# Patient Record
Sex: FEMALE | Race: WHITE | NOT HISPANIC OR LATINO | Employment: UNEMPLOYED | URBAN - METROPOLITAN AREA
[De-identification: names, ages, dates, MRNs, and addresses within clinical notes are randomized per-mention and may not be internally consistent; named-entity substitution may affect disease eponyms.]

---

## 2019-05-28 LAB
CHOLEST SERPL-MSCNC: 217 MG/DL (ref 0–200)
HDLC SERPL-MCNC: 35 MG/DL
LDLC SERPL CALC-MCNC: 147 MG/DL
TRIGL SERPL-MCNC: 176 MG/DL

## 2019-08-26 ENCOUNTER — PATIENT OUTREACH (OUTPATIENT)
Dept: ADMINISTRATIVE | Facility: HOSPITAL | Age: 39
End: 2019-08-26

## 2019-08-26 DIAGNOSIS — G47.33 OSA (OBSTRUCTIVE SLEEP APNEA): Primary | ICD-10-CM

## 2019-08-26 NOTE — LETTER
August 26, 2019    Mariana Mora  [address] [place] [address]             [facility]  [address]  [facility] [address]  Phone: [phone] Dear Mariana Mora    [facility] is committed to your overall health.  To help you get the most out of each of your visits, we will review your information to make sure you are up to date on all of your recommended tests and/or procedures.      As a new patient to EMILIA HAMILTON MD, we may not have your complete medical records.  He has found that your chart shows you may be due for:    ANNUAL LABS  PAP SMEAR    If you have had any of the above done at another facility, please bring the records or information with you so that your record at [facility] will be complete.      If you are currently taking medication, please bring it with you to your appointment for review.    Also, if you have any type of Advanced Directives, please bring them with you to your office visit so we may scan them into your chart.      Thank You,  Your [facility] Team  Lizeth Aguilar L.P.N. Clinical Care Coordinator  [address] [place] [address], [place] [address] [phone]

## 2019-09-04 ENCOUNTER — PROCEDURE VISIT (OUTPATIENT)
Dept: SLEEP MEDICINE | Facility: HOSPITAL | Age: 39
End: 2019-09-04
Attending: INTERNAL MEDICINE
Payer: COMMERCIAL

## 2019-09-04 DIAGNOSIS — G47.33 OSA (OBSTRUCTIVE SLEEP APNEA): ICD-10-CM

## 2019-09-04 PROCEDURE — 95806 SLEEP STUDY UNATT&RESP EFFT: CPT

## 2019-09-09 ENCOUNTER — OFFICE VISIT (OUTPATIENT)
Dept: FAMILY MEDICINE | Facility: CLINIC | Age: 39
End: 2019-09-09
Payer: COMMERCIAL

## 2019-09-09 VITALS
DIASTOLIC BLOOD PRESSURE: 79 MMHG | HEART RATE: 86 BPM | RESPIRATION RATE: 16 BRPM | OXYGEN SATURATION: 98 % | BODY MASS INDEX: 41.71 KG/M2 | WEIGHT: 259.5 LBS | SYSTOLIC BLOOD PRESSURE: 119 MMHG | HEIGHT: 66 IN

## 2019-09-09 DIAGNOSIS — K21.9 GASTROESOPHAGEAL REFLUX DISEASE WITHOUT ESOPHAGITIS: ICD-10-CM

## 2019-09-09 DIAGNOSIS — L40.50 PSORIATIC ARTHRITIS: ICD-10-CM

## 2019-09-09 DIAGNOSIS — F41.9 ANXIETY: Primary | ICD-10-CM

## 2019-09-09 PROCEDURE — 99203 PR OFFICE/OUTPT VISIT, NEW, LEVL III, 30-44 MIN: ICD-10-PCS | Mod: S$GLB,,, | Performed by: FAMILY MEDICINE

## 2019-09-09 PROCEDURE — 99203 OFFICE O/P NEW LOW 30 MIN: CPT | Mod: S$GLB,,, | Performed by: FAMILY MEDICINE

## 2019-09-09 PROCEDURE — 99999 PR PBB SHADOW E&M-EST. PATIENT-LVL IV: ICD-10-PCS | Mod: PBBFAC,,, | Performed by: FAMILY MEDICINE

## 2019-09-09 PROCEDURE — 99999 PR PBB SHADOW E&M-EST. PATIENT-LVL IV: CPT | Mod: PBBFAC,,, | Performed by: FAMILY MEDICINE

## 2019-09-09 PROCEDURE — 3008F BODY MASS INDEX DOCD: CPT | Mod: S$GLB,,, | Performed by: FAMILY MEDICINE

## 2019-09-09 PROCEDURE — 3008F PR BODY MASS INDEX (BMI) DOCUMENTED: ICD-10-PCS | Mod: S$GLB,,, | Performed by: FAMILY MEDICINE

## 2019-09-09 RX ORDER — LEVONORGESTREL / ETHINYL ESTRADIOL AND ETHINYL ESTRADIOL 150-30(84)
KIT ORAL
COMMUNITY
End: 2020-11-11 | Stop reason: SDUPTHER

## 2019-09-09 RX ORDER — SECUKINUMAB 150 MG/ML
INJECTION SUBCUTANEOUS
COMMUNITY
Start: 2019-08-08 | End: 2021-09-20

## 2019-09-09 RX ORDER — ERGOCALCIFEROL 1.25 MG/1
50000 CAPSULE ORAL
COMMUNITY
Start: 2019-07-11 | End: 2019-12-09

## 2019-09-09 RX ORDER — SERTRALINE HYDROCHLORIDE 50 MG/1
50 TABLET, FILM COATED ORAL DAILY
COMMUNITY
Start: 2019-07-09 | End: 2019-09-09 | Stop reason: SDUPTHER

## 2019-09-09 RX ORDER — APREMILAST 30 MG/1
30 TABLET, FILM COATED ORAL 2 TIMES DAILY
COMMUNITY
Start: 2019-08-28 | End: 2022-09-15

## 2019-09-09 RX ORDER — OMEPRAZOLE 40 MG/1
40 CAPSULE, DELAYED RELEASE ORAL 2 TIMES DAILY
COMMUNITY
End: 2019-09-09 | Stop reason: SDUPTHER

## 2019-09-09 RX ORDER — LEVOTHYROXINE SODIUM 75 UG/1
75 TABLET ORAL DAILY
COMMUNITY
Start: 2019-08-19 | End: 2020-02-20 | Stop reason: SDUPTHER

## 2019-09-09 RX ORDER — SERTRALINE HYDROCHLORIDE 50 MG/1
50 TABLET, FILM COATED ORAL DAILY
Qty: 30 TABLET | Refills: 11 | Status: SHIPPED | OUTPATIENT
Start: 2019-09-09 | End: 2020-03-12 | Stop reason: DRUGHIGH

## 2019-09-09 RX ORDER — RIZATRIPTAN BENZOATE 10 MG/1
10 TABLET, ORALLY DISINTEGRATING ORAL
COMMUNITY
Start: 2019-07-30 | End: 2020-05-22 | Stop reason: SDUPTHER

## 2019-09-09 RX ORDER — CETIRIZINE HYDROCHLORIDE 10 MG/1
10 TABLET ORAL DAILY
COMMUNITY
End: 2020-07-24

## 2019-09-09 RX ORDER — OMEPRAZOLE 40 MG/1
40 CAPSULE, DELAYED RELEASE ORAL 2 TIMES DAILY
Qty: 60 CAPSULE | Refills: 11 | Status: SHIPPED | OUTPATIENT
Start: 2019-09-09 | End: 2020-02-27

## 2019-09-09 NOTE — PROGRESS NOTES
"Subjective:       Patient ID: Mariana Mora is a 38 y.o. female.    Chief Complaint: Establish Care (BW completed w/Dr. Burkett, Pap completed w/[facility], Pt refused vaccinations) and Medication Refill    Establish care    Psoriatic arthritis  Seeing rheum and chiropractor, improved, stable    Anxiety  Stable  Would like to see a psychologist for non-medication options    GERD  Controlled with prilosec bid    Review of Systems   Constitutional: Negative for activity change, appetite change, chills, fatigue and fever.   HENT: Negative for congestion, dental problem, facial swelling, nosebleeds, postnasal drip, sinus pain, sore throat, trouble swallowing and voice change.    Eyes: Negative for pain, discharge and visual disturbance.   Respiratory: Negative for apnea, cough, chest tightness and shortness of breath.    Cardiovascular: Negative for chest pain and palpitations.   Gastrointestinal: Negative for abdominal pain, blood in stool, constipation and nausea.   Endocrine: Negative for cold intolerance, polydipsia and polyuria.   Genitourinary: Negative for difficulty urinating, enuresis and flank pain.   Musculoskeletal: Positive for arthralgias, back pain, gait problem and myalgias.   Skin: Negative for color change.   Allergic/Immunologic: Negative for environmental allergies and immunocompromised state.   Neurological: Negative for dizziness and light-headedness.   Hematological: Negative for adenopathy.   Psychiatric/Behavioral: Negative for agitation, behavioral problems, decreased concentration and dysphoric mood. The patient is not nervous/anxious.    All other systems reviewed and are negative.        Reviewed family, medical, surgical, and social history.    Objective:      /79 (BP Location: Left arm, Patient Position: Sitting, BP Method: Large (Automatic))   Pulse 86   Resp 16   Ht 5' 5.5" (1.664 m)   Wt 117.7 kg (259 lb 8 oz)   LMP  (Approximate) Comment: Irregular  SpO2 " 98%   BMI 42.53 kg/m²   Physical Exam   Constitutional: She is oriented to person, place, and time. She appears well-developed and well-nourished. No distress.   HENT:   Head: Normocephalic and atraumatic.   Nose: Nose normal.   Mouth/Throat: Oropharynx is clear and moist. No oropharyngeal exudate.   Eyes: Pupils are equal, round, and reactive to light. Conjunctivae and EOM are normal. No scleral icterus.   Neck: Normal range of motion. Neck supple. No thyromegaly present.   Cardiovascular: Normal rate, regular rhythm and normal heart sounds. Exam reveals no gallop and no friction rub.   No murmur heard.  Pulmonary/Chest: Effort normal and breath sounds normal. No respiratory distress. She has no wheezes. She has no rales. She exhibits no tenderness.   Abdominal: Soft. Bowel sounds are normal. She exhibits no distension. There is no tenderness. There is no guarding.   Musculoskeletal: Normal range of motion. She exhibits tenderness. She exhibits no edema or deformity.   Lymphadenopathy:     She has no cervical adenopathy.   Neurological: She is alert and oriented to person, place, and time. She displays normal reflexes. No cranial nerve deficit or sensory deficit. She exhibits normal muscle tone.   Skin: Skin is warm and dry. Rash noted. She is not diaphoretic. No erythema. No pallor.   Psychiatric: She has a normal mood and affect. Her behavior is normal. Judgment and thought content normal.   Nursing note and vitals reviewed.      Assessment:       1. Anxiety    2. Gastroesophageal reflux disease without esophagitis    3. Psoriatic arthritis        Plan:       Anxiety  -     sertraline (ZOLOFT) 50 MG tablet; Take 1 tablet (50 mg total) by mouth once daily.  Dispense: 30 tablet; Refill: 11  -     Ambulatory referral to Psychology    Gastroesophageal reflux disease without esophagitis  -     omeprazole (PRILOSEC) 40 MG capsule; Take 1 capsule (40 mg total) by mouth 2 (two) times daily.  Dispense: 60 capsule;  Refill: 11    Psoriatic arthritis            Risks, benefits, and side effects were discussed with the patient. All questions were answered to the fullest satisfaction of the patient, and pt verbalized understanding and agreement to treatment plan. Pt was to call with any new or worsening symptoms, or present to the ER.

## 2019-09-12 ENCOUNTER — PATIENT OUTREACH (OUTPATIENT)
Dept: ADMINISTRATIVE | Facility: HOSPITAL | Age: 39
End: 2019-09-12

## 2019-09-13 ENCOUNTER — TELEPHONE (OUTPATIENT)
Dept: FAMILY MEDICINE | Facility: CLINIC | Age: 39
End: 2019-09-13

## 2019-09-13 NOTE — TELEPHONE ENCOUNTER
----- Message from Lenora Arriola sent at 9/13/2019  1:59 PM CDT -----  Contact: self [phone]  Patient requesting a referral to [facility] in [place], [place].    Patient contact # [phone]

## 2019-09-13 NOTE — TELEPHONE ENCOUNTER
Referral faxed to [facility] [phone] per pt request.

## 2019-09-23 ENCOUNTER — PATIENT OUTREACH (OUTPATIENT)
Dept: ADMINISTRATIVE | Facility: HOSPITAL | Age: 39
End: 2019-09-23

## 2019-10-10 DIAGNOSIS — Z12.31 ENCOUNTER FOR SCREENING MAMMOGRAM FOR HIGH-RISK PATIENT: Primary | ICD-10-CM

## 2019-10-16 ENCOUNTER — TELEPHONE (OUTPATIENT)
Dept: FAMILY MEDICINE | Facility: CLINIC | Age: 39
End: 2019-10-16

## 2019-10-18 ENCOUNTER — TELEPHONE (OUTPATIENT)
Dept: PSYCHIATRY | Facility: CLINIC | Age: 39
End: 2019-10-18

## 2019-10-18 NOTE — TELEPHONE ENCOUNTER
Patient was transferred by phone room and she states she has been leaving messages to be schedule with Dr Swan please call to schedule

## 2019-10-25 DIAGNOSIS — G47.9 FATIGUE DUE TO SLEEP PATTERN DISTURBANCE: ICD-10-CM

## 2019-10-25 DIAGNOSIS — G47.33 OSA (OBSTRUCTIVE SLEEP APNEA): Primary | ICD-10-CM

## 2019-10-25 DIAGNOSIS — G47.19 EXCESSIVE DAYTIME SLEEPINESS: ICD-10-CM

## 2019-10-25 DIAGNOSIS — R06.83 SNORING: ICD-10-CM

## 2019-10-25 DIAGNOSIS — R53.83 FATIGUE DUE TO SLEEP PATTERN DISTURBANCE: ICD-10-CM

## 2019-11-08 ENCOUNTER — OFFICE VISIT (OUTPATIENT)
Dept: FAMILY MEDICINE | Facility: CLINIC | Age: 39
End: 2019-11-08
Payer: COMMERCIAL

## 2019-11-08 VITALS
WEIGHT: 263 LBS | DIASTOLIC BLOOD PRESSURE: 81 MMHG | HEART RATE: 80 BPM | SYSTOLIC BLOOD PRESSURE: 134 MMHG | BODY MASS INDEX: 42.27 KG/M2 | HEIGHT: 66 IN | OXYGEN SATURATION: 99 % | TEMPERATURE: 98 F | RESPIRATION RATE: 18 BRPM

## 2019-11-08 DIAGNOSIS — R13.10 DYSPHAGIA, UNSPECIFIED TYPE: Primary | ICD-10-CM

## 2019-11-08 PROCEDURE — 99213 OFFICE O/P EST LOW 20 MIN: CPT | Mod: S$GLB,,, | Performed by: NURSE PRACTITIONER

## 2019-11-08 PROCEDURE — 3008F BODY MASS INDEX DOCD: CPT | Mod: S$GLB,,, | Performed by: NURSE PRACTITIONER

## 2019-11-08 PROCEDURE — 99213 PR OFFICE/OUTPT VISIT, EST, LEVL III, 20-29 MIN: ICD-10-PCS | Mod: S$GLB,,, | Performed by: NURSE PRACTITIONER

## 2019-11-08 PROCEDURE — 3008F PR BODY MASS INDEX (BMI) DOCUMENTED: ICD-10-PCS | Mod: S$GLB,,, | Performed by: NURSE PRACTITIONER

## 2019-11-08 PROCEDURE — 99999 PR PBB SHADOW E&M-EST. PATIENT-LVL III: CPT | Mod: PBBFAC,,, | Performed by: NURSE PRACTITIONER

## 2019-11-08 PROCEDURE — 99999 PR PBB SHADOW E&M-EST. PATIENT-LVL III: ICD-10-PCS | Mod: PBBFAC,,, | Performed by: NURSE PRACTITIONER

## 2019-11-08 NOTE — PROGRESS NOTES
Subjective:       Patient ID: Mariana Mora is a 38 y.o. female.    Chief Complaint: Choking (having difficulity swallowing and its painful)    Ms. Mariana Mora is a 38 year old female who presents to the clinic today for difficulty swallowing. She reports this has been ongoing for quite some time, but states that it is getting worse. She reports she was eating a blueberry muffin this morning and felt her throat closing. She reports she was very anxious because she couldn't swallow. She reports a few months ago her rheumatologist suggested she decrease her otezla to once daily instead of BID to see if this improves her symptoms but didn't. She stated she couldn't eat steak the other night because the same issue occurred. She reports it happens almost daily, but progressively getting worse. Denies nausea or vomiting. She is taking omperzaole BID.     Health Maintenance:  - Refusing vaccine today     Review of Systems   Constitutional: Negative for activity change, appetite change, chills, fatigue and fever.   HENT: Positive for trouble swallowing. Negative for congestion, dental problem, facial swelling, nosebleeds, postnasal drip, sinus pain, sore throat and voice change.    Eyes: Negative for pain, discharge and visual disturbance.   Respiratory: Negative for apnea, cough, chest tightness and shortness of breath.    Cardiovascular: Negative for chest pain and palpitations.   Gastrointestinal: Negative for abdominal pain, blood in stool, constipation and nausea.   Endocrine: Negative for cold intolerance, polydipsia and polyuria.   Genitourinary: Negative for difficulty urinating, enuresis and flank pain.   Musculoskeletal: Positive for arthralgias, back pain, gait problem and myalgias.   Skin: Negative for color change.   Allergic/Immunologic: Negative for environmental allergies and immunocompromised state.   Neurological: Negative for dizziness and light-headedness.   Hematological: Negative for  "adenopathy.   Psychiatric/Behavioral: Negative for agitation, behavioral problems, decreased concentration and dysphoric mood. The patient is not nervous/anxious.    All other systems reviewed and are negative.        Reviewed family, medical, surgical, and social history.    Objective:      /81 (BP Location: Right arm, Patient Position: Sitting, BP Method: Large (Automatic))   Pulse 80   Temp 98.2 °F (36.8 °C) (Tympanic)   Resp 18   Ht 5' 5.5" (1.664 m)   Wt 119.3 kg (263 lb)   SpO2 99%   BMI 43.10 kg/m²   Physical Exam   Constitutional: She is oriented to person, place, and time. She appears well-developed and well-nourished. No distress.   HENT:   Head: Normocephalic and atraumatic.   Nose: Nose normal.   Mouth/Throat: Oropharynx is clear and moist. No oropharyngeal exudate.   Eyes: Pupils are equal, round, and reactive to light. Conjunctivae and EOM are normal. No scleral icterus.   Neck: Normal range of motion. Neck supple. No thyromegaly present.   Cardiovascular: Normal rate, regular rhythm and normal heart sounds. Exam reveals no gallop and no friction rub.   No murmur heard.  Pulmonary/Chest: Effort normal and breath sounds normal. No respiratory distress. She has no wheezes. She has no rales. She exhibits no tenderness.   Abdominal: Soft. Bowel sounds are normal. She exhibits no distension. There is no tenderness. There is no guarding.   Musculoskeletal: Normal range of motion. She exhibits tenderness. She exhibits no edema or deformity.   Lymphadenopathy:     She has no cervical adenopathy.   Neurological: She is alert and oriented to person, place, and time. She displays normal reflexes. No cranial nerve deficit or sensory deficit. She exhibits normal muscle tone.   Skin: Skin is warm and dry. Rash noted. She is not diaphoretic. No erythema. No pallor.   Psychiatric: She has a normal mood and affect. Her behavior is normal. Judgment and thought content normal.   Nursing note and vitals " reviewed.      Assessment:       1. Dysphagia, unspecified type        Plan:       Dysphagia, unspecified type  -     Ambulatory referral to Gastroenterology          PLAN:  - Discussed with patient the plan of care  - Medications reviewed. Medication side effects discussed. Patient has no questions or concerns at this time. Informed patient to notify me regarding any concerns.   - Continue monitoring symptoms   - Informed patient to please notify me with any questions or concerns at anytime  - Follow up ordered for December with Dr Stahl      Risks, benefits, and side effects were discussed with the patient. All questions were answered to the fullest satisfaction of the patient, and pt verbalized understanding and agreement to treatment plan. Pt was to call with any new or worsening symptoms, or present to the ER.

## 2019-11-14 ENCOUNTER — OFFICE VISIT (OUTPATIENT)
Dept: GASTROENTEROLOGY | Facility: CLINIC | Age: 39
End: 2019-11-14
Payer: COMMERCIAL

## 2019-11-14 VITALS
SYSTOLIC BLOOD PRESSURE: 126 MMHG | OXYGEN SATURATION: 98 % | WEIGHT: 266 LBS | BODY MASS INDEX: 44.32 KG/M2 | HEIGHT: 65 IN | RESPIRATION RATE: 16 BRPM | HEART RATE: 90 BPM | DIASTOLIC BLOOD PRESSURE: 86 MMHG

## 2019-11-14 DIAGNOSIS — K21.00 GASTROESOPHAGEAL REFLUX DISEASE WITH ESOPHAGITIS: ICD-10-CM

## 2019-11-14 DIAGNOSIS — R13.10 DYSPHAGIA, UNSPECIFIED TYPE: ICD-10-CM

## 2019-11-14 DIAGNOSIS — R10.9 ABDOMINAL PAIN, UNSPECIFIED ABDOMINAL LOCATION: Primary | ICD-10-CM

## 2019-11-14 PROCEDURE — 99999 PR PBB SHADOW E&M-EST. PATIENT-LVL III: ICD-10-PCS | Mod: PBBFAC,,, | Performed by: INTERNAL MEDICINE

## 2019-11-14 PROCEDURE — 99203 OFFICE O/P NEW LOW 30 MIN: CPT | Mod: S$GLB,,, | Performed by: INTERNAL MEDICINE

## 2019-11-14 PROCEDURE — 99999 PR PBB SHADOW E&M-EST. PATIENT-LVL III: CPT | Mod: PBBFAC,,, | Performed by: INTERNAL MEDICINE

## 2019-11-14 PROCEDURE — 99203 PR OFFICE/OUTPT VISIT, NEW, LEVL III, 30-44 MIN: ICD-10-PCS | Mod: S$GLB,,, | Performed by: INTERNAL MEDICINE

## 2019-11-14 PROCEDURE — 3008F PR BODY MASS INDEX (BMI) DOCUMENTED: ICD-10-PCS | Mod: S$GLB,,, | Performed by: INTERNAL MEDICINE

## 2019-11-14 PROCEDURE — 3008F BODY MASS INDEX DOCD: CPT | Mod: S$GLB,,, | Performed by: INTERNAL MEDICINE

## 2019-11-14 NOTE — PROGRESS NOTES
Subjective:       Patient ID: Mariana Mora is a 38 y.o. female.    Chief Complaint: Dysphagia (off and on for years but every day now) and Heartburn    She is here for dysphagia.  She has a long history of gastroesophageal reflux disease but on the omeprazole.  Her family history is negative for gastrointestinal disease but is positive for diabetes arises and heart disease. She has experienced dysphagia.  It all started in the last 4-5 months.  She was put on otezla for the psoriasis and arthritis.  She had gastrointestinal problems and the dosage had to be reduced.  She is not sure whether cause the dysphagia but she did have gastrointestinal upset and diarrhea.  She denies hematemesis hematochezia aspiration jaundice or melena.  She generally has daily bowel movements.  A couple months ago she had ENT surgery.  She was put on the prednisone and stated she was improved.  She had a sore throat for 2 weeks after the surgery.  She describes having an endotracheal tube.  She denies aspiration or respiratory symptoms.  She states when she swallows that it sticks .  And she points to her throat.  She can cough and the food will come out through her mouth.  She denies difficulty breathing.  She sometimes feels as if if burp a few times it comes upper or goes down .  She denies fever chills or weight loss.      Allergies:  Review of patient's allergies indicates:   Allergen Reactions    Penicillins     Prednisone Other (See Comments) and Rash     PSORIASIS  PSORIASIS       Medications:    Current Outpatient Medications:     cetirizine (ZYRTEC) 10 MG tablet, Take 10 mg by mouth once daily., Disp: , Rfl:     COSENTYX PEN, 2 PENS, 150 mg/mL PnIj, , Disp: , Rfl:     folic acid-vit B6-vit B12 2.5-25-2 mg (FOLBIC) 2.5-25-2 mg Tab, Take 1 tablet by mouth once daily., Disp: , Rfl:     L norgest/e.estradiol-e.estrad (SEASONIQUE) 0.15 mg-30 mcg (84)/10 mcg (7) 3MPk, Take by mouth., Disp: , Rfl:     omeprazole  (PRILOSEC) 40 MG capsule, Take 1 capsule (40 mg total) by mouth 2 (two) times daily., Disp: 60 capsule, Rfl: 11    OTEZLA 30 mg Tab, Take 30 mg by mouth 2 (two) times daily., Disp: , Rfl:     rizatriptan (MAXALT-MLT) 10 MG disintegrating tablet, 10 mg., Disp: , Rfl:     sertraline (ZOLOFT) 50 MG tablet, Take 1 tablet (50 mg total) by mouth once daily., Disp: 30 tablet, Rfl: 11    SYNTHROID 75 mcg tablet, Take 75 mcg by mouth once daily., Disp: , Rfl:     VITAMIN D2 50,000 unit capsule, Take 50,000 Units by mouth every 7 days. , Disp: , Rfl:     Past Medical History:   Diagnosis Date    Anxiety     Depression     Hypertension     Hypothyroidism     Migraine headache     Psoriasis     TMJ (temporomandibular joint syndrome)        Past Surgical History:   Procedure Laterality Date    TYMPANOPLASTY      bilateral    TYMPANOSTOMY TUBE PLACEMENT           Review of Systems   Constitutional: Negative for appetite change, fever and unexpected weight change.   HENT: Positive for trouble swallowing.         No jaundice.   Respiratory: Negative for cough, shortness of breath and wheezing.         She does not use tobacco alcohol.  She denies chronic cough sputum production hemoptysis.   Cardiovascular: Negative for chest pain.   Gastrointestinal: Negative for abdominal distention, abdominal pain, anal bleeding, blood in stool, constipation, diarrhea, nausea, rectal pain and vomiting.   Musculoskeletal: Positive for arthralgias. Negative for back pain and neck pain.   Skin: Negative for pallor and rash.        She has psoriasis and is followed by her dermatologist.   Neurological: Negative for dizziness, seizures, syncope, speech difficulty, weakness and numbness.   Hematological: Negative for adenopathy.   Psychiatric/Behavioral: Negative for confusion.       Objective:      Physical Exam   Constitutional: She is oriented to person, place, and time. She appears well-developed and well-nourished.   Well-nourished  well-hydrated overweight nonicteric white female.   HENT:   Head: Normocephalic.   Eyes: Pupils are equal, round, and reactive to light. EOM are normal.   Neck: Normal range of motion. Neck supple. No tracheal deviation present. No thyromegaly present.   Cardiovascular: Normal rate, regular rhythm and normal heart sounds.   Pulmonary/Chest: Effort normal and breath sounds normal.   Abdominal: Soft. Bowel sounds are normal. She exhibits no distension and no mass. There is no tenderness. There is no rebound and no guarding. No hernia.   The abdomen is soft obese without tenderness masses organomegaly.  Bowel sounds are normal.   Musculoskeletal: Normal range of motion.   She can ambulate normally.  She can go from the sitting to the standing position without difficulty.  She get on the examination table without difficulty or assistance.   Lymphadenopathy:     She has no cervical adenopathy.   Neurological: She is alert and oriented to person, place, and time. No cranial nerve deficit.   Skin: Skin is warm and dry.   Psychiatric: She has a normal mood and affect. Her behavior is normal.   Vitals reviewed.        Plan:       Abdominal pain, unspecified abdominal location  -     FL Esophagram Complete; Future; Expected date: 11/14/2019  -     FL Upper GI With KUB; Future; Expected date: 11/14/2019    Dysphagia, unspecified type  -     FL Esophagram Complete; Future; Expected date: 11/14/2019  -     FL Upper GI With KUB; Future; Expected date: 11/14/2019    Gastroesophageal reflux disease with esophagitis     She will continue her reflux regimen in chew her food very carefully. She continues the omeprazole another medication.  Weight reduction would be ideal.  A barium swallow and upper GI series will be scheduled initially.

## 2019-11-14 NOTE — LETTER
November 14, 2019      Katia Torres, NP  [address]  [place] [address]           [facility]  [address], [address]  [place] [address]  Phone: [phone]  Fax: [phone]          Patient: Mariana Mora   MR Number: [id number]   YOB: 1980   Date of Visit: 11/14/2019       Dear Katia Torres:    Thank you for referring Mariana Mora to me for evaluation. Attached you will find relevant portions of my assessment and plan of care.    If you have questions, please do not hesitate to call me. I look forward to following Mariana Mora along with you.    Sincerely,    David Stahl MD    Enclosure  CC:  No Recipients    If you would like to receive this communication electronically, please contact [email or web address] or [phone] to request more information on [employer] access.    For providers and/or their staff who would like to refer a patient to [facility], please contact us through our one-stop-shop provider referral line, [facility] , at [phone].    If you feel you have received this communication in error or would no longer like to receive these types of communications, please e-mail [email or web address]

## 2019-11-14 NOTE — PATIENT INSTRUCTIONS
She will continue the omeprazole and current medications.  Barium swallow and upper GI series will be obtained to evaluate the dysphagia.  In the interval she continues her other medications.  She will be very careful with her oral intake and try to avoid the things that upset her such is the breads.  She will chew her food well.

## 2019-11-18 ENCOUNTER — PROCEDURE VISIT (OUTPATIENT)
Dept: SLEEP MEDICINE | Facility: HOSPITAL | Age: 39
End: 2019-11-18
Attending: INTERNAL MEDICINE
Payer: COMMERCIAL

## 2019-11-18 DIAGNOSIS — G47.9 FATIGUE DUE TO SLEEP PATTERN DISTURBANCE: ICD-10-CM

## 2019-11-18 DIAGNOSIS — R53.83 FATIGUE DUE TO SLEEP PATTERN DISTURBANCE: ICD-10-CM

## 2019-11-18 DIAGNOSIS — G47.19 EXCESSIVE DAYTIME SLEEPINESS: ICD-10-CM

## 2019-11-18 DIAGNOSIS — G47.33 OSA (OBSTRUCTIVE SLEEP APNEA): ICD-10-CM

## 2019-11-18 DIAGNOSIS — R06.83 SNORING: ICD-10-CM

## 2019-11-18 PROCEDURE — 95811 POLYSOM 6/>YRS CPAP 4/> PARM: CPT

## 2019-11-22 ENCOUNTER — HOSPITAL ENCOUNTER (OUTPATIENT)
Dept: RADIOLOGY | Facility: HOSPITAL | Age: 39
Discharge: HOME | End: 2019-11-22
Attending: NURSE PRACTITIONER
Payer: COMMERCIAL

## 2019-11-22 ENCOUNTER — HOSPITAL ENCOUNTER (OUTPATIENT)
Dept: RADIOLOGY | Facility: HOSPITAL | Age: 39
Discharge: HOME | End: 2019-11-22
Attending: INTERNAL MEDICINE
Payer: COMMERCIAL

## 2019-11-22 VITALS — BODY MASS INDEX: 44.08 KG/M2 | WEIGHT: 264.56 LBS | HEIGHT: 65 IN

## 2019-11-22 DIAGNOSIS — R13.10 DYSPHAGIA, UNSPECIFIED TYPE: ICD-10-CM

## 2019-11-22 DIAGNOSIS — Z12.31 ENCOUNTER FOR SCREENING MAMMOGRAM FOR HIGH-RISK PATIENT: ICD-10-CM

## 2019-11-22 DIAGNOSIS — R10.9 ABDOMINAL PAIN, UNSPECIFIED ABDOMINAL LOCATION: ICD-10-CM

## 2019-11-22 PROCEDURE — 74241 FL UPPER GI W KUB TO INCLUDE ESOPHAGRAM: CPT | Mod: 26,,, | Performed by: RADIOLOGY

## 2019-11-22 PROCEDURE — 77067 SCR MAMMO BI INCL CAD: CPT | Mod: 26,,, | Performed by: RADIOLOGY

## 2019-11-22 PROCEDURE — 25500020 PHARM REV CODE 255: Performed by: INTERNAL MEDICINE

## 2019-11-22 PROCEDURE — 77063 BREAST TOMOSYNTHESIS BI: CPT | Mod: TC

## 2019-11-22 PROCEDURE — A9698 NON-RAD CONTRAST MATERIALNOC: HCPCS | Performed by: INTERNAL MEDICINE

## 2019-11-22 PROCEDURE — 77063 MAMMO DIGITAL SCREENING BILAT WITH TOMOSYNTHESIS_CAD: ICD-10-PCS | Mod: 26,,, | Performed by: RADIOLOGY

## 2019-11-22 PROCEDURE — 77063 BREAST TOMOSYNTHESIS BI: CPT | Mod: 26,,, | Performed by: RADIOLOGY

## 2019-11-22 PROCEDURE — 74241 FL UPPER GI W KUB TO INCLUDE ESOPHAGRAM: ICD-10-PCS | Mod: 26,,, | Performed by: RADIOLOGY

## 2019-11-22 PROCEDURE — 77067 MAMMO DIGITAL SCREENING BILAT WITH TOMOSYNTHESIS_CAD: ICD-10-PCS | Mod: 26,,, | Performed by: RADIOLOGY

## 2019-11-22 PROCEDURE — 74241 FL UPPER GI W KUB TO INCLUDE ESOPHAGRAM: CPT | Mod: TC,FY

## 2019-11-22 RX ADMIN — BARIUM SULFATE 135 ML: 980 POWDER, FOR SUSPENSION ORAL at 09:11

## 2019-11-25 ENCOUNTER — PATIENT OUTREACH (OUTPATIENT)
Dept: ADMINISTRATIVE | Facility: HOSPITAL | Age: 39
End: 2019-11-25

## 2019-12-05 ENCOUNTER — OFFICE VISIT (OUTPATIENT)
Dept: GASTROENTEROLOGY | Facility: CLINIC | Age: 39
End: 2019-12-05
Payer: COMMERCIAL

## 2019-12-05 VITALS
OXYGEN SATURATION: 96 % | BODY MASS INDEX: 44.15 KG/M2 | WEIGHT: 265 LBS | HEIGHT: 65 IN | DIASTOLIC BLOOD PRESSURE: 88 MMHG | SYSTOLIC BLOOD PRESSURE: 128 MMHG | HEART RATE: 78 BPM | RESPIRATION RATE: 16 BRPM

## 2019-12-05 DIAGNOSIS — E66.01 OBESITY, CLASS III, BMI 40-49.9 (MORBID OBESITY): ICD-10-CM

## 2019-12-05 DIAGNOSIS — K21.9 GASTROESOPHAGEAL REFLUX DISEASE, ESOPHAGITIS PRESENCE NOT SPECIFIED: Primary | ICD-10-CM

## 2019-12-05 DIAGNOSIS — K21.9 GASTROESOPHAGEAL REFLUX DISEASE, ESOPHAGITIS PRESENCE NOT SPECIFIED: ICD-10-CM

## 2019-12-05 DIAGNOSIS — K44.9 HIATAL HERNIA: ICD-10-CM

## 2019-12-05 DIAGNOSIS — R13.10 DYSPHAGIA, UNSPECIFIED TYPE: Primary | ICD-10-CM

## 2019-12-05 DIAGNOSIS — K21.9 GERD (GASTROESOPHAGEAL REFLUX DISEASE): ICD-10-CM

## 2019-12-05 DIAGNOSIS — Z01.818 PREOP EXAMINATION: ICD-10-CM

## 2019-12-05 PROCEDURE — 99999 PR PBB SHADOW E&M-EST. PATIENT-LVL III: ICD-10-PCS | Mod: PBBFAC,,, | Performed by: INTERNAL MEDICINE

## 2019-12-05 PROCEDURE — 99999 PR PBB SHADOW E&M-EST. PATIENT-LVL III: CPT | Mod: PBBFAC,,, | Performed by: INTERNAL MEDICINE

## 2019-12-05 PROCEDURE — 3008F PR BODY MASS INDEX (BMI) DOCUMENTED: ICD-10-PCS | Mod: S$GLB,,, | Performed by: INTERNAL MEDICINE

## 2019-12-05 PROCEDURE — 99213 PR OFFICE/OUTPT VISIT, EST, LEVL III, 20-29 MIN: ICD-10-PCS | Mod: S$GLB,,, | Performed by: INTERNAL MEDICINE

## 2019-12-05 PROCEDURE — 3008F BODY MASS INDEX DOCD: CPT | Mod: S$GLB,,, | Performed by: INTERNAL MEDICINE

## 2019-12-05 PROCEDURE — 99213 OFFICE O/P EST LOW 20 MIN: CPT | Mod: S$GLB,,, | Performed by: INTERNAL MEDICINE

## 2019-12-05 NOTE — PATIENT INSTRUCTIONS
The x-rays revealed a hiatal hernia.  Because of the severe indigestion Pepcid will be added in the evening and she will continue the omeprazole in the morning.  Weight loss would be ideal.  She will try to avoid oral intake in the evening and avoid the foods that upset her such as the tomato gravies and fatty foods.  She will be scheduled for upper endoscopy.  Additionally she will need esophageal motility study.

## 2019-12-05 NOTE — LETTER
December 9, 2019      John Stahl MD  [address]  [place] [address]           [facility]  [place] [facility]  [address]  [place] [address]  Phone: [phone]  Fax: [phone]          Patient: Mariana Mora   MR Number: [id number]   YOB: 1980   Date of Visit: 12/5/2019       Dear Dr. John Stahl:    Thank you for referring Mariana Mora to me for evaluation. Attached you will find relevant portions of my assessment and plan of care.    If you have questions, please do not hesitate to call me. I look forward to following Mariana Mora along with you.    Sincerely,    David Stahl MD    Enclosure  CC:  No Recipients    If you would like to receive this communication electronically, please contact [email or web address] or [phone] to request more information on [employer] Link access.    For providers and/or their staff who would like to refer a patient to [facility], please contact us through our one-stop-shop provider referral line, [facility], at [phone].    If you feel you have received this communication in error or would no longer like to receive these types of communications, please e-mail [email or web address]

## 2019-12-09 ENCOUNTER — OFFICE VISIT (OUTPATIENT)
Dept: FAMILY MEDICINE | Facility: CLINIC | Age: 39
End: 2019-12-09
Payer: COMMERCIAL

## 2019-12-09 VITALS
SYSTOLIC BLOOD PRESSURE: 113 MMHG | DIASTOLIC BLOOD PRESSURE: 70 MMHG | WEIGHT: 264.19 LBS | OXYGEN SATURATION: 98 % | HEART RATE: 77 BPM | BODY MASS INDEX: 42.46 KG/M2 | RESPIRATION RATE: 20 BRPM | HEIGHT: 66 IN

## 2019-12-09 DIAGNOSIS — E03.9 HYPOTHYROIDISM, UNSPECIFIED TYPE: ICD-10-CM

## 2019-12-09 DIAGNOSIS — F41.9 ANXIETY: Primary | ICD-10-CM

## 2019-12-09 DIAGNOSIS — E55.9 VITAMIN D DEFICIENCY: ICD-10-CM

## 2019-12-09 PROBLEM — E66.01 OBESITY, CLASS III, BMI 40-49.9 (MORBID OBESITY): Status: ACTIVE | Noted: 2019-12-09

## 2019-12-09 PROBLEM — E66.813 OBESITY, CLASS III, BMI 40-49.9 (MORBID OBESITY): Status: ACTIVE | Noted: 2019-12-09

## 2019-12-09 PROBLEM — K44.9 HIATAL HERNIA: Status: ACTIVE | Noted: 2019-12-09

## 2019-12-09 PROBLEM — Z01.818 PREOP EXAMINATION: Status: ACTIVE | Noted: 2019-12-09

## 2019-12-09 PROCEDURE — 99213 OFFICE O/P EST LOW 20 MIN: CPT | Mod: S$GLB,,, | Performed by: FAMILY MEDICINE

## 2019-12-09 PROCEDURE — 99999 PR PBB SHADOW E&M-EST. PATIENT-LVL III: ICD-10-PCS | Mod: PBBFAC,,, | Performed by: FAMILY MEDICINE

## 2019-12-09 PROCEDURE — 99213 PR OFFICE/OUTPT VISIT, EST, LEVL III, 20-29 MIN: ICD-10-PCS | Mod: S$GLB,,, | Performed by: FAMILY MEDICINE

## 2019-12-09 PROCEDURE — 99999 PR PBB SHADOW E&M-EST. PATIENT-LVL III: CPT | Mod: PBBFAC,,, | Performed by: FAMILY MEDICINE

## 2019-12-09 PROCEDURE — 3008F PR BODY MASS INDEX (BMI) DOCUMENTED: ICD-10-PCS | Mod: S$GLB,,, | Performed by: FAMILY MEDICINE

## 2019-12-09 PROCEDURE — 3008F BODY MASS INDEX DOCD: CPT | Mod: S$GLB,,, | Performed by: FAMILY MEDICINE

## 2019-12-09 RX ORDER — FAMOTIDINE 40 MG/1
40 TABLET, FILM COATED ORAL NIGHTLY
Qty: 30 TABLET | Refills: 11 | Status: SHIPPED | OUTPATIENT
Start: 2019-12-09 | End: 2020-02-27 | Stop reason: SDUPTHER

## 2019-12-09 NOTE — H&P (VIEW-ONLY)
Subjective:       Patient ID: Mariana Mora is a 39 y.o. female.    Chief Complaint: Follow-up    She still has occasional dysphagia but denies acute obstruction.  The barium swallow revealed a hiatal hernia.  She complains of pyrosis and she states that the omeprazole has not controlled her symptoms.  She denies hematemesis hematochezia aspiration jaundice or bleeding.  She generally has daily bowel movements.      Allergies:  Review of patient's allergies indicates:   Allergen Reactions    Penicillins     Prednisone Other (See Comments) and Rash     PSORIASIS  PSORIASIS       Medications:    Current Outpatient Medications:     cetirizine (ZYRTEC) 10 MG tablet, Take 10 mg by mouth once daily., Disp: , Rfl:     COSENTYX PEN, 2 PENS, 150 mg/mL PnIj, , Disp: , Rfl:     folic acid-vit B6-vit B12 2.5-25-2 mg (FOLBIC) 2.5-25-2 mg Tab, Take 1 tablet by mouth once daily., Disp: , Rfl:     L norgest/e.estradiol-e.estrad (SEASONIQUE) 0.15 mg-30 mcg (84)/10 mcg (7) 3MPk, Take by mouth., Disp: , Rfl:     omeprazole (PRILOSEC) 40 MG capsule, Take 1 capsule (40 mg total) by mouth 2 (two) times daily., Disp: 60 capsule, Rfl: 11    OTEZLA 30 mg Tab, Take 30 mg by mouth 2 (two) times daily., Disp: , Rfl:     rizatriptan (MAXALT-MLT) 10 MG disintegrating tablet, 10 mg., Disp: , Rfl:     sertraline (ZOLOFT) 50 MG tablet, Take 1 tablet (50 mg total) by mouth once daily., Disp: 30 tablet, Rfl: 11    SYNTHROID 75 mcg tablet, Take 75 mcg by mouth once daily., Disp: , Rfl:     famotidine (PEPCID) 40 MG tablet, Take 1 tablet (40 mg total) by mouth every evening., Disp: 30 tablet, Rfl: 11    Past Medical History:   Diagnosis Date    Anxiety     Depression     Hypertension     Hypothyroidism     Migraine headache     Psoriasis     TMJ (temporomandibular joint syndrome)        Past Surgical History:   Procedure Laterality Date    INNER EAR SURGERY Right     NASAL SEPTUM SURGERY      TYMPANOPLASTY      bilateral     TYMPANOSTOMY TUBE PLACEMENT           Review of Systems   Constitutional: Negative for appetite change, fever and unexpected weight change.   HENT: Positive for trouble swallowing.         No jaundice.   Respiratory: Negative for cough, shortness of breath and wheezing.         She denies tobacco or alcohol.  She denies dyspnea on exertion.  She denies dysphagia hemoptysis chronic cough chronic sputum production.   Cardiovascular: Negative for chest pain.        She denies exertional chest pain or rhythm disturbance.   Gastrointestinal: Negative for abdominal distention, abdominal pain, anal bleeding, blood in stool, constipation and diarrhea.   Endocrine:        She states her hypothyroidism is well controlled on the current medications.   Musculoskeletal: Negative for back pain and neck pain.   Skin: Negative for pallor and rash.   Neurological: Negative for dizziness, seizures, syncope, speech difficulty, weakness and numbness.   Hematological: Negative for adenopathy.   Psychiatric/Behavioral: Negative for confusion.       Objective:      Physical Exam   Constitutional: She is oriented to person, place, and time. She appears well-developed and well-nourished.   rished well-nourished well-hydrated obese nonicteric white female.   HENT:   Head: Normocephalic.   Eyes: Pupils are equal, round, and reactive to light. EOM are normal.   Neck: Normal range of motion. Neck supple. No tracheal deviation present. No thyromegaly present.   Cardiovascular: Normal rate, regular rhythm and normal heart sounds.   Pulmonary/Chest: Effort normal and breath sounds normal.   Abdominal: Soft. Bowel sounds are normal. She exhibits no distension and no mass. There is no tenderness. There is no rebound and no guarding. No hernia.   The abdomen is soft without tenderness masses organomegaly.  Bowel sounds are normal   Musculoskeletal: Normal range of motion.   She can ambulate normally.  She can go from the sitting to the standing  position without difficulty.   Lymphadenopathy:     She has no cervical adenopathy.   Neurological: She is alert and oriented to person, place, and time. No cranial nerve deficit.   Skin: Skin is warm and dry.   Psychiatric: She has a normal mood and affect. Her behavior is normal.   Vitals reviewed.        Plan:       Dysphagia, unspecified type  -     Manometry esophageal; Future    Preop examination  -     EKG RHYTHM STRIP (to Muse); Future    Gastroesophageal reflux disease, esophagitis presence not specified  -     famotidine (PEPCID) 40 MG tablet; Take 1 tablet (40 mg total) by mouth every evening.  Dispense: 30 tablet; Refill: 11    Hiatal hernia    Obesity, Class III, BMI 40-49.9 (morbid obesity)     She will continue her reflux regimen.  I have encouraged weight reduction.  She will be scheduled for upper endoscopy.  She has good health knowledge.  She understands the procedures of upper endoscopy.  Specifically she realizes there is an extremely small incidence of bleeding or perforation.  Pepcid will be added in the evening.  She will make a food diary and avoid the offending foods.  She continues her vitamins and minerals.

## 2019-12-09 NOTE — PROGRESS NOTES
"Subjective:       Patient ID: Mariana Mora is a 39 y.o. female.    Chief Complaint: Follow-up (Pt refused vaccinations)    Pt states that hypothyroidism is well controlled  No issues/side effects  Compliant with treatment regimen    Pt states that anxiety is well controlled  No issues/side effects  Compliant with treatment regimen    Review of Systems   Constitutional: Negative for activity change, appetite change, chills, fatigue and fever.   HENT: Negative for congestion, dental problem, facial swelling, nosebleeds, postnasal drip, sinus pain, sore throat, trouble swallowing and voice change.    Eyes: Negative for pain, discharge and visual disturbance.   Respiratory: Negative for apnea, cough, chest tightness and shortness of breath.    Cardiovascular: Negative for chest pain and palpitations.   Gastrointestinal: Negative for abdominal pain, blood in stool, constipation and nausea.   Endocrine: Negative for cold intolerance, polydipsia and polyuria.   Genitourinary: Negative for difficulty urinating, enuresis and flank pain.   Musculoskeletal: Negative for arthralgias and back pain.   Skin: Negative for color change.   Allergic/Immunologic: Negative for environmental allergies and immunocompromised state.   Neurological: Negative for dizziness and light-headedness.   Hematological: Negative for adenopathy.   Psychiatric/Behavioral: Negative for agitation, behavioral problems, decreased concentration and dysphoric mood. The patient is not nervous/anxious.    All other systems reviewed and are negative.        Reviewed family, medical, surgical, and social history.    Objective:      /70 (BP Location: Left arm, Patient Position: Sitting, BP Method: Large (Automatic))   Pulse 77   Resp 20   Ht 5' 6" (1.676 m)   Wt 119.8 kg (264 lb 3.2 oz)   SpO2 98%   BMI 42.64 kg/m²   Physical Exam   Constitutional: She is oriented to person, place, and time. She appears well-developed and well-nourished. No " distress.   HENT:   Head: Normocephalic and atraumatic.   Nose: Nose normal.   Mouth/Throat: Oropharynx is clear and moist. No oropharyngeal exudate.   Eyes: Pupils are equal, round, and reactive to light. Conjunctivae and EOM are normal. No scleral icterus.   Neck: Normal range of motion. Neck supple. No thyromegaly present.   Cardiovascular: Normal rate, regular rhythm and normal heart sounds. Exam reveals no gallop and no friction rub.   No murmur heard.  Pulmonary/Chest: Effort normal and breath sounds normal. No respiratory distress. She has no wheezes. She has no rales. She exhibits no tenderness.   Abdominal: Soft. Bowel sounds are normal. She exhibits no distension. There is no tenderness. There is no guarding.   Musculoskeletal: Normal range of motion. She exhibits no edema, tenderness or deformity.   Lymphadenopathy:     She has no cervical adenopathy.   Neurological: She is alert and oriented to person, place, and time. She displays normal reflexes. No cranial nerve deficit or sensory deficit. She exhibits normal muscle tone.   Skin: Skin is warm and dry. No rash noted. She is not diaphoretic. No erythema. No pallor.   Psychiatric: She has a normal mood and affect. Her behavior is normal. Judgment and thought content normal.   Nursing note and vitals reviewed.      Assessment:       1. Anxiety    2. Hypothyroidism, unspecified type    3. Vitamin D deficiency        Plan:       Anxiety  -     Comprehensive metabolic panel; Future; Expected date: 12/09/2019  -     CBC auto differential; Future; Expected date: 12/09/2019  -     Lipid panel; Future; Expected date: 12/09/2019  -     TSH; Future; Expected date: 12/09/2019  -     T4, free; Future; Expected date: 12/09/2019  -     Vitamin D; Future; Expected date: 12/09/2019    Hypothyroidism, unspecified type  -     Comprehensive metabolic panel; Future; Expected date: 12/09/2019  -     CBC auto differential; Future; Expected date: 12/09/2019  -     Lipid panel;  Future; Expected date: 12/09/2019  -     TSH; Future; Expected date: 12/09/2019  -     T4, free; Future; Expected date: 12/09/2019  -     Vitamin D; Future; Expected date: 12/09/2019    Vitamin D deficiency  -     Comprehensive metabolic panel; Future; Expected date: 12/09/2019  -     CBC auto differential; Future; Expected date: 12/09/2019  -     Lipid panel; Future; Expected date: 12/09/2019  -     TSH; Future; Expected date: 12/09/2019  -     T4, free; Future; Expected date: 12/09/2019  -     Vitamin D; Future; Expected date: 12/09/2019            Risks, benefits, and side effects were discussed with the patient. All questions were answered to the fullest satisfaction of the patient, and pt verbalized understanding and agreement to treatment plan. Pt was to call with any new or worsening symptoms, or present to the ER.

## 2019-12-11 ENCOUNTER — TELEPHONE (OUTPATIENT)
Dept: ENDOSCOPY | Facility: HOSPITAL | Age: 39
End: 2019-12-11

## 2019-12-17 ENCOUNTER — ANESTHESIA (OUTPATIENT)
Dept: SURGERY | Facility: HOSPITAL | Age: 39
End: 2019-12-17
Payer: COMMERCIAL

## 2019-12-17 ENCOUNTER — ANESTHESIA EVENT (OUTPATIENT)
Dept: SURGERY | Facility: HOSPITAL | Age: 39
End: 2019-12-17
Payer: COMMERCIAL

## 2019-12-17 ENCOUNTER — HOSPITAL ENCOUNTER (OUTPATIENT)
Facility: HOSPITAL | Age: 39
Discharge: HOME | End: 2019-12-17
Attending: INTERNAL MEDICINE | Admitting: INTERNAL MEDICINE
Payer: COMMERCIAL

## 2019-12-17 DIAGNOSIS — K21.9 GASTROESOPHAGEAL REFLUX DISEASE, ESOPHAGITIS PRESENCE NOT SPECIFIED: ICD-10-CM

## 2019-12-17 DIAGNOSIS — K21.9 GERD (GASTROESOPHAGEAL REFLUX DISEASE): ICD-10-CM

## 2019-12-17 LAB
ANION GAP SERPL CALC-SCNC: 8 MMOL/L (ref 8–16)
B-HCG UR QL: NEGATIVE
BASOPHILS # BLD AUTO: 0.04 K/UL (ref 0–0.2)
BASOPHILS NFR BLD: 0.5 % (ref 0–1.9)
BUN SERPL-MCNC: 13 MG/DL (ref 6–20)
CALCIUM SERPL-MCNC: 8.8 MG/DL (ref 8.7–10.5)
CHLORIDE SERPL-SCNC: 106 MMOL/L (ref 95–110)
CO2 SERPL-SCNC: 24 MMOL/L (ref 23–29)
CREAT SERPL-MCNC: 0.8 MG/DL (ref 0.5–1.4)
DIFFERENTIAL METHOD: ABNORMAL
EOSINOPHIL # BLD AUTO: 0.3 K/UL (ref 0–0.5)
EOSINOPHIL NFR BLD: 3.6 % (ref 0–8)
ERYTHROCYTE [DISTWIDTH] IN BLOOD BY AUTOMATED COUNT: 12.5 % (ref 11.5–14.5)
EST. GFR  (AFRICAN AMERICAN): >60 ML/MIN/1.73 M^2
EST. GFR  (NON AFRICAN AMERICAN): >60 ML/MIN/1.73 M^2
GLUCOSE SERPL-MCNC: 89 MG/DL (ref 70–110)
HCT VFR BLD AUTO: 40.9 % (ref 37–48.5)
HGB BLD-MCNC: 14 G/DL (ref 12–16)
IMM GRANULOCYTES # BLD AUTO: 0.03 K/UL (ref 0–0.04)
IMM GRANULOCYTES NFR BLD AUTO: 0.4 % (ref 0–0.5)
LYMPHOCYTES # BLD AUTO: 2.5 K/UL (ref 1–4.8)
LYMPHOCYTES NFR BLD: 33 % (ref 18–48)
MCH RBC QN AUTO: 31.3 PG (ref 27–31)
MCHC RBC AUTO-ENTMCNC: 34.2 G/DL (ref 32–36)
MCV RBC AUTO: 92 FL (ref 82–98)
MONOCYTES # BLD AUTO: 0.5 K/UL (ref 0.3–1)
MONOCYTES NFR BLD: 6.4 % (ref 4–15)
NEUTROPHILS # BLD AUTO: 4.3 K/UL (ref 1.8–7.7)
NEUTROPHILS NFR BLD: 56.1 % (ref 38–73)
NRBC BLD-RTO: 0 /100 WBC
PLATELET # BLD AUTO: 327 K/UL (ref 150–350)
PMV BLD AUTO: 8.9 FL (ref 9.2–12.9)
POTASSIUM SERPL-SCNC: 3.7 MMOL/L (ref 3.5–5.1)
RBC # BLD AUTO: 4.47 M/UL (ref 4–5.4)
SODIUM SERPL-SCNC: 138 MMOL/L (ref 136–145)
WBC # BLD AUTO: 7.69 K/UL (ref 3.9–12.7)

## 2019-12-17 PROCEDURE — 80048 BASIC METABOLIC PNL TOTAL CA: CPT

## 2019-12-17 PROCEDURE — D9220A PRA ANESTHESIA: Mod: ANES,,, | Performed by: ANESTHESIOLOGY

## 2019-12-17 PROCEDURE — 27201012 HC FORCEPS, HOT/COLD, DISP: Performed by: INTERNAL MEDICINE

## 2019-12-17 PROCEDURE — 63600175 PHARM REV CODE 636 W HCPCS: Performed by: ANESTHESIOLOGY

## 2019-12-17 PROCEDURE — 43239 EGD BIOPSY SINGLE/MULTIPLE: CPT | Performed by: INTERNAL MEDICINE

## 2019-12-17 PROCEDURE — 37000008 HC ANESTHESIA 1ST 15 MINUTES: Performed by: INTERNAL MEDICINE

## 2019-12-17 PROCEDURE — 88305 TISSUE EXAM BY PATHOLOGIST: CPT | Performed by: PATHOLOGY

## 2019-12-17 PROCEDURE — 88342 CHG IMMUNOCYTOCHEMISTRY: ICD-10-PCS | Mod: 26,,, | Performed by: PATHOLOGY

## 2019-12-17 PROCEDURE — 37000009 HC ANESTHESIA EA ADD 15 MINS: Performed by: INTERNAL MEDICINE

## 2019-12-17 PROCEDURE — 88305 TISSUE EXAM BY PATHOLOGIST: CPT | Mod: 26,,, | Performed by: PATHOLOGY

## 2019-12-17 PROCEDURE — 81025 URINE PREGNANCY TEST: CPT

## 2019-12-17 PROCEDURE — 43248 PR EGD, FLEX, W/DILATION OVER GUIDEWIRE: ICD-10-PCS | Mod: ,,, | Performed by: INTERNAL MEDICINE

## 2019-12-17 PROCEDURE — D9220A PRA ANESTHESIA: ICD-10-PCS | Mod: ANES,,, | Performed by: ANESTHESIOLOGY

## 2019-12-17 PROCEDURE — C1769 GUIDE WIRE: HCPCS | Performed by: INTERNAL MEDICINE

## 2019-12-17 PROCEDURE — 25000003 PHARM REV CODE 250: Performed by: ANESTHESIOLOGY

## 2019-12-17 PROCEDURE — 88305 TISSUE EXAM BY PATHOLOGIST: ICD-10-PCS | Mod: 26,,, | Performed by: PATHOLOGY

## 2019-12-17 PROCEDURE — S0028 INJECTION, FAMOTIDINE, 20 MG: HCPCS | Performed by: ANESTHESIOLOGY

## 2019-12-17 PROCEDURE — 43239 PR EGD, FLEX, W/BIOPSY, SGL/MULTI: ICD-10-PCS | Mod: 59,,, | Performed by: INTERNAL MEDICINE

## 2019-12-17 PROCEDURE — 63600175 PHARM REV CODE 636 W HCPCS: Performed by: NURSE ANESTHETIST, CERTIFIED REGISTERED

## 2019-12-17 PROCEDURE — 43248 EGD GUIDE WIRE INSERTION: CPT | Performed by: INTERNAL MEDICINE

## 2019-12-17 PROCEDURE — 85025 COMPLETE CBC W/AUTO DIFF WBC: CPT

## 2019-12-17 PROCEDURE — 36415 COLL VENOUS BLD VENIPUNCTURE: CPT

## 2019-12-17 PROCEDURE — 43239 EGD BIOPSY SINGLE/MULTIPLE: CPT | Mod: 59,,, | Performed by: INTERNAL MEDICINE

## 2019-12-17 PROCEDURE — 88342 IMHCHEM/IMCYTCHM 1ST ANTB: CPT | Mod: 26,,, | Performed by: PATHOLOGY

## 2019-12-17 PROCEDURE — 43248 EGD GUIDE WIRE INSERTION: CPT | Mod: ,,, | Performed by: INTERNAL MEDICINE

## 2019-12-17 PROCEDURE — 88342 IMHCHEM/IMCYTCHM 1ST ANTB: CPT | Performed by: PATHOLOGY

## 2019-12-17 RX ORDER — PROPOFOL 10 MG/ML
VIAL (ML) INTRAVENOUS
Status: DISCONTINUED | OUTPATIENT
Start: 2019-12-17 | End: 2019-12-17

## 2019-12-17 RX ORDER — DIPHENHYDRAMINE HYDROCHLORIDE 50 MG/ML
12.5 INJECTION INTRAMUSCULAR; INTRAVENOUS
Status: DISCONTINUED | OUTPATIENT
Start: 2019-12-17 | End: 2019-12-17 | Stop reason: HOSPADM

## 2019-12-17 RX ORDER — SODIUM CHLORIDE, SODIUM LACTATE, POTASSIUM CHLORIDE, CALCIUM CHLORIDE 600; 310; 30; 20 MG/100ML; MG/100ML; MG/100ML; MG/100ML
INJECTION, SOLUTION INTRAVENOUS CONTINUOUS
Status: DISCONTINUED | OUTPATIENT
Start: 2019-12-17 | End: 2019-12-17 | Stop reason: HOSPADM

## 2019-12-17 RX ORDER — FAMOTIDINE 10 MG/ML
20 INJECTION INTRAVENOUS ONCE
Status: COMPLETED | OUTPATIENT
Start: 2019-12-17 | End: 2019-12-17

## 2019-12-17 RX ORDER — ONDANSETRON 2 MG/ML
4 INJECTION INTRAMUSCULAR; INTRAVENOUS DAILY PRN
Status: DISCONTINUED | OUTPATIENT
Start: 2019-12-17 | End: 2019-12-17 | Stop reason: HOSPADM

## 2019-12-17 RX ORDER — LIDOCAINE HYDROCHLORIDE 10 MG/ML
1 INJECTION, SOLUTION EPIDURAL; INFILTRATION; INTRACAUDAL; PERINEURAL ONCE
Status: DISCONTINUED | OUTPATIENT
Start: 2019-12-17 | End: 2019-12-17 | Stop reason: HOSPADM

## 2019-12-17 RX ORDER — SODIUM CHLORIDE, SODIUM LACTATE, POTASSIUM CHLORIDE, CALCIUM CHLORIDE 600; 310; 30; 20 MG/100ML; MG/100ML; MG/100ML; MG/100ML
125 INJECTION, SOLUTION INTRAVENOUS CONTINUOUS
Status: DISCONTINUED | OUTPATIENT
Start: 2019-12-17 | End: 2019-12-17 | Stop reason: HOSPADM

## 2019-12-17 RX ORDER — FAMOTIDINE 10 MG/ML
INJECTION INTRAVENOUS
Status: DISCONTINUED
Start: 2019-12-17 | End: 2019-12-17 | Stop reason: HOSPADM

## 2019-12-17 RX ADMIN — FAMOTIDINE 20 MG: 10 INJECTION INTRAVENOUS at 07:12

## 2019-12-17 RX ADMIN — PROPOFOL 40 MG: 10 INJECTION, EMULSION INTRAVENOUS at 08:12

## 2019-12-17 RX ADMIN — PROPOFOL 20 MG: 10 INJECTION, EMULSION INTRAVENOUS at 08:12

## 2019-12-17 RX ADMIN — PROPOFOL 30 MG: 10 INJECTION, EMULSION INTRAVENOUS at 08:12

## 2019-12-17 RX ADMIN — SODIUM CHLORIDE, POTASSIUM CHLORIDE, SODIUM LACTATE AND CALCIUM CHLORIDE: 600; 310; 30; 20 INJECTION, SOLUTION INTRAVENOUS at 07:12

## 2019-12-17 NOTE — ANESTHESIA PREPROCEDURE EVALUATION
12/17/2019  Mariana Mora is a 39 y.o., female.    Anesthesia Evaluation    I have reviewed the Patient Summary Reports.    I have reviewed the Nursing Notes.   I have reviewed the Medications.     Review of Systems  Anesthesia Hx:  No problems with previous Anesthesia    Social:  Non-Smoker    Hematology/Oncology:     Oncology Normal     EENT/Dental:EENT/Dental Normal   Cardiovascular:   Hypertension    Pulmonary:  Pulmonary Normal    Hepatic/GI:   Hiatal Hernia, GERD    Neurological:   Headaches    Endocrine:   Hypothyroidism    Psych:   anxiety depression          Physical Exam  General:  Well nourished, Obesity    Airway/Jaw/Neck:  Airway Findings: Mouth Opening: Normal Tongue: Normal  General Airway Assessment: Adult  Mallampati: II  TM Distance: 4 - 6 cm       Chest/Lungs:  Chest/Lungs Findings: Clear to auscultation     Heart/Vascular:  Heart Findings: Rate: Normal  Rhythm: Regular Rhythm        Mental Status:  Mental Status Findings:  Cooperative, Alert and Oriented         Anesthesia Plan  Type of Anesthesia, risks & benefits discussed:  Anesthesia Type:  general  Patient's Preference:   Intra-op Monitoring Plan: standard ASA monitors  Intra-op Monitoring Plan Comments:   Post Op Pain Control Plan: IV/PO Opioids PRN  Post Op Pain Control Plan Comments:   Induction:   IV  Beta Blocker:  Patient is not currently on a Beta-Blocker (No further documentation required).       Informed Consent: Patient understands risks and agrees with Anesthesia plan.  Questions answered. Anesthesia consent signed with patient.  ASA Score: 2     Day of Surgery Review of History & Physical: I have interviewed and examined the patient. I have reviewed the patient's H&P dated:            Ready For Surgery From Anesthesia Perspective.

## 2019-12-17 NOTE — ANESTHESIA POSTPROCEDURE EVALUATION
Anesthesia Post Evaluation    Patient: Mariana Mora    Procedure(s) Performed: Procedure(s) (LRB):  EGD (ESOPHAGOGASTRODUODENOSCOPY) (N/A)    Final Anesthesia Type: general    Patient location during evaluation: PACU  Patient participation: Yes- Able to Participate  Level of consciousness: awake and alert  Post-procedure vital signs: reviewed and stable  Pain management: adequate  Airway patency: patent    PONV status at discharge: No PONV  Anesthetic complications: no      Cardiovascular status: blood pressure returned to baseline  Respiratory status: unassisted  Hydration status: euvolemic  Follow-up not needed.          Vitals Value Taken Time   /62 12/17/2019  8:45 AM   Temp 36.8 °C (98.2 °F) 12/17/2019  7:14 AM   Pulse 76 12/17/2019  8:45 AM   Resp 9 12/17/2019  8:45 AM   SpO2 98 % 12/17/2019  8:45 AM   Vitals shown include unvalidated device data.      Event Time     Out of Recovery 08:38:56          Pain/Timoteo Score: Timoteo Score: 10 (12/17/2019  8:45 AM)

## 2019-12-17 NOTE — TRANSFER OF CARE
"Anesthesia Transfer of Care Note    Patient: Mariana Mora    Procedure(s) Performed: Procedure(s) (LRB):  EGD (ESOPHAGOGASTRODUODENOSCOPY) (N/A)    Patient location: PACU    Anesthesia Type: general    Transport from OR: Transported from OR on room air with adequate spontaneous ventilation    Post pain: adequate analgesia    Post assessment: no apparent anesthetic complications and tolerated procedure well    Post vital signs: stable    Level of consciousness: awake, alert and oriented    Nausea/Vomiting: no nausea/vomiting    Complications: none    Transfer of care protocol was followed      Last vitals:   Visit Vitals  BP (!) 118/99   Pulse 75   Temp 36.8 °C (98.2 °F) (Oral)   Resp 16   Ht 5' 6" (1.676 m)   Wt 115.7 kg (255 lb)   SpO2 (!) 94%   Breastfeeding? No   BMI 41.16 kg/m²     "

## 2019-12-17 NOTE — DISCHARGE SUMMARY
Upper endoscopy revealed hiatal hernia.  There was a stricture.  She was dilated.  She will continue her reflux regimen.  I have encouraged weight reduction.  She may have sleep apnea and I will discuss this in more detail with her.  In the interval she continues her reflux regimen current medications diet and activity.

## 2019-12-17 NOTE — [employer] PATIENT INSTRUCTIONS
Discharge Summary/Instructions after an Endoscopic Procedure  Patient Name: Mariana Mora  Patient MRN: [id number]  Patient YOB: 1980 Tuesday, December 17, 2019  David Stahl MD  RESTRICTIONS:  During your procedure today, you received medications for sedation.  These   medications may affect your judgment, balance and coordination.  Therefore,   for 24 hours, you have the following restrictions:   - DO NOT drive a car, operate machinery, make legal/financial decisions,   sign important papers or drink alcohol.    ACTIVITY:  Today: no heavy lifting, straining or running due to procedural   sedation/anesthesia.  The following day: return to full activity including work.  DIET:  Eat and drink normally unless instructed otherwise.     TREATMENT FOR COMMON SIDE EFFECTS:  - Mild abdominal pain, nausea, belching, bloating or excessive gas:  rest,   eat lightly and use a heating pad.  - Sore Throat: treat with throat lozenges and/or gargle with warm salt   water.  - Because air was used during the procedure, expelling large amounts of air   from your rectum or belching is normal.  - If a bowel prep was taken, you may not have a bowel movement for 1-3 days.    This is normal.  SYMPTOMS TO WATCH FOR AND REPORT TO YOUR PHYSICIAN:  1. Abdominal pain or bloating, other than gas cramps.  2. Chest pain.  3. Back pain.  4. Signs of infection such as: chills or fever occurring within 24 hours   after the procedure.  5. Rectal bleeding, which would show as bright red, maroon, or black stools.   (A tablespoon of blood from the rectum is not serious, especially if   hemorrhoids are present.)  6. Vomiting.  7. Weakness or dizziness.  GO DIRECTLY TO THE NEAREST EMERGENCY ROOM IF YOU HAVE ANY OF THE FOLLOWING:      Difficulty breathing              Chills and/or fever over 101 F   Persistent vomiting and/or vomiting blood   Severe abdominal pain   Severe chest pain   Black, tarry stools   Bleeding- more than one  tablespoon   Any other symptom or condition that you feel may need urgent attention  Your doctor recommends these additional instructions:  If any biopsies were taken, your doctors clinic will contact you in 1 to 2   weeks with any results.  - Discharge patient to home (ambulatory).   - Resume previous diet.  For questions, problems or results please call your physician - David Stahl MD at Work:  [phone].  [facility] EMERGENCY ROOM PHONE NUMBER: [phone]  IF A COMPLICATION OR EMERGENCY SITUATION ARISES AND YOU ARE UNABLE TO REACH   YOUR PHYSICIAN - GO DIRECTLY TO THE EMERGENCY ROOM.  MD David Quinonez MD  12/17/2019 8:15:56 AM  This report has been verified and signed electronically.  [employer]

## 2019-12-17 NOTE — H&P
"Office Visit     12/5/2019  [facility] - [facility]astro   David Stahl MD   Gastroenterology   Dysphagia, unspecified type +4 more   Dx   Follow-up ; Referred by John Stahl MD   Reason for Visit    Additional Documentation     Vitals:    /88 (BP Location: Left arm, Patient Position: Sitting, BP Method: Medium (Automatic))    Pulse 78    Resp 16    Ht 5' 5" (1.651 m)    Wt 120.2 kg (265 lb)    SpO2 96%    BMI 44.10 kg/m²    BSA 2.35 m²       More Vitals    Flowsheets:    Anthropometrics       SmartForms:     [facility] AMB - FALL RISK       Encounter Info:    Billing Info,    History,    Allergies,    Detailed Report       Instructions         Follow up in about 6 weeks (around 1/16/2020).   The x-rays revealed a hiatal hernia.  Because of the severe indigestion Pepcid will be added in the evening and she will continue the omeprazole in the morning.  Weight loss would be ideal.  She will try to avoid oral intake in the evening and avoid the foods that upset her such as the tomato gravies and fatty foods.  She will be scheduled for upper endoscopy.  Additionally she will need esophageal motility study.          After Visit Summary (Printed 12/5/2019)   Progress Notes        Subjective:       Patient ID: Mariana Mora is a 39 y.o. female.     Chief Complaint: Follow-up     She still has occasional dysphagia but denies acute obstruction.  The barium swallow revealed a hiatal hernia.  She complains of pyrosis and she states that the omeprazole has not controlled her symptoms.  She denies hematemesis hematochezia aspiration jaundice or bleeding.  She generally has daily bowel movements.        Allergies:        Review of patient's allergies indicates:   Allergen Reactions    Penicillins      Prednisone Other (See Comments) and Rash       PSORIASIS  PSORIASIS         Medications:     Current Outpatient Medications:     cetirizine (ZYRTEC) 10 MG tablet, Take 10 mg by mouth once daily., Disp: , " Rfl:     COSENTYX PEN, 2 PENS, 150 mg/mL PnIj, , Disp: , Rfl:     folic acid-vit B6-vit B12 2.5-25-2 mg (FOLBIC) 2.5-25-2 mg Tab, Take 1 tablet by mouth once daily., Disp: , Rfl:     L norgest/e.estradiol-e.estrad (SEASONIQUE) 0.15 mg-30 mcg (84)/10 mcg (7) 3MPk, Take by mouth., Disp: , Rfl:     omeprazole (PRILOSEC) 40 MG capsule, Take 1 capsule (40 mg total) by mouth 2 (two) times daily., Disp: 60 capsule, Rfl: 11    OTEZLA 30 mg Tab, Take 30 mg by mouth 2 (two) times daily., Disp: , Rfl:     rizatriptan (MAXALT-MLT) 10 MG disintegrating tablet, 10 mg., Disp: , Rfl:     sertraline (ZOLOFT) 50 MG tablet, Take 1 tablet (50 mg total) by mouth once daily., Disp: 30 tablet, Rfl: 11    SYNTHROID 75 mcg tablet, Take 75 mcg by mouth once daily., Disp: , Rfl:     famotidine (PEPCID) 40 MG tablet, Take 1 tablet (40 mg total) by mouth every evening., Disp: 30 tablet, Rfl: 11          Past Medical History:   Diagnosis Date    Anxiety      Depression      Hypertension      Hypothyroidism      Migraine headache      Psoriasis      TMJ (temporomandibular joint syndrome)                 Past Surgical History:   Procedure Laterality Date    INNER EAR SURGERY Right      NASAL SEPTUM SURGERY        TYMPANOPLASTY         bilateral    TYMPANOSTOMY TUBE PLACEMENT                Review of Systems   Constitutional: Negative for appetite change, fever and unexpected weight change.   HENT: Positive for trouble swallowing.         No jaundice.   Respiratory: Negative for cough, shortness of breath and wheezing.         She denies tobacco or alcohol.  She denies dyspnea on exertion.  She denies dysphagia hemoptysis chronic cough chronic sputum production.   Cardiovascular: Negative for chest pain.        She denies exertional chest pain or rhythm disturbance.   Gastrointestinal: Negative for abdominal distention, abdominal pain, anal bleeding, blood in stool, constipation and diarrhea.   Endocrine:        She states her  hypothyroidism is well controlled on the current medications.   Musculoskeletal: Negative for back pain and neck pain.   Skin: Negative for pallor and rash.   Neurological: Negative for dizziness, seizures, syncope, speech difficulty, weakness and numbness.   Hematological: Negative for adenopathy.   Psychiatric/Behavioral: Negative for confusion.       Objective:   Physical Exam   Constitutional: She is oriented to person, place, and time. She appears well-developed and well-nourished.   rished well-nourished well-hydrated obese nonicteric white female.   HENT:   Head: Normocephalic.   Eyes: Pupils are equal, round, and reactive to light. EOM are normal.   Neck: Normal range of motion. Neck supple. No tracheal deviation present. No thyromegaly present.   Cardiovascular: Normal rate, regular rhythm and normal heart sounds.   Pulmonary/Chest: Effort normal and breath sounds normal.   Abdominal: Soft. Bowel sounds are normal. She exhibits no distension and no mass. There is no tenderness. There is no rebound and no guarding. No hernia.   The abdomen is soft without tenderness masses organomegaly.  Bowel sounds are normal   Musculoskeletal: Normal range of motion.   She can ambulate normally.  She can go from the sitting to the standing position without difficulty.   Lymphadenopathy:     She has no cervical adenopathy.   Neurological: She is alert and oriented to person, place, and time. No cranial nerve deficit.   Skin: Skin is warm and dry.   Psychiatric: She has a normal mood and affect. Her behavior is normal.   Vitals reviewed.         Plan:       Dysphagia, unspecified type  -     Manometry esophageal; Future     Preop examination  -     EKG RHYTHM STRIP (to [facility]); Future     Gastroesophageal reflux disease, esophagitis presence not specified  -     famotidine (PEPCID) 40 MG tablet; Take 1 tablet (40 mg total) by mouth every evening.  Dispense: 30 tablet; Refill: 11     Hiatal hernia     Obesity, Class III, BMI  40-49.9 (morbid obesity)      She will continue her reflux regimen.  I have encouraged weight reduction.  She will be scheduled for upper endoscopy.  She has good health knowledge.  She understands the procedures of upper endoscopy.  Specifically she realizes there is an extremely small incidence of bleeding or perforation.  Pepcid will be added in the evening.  She will make a food diary and avoid the offending foods.  She continues her vitamins and minerals.          Other Notes      All notes   Communications         Letter sent to John Stahl MD    AMB Visit Summary: Provider Version   Sent 12/9/2019   Not recorded   All Charges for This Encounter     Code Description Service Date Service Provider Modifiers Qty   08438 [place] OFFICE/OUTPT VISIT,EST,LEVL III 12/5/2019 David Stahl MD S$GLB 1   070102380 [place] PBB SHADOW E&M-EST. PATIENT-LVL III 12/5/2019 David Stahl MD PBBFAC 1   3008F [place] BODY MASS INDEX (BMI) DOCUMENTED 12/5/2019 David Stahl MD S$GLB 1   Level of Service     Level of Service   WA OFFICE/OUTPT VISIT,EST,LEVL III [91844]   BestPractice Advisories     Click to view BestPractice Advisory history   AVS Reports     Date/Time Report Action User   12/5/2019 12:21 PM After Visit Summary Printed Maira White LPN   Encounter-Level Documents - 12/05/2019:     After Visit Summary - Document on 12/5/2019 12:21 PM by Maira White LPN: After Visit Summary   Orders Placed      EKG RHYTHM STRIP (to [place])    Manometry esophageal   Medication Changes         famotidine 40 mg Oral Nightly      Medication List    Fall Risk     Patient Mobility Status: Ambulatory   Number of falls in the past 12 months?: 0   Fall Risk?: No   Visit Diagnoses         Dysphagia, unspecified type      Preop examination      Gastroesophageal reflux disease, esophagitis presence not specified      Hiatal hernia      Obesity, Class III, BMI 40-49.9 (morbid obesity)      Problem List    History and physical unchanged.  She complains of  dysphagia x-rays revealed hiatal hernia with gastroesophageal reflux.  She denies cardiopulmonary symptoms jaundice or bleeding.  She denies aspiration.  She has good health knowledge and she understands the procedures of upper endoscopy specifically she realizes there is an extremely small incidence of bleeding perforation or aspiration.  Physical examination.  Well-nourished well-hydrated nonicteric white female.  She is normocephalic.  Jaundice is absent.  Pupils are normal.  Lungs reveal symmetrical respirations.  Heart reveals regular rhythm.  The abdomen is soft with tenderness in the epigastrium.  Organomegaly masses are not detected.  Bowel sounds are normal. Impression 1.  Dysphagia 2.  Hiatal hernia 3.  Gastroesophageal reflux 4.  Probable stricture.

## 2019-12-17 NOTE — OP NOTE
Procedure. Esophageal gastroduodenoscopy 2.  Gastric biopsies 3.  Esophageal dilatation using the guidewire and Savary dilators.  Indications dysphagia with pyrosis and epigastric pain with nausea without vomiting jaundice or bleeding.  She has good health knowledge.  She understands the procedures.  Specifically she realizes there is an extremely small incidence of bleeding perforation or aspiration.  Anesthesia.  Monitored anesthesia coverage.  Procedure. With the patient in the left lateral supine position in the procedure room.  A time she was noted to have loud snoring.  The Pentax upper endoscope was passed without difficulty.  The hyperemic narrowed gastroesophageal junction was at 36-37 cm this Q junction 36-37 cm.  Diaphragmatic hiatus appeared be at 38-39 cm.  The cardia revealed a small hiatal hernia.  The cardia body and antrum was diffusely hyperemic.  There was minimal retained secretions.  There was punctate erosions and biopsies were obtained for histology.  There was minimal deformity of the pylorus 1st and 2nd parts of the duodenum were normal.  The scope was withdrawn into the stomach and the guidewire was placed.  The Savary 16.  Was passed.  Patient tolerated the procedure well. Impression 1.  Esophageal stricture 2.  Esophagitis LA grade A 3.  He hiatal hernia 4.  Gastritis.

## 2019-12-23 VITALS
RESPIRATION RATE: 20 BRPM | DIASTOLIC BLOOD PRESSURE: 62 MMHG | SYSTOLIC BLOOD PRESSURE: 102 MMHG | TEMPERATURE: 98 F | HEART RATE: 76 BPM | OXYGEN SATURATION: 98 % | HEIGHT: 66 IN | WEIGHT: 255 LBS | BODY MASS INDEX: 40.98 KG/M2

## 2019-12-24 LAB
FINAL PATHOLOGIC DIAGNOSIS: NORMAL
GROSS: NORMAL

## 2020-01-16 ENCOUNTER — OFFICE VISIT (OUTPATIENT)
Dept: GASTROENTEROLOGY | Facility: CLINIC | Age: 40
End: 2020-01-16
Payer: COMMERCIAL

## 2020-01-16 VITALS
RESPIRATION RATE: 16 BRPM | WEIGHT: 262 LBS | DIASTOLIC BLOOD PRESSURE: 75 MMHG | OXYGEN SATURATION: 99 % | SYSTOLIC BLOOD PRESSURE: 123 MMHG | HEART RATE: 85 BPM | HEIGHT: 66 IN | BODY MASS INDEX: 42.11 KG/M2

## 2020-01-16 DIAGNOSIS — Z87.19 HISTORY OF ESOPHAGEAL STRICTURE: ICD-10-CM

## 2020-01-16 DIAGNOSIS — K21.9 GASTROESOPHAGEAL REFLUX DISEASE, ESOPHAGITIS PRESENCE NOT SPECIFIED: ICD-10-CM

## 2020-01-16 DIAGNOSIS — K44.9 HIATAL HERNIA: ICD-10-CM

## 2020-01-16 DIAGNOSIS — R13.10 DYSPHAGIA, UNSPECIFIED TYPE: ICD-10-CM

## 2020-01-16 DIAGNOSIS — R19.7 DIARRHEA, UNSPECIFIED TYPE: Primary | ICD-10-CM

## 2020-01-16 PROCEDURE — 3008F PR BODY MASS INDEX (BMI) DOCUMENTED: ICD-10-PCS | Mod: S$GLB,,, | Performed by: INTERNAL MEDICINE

## 2020-01-16 PROCEDURE — 99213 OFFICE O/P EST LOW 20 MIN: CPT | Mod: S$GLB,,, | Performed by: INTERNAL MEDICINE

## 2020-01-16 PROCEDURE — 3008F BODY MASS INDEX DOCD: CPT | Mod: S$GLB,,, | Performed by: INTERNAL MEDICINE

## 2020-01-16 PROCEDURE — 99999 PR PBB SHADOW E&M-EST. PATIENT-LVL III: ICD-10-PCS | Mod: PBBFAC,,, | Performed by: INTERNAL MEDICINE

## 2020-01-16 PROCEDURE — 99999 PR PBB SHADOW E&M-EST. PATIENT-LVL III: CPT | Mod: PBBFAC,,, | Performed by: INTERNAL MEDICINE

## 2020-01-16 PROCEDURE — 99213 PR OFFICE/OUTPT VISIT, EST, LEVL III, 20-29 MIN: ICD-10-PCS | Mod: S$GLB,,, | Performed by: INTERNAL MEDICINE

## 2020-01-16 NOTE — PATIENT INSTRUCTIONS
Upper endoscopy revealed a hiatal hernia and mild narrowing.  She was dilated in she is swallowing better.  She has occasional sensation of difficulty swallowing.  The esophageal motility studies scheduled.  She has psoriasis.  She is trying to make a food diary and avoid the offending foods.  She believes the wheat containing food and milk has upset her.  She will be scheduled for hydrogen breath test and also a celiac disease panel.  She continues her food diary and reflux regimen.  Weight loss would be ideal.  She continues her other medications vitamins and minerals.

## 2020-01-16 NOTE — LETTER
January 21, 2020      John Stahl MD  [address]  [place] [address]           [facility]  [address], [address]  [place] [address]  Phone: [phone]  Fax: [phone]          Patient: Mariana Mora   MR Number: [id number]   YOB: 1980   Date of Visit: 1/16/2020       Dear Dr. John Stahl:    Thank you for referring Mariana Mora to me for evaluation. Attached you will find relevant portions of my assessment and plan of care.    If you have questions, please do not hesitate to call me. I look forward to following Mariana Mora along with you.    Sincerely,    David Stahl MD    Enclosure  CC:  No Recipients    If you would like to receive this communication electronically, please contact [email or web address] or [phone] to request more information on [facility] access.    For providers and/or their staff who would like to refer a patient to [facility], please contact us through our one-stop-shop provider referral line, [facility] , at [phone].    If you feel you have received this communication in error or would no longer like to receive these types of communications, please e-mail [email or web address]

## 2020-01-21 ENCOUNTER — LAB VISIT (OUTPATIENT)
Dept: LAB | Facility: HOSPITAL | Age: 40
End: 2020-01-21
Attending: INTERNAL MEDICINE
Payer: COMMERCIAL

## 2020-01-21 DIAGNOSIS — R19.7 DIARRHEA, UNSPECIFIED TYPE: ICD-10-CM

## 2020-01-21 PROBLEM — Z87.19 HISTORY OF ESOPHAGEAL STRICTURE: Status: ACTIVE | Noted: 2020-01-21

## 2020-01-21 PROCEDURE — 36415 COLL VENOUS BLD VENIPUNCTURE: CPT

## 2020-01-21 PROCEDURE — 83516 IMMUNOASSAY NONANTIBODY: CPT | Mod: 59

## 2020-01-21 NOTE — PROGRESS NOTES
Subjective:       Patient ID: Mariana Mora is a 39 y.o. female.    Chief Complaint: Follow-up    Upper endoscopy revealed mild esophagitis and a narrowed cervical esophagus. She was dilated and she denies pyrosis and dysphagia.  She still has a sensation that when food is in the back the throat and she swallows that she feels as if that it may not go all the way down but she denies acute obstruction.  She denies chest pain. She denies aspiration hematemesis hematochezia jaundice or bleeding.  She had an esophageal motility study scheduled and has not followed up.  Additionally she complains of an upset stomach occasion with wheat containing products and also possibly milk.  She is having daily bowel movements.  She awaits remained stable. She denies fever chills. She has a small hiatal hernia. She has occasional diarrhea.      Allergies:  Review of patient's allergies indicates:   Allergen Reactions    Penicillins     Prednisone Other (See Comments) and Rash     PSORIASIS  PSORIASIS       Medications:    Current Outpatient Medications:     cetirizine (ZYRTEC) 10 MG tablet, Take 10 mg by mouth once daily., Disp: , Rfl:     COSENTYX PEN, 2 PENS, 150 mg/mL PnIj, , Disp: , Rfl:     famotidine (PEPCID) 40 MG tablet, Take 1 tablet (40 mg total) by mouth every evening., Disp: 30 tablet, Rfl: 11    folic acid-vit B6-vit B12 2.5-25-2 mg (FOLBIC) 2.5-25-2 mg Tab, Take 1 tablet by mouth once daily., Disp: , Rfl:     L norgest/e.estradiol-e.estrad (SEASONIQUE) 0.15 mg-30 mcg (84)/10 mcg (7) 3MPk, Take by mouth., Disp: , Rfl:     omeprazole (PRILOSEC) 40 MG capsule, Take 1 capsule (40 mg total) by mouth 2 (two) times daily., Disp: 60 capsule, Rfl: 11    OTEZLA 30 mg Tab, Take 30 mg by mouth 2 (two) times daily., Disp: , Rfl:     rizatriptan (MAXALT-MLT) 10 MG disintegrating tablet, 10 mg., Disp: , Rfl:     sertraline (ZOLOFT) 50 MG tablet, Take 1 tablet (50 mg total) by mouth once daily., Disp: 30 tablet, Rfl:  11    SYNTHROID 75 mcg tablet, Take 75 mcg by mouth once daily., Disp: , Rfl:     Past Medical History:   Diagnosis Date    Anxiety     Depression     Hypertension     Hypothyroidism     Migraine headache     Psoriasis     TMJ (temporomandibular joint syndrome)        Past Surgical History:   Procedure Laterality Date    ESOPHAGOGASTRODUODENOSCOPY N/A 12/17/2019    Procedure: EGD (ESOPHAGOGASTRODUODENOSCOPY);  Surgeon: David Stahl MD;  Location: [facility];  Service: Endoscopy;  Laterality: N/A;    INNER EAR SURGERY Right     NASAL SEPTUM SURGERY      TYMPANOPLASTY      bilateral    TYMPANOSTOMY TUBE PLACEMENT           Review of Systems   Constitutional: Negative for appetite change, fever and unexpected weight change.   HENT: Positive for trouble swallowing.         No jaundice.   Respiratory: Negative for cough, shortness of breath and wheezing.         She has never used tobacco.  She denies dyspnea on exertion.  She denies aspiration hemoptysis chronic cough or chronic sputum production.   Cardiovascular: Negative for chest pain.        She denies exertional chest pain or irregular heartbeat.   Gastrointestinal: Negative for abdominal distention, abdominal pain, anal bleeding, blood in stool, constipation, diarrhea, nausea, rectal pain and vomiting.   Endocrine:        She states her hypothyroidism is well controlled with current medications.   Musculoskeletal: Negative for back pain and neck pain.   Skin: Negative for pallor and rash.        She is followed by the dermatologist for the psoriasis.   Neurological: Negative for dizziness, seizures, syncope, speech difficulty, weakness and numbness.   Hematological: Negative for adenopathy.   Psychiatric/Behavioral: Negative for confusion.       Objective:      Physical Exam   Constitutional: She is oriented to person, place, and time. She appears well-developed and well-nourished.   Well-nourished well-hydrated nonicteric white female.  She has a  firm hand .   HENT:   Head: Normocephalic.   Eyes: Pupils are equal, round, and reactive to light. EOM are normal.   Neck: Normal range of motion. Neck supple. No tracheal deviation present. No thyromegaly present.   Cardiovascular: Normal rate, regular rhythm and normal heart sounds.   Pulmonary/Chest: Effort normal and breath sounds normal.   Abdominal: Soft. Bowel sounds are normal. She exhibits no distension and no mass. There is no tenderness. There is no rebound and no guarding. No hernia.   The abdomen is soft without tenderness masses organomegaly.  Bowel sounds are.  Normal   Musculoskeletal: Normal range of motion.   She can ambulate normally.  She can go from the sitting to standing position difficulty.   Lymphadenopathy:     She has no cervical adenopathy.   Neurological: She is alert and oriented to person, place, and time. No cranial nerve deficit.   Skin: Skin is warm and dry.   Psychiatric: She has a normal mood and affect. Her behavior is normal.   Vitals reviewed.        Plan:       Diarrhea, unspecified type  -     Celiac Disease Panel; Future; Expected date: 01/16/2020  -     Lactulose Challenge Test; Future; Expected date: 01/16/2020    Gastroesophageal reflux disease, esophagitis presence not specified    Hiatal hernia    Dysphagia, unspecified type    History of esophageal stricture      she will continue her reflux regimen current medications vitamins and minerals.  She will make a food diary and avoid the offending foods.  She is scheduled for an esophageal motility study.  Additionally celiac disease panel in hydrogen breath test will be scheduled.  She will add more fiber to the diet.

## 2020-01-23 LAB
GLIADIN PEPTIDE IGA SER-ACNC: 11 UNITS
GLIADIN PEPTIDE IGG SER-ACNC: 2 UNITS
IGA SERPL-MCNC: 209 MG/DL (ref 70–400)
TTG IGA SER-ACNC: 5 UNITS
TTG IGG SER-ACNC: 5 UNITS

## 2020-02-03 ENCOUNTER — TELEPHONE (OUTPATIENT)
Dept: FAMILY MEDICINE | Facility: CLINIC | Age: 40
End: 2020-02-03

## 2020-02-03 NOTE — TELEPHONE ENCOUNTER
MACY.        ----- Message from Delmy De La Fuente sent at 2/3/2020 12:20 PM CST -----  Contact: Patient  Type: Needs Medical Advice    Who Called:  Patient  Best Call Back Number:   Additional Information: Calling to speak to the nurse to make sure she has been scheduled for the correct test. She was told she needed a hydrogen test but the orders read something different than what she was told.

## 2020-02-07 ENCOUNTER — TELEPHONE (OUTPATIENT)
Dept: FAMILY MEDICINE | Facility: CLINIC | Age: 40
End: 2020-02-07

## 2020-02-10 ENCOUNTER — HOSPITAL ENCOUNTER (OUTPATIENT)
Facility: HOSPITAL | Age: 40
Discharge: HOME | End: 2020-02-10
Attending: INTERNAL MEDICINE | Admitting: INTERNAL MEDICINE
Payer: COMMERCIAL

## 2020-02-10 VITALS
DIASTOLIC BLOOD PRESSURE: 59 MMHG | HEIGHT: 66 IN | SYSTOLIC BLOOD PRESSURE: 128 MMHG | TEMPERATURE: 98 F | HEART RATE: 79 BPM | RESPIRATION RATE: 14 BRPM | OXYGEN SATURATION: 97 % | WEIGHT: 250 LBS | BODY MASS INDEX: 40.18 KG/M2

## 2020-02-10 DIAGNOSIS — R13.10 DYSPHAGIA: ICD-10-CM

## 2020-02-10 PROCEDURE — 91010 PR ESOPHAGEAL MOTILITY STUDY, MA2METRY: ICD-10-PCS | Mod: 26,,, | Performed by: INTERNAL MEDICINE

## 2020-02-10 PROCEDURE — 91010 ESOPHAGUS MOTILITY STUDY: CPT | Performed by: INTERNAL MEDICINE

## 2020-02-10 PROCEDURE — 91010 ESOPHAGUS MOTILITY STUDY: CPT | Mod: 26,,, | Performed by: INTERNAL MEDICINE

## 2020-02-10 PROCEDURE — 91037 ESOPH IMPED FUNCTION TEST: CPT | Performed by: INTERNAL MEDICINE

## 2020-02-10 PROCEDURE — 91037 ESOPH IMPED FUNCTION TEST: CPT | Mod: 26,,, | Performed by: INTERNAL MEDICINE

## 2020-02-10 PROCEDURE — 25000003 PHARM REV CODE 250: Performed by: INTERNAL MEDICINE

## 2020-02-10 PROCEDURE — 91037 PR GERD TST W/ NASAL IMPEDENCE ELECTROD: ICD-10-PCS | Mod: 26,,, | Performed by: INTERNAL MEDICINE

## 2020-02-10 RX ORDER — LIDOCAINE HYDROCHLORIDE 20 MG/ML
JELLY TOPICAL ONCE
Status: COMPLETED | OUTPATIENT
Start: 2020-02-10 | End: 2020-02-10

## 2020-02-10 RX ADMIN — LIDOCAINE HYDROCHLORIDE 10 ML: 20 JELLY TOPICAL at 09:02

## 2020-02-10 NOTE — [employer] PATIENT INSTRUCTIONS
Discharge Summary/Instructions after an Endoscopic Procedure  Patient Name: Mariana Mora  Patient MRN: [id number]  Patient YOB: 1980  Monday, February 10, 2020  Fitz Murray MD  RESTRICTIONS:  During your procedure today, you received medications for sedation.  These   medications may affect your judgment, balance and coordination.  Therefore,   for 24 hours, you have the following restrictions:   - DO NOT drive a car, operate machinery, make legal/financial decisions,   sign important papers or drink alcohol.    ACTIVITY:  Today: no heavy lifting, straining or running due to procedural   sedation/anesthesia.  The following day: return to full activity including work.  DIET:  Eat and drink normally unless instructed otherwise.     TREATMENT FOR COMMON SIDE EFFECTS:  - Mild abdominal pain, nausea, belching, bloating or excessive gas:  rest,   eat lightly and use a heating pad.  - Sore Throat: treat with throat lozenges and/or gargle with warm salt   water.  - Because air was used during the procedure, expelling large amounts of air   from your rectum or belching is normal.  - If a bowel prep was taken, you may not have a bowel movement for 1-3 days.    This is normal.  SYMPTOMS TO WATCH FOR AND REPORT TO YOUR PHYSICIAN:  1. Abdominal pain or bloating, other than gas cramps.  2. Chest pain.  3. Back pain.  4. Signs of infection such as: chills or fever occurring within 24 hours   after the procedure.  5. Rectal bleeding, which would show as bright red, maroon, or black stools.   (A tablespoon of blood from the rectum is not serious, especially if   hemorrhoids are present.)  6. Vomiting.  7. Weakness or dizziness.  GO DIRECTLY TO THE NEAREST EMERGENCY ROOM IF YOU HAVE ANY OF THE FOLLOWING:      Difficulty breathing              Chills and/or fever over 101 F   Persistent vomiting and/or vomiting blood   Severe abdominal pain   Severe chest pain   Black, tarry stools   Bleeding- more than one  tablespoon   Any other symptom or condition that you feel may need urgent attention  Your doctor recommends these additional instructions:  If any biopsies were taken, your doctors clinic will contact you in 1 to 2   weeks with any results.  - Return to referring physician.   For questions, problems or results please call your physician - Fitz Murray MD at Work:  [phone].  [facility], EMERGENCY ROOM PHONE NUMBER: [phone]  IF A COMPLICATION OR EMERGENCY SITUATION ARISES AND YOU ARE UNABLE TO REACH   YOUR PHYSICIAN - GO DIRECTLY TO THE EMERGENCY ROOM.  Fitz Murray MD  2/10/2020 5:02:04 PM  This report has been verified and signed electronically.  [employer]

## 2020-02-17 ENCOUNTER — LAB VISIT (OUTPATIENT)
Dept: LAB | Facility: HOSPITAL | Age: 40
End: 2020-02-17
Attending: INTERNAL MEDICINE
Payer: COMMERCIAL

## 2020-02-17 DIAGNOSIS — R19.7 DIARRHEA, UNSPECIFIED TYPE: ICD-10-CM

## 2020-02-17 LAB
BREATH H2 1.5H P LAC: 5 PPM
BREATH H2 1H P LAC: 4 PPM
BREATH H2 30M P LAC: 4 PPM
BREATH H2 P 10 G LACTULOSE PO: 4 PPM
BREATH H2 P 10 G LACTULOSE PO: 8 PPM
BREATH H2 PRE LAC: 6 PPM
LACTULOSE BT COL PERIOD: NORMAL HR
LACTULOSE BT INTERPRETATION: NORMAL

## 2020-02-17 PROCEDURE — 91065 BREATH HYDROGEN/METHANE TEST: CPT

## 2020-02-20 DIAGNOSIS — F41.9 ANXIETY: ICD-10-CM

## 2020-02-20 NOTE — TELEPHONE ENCOUNTER
----- Message from Carissa Thakkar sent at 2/20/2020  4:50 PM CST -----  Type:  RX Refill Request    Who Called:  patient  Refill or New Rx:  refill  RX Name and Strength:    1. SYNTHROID 75 mcg tablet (1XDAY)  2. folic acid-vit B6-vit B12 2.5-25-2 mg (FOLBIC) 2.5-25-2 mg Tab (1XDAY  Is this a 30 day or 90 day RX:  90  Preferred Pharmacy with phone number:    [facility] - [place] - [address]  [address]  [place] [address]  Phone: [phone] Fax: [phone]  Local or Mail Order:  local  Ordering Provider:  Favio  Best Call Back Number:  [phone]  Additional Information:

## 2020-02-21 RX ORDER — LEVOTHYROXINE SODIUM 75 UG/1
75 TABLET ORAL DAILY
Qty: 30 TABLET | Refills: 11 | Status: SHIPPED | OUTPATIENT
Start: 2020-02-21 | End: 2020-05-22

## 2020-02-27 ENCOUNTER — PATIENT OUTREACH (OUTPATIENT)
Dept: ADMINISTRATIVE | Facility: OTHER | Age: 40
End: 2020-02-27

## 2020-02-27 ENCOUNTER — OFFICE VISIT (OUTPATIENT)
Dept: GASTROENTEROLOGY | Facility: CLINIC | Age: 40
End: 2020-02-27
Payer: COMMERCIAL

## 2020-02-27 VITALS
DIASTOLIC BLOOD PRESSURE: 76 MMHG | HEART RATE: 74 BPM | RESPIRATION RATE: 18 BRPM | SYSTOLIC BLOOD PRESSURE: 121 MMHG | BODY MASS INDEX: 42.75 KG/M2 | HEIGHT: 66 IN | WEIGHT: 266 LBS

## 2020-02-27 DIAGNOSIS — R13.10 DYSPHAGIA, UNSPECIFIED TYPE: ICD-10-CM

## 2020-02-27 DIAGNOSIS — E66.01 OBESITY, CLASS III, BMI 40-49.9 (MORBID OBESITY): ICD-10-CM

## 2020-02-27 DIAGNOSIS — K44.9 HIATAL HERNIA: Primary | ICD-10-CM

## 2020-02-27 DIAGNOSIS — K21.9 GASTROESOPHAGEAL REFLUX DISEASE, ESOPHAGITIS PRESENCE NOT SPECIFIED: ICD-10-CM

## 2020-02-27 PROCEDURE — 3008F PR BODY MASS INDEX (BMI) DOCUMENTED: ICD-10-PCS | Mod: S$GLB,,, | Performed by: INTERNAL MEDICINE

## 2020-02-27 PROCEDURE — 3008F BODY MASS INDEX DOCD: CPT | Mod: S$GLB,,, | Performed by: INTERNAL MEDICINE

## 2020-02-27 PROCEDURE — 99999 PR PBB SHADOW E&M-EST. PATIENT-LVL III: CPT | Mod: PBBFAC,,, | Performed by: INTERNAL MEDICINE

## 2020-02-27 PROCEDURE — 99213 OFFICE O/P EST LOW 20 MIN: CPT | Mod: S$GLB,,, | Performed by: INTERNAL MEDICINE

## 2020-02-27 PROCEDURE — 99213 PR OFFICE/OUTPT VISIT, EST, LEVL III, 20-29 MIN: ICD-10-PCS | Mod: S$GLB,,, | Performed by: INTERNAL MEDICINE

## 2020-02-27 PROCEDURE — 99999 PR PBB SHADOW E&M-EST. PATIENT-LVL III: ICD-10-PCS | Mod: PBBFAC,,, | Performed by: INTERNAL MEDICINE

## 2020-02-27 RX ORDER — PANTOPRAZOLE SODIUM 40 MG/1
40 TABLET, DELAYED RELEASE ORAL DAILY
Qty: 90 TABLET | Refills: 3 | Status: SHIPPED | OUTPATIENT
Start: 2020-02-27 | End: 2020-04-08 | Stop reason: SDUPTHER

## 2020-02-27 RX ORDER — FAMOTIDINE 40 MG/1
40 TABLET, FILM COATED ORAL NIGHTLY
Qty: 90 TABLET | Refills: 3 | Status: SHIPPED | OUTPATIENT
Start: 2020-02-27 | End: 2020-03-12

## 2020-02-27 NOTE — PROGRESS NOTES
Subjective:       Patient ID: Mariana Mora is a 39 y.o. female.    Chief Complaint: Follow-up    She still has occasional episodes in which a food will not go down .  She ate a cracker and had to drink water because she felt as if it took awhile for the cracker to inter her stomach. She denied aspiration or coughing.  The recent barium swallow revealed a hiatal hernia. She was found to have gastroesophageal reflux and was dilated.  The esophageal motility  study was normal. She denies hematemesis hematochezia jaundice or bleeding.  Sometimes she will awaken in the morning with pyrosis and dyspepsia and have coughing.  She states her psoriasis is better.  She is on multiple medications.  She denies fever chills.  She is having daily bowel movements.      Allergies:  Review of patient's allergies indicates:   Allergen Reactions    Penicillins     Prednisone Other (See Comments) and Rash     PSORIASIS  PSORIASIS       Medications:    Current Outpatient Medications:     cetirizine (ZYRTEC) 10 MG tablet, Take 10 mg by mouth once daily., Disp: , Rfl:     COSENTYX PEN, 2 PENS, 150 mg/mL PnIj, , Disp: , Rfl:     famotidine (PEPCID) 40 MG tablet, Take 1 tablet (40 mg total) by mouth every evening., Disp: 90 tablet, Rfl: 3    folic acid-vit B6-vit B12 2.5-25-2 mg (FOLBIC) 2.5-25-2 mg Tab, Take 1 tablet by mouth once daily., Disp: 30 tablet, Rfl: 11    L norgest/e.estradiol-e.estrad (SEASONIQUE) 0.15 mg-30 mcg (84)/10 mcg (7) 3MPk, Take by mouth., Disp: , Rfl:     OTEZLA 30 mg Tab, Take 30 mg by mouth 2 (two) times daily., Disp: , Rfl:     rizatriptan (MAXALT-MLT) 10 MG disintegrating tablet, 10 mg., Disp: , Rfl:     sertraline (ZOLOFT) 50 MG tablet, Take 1 tablet (50 mg total) by mouth once daily., Disp: 30 tablet, Rfl: 11    SYNTHROID 75 mcg tablet, Take 1 tablet (75 mcg total) by mouth once daily., Disp: 30 tablet, Rfl: 11    pantoprazole (PROTONIX) 40 MG tablet, Take 1 tablet (40 mg total) by mouth once  daily., Disp: 90 tablet, Rfl: 3    Past Medical History:   Diagnosis Date    Anxiety     Depression     Hypertension     Hypothyroidism     Migraine headache     Psoriasis     TMJ (temporomandibular joint syndrome)        Past Surgical History:   Procedure Laterality Date    ESOPHAGEAL MANOMETRY WITH MEASUREMENT OF IMPEDANCE N/A 2/10/2020    Procedure: MANOMETRY, ESOPHAGUS, WITH IMPEDANCE MEASUREMENT;  Surgeon: Fitz Murray MD;  Location: [facility] ENDO (4TH FLR);  Service: Endoscopy;  Laterality: N/A;  2/3 - pt confirmed    ESOPHAGOGASTRODUODENOSCOPY N/A 12/17/2019    Procedure: EGD (ESOPHAGOGASTRODUODENOSCOPY);  Surgeon: David Stahl MD;  Location: [facility];  Service: Endoscopy;  Laterality: N/A;    INNER EAR SURGERY Right     NASAL SEPTUM SURGERY      TYMPANOPLASTY      bilateral    TYMPANOSTOMY TUBE PLACEMENT           Review of Systems   Constitutional: Negative for appetite change, fever and unexpected weight change.   HENT: Positive for trouble swallowing.         No jaundice.   Respiratory: Negative for cough, shortness of breath and wheezing.         She has never used tobacco.  She denies aspiration hemoptysis chronic sputum production.  She has occasional coughing.  She cannot pinpoint a precipitating cause for the coughing although she has has allergies and has had ENT surgery.  She denies dyspnea on exertion.   Cardiovascular: Negative for chest pain.        She denies exertional chest pain or rhythm disturbance.   Gastrointestinal: Negative for abdominal distention, abdominal pain, anal bleeding, blood in stool, constipation, diarrhea, nausea, rectal pain and vomiting.   Musculoskeletal: Positive for arthralgias. Negative for back pain and neck pain.   Skin: Negative for pallor and rash.        She has psoriasis which has improved with therapy.  She has had joint symptoms consistent with psoriatic arthritis.   Neurological: Negative for dizziness, seizures, syncope, speech difficulty,  weakness and numbness.   Hematological: Negative for adenopathy.   Psychiatric/Behavioral: Negative for confusion.       Objective:      Physical Exam   Constitutional: She is oriented to person, place, and time. She appears well-developed and well-nourished.   Well-nourished well-hydrated obese nonicteric white female.  She is oriented x3. She can relate her history and answer questions appropriately.  She is sitting in the exam room and appears to be comfortable.  She is breathing normally without coughing.   HENT:   Head: Normocephalic.   Eyes: Pupils are equal, round, and reactive to light. EOM are normal.   Neck: Normal range of motion. Neck supple. No tracheal deviation present. No thyromegaly present.   Cardiovascular: Normal rate, regular rhythm and normal heart sounds.   Pulmonary/Chest: Effort normal and breath sounds normal.   Abdominal: Soft. Bowel sounds are normal. She exhibits no distension and no mass. There is no tenderness. There is no rebound and no guarding. No hernia.   The abdomen is soft obese without tenderness masses organomegaly.  Bowel sounds are normal.   Musculoskeletal: Normal range of motion.   She can ambulate normally.  She can go from the sitting to the standing position without difficulty.   Lymphadenopathy:     She has no cervical adenopathy.   Neurological: She is alert and oriented to person, place, and time. No cranial nerve deficit.   Skin: Skin is warm and dry.   Psychiatric: She has a normal mood and affect. Her behavior is normal.   Vitals reviewed.        Plan:       Hiatal hernia    Gastroesophageal reflux disease, esophagitis presence not specified  -     pantoprazole (PROTONIX) 40 MG tablet; Take 1 tablet (40 mg total) by mouth once daily.  Dispense: 90 tablet; Refill: 3  -     famotidine (PEPCID) 40 MG tablet; Take 1 tablet (40 mg total) by mouth every evening.  Dispense: 90 tablet; Refill: 3    Obesity, Class III, BMI 40-49.9 (morbid obesity)    Dysphagia, unspecified  type     She will continue her reflux regimen.  She will take the Protonix in the morning the Pepcid at night.  She will make a food diary and avoid the offending foods.  I have encouraged weight reduction.  She continues her current fiber supplements to produced daily bowel movements.  She follows up with a dermatologist.  She continues her medications vitamins and minerals.

## 2020-02-27 NOTE — LETTER
February 27, 2020      John Stahl MD  [address]  [place] [address]           [facility]  [place] - [facility]  [address]  [place] [address]  Phone: [phone]  Fax: [phone]          Patient: Mariana Mora   MR Number: [id number]   YOB: 1980   Date of Visit: 2/27/2020       Dear Dr. John Stahl:    Thank you for referring Mariana Mora to me for evaluation. Attached you will find relevant portions of my assessment and plan of care.    If you have questions, please do not hesitate to call me. I look forward to following Mariana Mora along with you.    Sincerely,    David Stahl MD    Enclosure  CC:  No Recipients    If you would like to receive this communication electronically, please contact [email or web address] or [phone] to request more information on [facility] access.    For providers and/or their staff who would like to refer a patient to [facility], please contact us through our one-stop-shop provider referral line, [facility], at [phone].    If you feel you have received this communication in error or would no longer like to receive these types of communications, please e-mail [email or web address]

## 2020-02-27 NOTE — PATIENT INSTRUCTIONS
She will be started on the Protonix in the morning and will continue the Pepcid in the evening.    She will chew her food very well.  The esophageal motility study was normal. She will make a food diary and avoid the off ending foods.  Weight reduction would be ideal.  She will continue her other medications vitamins and minerals and follow-up with the dermatologist.

## 2020-03-12 ENCOUNTER — OFFICE VISIT (OUTPATIENT)
Dept: FAMILY MEDICINE | Facility: CLINIC | Age: 40
End: 2020-03-12
Payer: COMMERCIAL

## 2020-03-12 VITALS
WEIGHT: 267 LBS | BODY MASS INDEX: 42.91 KG/M2 | RESPIRATION RATE: 17 BRPM | HEIGHT: 66 IN | SYSTOLIC BLOOD PRESSURE: 116 MMHG | HEART RATE: 70 BPM | DIASTOLIC BLOOD PRESSURE: 66 MMHG | OXYGEN SATURATION: 96 %

## 2020-03-12 DIAGNOSIS — R63.5 WEIGHT GAIN: ICD-10-CM

## 2020-03-12 DIAGNOSIS — F41.9 ANXIETY: Primary | ICD-10-CM

## 2020-03-12 DIAGNOSIS — E03.9 HYPOTHYROIDISM, UNSPECIFIED TYPE: ICD-10-CM

## 2020-03-12 DIAGNOSIS — F33.1 MODERATE EPISODE OF RECURRENT MAJOR DEPRESSIVE DISORDER: ICD-10-CM

## 2020-03-12 DIAGNOSIS — R82.90 ABNORMAL URINE ODOR: ICD-10-CM

## 2020-03-12 DIAGNOSIS — L40.50 PSORIATIC ARTHRITIS: ICD-10-CM

## 2020-03-12 DIAGNOSIS — R20.2 PARESTHESIA: ICD-10-CM

## 2020-03-12 DIAGNOSIS — E55.9 VITAMIN D DEFICIENCY: ICD-10-CM

## 2020-03-12 DIAGNOSIS — K21.9 GASTROESOPHAGEAL REFLUX DISEASE WITHOUT ESOPHAGITIS: ICD-10-CM

## 2020-03-12 LAB
BILIRUB SERPL-MCNC: NORMAL MG/DL
BLOOD URINE, POC: NORMAL
COLOR, POC UA: YELLOW
GLUCOSE UR QL STRIP: NEGATIVE
KETONES UR QL STRIP: NEGATIVE
LEUKOCYTE ESTERASE URINE, POC: NEGATIVE
NITRITE, POC UA: NEGATIVE
PH, POC UA: 6.5
PROTEIN, POC: NEGATIVE
SPECIFIC GRAVITY, POC UA: >=1.03
UROBILINOGEN, POC UA: NORMAL

## 2020-03-12 PROCEDURE — 3008F BODY MASS INDEX DOCD: CPT | Mod: S$GLB,,, | Performed by: FAMILY MEDICINE

## 2020-03-12 PROCEDURE — 3008F PR BODY MASS INDEX (BMI) DOCUMENTED: ICD-10-PCS | Mod: S$GLB,,, | Performed by: FAMILY MEDICINE

## 2020-03-12 PROCEDURE — 99999 PR PBB SHADOW E&M-EST. PATIENT-LVL III: CPT | Mod: PBBFAC,,, | Performed by: FAMILY MEDICINE

## 2020-03-12 PROCEDURE — 99214 PR OFFICE/OUTPT VISIT, EST, LEVL IV, 30-39 MIN: ICD-10-PCS | Mod: 25,S$GLB,, | Performed by: FAMILY MEDICINE

## 2020-03-12 PROCEDURE — 81002 POCT URINE DIPSTICK WITHOUT MICROSCOPE: ICD-10-PCS | Mod: S$GLB,,, | Performed by: FAMILY MEDICINE

## 2020-03-12 PROCEDURE — 81002 URINALYSIS NONAUTO W/O SCOPE: CPT | Mod: S$GLB,,, | Performed by: FAMILY MEDICINE

## 2020-03-12 PROCEDURE — 99214 OFFICE O/P EST MOD 30 MIN: CPT | Mod: 25,S$GLB,, | Performed by: FAMILY MEDICINE

## 2020-03-12 PROCEDURE — 99999 PR PBB SHADOW E&M-EST. PATIENT-LVL III: ICD-10-PCS | Mod: PBBFAC,,, | Performed by: FAMILY MEDICINE

## 2020-03-12 RX ORDER — SERTRALINE HYDROCHLORIDE 50 MG/1
75 TABLET, FILM COATED ORAL DAILY
Qty: 45 TABLET | Refills: 5 | Status: SHIPPED | OUTPATIENT
Start: 2020-03-12 | End: 2020-06-03 | Stop reason: SDUPTHER

## 2020-03-12 NOTE — PROGRESS NOTES
"  Subjective:       Patient ID: Mariana Mora is a 39 y.o. female.    Chief Complaint: Foot Pain (pt declines HM); Hand Pain; Back Pain; and Edema (urine cloudy and strong in odor in the morning)    Depression and Anxiety, now seeing Dr. Drea Burgess (psychologist in [place]) recommended increase of Zoloft. Patient currently at 50mg in the mornings. Dr. Burgess recommended patient go up to 75mg daily.    Patient c/o weight gain since starting the Protonix. Reports she was dx with severe GERD and has been taking Protonix for a few weeks. C/o other symptoms since starting the Protonix, such as fatigue (worse than usual, and she has tried using her CPAP for at least 12 hours per day to help with no avail), overall "puffiness" (to which she attributes her foot and hand discomfort), and also lower back pain with "strong smelling urine."     Foot Pain   This is a new problem. The current episode started in the past 7 days. The problem occurs constantly. The problem has been waxing and waning. Associated symptoms include abdominal pain (Typically has RUQ associated with nausea (morning, upon awakening)), anorexia, fatigue, a fever (99.5 template), headaches (hx migraines), nausea and urinary symptoms. Pertinent negatives include no arthralgias, change in bowel habit, chest pain, chills, congestion, coughing, diaphoresis, joint swelling, myalgias, rash (hx psoriasis, no flare currently), vomiting or weakness. The symptoms are aggravated by walking, standing and stress. She has tried position changes and rest for the symptoms. The treatment provided mild relief.   Back Pain   This is a new (Started when she was Rx Protonix) problem. The current episode started 1 to 4 weeks ago (about 2 weeks). The problem occurs constantly. The pain is present in the costovertebral angle. The quality of the pain is described as aching. The pain does not radiate. The pain is mild. The pain is worse during the night (also has worsening of " pain upon awakening). Exacerbated by: nothing seems to aggravating. Stiffness is present all day (mild, could be attributed to arthritis). Associated symptoms include abdominal pain (Typically has RUQ associated with nausea (morning, upon awakening)), bladder incontinence, bowel incontinence, dysuria (mild), a fever (99.5 template), headaches (hx migraines) and paresthesias (R foot, just last night). Pertinent negatives include no chest pain, weakness or weight loss (weight GAIN).   Edema   This is a new problem. The current episode started in the past 7 days. The problem occurs constantly. The problem has been gradually worsening. Associated symptoms include abdominal pain (Typically has RUQ associated with nausea (morning, upon awakening)), anorexia, fatigue, a fever (99.5 template), headaches (hx migraines), nausea and urinary symptoms. Pertinent negatives include no arthralgias, change in bowel habit, chest pain, chills, congestion, coughing, diaphoresis, joint swelling, myalgias, rash (hx psoriasis, no flare currently), vomiting or weakness. Nothing aggravates the symptoms. She has tried rest and position changes for the symptoms. The treatment provided mild relief.     Review of Systems   Constitutional: Positive for appetite change (decreased appetitie), fatigue, fever (99.5 template) and unexpected weight change. Negative for chills, diaphoresis and weight loss (weight GAIN).   HENT: Negative for congestion, sinus pressure, sinus pain and trouble swallowing.    Eyes: Negative for visual disturbance.   Respiratory: Positive for apnea (hx SUNIL, uses CPAP as direceted). Negative for cough, chest tightness, shortness of breath and wheezing.    Cardiovascular: Negative for chest pain, palpitations and leg swelling (more in the ankles and feet).   Gastrointestinal: Positive for abdominal pain (Typically has RUQ associated with nausea (morning, upon awakening)), anorexia, bowel incontinence and nausea. Negative for  abdominal distention, anal bleeding, blood in stool, change in bowel habit, constipation, diarrhea and vomiting.   Endocrine: Negative for cold intolerance, heat intolerance, polydipsia, polyphagia and polyuria.   Genitourinary: Positive for bladder incontinence, dysuria (mild) and flank pain. Negative for difficulty urinating, dyspareunia, enuresis, frequency, hematuria, menstrual problem and urgency.   Musculoskeletal: Positive for back pain. Negative for arthralgias, joint swelling and myalgias.   Skin: Negative for rash (hx psoriasis, no flare currently).   Allergic/Immunologic: Negative for environmental allergies.   Neurological: Positive for headaches (hx migraines) and paresthesias (R foot, just last night). Negative for dizziness and weakness.   Hematological: Negative for adenopathy.   Psychiatric/Behavioral: Positive for dysphoric mood. Negative for sleep disturbance. The patient is nervous/anxious.          Past Medical History:   Diagnosis Date    Anxiety     Depression     Hypertension     Hypothyroidism     Migraine headache     Psoriasis     TMJ (temporomandibular joint syndrome)      Past Surgical History:   Procedure Laterality Date    ESOPHAGEAL MANOMETRY WITH MEASUREMENT OF IMPEDANCE N/A 2/10/2020    Procedure: MANOMETRY, ESOPHAGUS, WITH IMPEDANCE MEASUREMENT;  Surgeon: Fitz Murray MD;  Location: [facility] ENDO ([address] [place] [address]);  Service: Endoscopy;  Laterality: N/A;  2/3 - pt confirmed    ESOPHAGOGASTRODUODENOSCOPY N/A 12/17/2019    Procedure: EGD (ESOPHAGOGASTRODUODENOSCOPY);  Surgeon: David Stahl MD;  Location: [facility] ENDO;  Service: Endoscopy;  Laterality: N/A;    INNER EAR SURGERY Right     NASAL SEPTUM SURGERY      TYMPANOPLASTY      bilateral    TYMPANOSTOMY TUBE PLACEMENT       Family History   Problem Relation Age of Onset    Diabetes Paternal Grandmother     Heart failure Paternal Grandmother     Breast cancer Paternal Grandmother     Diabetes Paternal Grandfather     Heart  "failure Paternal Grandfather     Cancer Paternal Grandfather     Hemochromatosis Unknown         Father's side    Cancer Paternal Uncle        Review of patient's allergies indicates:   Allergen Reactions    Penicillins     Prednisone Other (See Comments) and Rash     PSORIASIS  PSORIASIS       Current Outpatient Medications:     COSENTYX PEN, 2 PENS, 150 mg/mL PnIj, , Disp: , Rfl:     folic acid-vit B6-vit B12 2.5-25-2 mg (FOLBIC) 2.5-25-2 mg Tab, Take 1 tablet by mouth once daily., Disp: 30 tablet, Rfl: 11    L norgest/e.estradiol-e.estrad (SEASONIQUE) 0.15 mg-30 mcg (84)/10 mcg (7) 3MPk, Take by mouth., Disp: , Rfl:     OTEZLA 30 mg Tab, Take 30 mg by mouth 2 (two) times daily., Disp: , Rfl:     pantoprazole (PROTONIX) 40 MG tablet, Take 1 tablet (40 mg total) by mouth once daily., Disp: 90 tablet, Rfl: 3    rizatriptan (MAXALT-MLT) 10 MG disintegrating tablet, 10 mg., Disp: , Rfl:     SYNTHROID 75 mcg tablet, Take 1 tablet (75 mcg total) by mouth once daily., Disp: 30 tablet, Rfl: 11    bacillus-protease-amylas-lipas (DIGESTIVE ADVANTAGE INTENS BOW) 1 billion cell- 30,000 unit Cap, Take 1 capsule by mouth once daily., Disp: 90 capsule, Rfl: 3    cetirizine (ZYRTEC) 10 MG tablet, Take 10 mg by mouth once daily., Disp: , Rfl:     sertraline (ZOLOFT) 50 MG tablet, Take 1.5 tablets (75 mg total) by mouth once daily., Disp: 45 tablet, Rfl: 5      Objective:      /66 (BP Location: Right arm, Patient Position: Sitting, BP Method: Large (Automatic))   Pulse 70   Resp 17   Ht 5' 6" (1.676 m)   Wt 121.1 kg (267 lb)   SpO2 96%   BMI 43.09 kg/m²   Physical Exam   Constitutional: She is oriented to person, place, and time. She appears well-developed and well-nourished. No distress.   HENT:   Head: Normocephalic and atraumatic.   Right Ear: External ear normal.   Left Ear: External ear normal.   Nose: Nose normal.   Mouth/Throat: Oropharynx is clear and moist. No oropharyngeal exudate.   Eyes: " Pupils are equal, round, and reactive to light. Conjunctivae and EOM are normal. Right eye exhibits no discharge. Left eye exhibits no discharge. No scleral icterus.   Neck: Normal range of motion. Neck supple. No JVD present. No tracheal deviation present. No thyromegaly present.   Cardiovascular: Normal rate, regular rhythm, normal heart sounds and intact distal pulses. Exam reveals no friction rub.   No murmur heard.  Pulmonary/Chest: Effort normal and breath sounds normal. No respiratory distress. She has no wheezes.   Abdominal: Soft. Bowel sounds are normal. She exhibits no distension and no mass. There is no tenderness. There is no rebound and no guarding.   Obese     Genitourinary:   Genitourinary Comments: No CVAT     Musculoskeletal: Normal range of motion. She exhibits no edema (no appreciative edema in extremities, no pitting) or tenderness.   Lymphadenopathy:     She has no cervical adenopathy.   Neurological: She is alert and oriented to person, place, and time.   Skin: Skin is warm and dry. Capillary refill takes less than 2 seconds. No rash noted. She is not diaphoretic. No erythema. No pallor.   Psychiatric: She has a normal mood and affect. Her behavior is normal. Judgment and thought content normal.   Nursing note and vitals reviewed.      Assessment:       1. Anxiety    2. Moderate episode of recurrent major depressive disorder    3. Weight gain    4. Hypothyroidism, unspecified type    5. Vitamin D deficiency    6. Gastroesophageal reflux disease without esophagitis    7. Psoriatic arthritis    8. Abnormal urine odor    9. Paresthesia        Plan:       Anxiety  -     sertraline (ZOLOFT) 50 MG tablet; Take 1.5 tablets (75 mg total) by mouth once daily.  Dispense: 45 tablet; Refill: 5    Moderate episode of recurrent major depressive disorder  -     sertraline (ZOLOFT) 50 MG tablet; Take 1.5 tablets (75 mg total) by mouth once daily.  Dispense: 45 tablet; Refill: 5  -     Hemoglobin A1c; Future;  Expected date: 03/12/2020    Weight gain  -     Hemoglobin A1c; Future; Expected date: 03/12/2020    Hypothyroidism, unspecified type  -     Hemoglobin A1c; Future; Expected date: 03/12/2020    Vitamin D deficiency  -     Hemoglobin A1c; Future; Expected date: 03/12/2020    Gastroesophageal reflux disease without esophagitis  -     bacillus-protease-amylas-lipas (DIGESTIVE ADVANTAGE INTENS BOW) 1 billion cell- 30,000 unit Cap; Take 1 capsule by mouth once daily.  Dispense: 90 capsule; Refill: 3    Psoriatic arthritis    Abnormal urine odor  -     POCT URINE DIPSTICK WITHOUT MICROSCOPE  -     Urinalysis Complete; Future; Expected date: 03/12/2020    Paresthesia  -     Hemoglobin A1c; Future; Expected date: 03/12/2020  -     Magnesium; Future; Expected date: 03/12/2020            Follow up for as scheduled with PCP (Dr. Stahl) after labs.     Add magnesium, A1C, UA to her labs that will be done next week. Consider different PPI, but no change today since we are increasing her Zoloft (patient educated on causes of fatigue, weight gain, fluid retention.) She will benefit from a probiotic and prebiotic. Consider trial of magnesium supplement regardless of her serum mag level (such as Slo-Mag or a magnesium glycinate OTC.)    Return precautions reviewed.    Push fluids.    Limit sugars and refined carbs.

## 2020-03-16 ENCOUNTER — LAB VISIT (OUTPATIENT)
Dept: LAB | Facility: HOSPITAL | Age: 40
End: 2020-03-16
Attending: FAMILY MEDICINE
Payer: COMMERCIAL

## 2020-03-16 DIAGNOSIS — R63.5 WEIGHT GAIN: ICD-10-CM

## 2020-03-16 DIAGNOSIS — E03.9 HYPOTHYROIDISM, UNSPECIFIED TYPE: ICD-10-CM

## 2020-03-16 DIAGNOSIS — F41.9 ANXIETY: ICD-10-CM

## 2020-03-16 DIAGNOSIS — E55.9 VITAMIN D DEFICIENCY: ICD-10-CM

## 2020-03-16 DIAGNOSIS — F33.1 MODERATE EPISODE OF RECURRENT MAJOR DEPRESSIVE DISORDER: ICD-10-CM

## 2020-03-16 DIAGNOSIS — R20.2 PARESTHESIA: ICD-10-CM

## 2020-03-16 LAB
25(OH)D3+25(OH)D2 SERPL-MCNC: 11 NG/ML (ref 30–96)
ALBUMIN SERPL BCP-MCNC: 3.9 G/DL (ref 3.5–5.2)
ALP SERPL-CCNC: 44 U/L (ref 55–135)
ALT SERPL W/O P-5'-P-CCNC: 24 U/L (ref 10–44)
ANION GAP SERPL CALC-SCNC: 8 MMOL/L (ref 8–16)
AST SERPL-CCNC: 18 U/L (ref 10–40)
BASOPHILS # BLD AUTO: 0.07 K/UL (ref 0–0.2)
BASOPHILS NFR BLD: 0.8 % (ref 0–1.9)
BILIRUB SERPL-MCNC: 0.6 MG/DL (ref 0.1–1)
BUN SERPL-MCNC: 13 MG/DL (ref 6–20)
CALCIUM SERPL-MCNC: 8.9 MG/DL (ref 8.7–10.5)
CHLORIDE SERPL-SCNC: 106 MMOL/L (ref 95–110)
CHOLEST SERPL-MCNC: 237 MG/DL (ref 120–199)
CHOLEST/HDLC SERPL: 5.8 {RATIO} (ref 2–5)
CO2 SERPL-SCNC: 24 MMOL/L (ref 23–29)
CREAT SERPL-MCNC: 0.8 MG/DL (ref 0.5–1.4)
DIFFERENTIAL METHOD: ABNORMAL
EOSINOPHIL # BLD AUTO: 0.3 K/UL (ref 0–0.5)
EOSINOPHIL NFR BLD: 3.4 % (ref 0–8)
ERYTHROCYTE [DISTWIDTH] IN BLOOD BY AUTOMATED COUNT: 12.4 % (ref 11.5–14.5)
EST. GFR  (AFRICAN AMERICAN): >60 ML/MIN/1.73 M^2
EST. GFR  (NON AFRICAN AMERICAN): >60 ML/MIN/1.73 M^2
ESTIMATED AVG GLUCOSE: 103 MG/DL (ref 68–131)
GLUCOSE SERPL-MCNC: 72 MG/DL (ref 70–110)
HBA1C MFR BLD HPLC: 5.2 % (ref 4.5–6.2)
HCT VFR BLD AUTO: 42.5 % (ref 37–48.5)
HDLC SERPL-MCNC: 41 MG/DL (ref 40–75)
HDLC SERPL: 17.3 % (ref 20–50)
HGB BLD-MCNC: 14.2 G/DL (ref 12–16)
IMM GRANULOCYTES # BLD AUTO: 0.02 K/UL (ref 0–0.04)
IMM GRANULOCYTES NFR BLD AUTO: 0.2 % (ref 0–0.5)
LDLC SERPL CALC-MCNC: 160.8 MG/DL (ref 63–159)
LYMPHOCYTES # BLD AUTO: 2.3 K/UL (ref 1–4.8)
LYMPHOCYTES NFR BLD: 25.9 % (ref 18–48)
MAGNESIUM SERPL-MCNC: 2.3 MG/DL (ref 1.6–2.6)
MCH RBC QN AUTO: 30.4 PG (ref 27–31)
MCHC RBC AUTO-ENTMCNC: 33.4 G/DL (ref 32–36)
MCV RBC AUTO: 91 FL (ref 82–98)
MONOCYTES # BLD AUTO: 0.5 K/UL (ref 0.3–1)
MONOCYTES NFR BLD: 5.9 % (ref 4–15)
NEUTROPHILS # BLD AUTO: 5.7 K/UL (ref 1.8–7.7)
NEUTROPHILS NFR BLD: 63.8 % (ref 38–73)
NONHDLC SERPL-MCNC: 196 MG/DL
NRBC BLD-RTO: 0 /100 WBC
PLATELET # BLD AUTO: 320 K/UL (ref 150–350)
PMV BLD AUTO: 9.1 FL (ref 9.2–12.9)
POTASSIUM SERPL-SCNC: 4 MMOL/L (ref 3.5–5.1)
PROT SERPL-MCNC: 7.8 G/DL (ref 6–8.4)
RBC # BLD AUTO: 4.67 M/UL (ref 4–5.4)
SODIUM SERPL-SCNC: 138 MMOL/L (ref 136–145)
T4 FREE SERPL-MCNC: 0.84 NG/DL (ref 0.71–1.51)
TRIGL SERPL-MCNC: 176 MG/DL (ref 30–150)
TSH SERPL DL<=0.005 MIU/L-ACNC: 2.54 UIU/ML (ref 0.34–5.6)
WBC # BLD AUTO: 8.92 K/UL (ref 3.9–12.7)

## 2020-03-16 PROCEDURE — 83735 ASSAY OF MAGNESIUM: CPT

## 2020-03-16 PROCEDURE — 80061 LIPID PANEL: CPT

## 2020-03-16 PROCEDURE — 84443 ASSAY THYROID STIM HORMONE: CPT

## 2020-03-16 PROCEDURE — 85025 COMPLETE CBC W/AUTO DIFF WBC: CPT

## 2020-03-16 PROCEDURE — 36415 COLL VENOUS BLD VENIPUNCTURE: CPT

## 2020-03-16 PROCEDURE — 82306 VITAMIN D 25 HYDROXY: CPT

## 2020-03-16 PROCEDURE — 83036 HEMOGLOBIN GLYCOSYLATED A1C: CPT

## 2020-03-16 PROCEDURE — 80053 COMPREHEN METABOLIC PANEL: CPT

## 2020-03-16 PROCEDURE — 84439 ASSAY OF FREE THYROXINE: CPT

## 2020-03-17 ENCOUNTER — TELEPHONE (OUTPATIENT)
Dept: GASTROENTEROLOGY | Facility: CLINIC | Age: 40
End: 2020-03-17

## 2020-03-17 NOTE — TELEPHONE ENCOUNTER
Spoke with pt. She states she is more tired than normal, and feels like her clothes don't fit right. Pt stated she thinks her Protonix is preventing her synthroid from working.   Pt had TSH and T4 drawn yesterday. Both labs WNL. Pt Vit D low. Informed pt her Vit D was low, and that is probably what is causing her symptoms of fatigue. Informed pt MD wanted to further discuss this at office f/u. Advised pt she could obtain Vit D OTC to take daily until office visit next week. Pt verbalized understanding.

## 2020-03-17 NOTE — TELEPHONE ENCOUNTER
----- Message from Nadeem Ricketts sent at 3/17/2020  1:12 PM CDT -----  Contact: pt  Type: Needs Medical Advice    Who Called:  pt    Best Call Back Number: [phone]  Additional Information: pt thinks her pantoprazole (PROTONIX) 40 MG tablet is interacting with SYNTHROID 75 mcg tablet. Please call pt to advise

## 2020-03-23 ENCOUNTER — PATIENT OUTREACH (OUTPATIENT)
Dept: ADMINISTRATIVE | Facility: OTHER | Age: 40
End: 2020-03-23

## 2020-03-26 ENCOUNTER — TELEPHONE (OUTPATIENT)
Dept: GASTROENTEROLOGY | Facility: CLINIC | Age: 40
End: 2020-03-26

## 2020-03-26 ENCOUNTER — OFFICE VISIT (OUTPATIENT)
Dept: GASTROENTEROLOGY | Facility: CLINIC | Age: 40
End: 2020-03-26
Payer: COMMERCIAL

## 2020-03-26 VITALS
BODY MASS INDEX: 43.07 KG/M2 | HEIGHT: 66 IN | SYSTOLIC BLOOD PRESSURE: 130 MMHG | HEART RATE: 72 BPM | DIASTOLIC BLOOD PRESSURE: 82 MMHG | WEIGHT: 268 LBS | OXYGEN SATURATION: 97 % | RESPIRATION RATE: 18 BRPM

## 2020-03-26 DIAGNOSIS — K44.9 HIATAL HERNIA: ICD-10-CM

## 2020-03-26 DIAGNOSIS — R11.0 NAUSEA: ICD-10-CM

## 2020-03-26 DIAGNOSIS — K21.9 GASTROESOPHAGEAL REFLUX DISEASE, ESOPHAGITIS PRESENCE NOT SPECIFIED: ICD-10-CM

## 2020-03-26 DIAGNOSIS — E55.9 VITAMIN D DEFICIENCY: Primary | ICD-10-CM

## 2020-03-26 DIAGNOSIS — E66.01 OBESITY, CLASS III, BMI 40-49.9 (MORBID OBESITY): ICD-10-CM

## 2020-03-26 PROCEDURE — 99213 PR OFFICE/OUTPT VISIT, EST, LEVL III, 20-29 MIN: ICD-10-PCS | Mod: S$GLB,,, | Performed by: INTERNAL MEDICINE

## 2020-03-26 PROCEDURE — 99999 PR PBB SHADOW E&M-EST. PATIENT-LVL III: CPT | Mod: PBBFAC,,, | Performed by: INTERNAL MEDICINE

## 2020-03-26 PROCEDURE — 3008F PR BODY MASS INDEX (BMI) DOCUMENTED: ICD-10-PCS | Mod: S$GLB,,, | Performed by: INTERNAL MEDICINE

## 2020-03-26 PROCEDURE — 99213 OFFICE O/P EST LOW 20 MIN: CPT | Mod: S$GLB,,, | Performed by: INTERNAL MEDICINE

## 2020-03-26 PROCEDURE — 3008F BODY MASS INDEX DOCD: CPT | Mod: S$GLB,,, | Performed by: INTERNAL MEDICINE

## 2020-03-26 PROCEDURE — 99999 PR PBB SHADOW E&M-EST. PATIENT-LVL III: ICD-10-PCS | Mod: PBBFAC,,, | Performed by: INTERNAL MEDICINE

## 2020-03-26 RX ORDER — FAMOTIDINE 40 MG/1
40 TABLET, FILM COATED ORAL 2 TIMES DAILY
Qty: 180 TABLET | Refills: 1 | Status: SHIPPED | OUTPATIENT
Start: 2020-03-26 | End: 2020-09-22

## 2020-03-26 RX ORDER — FAMOTIDINE 40 MG/1
TABLET, FILM COATED ORAL
COMMUNITY
Start: 2020-03-23 | End: 2020-03-26 | Stop reason: SDUPTHER

## 2020-03-26 RX ORDER — ONDANSETRON 4 MG/1
8 TABLET, FILM COATED ORAL EVERY 6 HOURS PRN
Qty: 50 TABLET | Refills: 0 | Status: SHIPPED | OUTPATIENT
Start: 2020-03-26 | End: 2020-07-24

## 2020-03-26 RX ORDER — ERGOCALCIFEROL 1.25 MG/1
50000 CAPSULE ORAL
Qty: 8 CAPSULE | Refills: 0 | Status: SHIPPED | OUTPATIENT
Start: 2020-03-26 | End: 2020-07-07 | Stop reason: SDUPTHER

## 2020-03-26 NOTE — TELEPHONE ENCOUNTER
----- Message from Theodore Emmanuel sent at 3/26/2020  4:39 PM CDT -----  Contact: patient  Type: Needs Medical Advice    Who Called: patient  Symptoms (please be specific):    How long has patient had these symptoms:    Pharmacy name and phone #:  [facility] [address] [place]  Best Call Back Number:   Additional Information: called to advise that today's medications was sent to wrong pharmacy requesting medication be sent to [facility].Patient was seen today

## 2020-03-26 NOTE — LETTER
March 26, 2020      John Stahl MD  [address]  [place] [address]           [facility]  [place] [facility]  [address], [address]  [place] [address]  Phone: [phone]  Fax: [phone]          Patient: Mariana Mora   MR Number: [id number]   YOB: 1980   Date of Visit: 3/26/2020       Dear Dr. John Stahl:    Thank you for referring Mariana Mora to me for evaluation. Attached you will find relevant portions of my assessment and plan of care.    If you have questions, please do not hesitate to call me. I look forward to following Mariana Mora along with you.    Sincerely,    David Stahl MD    Enclosure  CC:  No Recipients    If you would like to receive this communication electronically, please contact [email or web address] or [phone] to request more information on [facility] Link access.    For providers and/or their staff who would like to refer a patient to [facility], please contact us through our one-stop-shop provider referral line, [facility] , at [phone].    If you feel you have received this communication in error or would no longer like to receive these types of communications, please e-mail [email or web address]

## 2020-03-26 NOTE — TELEPHONE ENCOUNTER
E-prescribed meds sent to incorrect pharmacy. Medication called into [facility].    Pt notified.

## 2020-03-26 NOTE — PATIENT INSTRUCTIONS
She will follow-up with the rheumatologist for the psoriasis treatment.  She will stop the Protonix.  She will continue the reflux regimen but take the Pepcid twice a day.  She will make a food diary and avoid the offending foods.  She started on the Zofran for occasional nausea.  She is started on the vitamin D 96361 units weekly because of the low vitamin-D.  She continues her other medications vitamins and minerals.

## 2020-03-26 NOTE — PROGRESS NOTES
Subjective:       Patient ID: Mariana Mora is a 39 y.o. female.    Chief Complaint: Follow-up (Hiatal hernia, Diarrhea, Dysphagia)    She states the esophageal symptoms such as pyrosis dyspepsia atypical chest pain or dysphagia resolved with the Protonix in the morning and the Pepcid at night.  She believes the Protonix has caused side effects such as not feeling well in being weak.  She wants to stop the Protonix.  She denies hematemesis hematochezia jaundice or bleeding.  She is having daily bowel movements.  The rheumatologist is regulating her psoriasis.  She states the skin lesions have markedly improved but she still has nonspecific joint symptoms.  She denies swollen joints or limited activity.  She denies fever chills.  She has had occasional nausea without vomiting.  She has his the Phenergan in the past.  She wants something that will not make her sleepy for the nausea.  She cannot pinpoint a precipitating factor.      Allergies:  Review of patient's allergies indicates:   Allergen Reactions    Penicillins     Prednisone Other (See Comments) and Rash     PSORIASIS  PSORIASIS       Medications:    Current Outpatient Medications:     bacillus-protease-amylas-lipas (DIGESTIVE ADVANTAGE INTENS BOW) 1 billion cell- 30,000 unit Cap, Take 1 capsule by mouth once daily., Disp: 90 capsule, Rfl: 3    cetirizine (ZYRTEC) 10 MG tablet, Take 10 mg by mouth once daily., Disp: , Rfl:     COSENTYX PEN, 2 PENS, 150 mg/mL PnIj, , Disp: , Rfl:     famotidine (PEPCID) 40 MG tablet, Take 1 tablet (40 mg total) by mouth 2 (two) times daily., Disp: 180 tablet, Rfl: 1    folic acid-vit B6-vit B12 2.5-25-2 mg (FOLBIC) 2.5-25-2 mg Tab, Take 1 tablet by mouth once daily., Disp: 30 tablet, Rfl: 11    L norgest/e.estradiol-e.estrad (SEASONIQUE) 0.15 mg-30 mcg (84)/10 mcg (7) 3MPk, Take by mouth., Disp: , Rfl:     OTEZLA 30 mg Tab, Take 30 mg by mouth 2 (two) times daily., Disp: , Rfl:     pantoprazole (PROTONIX) 40 MG  tablet, Take 1 tablet (40 mg total) by mouth once daily., Disp: 90 tablet, Rfl: 3    rizatriptan (MAXALT-MLT) 10 MG disintegrating tablet, 10 mg., Disp: , Rfl:     sertraline (ZOLOFT) 50 MG tablet, Take 1.5 tablets (75 mg total) by mouth once daily., Disp: 45 tablet, Rfl: 5    SYNTHROID 75 mcg tablet, Take 1 tablet (75 mcg total) by mouth once daily., Disp: 30 tablet, Rfl: 11    ergocalciferol (ERGOCALCIFEROL) 50,000 unit Cap, Take 1 capsule (50,000 Units total) by mouth every 7 days., Disp: 8 capsule, Rfl: 0    ondansetron (ZOFRAN) 4 MG tablet, Take 2 tablets (8 mg total) by mouth every 6 (six) hours as needed for Nausea., Disp: 50 tablet, Rfl: 0    Past Medical History:   Diagnosis Date    Anxiety     Depression     Hypertension     Hypothyroidism     Migraine headache     Psoriasis     TMJ (temporomandibular joint syndrome)        Past Surgical History:   Procedure Laterality Date    ESOPHAGEAL MANOMETRY WITH MEASUREMENT OF IMPEDANCE N/A 2/10/2020    Procedure: MANOMETRY, ESOPHAGUS, WITH IMPEDANCE MEASUREMENT;  Surgeon: Fitz Murray MD;  Location: [address]);  Service: Endoscopy;  Laterality: N/A;  2/3 - pt confirmed    ESOPHAGOGASTRODUODENOSCOPY N/A 12/17/2019    Procedure: EGD (ESOPHAGOGASTRODUODENOSCOPY);  Surgeon: David Stahl MD;  Location: [facility];  Service: Endoscopy;  Laterality: N/A;    INNER EAR SURGERY Right     NASAL SEPTUM SURGERY      TYMPANOPLASTY      bilateral    TYMPANOSTOMY TUBE PLACEMENT           Review of Systems   Cardiovascular:        She denies exertional chest pain or rhythm disturbance.   Gastrointestinal: Positive for nausea. Negative for abdominal distention, abdominal pain, anal bleeding, blood in stool, constipation and diarrhea.        She was instructed last visit to try to reduce her weight by making a food diary and decreased her caloric intake and adding fiber to the diet to avoid constipation.  She states his this difficult to reduce her  weight.   Endocrine:        She states her hypothyroidism well controlled.  I reviewed the need to take the Synthroid with only water and without other oral intake her medications for 1 hr.   Musculoskeletal:        Her psoriasis has responded to the biologics.  She is followed by the rheumatologist.  She has minimal joint symptoms.  She denies swollen joints or limited motion.   Neurological:        She has occasional headaches.  She denies visual disturbances.       Objective:      Physical Exam   Constitutional: She is oriented to person, place, and time. She appears well-developed and well-nourished.   Well-nourished well-hydrated overweight nonicteric white female.  She is oriented x3.  She can relate her history and answer questions appropriately.   HENT:   Head: Normocephalic.   Eyes: Pupils are equal, round, and reactive to light. EOM are normal.   Neck: Normal range of motion. Neck supple. No tracheal deviation present. No thyromegaly present.   Cardiovascular: Normal rate, regular rhythm and normal heart sounds.   Pulmonary/Chest: Effort normal and breath sounds normal.   Abdominal: Soft. Bowel sounds are normal. She exhibits no distension and no mass. There is no tenderness. There is no rebound and no guarding. No hernia.   Abdomen is soft obese without tenderness masses organomegaly.  Bowel sounds are normal.   Musculoskeletal: Normal range of motion.   She can ambulate normally.  She can go from the sitting to the standing position without difficulty.   Lymphadenopathy:     She has no cervical adenopathy.   Neurological: She is alert and oriented to person, place, and time. No cranial nerve deficit.   Skin: Skin is warm and dry.   Psychiatric: She has a normal mood and affect. Her behavior is normal.   Vitals reviewed.        Plan:       Vitamin D deficiency  -     ergocalciferol (ERGOCALCIFEROL) 50,000 unit Cap; Take 1 capsule (50,000 Units total) by mouth every 7 days.  Dispense: 8 capsule; Refill:  0    Nausea  -     ondansetron (ZOFRAN) 4 MG tablet; Take 2 tablets (8 mg total) by mouth every 6 (six) hours as needed for Nausea.  Dispense: 50 tablet; Refill: 0    Gastroesophageal reflux disease, esophagitis presence not specified  -     famotidine (PEPCID) 40 MG tablet; Take 1 tablet (40 mg total) by mouth 2 (two) times daily.  Dispense: 180 tablet; Refill: 1    Hiatal hernia    Obesity, Class III, BMI 40-49.9 (morbid obesity)     She will continue her reflux regimen.  She will make a food diary and avoid the offending foods.  I have encouraged weight reduction with decreased caloric intake.  She continues her other vitamins and minerals and she started on the vitamin-D.  She follows up with her rheumatologist for the psoriasis.  She will take her Pepcid twice a day.  She can use the Zofran for occasional nausea.

## 2020-03-27 ENCOUNTER — TELEPHONE (OUTPATIENT)
Dept: GASTROENTEROLOGY | Facility: CLINIC | Age: 40
End: 2020-03-27

## 2020-03-27 NOTE — TELEPHONE ENCOUNTER
----- Message from Harriett Broussard sent at 3/27/2020  9:38 AM CDT -----  Contact: Patient  Patient called to state that her pharmacy got two of the meds that were messed up but still missing one. Please send script in    Medication: famotidine (PEPCID) 40 MG tablet    Pharmacy: [facility] #[id number] [address] [facility] [address] [phone] (Phone)     Patient call back: [phone]

## 2020-04-06 ENCOUNTER — PATIENT OUTREACH (OUTPATIENT)
Dept: ADMINISTRATIVE | Facility: HOSPITAL | Age: 40
End: 2020-04-06

## 2020-04-08 ENCOUNTER — OFFICE VISIT (OUTPATIENT)
Dept: FAMILY MEDICINE | Facility: CLINIC | Age: 40
End: 2020-04-08
Payer: COMMERCIAL

## 2020-04-08 DIAGNOSIS — K21.9 GASTROESOPHAGEAL REFLUX DISEASE WITHOUT ESOPHAGITIS: ICD-10-CM

## 2020-04-08 DIAGNOSIS — E03.9 HYPOTHYROIDISM, UNSPECIFIED TYPE: Primary | ICD-10-CM

## 2020-04-08 DIAGNOSIS — K21.9 GASTROESOPHAGEAL REFLUX DISEASE, ESOPHAGITIS PRESENCE NOT SPECIFIED: ICD-10-CM

## 2020-04-08 PROCEDURE — 99214 OFFICE O/P EST MOD 30 MIN: CPT | Mod: 95,,, | Performed by: FAMILY MEDICINE

## 2020-04-08 PROCEDURE — 99214 PR OFFICE/OUTPT VISIT, EST, LEVL IV, 30-39 MIN: ICD-10-PCS | Mod: 95,,, | Performed by: FAMILY MEDICINE

## 2020-04-08 RX ORDER — PANTOPRAZOLE SODIUM 20 MG/1
20 TABLET, DELAYED RELEASE ORAL DAILY
Qty: 90 TABLET | Refills: 3 | Status: SHIPPED | OUTPATIENT
Start: 2020-04-08 | End: 2020-05-22 | Stop reason: SDUPTHER

## 2020-04-08 NOTE — PROGRESS NOTES
Patient ID: Mariana Mora is a 39 y.o. female.    Chief Complaint: follow up visit/med check  Fatigue  Believes it is due to ppi interacting with synthroid  Would like to decrease ppi  Notes that it would be difficult to get off ppi due to acid reflux    Review of Systems   Constitutional: Positive for appetite change (decreased appetitie), fatigue, fever (99.5 template) and unexpected weight change. Negative for chills and diaphoresis.   HENT: Negative for congestion, sinus pressure, sinus pain and trouble swallowing.    Eyes: Negative for visual disturbance.   Respiratory: Positive for apnea (hx SUNIL, uses CPAP as direceted). Negative for cough, chest tightness, shortness of breath and wheezing.    Cardiovascular: Negative for chest pain, palpitations and leg swelling (more in the ankles and feet).   Gastrointestinal: Positive for abdominal pain (Typically has RUQ associated with nausea (morning, upon awakening)) and nausea. Negative for abdominal distention, anal bleeding, blood in stool, constipation, diarrhea and vomiting.   Endocrine: Negative for cold intolerance, heat intolerance, polydipsia, polyphagia and polyuria.   Genitourinary: Positive for dysuria (mild) and flank pain. Negative for difficulty urinating, dyspareunia, enuresis, frequency, hematuria, menstrual problem and urgency.   Musculoskeletal: Positive for back pain. Negative for arthralgias, joint swelling and myalgias.   Skin: Negative for rash (hx psoriasis, no flare currently).   Allergic/Immunologic: Negative for environmental allergies.   Neurological: Positive for headaches (hx migraines). Negative for dizziness and weakness.   Hematological: Negative for adenopathy.   Psychiatric/Behavioral: Positive for dysphoric mood. Negative for sleep disturbance. The patient is nervous/anxious.          Reviewed family, medical, surgical, and social history.    Objective:        patient is speaking full sentences  mood and behavior  appropriate  no signs of distress  no wheezing heard  No audible congestion in voice  No coughing on the phone  patient doesnt see pus on the tonsils  can move neck in all directions without pain  Reports able to walk normally  able to move all extremities without weakness  reports facial muscles look symmetrical  Patient has no tenderness over the abdomen with pressure  no CVA tenderness according to patient  Assessment:       1. Screening for HIV (human immunodeficiency virus)        Plan:       Screening for HIV (human immunodeficiency virus)            Risks, benefits, and side effects were discussed with the patient. All questions were answered to the fullest satisfaction of the patient, and pt verbalized understanding and agreement to treatment plan. Pt was to call with any new or worsening symptoms, or present to the ER.    The patient location is:  Patient Home   Visit type: Virtual visit with synchronous audio and video  Each patient to whom he or she provides medical services by telemedicine is:  (1) informed of the relationship between the physician and patient and the respective role of any other health care provider with respect to management of the patient; and (2) notified that he or she may decline to receive medical services by telemedicine and may withdraw from such care at any time.

## 2020-04-17 ENCOUNTER — PATIENT MESSAGE (OUTPATIENT)
Dept: FAMILY MEDICINE | Facility: CLINIC | Age: 40
End: 2020-04-17

## 2020-05-20 ENCOUNTER — PATIENT MESSAGE (OUTPATIENT)
Dept: ADMINISTRATIVE | Facility: HOSPITAL | Age: 40
End: 2020-05-20

## 2020-05-22 ENCOUNTER — OFFICE VISIT (OUTPATIENT)
Dept: FAMILY MEDICINE | Facility: CLINIC | Age: 40
End: 2020-05-22
Payer: COMMERCIAL

## 2020-05-22 DIAGNOSIS — E03.9 HYPOTHYROIDISM, UNSPECIFIED TYPE: Primary | ICD-10-CM

## 2020-05-22 DIAGNOSIS — G43.809 OTHER MIGRAINE WITHOUT STATUS MIGRAINOSUS, NOT INTRACTABLE: ICD-10-CM

## 2020-05-22 DIAGNOSIS — K21.9 GASTROESOPHAGEAL REFLUX DISEASE, ESOPHAGITIS PRESENCE NOT SPECIFIED: ICD-10-CM

## 2020-05-22 PROCEDURE — 99214 OFFICE O/P EST MOD 30 MIN: CPT | Mod: 95,,, | Performed by: FAMILY MEDICINE

## 2020-05-22 PROCEDURE — 99214 PR OFFICE/OUTPT VISIT, EST, LEVL IV, 30-39 MIN: ICD-10-PCS | Mod: 95,,, | Performed by: FAMILY MEDICINE

## 2020-05-22 RX ORDER — LEVOTHYROXINE SODIUM 100 UG/1
100 TABLET ORAL
Qty: 90 TABLET | Refills: 3 | Status: SHIPPED | OUTPATIENT
Start: 2020-05-22 | End: 2021-05-14

## 2020-05-22 RX ORDER — RIZATRIPTAN BENZOATE 10 MG/1
10 TABLET, ORALLY DISINTEGRATING ORAL
Qty: 9 TABLET | Refills: 11 | Status: SHIPPED | OUTPATIENT
Start: 2020-05-22 | End: 2021-07-30 | Stop reason: SDUPTHER

## 2020-05-22 RX ORDER — PANTOPRAZOLE SODIUM 40 MG/1
40 TABLET, DELAYED RELEASE ORAL DAILY
Qty: 90 TABLET | Refills: 3 | Status: SHIPPED | OUTPATIENT
Start: 2020-05-22 | End: 2021-05-27

## 2020-05-22 NOTE — PROGRESS NOTES
Patient ID: Mariana Mora is a 39 y.o. female.    Chief Complaint: follow up visit/med check  Pt states that hypothyroidism is well controlled  No issues/side effects  Compliant with treatment regimen    Pt states that migraines are well controlled  No issues/side effects  Compliant with treatment regimen    Pt states that GERD is well controlled  No issues/side effects  Compliant with treatment regimen    Review of Systems   Constitutional: Negative for activity change and unexpected weight change.   HENT: Negative for hearing loss, rhinorrhea and trouble swallowing.    Eyes: Negative for discharge and visual disturbance.   Respiratory: Negative for chest tightness and wheezing.    Cardiovascular: Negative for chest pain and palpitations.   Gastrointestinal: Negative for blood in stool, constipation, diarrhea and vomiting.   Endocrine: Negative for polydipsia and polyuria.   Genitourinary: Negative for difficulty urinating, dysuria, hematuria and menstrual problem.   Musculoskeletal: Negative for arthralgias, joint swelling and neck pain.   Neurological: Negative for weakness and headaches.   Psychiatric/Behavioral: Negative for confusion and dysphoric mood.         Reviewed family, medical, surgical, and social history.    Objective:        patient is speaking full sentences  mood and behavior appropriate  no signs of distress  no wheezing heard  No audible congestion in voice  No coughing on the phone  patient doesnt see pus on the tonsils  can move neck in all directions without pain  Reports able to walk normally  able to move all extremities without weakness  reports facial muscles look symmetrical  Patient has no tenderness over the abdomen with pressure  no CVA tenderness according to patient  Assessment:       1. Hypothyroidism, unspecified type    2. Gastroesophageal reflux disease, esophagitis presence not specified    3. Other migraine without status migrainosus, not intractable        Plan:        Hypothyroidism, unspecified type    Gastroesophageal reflux disease, esophagitis presence not specified  -     pantoprazole (PROTONIX) 40 MG tablet; Take 1 tablet (40 mg total) by mouth once daily.  Dispense: 90 tablet; Refill: 3    Other migraine without status migrainosus, not intractable  -     rizatriptan (MAXALT-MLT) 10 MG disintegrating tablet; Take 1 tablet (10 mg total) by mouth as needed for Migraine (No more than 2 tablets in 24 hours).  Dispense: 9 tablet; Refill: 11    Other orders  -     SYNTHROID 100 mcg tablet; Take 1 tablet (100 mcg total) by mouth before breakfast.  Dispense: 90 tablet; Refill: 3            Risks, benefits, and side effects were discussed with the patient. All questions were answered to the fullest satisfaction of the patient, and pt verbalized understanding and agreement to treatment plan. Pt was to call with any new or worsening symptoms, or present to the ER.    The patient location is:  Patient Home   Visit type: Virtual visit with synchronous audio and video  Each patient to whom he or she provides medical services by telemedicine is:  (1) informed of the relationship between the physician and patient and the respective role of any other health care provider with respect to management of the patient; and (2) notified that he or she may decline to receive medical services by telemedicine and may withdraw from such care at any time.

## 2020-06-03 ENCOUNTER — PATIENT MESSAGE (OUTPATIENT)
Dept: FAMILY MEDICINE | Facility: CLINIC | Age: 40
End: 2020-06-03

## 2020-06-03 DIAGNOSIS — F33.1 MODERATE EPISODE OF RECURRENT MAJOR DEPRESSIVE DISORDER: ICD-10-CM

## 2020-06-03 DIAGNOSIS — F41.9 ANXIETY: ICD-10-CM

## 2020-06-03 RX ORDER — SERTRALINE HYDROCHLORIDE 100 MG/1
100 TABLET, FILM COATED ORAL DAILY
Qty: 90 TABLET | Refills: 3 | Status: SHIPPED | OUTPATIENT
Start: 2020-06-03 | End: 2020-06-04 | Stop reason: SDUPTHER

## 2020-06-04 DIAGNOSIS — F33.1 MODERATE EPISODE OF RECURRENT MAJOR DEPRESSIVE DISORDER: ICD-10-CM

## 2020-06-04 DIAGNOSIS — F41.9 ANXIETY: ICD-10-CM

## 2020-06-04 RX ORDER — SERTRALINE HYDROCHLORIDE 100 MG/1
100 TABLET, FILM COATED ORAL DAILY
Qty: 90 TABLET | Refills: 3 | Status: SHIPPED | OUTPATIENT
Start: 2020-06-04 | End: 2020-07-24 | Stop reason: SDUPTHER

## 2020-06-04 RX ORDER — SERTRALINE HYDROCHLORIDE 100 MG/1
100 TABLET, FILM COATED ORAL DAILY
Qty: 90 TABLET | Refills: 3 | Status: SHIPPED | OUTPATIENT
Start: 2020-06-04 | End: 2020-06-04 | Stop reason: SDUPTHER

## 2020-06-16 ENCOUNTER — TELEPHONE (OUTPATIENT)
Dept: FAMILY MEDICINE | Facility: CLINIC | Age: 40
End: 2020-06-16

## 2020-06-16 NOTE — TELEPHONE ENCOUNTER
----- Message from Sheree Beltrán sent at 6/16/2020  4:22 PM CDT -----  Regarding: Appointment  Name of Caller:    Mariana      Reason for Visit/Symptoms:   Sinus infection      Best Contact Number or Confirm if Mychart Preferred: [phone]    Preferred Date/Time of Appointment: as soon as possible     Interested in Virtual Visit: yes     Additional Information: Patient called would like to schedule an appt she says she does not have a fever and does not go outside. Patient would like virtual     Tried to schedule one but no availability and would like to speak to your office

## 2020-06-16 NOTE — TELEPHONE ENCOUNTER
VV scheduled tomorrow afternoon per pts request for possible sinus infection.   No other concerns at this time.

## 2020-06-22 ENCOUNTER — PATIENT MESSAGE (OUTPATIENT)
Dept: FAMILY MEDICINE | Facility: CLINIC | Age: 40
End: 2020-06-22

## 2020-06-22 DIAGNOSIS — E03.9 HYPOTHYROIDISM, UNSPECIFIED TYPE: Primary | ICD-10-CM

## 2020-07-02 ENCOUNTER — LAB VISIT (OUTPATIENT)
Dept: LAB | Facility: HOSPITAL | Age: 40
End: 2020-07-02
Attending: FAMILY MEDICINE
Payer: COMMERCIAL

## 2020-07-02 DIAGNOSIS — E03.9 HYPOTHYROIDISM, UNSPECIFIED TYPE: ICD-10-CM

## 2020-07-02 LAB
ABO + RH BLD: NORMAL
BLD GP AB SCN CELLS X3 SERPL QL: NORMAL
T4 FREE SERPL-MCNC: 0.84 NG/DL (ref 0.71–1.51)
TSH SERPL DL<=0.005 MIU/L-ACNC: 1.89 UIU/ML (ref 0.34–5.6)

## 2020-07-02 PROCEDURE — 82607 VITAMIN B-12: CPT

## 2020-07-02 PROCEDURE — 86850 RBC ANTIBODY SCREEN: CPT

## 2020-07-02 PROCEDURE — 84443 ASSAY THYROID STIM HORMONE: CPT

## 2020-07-02 PROCEDURE — 82306 VITAMIN D 25 HYDROXY: CPT

## 2020-07-02 PROCEDURE — 84439 ASSAY OF FREE THYROXINE: CPT

## 2020-07-03 LAB
25(OH)D3+25(OH)D2 SERPL-MCNC: 12 NG/ML (ref 30–96)
VIT B12 SERPL-MCNC: >2000 PG/ML (ref 210–950)

## 2020-07-22 ENCOUNTER — NURSE TRIAGE (OUTPATIENT)
Dept: ADMINISTRATIVE | Facility: CLINIC | Age: 40
End: 2020-07-22

## 2020-07-22 NOTE — TELEPHONE ENCOUNTER
Reason for Disposition   Caller has cancelled the call before the first contact    Additional Information   Negative: Caller has already spoken with the PCP (or office), and has no further questions   Negative: Caller has already spoken with another triager and has no further questions   Negative: Caller has already spoken with another triager or PCP (or office), and has further questions and triager able to answer questions.    Protocols used: NO CONTACT OR DUPLICATE CONTACT CALL-A-[place]

## 2020-07-24 ENCOUNTER — OFFICE VISIT (OUTPATIENT)
Dept: FAMILY MEDICINE | Facility: CLINIC | Age: 40
End: 2020-07-24
Payer: COMMERCIAL

## 2020-07-24 VITALS
TEMPERATURE: 99 F | WEIGHT: 278.31 LBS | OXYGEN SATURATION: 97 % | DIASTOLIC BLOOD PRESSURE: 70 MMHG | BODY MASS INDEX: 44.73 KG/M2 | HEART RATE: 79 BPM | HEIGHT: 66 IN | RESPIRATION RATE: 18 BRPM | SYSTOLIC BLOOD PRESSURE: 110 MMHG

## 2020-07-24 DIAGNOSIS — F51.01 PRIMARY INSOMNIA: Primary | ICD-10-CM

## 2020-07-24 DIAGNOSIS — F41.9 ANXIETY: ICD-10-CM

## 2020-07-24 DIAGNOSIS — F33.1 MODERATE EPISODE OF RECURRENT MAJOR DEPRESSIVE DISORDER: ICD-10-CM

## 2020-07-24 DIAGNOSIS — K59.1 FUNCTIONAL DIARRHEA: ICD-10-CM

## 2020-07-24 DIAGNOSIS — E55.9 VITAMIN D DEFICIENCY: ICD-10-CM

## 2020-07-24 PROCEDURE — 99214 PR OFFICE/OUTPT VISIT, EST, LEVL IV, 30-39 MIN: ICD-10-PCS | Mod: S$GLB,,, | Performed by: FAMILY MEDICINE

## 2020-07-24 PROCEDURE — 99999 PR PBB SHADOW E&M-EST. PATIENT-LVL IV: ICD-10-PCS | Mod: PBBFAC,,, | Performed by: FAMILY MEDICINE

## 2020-07-24 PROCEDURE — 3008F BODY MASS INDEX DOCD: CPT | Mod: S$GLB,,, | Performed by: FAMILY MEDICINE

## 2020-07-24 PROCEDURE — 99999 PR PBB SHADOW E&M-EST. PATIENT-LVL IV: CPT | Mod: PBBFAC,,, | Performed by: FAMILY MEDICINE

## 2020-07-24 PROCEDURE — 99214 OFFICE O/P EST MOD 30 MIN: CPT | Mod: S$GLB,,, | Performed by: FAMILY MEDICINE

## 2020-07-24 PROCEDURE — 3008F PR BODY MASS INDEX (BMI) DOCUMENTED: ICD-10-PCS | Mod: S$GLB,,, | Performed by: FAMILY MEDICINE

## 2020-07-24 RX ORDER — ERGOCALCIFEROL 1.25 MG/1
100000 CAPSULE ORAL
Qty: 8 CAPSULE | Refills: 2 | Status: SHIPPED | OUTPATIENT
Start: 2020-07-24 | End: 2021-04-08 | Stop reason: SDUPTHER

## 2020-07-24 RX ORDER — TRAZODONE HYDROCHLORIDE 50 MG/1
TABLET ORAL
Qty: 30 TABLET | Refills: 11 | Status: SHIPPED | OUTPATIENT
Start: 2020-07-24 | End: 2020-12-01

## 2020-07-24 RX ORDER — FLUTICASONE PROPIONATE 50 MCG
SPRAY, SUSPENSION (ML) NASAL
COMMUNITY
Start: 2020-06-16 | End: 2021-05-27

## 2020-07-24 RX ORDER — SERTRALINE HYDROCHLORIDE 100 MG/1
150 TABLET, FILM COATED ORAL DAILY
Qty: 135 TABLET | Refills: 1 | Status: SHIPPED | OUTPATIENT
Start: 2020-07-24 | End: 2020-12-01 | Stop reason: DRUGHIGH

## 2020-07-24 NOTE — PROGRESS NOTES
"Subjective:       Patient ID: Mariana Mora is a 39 y.o. female.    Chief Complaint: Follow-up (Pt refused tetanus vaccine), Diarrhea, and Insomnia    Insomnia  Last few weeks  Worsening    Pt states that depression is well controlled  No issues/side effects  Compliant with treatment regimen        Pt states that vitamin d deficiency is well controlled  No issues/side effects  Compliant with treatment regimen    Review of Systems   Constitutional: Negative for activity change and unexpected weight change.   HENT: Negative for hearing loss, rhinorrhea and trouble swallowing.    Eyes: Negative for discharge and visual disturbance.   Respiratory: Negative for chest tightness and wheezing.    Cardiovascular: Negative for chest pain and palpitations.   Gastrointestinal: Negative for blood in stool, constipation, diarrhea and vomiting.   Endocrine: Negative for polydipsia and polyuria.   Genitourinary: Negative for difficulty urinating, dysuria, hematuria and menstrual problem.   Musculoskeletal: Negative for arthralgias, joint swelling and neck pain.   Neurological: Negative for weakness and headaches.   Psychiatric/Behavioral: Negative for confusion and dysphoric mood.         Reviewed family, medical, surgical, and social history.    Objective:      /70 (BP Location: Left arm, Patient Position: Sitting, BP Method: Large (Automatic))   Pulse 79   Temp 98.6 °F (37 °C) (Oral)   Resp 18   Ht 5' 6" (1.676 m)   Wt 126.2 kg (278 lb 4.8 oz)   SpO2 97%   BMI 44.92 kg/m²   Physical Exam  Vitals signs and nursing note reviewed.   Constitutional:       General: She is not in acute distress.     Appearance: She is well-developed. She is not diaphoretic.   HENT:      Head: Normocephalic and atraumatic.      Nose: Nose normal.      Mouth/Throat:      Pharynx: No oropharyngeal exudate.   Eyes:      General: No scleral icterus.     Conjunctiva/sclera: Conjunctivae normal.      Pupils: Pupils are equal, round, and " reactive to light.   Neck:      Musculoskeletal: Normal range of motion and neck supple.      Thyroid: No thyromegaly.   Cardiovascular:      Rate and Rhythm: Normal rate and regular rhythm.      Heart sounds: Normal heart sounds. No murmur. No friction rub. No gallop.    Pulmonary:      Effort: Pulmonary effort is normal. No respiratory distress.      Breath sounds: Normal breath sounds. No wheezing or rales.   Chest:      Chest wall: No tenderness.   Abdominal:      General: Bowel sounds are normal. There is no distension.      Palpations: Abdomen is soft.      Tenderness: There is no abdominal tenderness. There is no guarding.   Musculoskeletal: Normal range of motion.         General: No tenderness or deformity.   Lymphadenopathy:      Cervical: No cervical adenopathy.   Skin:     General: Skin is warm and dry.      Coloration: Skin is not pale.      Findings: No erythema or rash.   Neurological:      Mental Status: She is alert and oriented to person, place, and time.      Cranial Nerves: No cranial nerve deficit.      Sensory: No sensory deficit.      Motor: No abnormal muscle tone.      Deep Tendon Reflexes: Reflexes normal.   Psychiatric:         Behavior: Behavior normal.         Thought Content: Thought content normal.         Judgment: Judgment normal.         Assessment:       1. Primary insomnia    2. Vitamin D deficiency    3. Functional diarrhea    4. Anxiety    5. Moderate episode of recurrent major depressive disorder        Plan:       Primary insomnia  -     traZODone (DESYREL) 50 MG tablet; Take 1-2 tablets as needed for insomnia  Dispense: 30 tablet; Refill: 11    Vitamin D deficiency  -     ergocalciferol (ERGOCALCIFEROL) 50,000 unit Cap; Take 2 capsules (100,000 Units total) by mouth every 7 days.  Dispense: 8 capsule; Refill: 2    Functional diarrhea    Anxiety  -     sertraline (ZOLOFT) 100 MG tablet; Take 1.5 tablets (150 mg total) by mouth once daily.  Dispense: 135 tablet; Refill: 1  -      Ambulatory referral/consult to Psychiatry; Future; Expected date: 07/31/2020    Moderate episode of recurrent major depressive disorder  -     sertraline (ZOLOFT) 100 MG tablet; Take 1.5 tablets (150 mg total) by mouth once daily.  Dispense: 135 tablet; Refill: 1            Risks, benefits, and side effects were discussed with the patient. All questions were answered to the fullest satisfaction of the patient, and pt verbalized understanding and agreement to treatment plan. Pt was to call with any new or worsening symptoms, or present to the ER.

## 2020-10-22 ENCOUNTER — PATIENT MESSAGE (OUTPATIENT)
Dept: FAMILY MEDICINE | Facility: CLINIC | Age: 40
End: 2020-10-22

## 2020-10-22 DIAGNOSIS — E03.9 ACQUIRED HYPOTHYROIDISM: ICD-10-CM

## 2020-10-22 DIAGNOSIS — E55.9 VITAMIN D DEFICIENCY: Primary | ICD-10-CM

## 2020-11-02 ENCOUNTER — LAB VISIT (OUTPATIENT)
Dept: LAB | Facility: HOSPITAL | Age: 40
End: 2020-11-02
Attending: FAMILY MEDICINE
Payer: COMMERCIAL

## 2020-11-02 DIAGNOSIS — E03.9 ACQUIRED HYPOTHYROIDISM: ICD-10-CM

## 2020-11-02 DIAGNOSIS — E55.9 VITAMIN D DEFICIENCY: ICD-10-CM

## 2020-11-02 LAB
T4 FREE SERPL-MCNC: 0.84 NG/DL (ref 0.71–1.51)
TSH SERPL DL<=0.005 MIU/L-ACNC: 1.9 UIU/ML (ref 0.34–5.6)

## 2020-11-02 PROCEDURE — 84481 FREE ASSAY (FT-3): CPT

## 2020-11-02 PROCEDURE — 82306 VITAMIN D 25 HYDROXY: CPT

## 2020-11-02 PROCEDURE — 84439 ASSAY OF FREE THYROXINE: CPT

## 2020-11-02 PROCEDURE — 84443 ASSAY THYROID STIM HORMONE: CPT

## 2020-11-02 PROCEDURE — 36415 COLL VENOUS BLD VENIPUNCTURE: CPT

## 2020-11-03 LAB
25(OH)D3+25(OH)D2 SERPL-MCNC: 16 NG/ML (ref 30–96)
T3FREE SERPL-MCNC: 3 PG/ML (ref 2.3–4.2)

## 2020-12-01 ENCOUNTER — OFFICE VISIT (OUTPATIENT)
Dept: PSYCHIATRY | Facility: CLINIC | Age: 40
End: 2020-12-01
Payer: COMMERCIAL

## 2020-12-01 VITALS
DIASTOLIC BLOOD PRESSURE: 87 MMHG | BODY MASS INDEX: 44.73 KG/M2 | HEART RATE: 75 BPM | TEMPERATURE: 95 F | HEIGHT: 66 IN | SYSTOLIC BLOOD PRESSURE: 124 MMHG | WEIGHT: 278.31 LBS

## 2020-12-01 DIAGNOSIS — F41.1 GAD (GENERALIZED ANXIETY DISORDER): ICD-10-CM

## 2020-12-01 DIAGNOSIS — F84.0 AUTISM SPECTRUM DISORDER: ICD-10-CM

## 2020-12-01 DIAGNOSIS — F33.41 RECURRENT MAJOR DEPRESSIVE DISORDER, IN PARTIAL REMISSION: ICD-10-CM

## 2020-12-01 DIAGNOSIS — F42.9 OBSESSIVE-COMPULSIVE DISORDER, UNSPECIFIED TYPE: Primary | ICD-10-CM

## 2020-12-01 DIAGNOSIS — F90.2 ATTENTION DEFICIT HYPERACTIVITY DISORDER (ADHD), COMBINED TYPE: ICD-10-CM

## 2020-12-01 PROCEDURE — 1125F PR PAIN SEVERITY QUANTIFIED, PAIN PRESENT: ICD-10-PCS | Mod: S$GLB,,, | Performed by: PHYSICIAN ASSISTANT

## 2020-12-01 PROCEDURE — 99999 PR PBB SHADOW E&M-EST. PATIENT-LVL IV: ICD-10-PCS | Mod: PBBFAC,,, | Performed by: PHYSICIAN ASSISTANT

## 2020-12-01 PROCEDURE — 1125F AMNT PAIN NOTED PAIN PRSNT: CPT | Mod: S$GLB,,, | Performed by: PHYSICIAN ASSISTANT

## 2020-12-01 PROCEDURE — 90792 PR PSYCHIATRIC DIAGNOSTIC EVALUATION W/MEDICAL SERVICES: ICD-10-PCS | Mod: S$GLB,,, | Performed by: PHYSICIAN ASSISTANT

## 2020-12-01 PROCEDURE — 99999 PR PBB SHADOW E&M-EST. PATIENT-LVL IV: CPT | Mod: PBBFAC,,, | Performed by: PHYSICIAN ASSISTANT

## 2020-12-01 PROCEDURE — 80307 DRUG TEST PRSMV CHEM ANLYZR: CPT

## 2020-12-01 PROCEDURE — 90792 PSYCH DIAG EVAL W/MED SRVCS: CPT | Mod: S$GLB,,, | Performed by: PHYSICIAN ASSISTANT

## 2020-12-01 PROCEDURE — 3008F PR BODY MASS INDEX (BMI) DOCUMENTED: ICD-10-PCS | Mod: CPTII,S$GLB,, | Performed by: PHYSICIAN ASSISTANT

## 2020-12-01 PROCEDURE — 3008F BODY MASS INDEX DOCD: CPT | Mod: CPTII,S$GLB,, | Performed by: PHYSICIAN ASSISTANT

## 2020-12-01 RX ORDER — TEMAZEPAM 30 MG/1
30 CAPSULE ORAL NIGHTLY PRN
Qty: 30 CAPSULE | Refills: 0 | Status: SHIPPED | OUTPATIENT
Start: 2020-12-01 | End: 2021-01-05 | Stop reason: SDUPTHER

## 2020-12-01 RX ORDER — SERTRALINE HYDROCHLORIDE 100 MG/1
200 TABLET, FILM COATED ORAL DAILY
Qty: 60 TABLET | Refills: 1 | Status: SHIPPED | OUTPATIENT
Start: 2020-12-01 | End: 2021-03-31 | Stop reason: SDUPTHER

## 2020-12-01 RX ORDER — BUSPIRONE HYDROCHLORIDE 15 MG/1
7.5 TABLET ORAL 2 TIMES DAILY
Qty: 30 TABLET | Refills: 1 | Status: SHIPPED | OUTPATIENT
Start: 2020-12-01 | End: 2021-01-05 | Stop reason: SDUPTHER

## 2020-12-01 RX ORDER — DOXEPIN HYDROCHLORIDE 25 MG/1
25 CAPSULE ORAL NIGHTLY
Qty: 30 CAPSULE | Refills: 1 | Status: SHIPPED | OUTPATIENT
Start: 2020-12-01 | End: 2021-01-05 | Stop reason: SDUPTHER

## 2020-12-01 RX ORDER — METHYLPHENIDATE HYDROCHLORIDE 5 MG/1
5 TABLET ORAL 2 TIMES DAILY WITH MEALS
Qty: 60 TABLET | Refills: 0 | Status: SHIPPED | OUTPATIENT
Start: 2020-12-01 | End: 2021-01-05 | Stop reason: SDUPTHER

## 2020-12-01 NOTE — PATIENT INSTRUCTIONS
Please go to emergency department if feeling as though you are a harm to themself or others or if you are in crisis.     Please call the clinic to report any worsening of symptoms or problems associated with medication.
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
## 2020-12-02 LAB
AMPHET+METHAMPHET UR QL: NEGATIVE
BARBITURATES UR QL SCN>200 NG/ML: NEGATIVE
BENZODIAZ UR QL SCN>200 NG/ML: NEGATIVE
BZE UR QL SCN: NEGATIVE
CANNABINOIDS UR QL SCN: NEGATIVE
CREAT UR-MCNC: 138 MG/DL (ref 15–325)
ETHANOL UR-MCNC: <10 MG/DL
METHADONE UR QL SCN>300 NG/ML: NEGATIVE
OPIATES UR QL SCN: NEGATIVE
PCP UR QL SCN>25 NG/ML: NEGATIVE
TOXICOLOGY INFORMATION: NORMAL

## 2021-01-05 ENCOUNTER — OFFICE VISIT (OUTPATIENT)
Dept: PSYCHIATRY | Facility: CLINIC | Age: 41
End: 2021-01-05
Payer: COMMERCIAL

## 2021-01-05 ENCOUNTER — HOSPITAL ENCOUNTER (OUTPATIENT)
Dept: PREADMISSION TESTING | Facility: HOSPITAL | Age: 41
Discharge: HOME | End: 2021-01-05
Attending: PHYSICIAN ASSISTANT
Payer: COMMERCIAL

## 2021-01-05 VITALS
SYSTOLIC BLOOD PRESSURE: 127 MMHG | BODY MASS INDEX: 44.38 KG/M2 | WEIGHT: 276.13 LBS | DIASTOLIC BLOOD PRESSURE: 79 MMHG | TEMPERATURE: 96 F | HEIGHT: 66 IN | HEART RATE: 78 BPM

## 2021-01-05 DIAGNOSIS — F42.9 OBSESSIVE-COMPULSIVE DISORDER, UNSPECIFIED TYPE: ICD-10-CM

## 2021-01-05 DIAGNOSIS — F90.2 ATTENTION DEFICIT HYPERACTIVITY DISORDER (ADHD), COMBINED TYPE: Primary | ICD-10-CM

## 2021-01-05 DIAGNOSIS — F90.2 ATTENTION DEFICIT HYPERACTIVITY DISORDER (ADHD), COMBINED TYPE: ICD-10-CM

## 2021-01-05 PROCEDURE — 99214 PR OFFICE/OUTPT VISIT, EST, LEVL IV, 30-39 MIN: ICD-10-PCS | Mod: S$GLB,,, | Performed by: PHYSICIAN ASSISTANT

## 2021-01-05 PROCEDURE — 3008F BODY MASS INDEX DOCD: CPT | Mod: CPTII,S$GLB,, | Performed by: PHYSICIAN ASSISTANT

## 2021-01-05 PROCEDURE — 99999 PR PBB SHADOW E&M-EST. PATIENT-LVL IV: CPT | Mod: PBBFAC,,, | Performed by: PHYSICIAN ASSISTANT

## 2021-01-05 PROCEDURE — 99999 PR PBB SHADOW E&M-EST. PATIENT-LVL IV: ICD-10-PCS | Mod: PBBFAC,,, | Performed by: PHYSICIAN ASSISTANT

## 2021-01-05 PROCEDURE — 99214 OFFICE O/P EST MOD 30 MIN: CPT | Mod: S$GLB,,, | Performed by: PHYSICIAN ASSISTANT

## 2021-01-05 PROCEDURE — 93005 ELECTROCARDIOGRAM TRACING: CPT | Performed by: INTERNAL MEDICINE

## 2021-01-05 PROCEDURE — 93010 ELECTROCARDIOGRAM REPORT: CPT | Mod: ,,, | Performed by: INTERNAL MEDICINE

## 2021-01-05 PROCEDURE — 1125F PR PAIN SEVERITY QUANTIFIED, PAIN PRESENT: ICD-10-PCS | Mod: S$GLB,,, | Performed by: PHYSICIAN ASSISTANT

## 2021-01-05 PROCEDURE — 3008F PR BODY MASS INDEX (BMI) DOCUMENTED: ICD-10-PCS | Mod: CPTII,S$GLB,, | Performed by: PHYSICIAN ASSISTANT

## 2021-01-05 PROCEDURE — 1125F AMNT PAIN NOTED PAIN PRSNT: CPT | Mod: S$GLB,,, | Performed by: PHYSICIAN ASSISTANT

## 2021-01-05 PROCEDURE — 93010 EKG 12-LEAD: ICD-10-PCS | Mod: ,,, | Performed by: INTERNAL MEDICINE

## 2021-01-05 RX ORDER — TEMAZEPAM 22.5 MG/1
22.5 CAPSULE ORAL NIGHTLY PRN
Qty: 30 CAPSULE | Refills: 0 | Status: SHIPPED | OUTPATIENT
Start: 2021-01-05 | End: 2021-02-04

## 2021-01-05 RX ORDER — DOXEPIN HYDROCHLORIDE 25 MG/1
25 CAPSULE ORAL NIGHTLY
Qty: 30 CAPSULE | Refills: 2 | Status: SHIPPED | OUTPATIENT
Start: 2021-01-05 | End: 2021-02-03 | Stop reason: SDUPTHER

## 2021-01-05 RX ORDER — BUSPIRONE HYDROCHLORIDE 15 MG/1
7.5 TABLET ORAL 2 TIMES DAILY
Qty: 30 TABLET | Refills: 2 | Status: SHIPPED | OUTPATIENT
Start: 2021-01-05 | End: 2021-02-03

## 2021-01-05 RX ORDER — METHYLPHENIDATE HYDROCHLORIDE 10 MG/1
10 TABLET ORAL 2 TIMES DAILY WITH MEALS
Qty: 60 TABLET | Refills: 0 | Status: SHIPPED | OUTPATIENT
Start: 2021-01-05 | End: 2021-03-31

## 2021-01-19 ENCOUNTER — HOSPITAL ENCOUNTER (OUTPATIENT)
Dept: RADIOLOGY | Facility: HOSPITAL | Age: 41
Discharge: HOME | End: 2021-01-19
Attending: NURSE PRACTITIONER
Payer: COMMERCIAL

## 2021-01-19 VITALS — HEIGHT: 66 IN | WEIGHT: 275.56 LBS | BODY MASS INDEX: 44.29 KG/M2

## 2021-01-19 DIAGNOSIS — Z12.39 ENCOUNTER FOR BREAST CANCER SCREENING OTHER THAN MAMMOGRAM: ICD-10-CM

## 2021-01-19 PROCEDURE — 77067 SCR MAMMO BI INCL CAD: CPT | Mod: TC

## 2021-01-19 PROCEDURE — 77063 BREAST TOMOSYNTHESIS BI: CPT | Mod: 26,,, | Performed by: RADIOLOGY

## 2021-01-19 PROCEDURE — 77067 SCR MAMMO BI INCL CAD: CPT | Mod: 26,,, | Performed by: RADIOLOGY

## 2021-01-19 PROCEDURE — 77067 MAMMO DIGITAL SCREENING BILAT WITH TOMO: ICD-10-PCS | Mod: 26,,, | Performed by: RADIOLOGY

## 2021-01-19 PROCEDURE — 77063 MAMMO DIGITAL SCREENING BILAT WITH TOMO: ICD-10-PCS | Mod: 26,,, | Performed by: RADIOLOGY

## 2021-02-03 ENCOUNTER — OFFICE VISIT (OUTPATIENT)
Dept: PSYCHIATRY | Facility: CLINIC | Age: 41
End: 2021-02-03
Payer: COMMERCIAL

## 2021-02-03 VITALS
BODY MASS INDEX: 45 KG/M2 | HEART RATE: 79 BPM | TEMPERATURE: 95 F | DIASTOLIC BLOOD PRESSURE: 79 MMHG | HEIGHT: 66 IN | WEIGHT: 280 LBS | SYSTOLIC BLOOD PRESSURE: 124 MMHG

## 2021-02-03 DIAGNOSIS — F42.9 OBSESSIVE-COMPULSIVE DISORDER, UNSPECIFIED TYPE: ICD-10-CM

## 2021-02-03 PROCEDURE — 99214 PR OFFICE/OUTPT VISIT, EST, LEVL IV, 30-39 MIN: ICD-10-PCS | Mod: S$GLB,,, | Performed by: PHYSICIAN ASSISTANT

## 2021-02-03 PROCEDURE — 3008F PR BODY MASS INDEX (BMI) DOCUMENTED: ICD-10-PCS | Mod: CPTII,S$GLB,, | Performed by: PHYSICIAN ASSISTANT

## 2021-02-03 PROCEDURE — 99999 PR PBB SHADOW E&M-EST. PATIENT-LVL IV: CPT | Mod: PBBFAC,,, | Performed by: PHYSICIAN ASSISTANT

## 2021-02-03 PROCEDURE — 1126F PR PAIN SEVERITY QUANTIFIED, NO PAIN PRESENT: ICD-10-PCS | Mod: S$GLB,,, | Performed by: PHYSICIAN ASSISTANT

## 2021-02-03 PROCEDURE — 1126F AMNT PAIN NOTED NONE PRSNT: CPT | Mod: S$GLB,,, | Performed by: PHYSICIAN ASSISTANT

## 2021-02-03 PROCEDURE — 3008F BODY MASS INDEX DOCD: CPT | Mod: CPTII,S$GLB,, | Performed by: PHYSICIAN ASSISTANT

## 2021-02-03 PROCEDURE — 99214 OFFICE O/P EST MOD 30 MIN: CPT | Mod: S$GLB,,, | Performed by: PHYSICIAN ASSISTANT

## 2021-02-03 PROCEDURE — 99999 PR PBB SHADOW E&M-EST. PATIENT-LVL IV: ICD-10-PCS | Mod: PBBFAC,,, | Performed by: PHYSICIAN ASSISTANT

## 2021-02-03 RX ORDER — DOXEPIN HYDROCHLORIDE 50 MG/1
50 CAPSULE ORAL NIGHTLY
Qty: 30 CAPSULE | Refills: 2 | Status: SHIPPED | OUTPATIENT
Start: 2021-02-03 | End: 2021-03-31

## 2021-02-03 RX ORDER — BUSPIRONE HYDROCHLORIDE 15 MG/1
7.5 TABLET ORAL 3 TIMES DAILY
Start: 2021-02-03 | End: 2021-03-16 | Stop reason: SDUPTHER

## 2021-03-04 DIAGNOSIS — Z11.59 NEED FOR HEPATITIS C SCREENING TEST: ICD-10-CM

## 2021-03-16 DIAGNOSIS — F42.9 OBSESSIVE-COMPULSIVE DISORDER, UNSPECIFIED TYPE: ICD-10-CM

## 2021-03-16 RX ORDER — TEMAZEPAM 22.5 MG/1
22.5 CAPSULE ORAL NIGHTLY PRN
COMMUNITY
Start: 2021-02-16 | End: 2021-03-16 | Stop reason: SDUPTHER

## 2021-03-17 RX ORDER — BUSPIRONE HYDROCHLORIDE 15 MG/1
7.5 TABLET ORAL 3 TIMES DAILY
Qty: 45 TABLET | Refills: 1 | Status: SHIPPED | OUTPATIENT
Start: 2021-03-17 | End: 2021-04-14

## 2021-03-17 RX ORDER — TEMAZEPAM 22.5 MG/1
22.5 CAPSULE ORAL NIGHTLY PRN
Qty: 30 CAPSULE | Refills: 0 | Status: SHIPPED | OUTPATIENT
Start: 2021-03-17 | End: 2021-03-31 | Stop reason: SDUPTHER

## 2021-03-18 ENCOUNTER — PATIENT MESSAGE (OUTPATIENT)
Dept: PSYCHIATRY | Facility: CLINIC | Age: 41
End: 2021-03-18

## 2021-03-23 ENCOUNTER — PATIENT MESSAGE (OUTPATIENT)
Dept: FAMILY MEDICINE | Facility: CLINIC | Age: 41
End: 2021-03-23

## 2021-03-23 DIAGNOSIS — Z13.220 SCREENING FOR HYPERLIPIDEMIA: Primary | ICD-10-CM

## 2021-03-26 ENCOUNTER — PATIENT MESSAGE (OUTPATIENT)
Dept: FAMILY MEDICINE | Facility: CLINIC | Age: 41
End: 2021-03-26

## 2021-03-29 ENCOUNTER — LAB VISIT (OUTPATIENT)
Dept: LAB | Facility: HOSPITAL | Age: 41
End: 2021-03-29
Attending: FAMILY MEDICINE
Payer: COMMERCIAL

## 2021-03-29 DIAGNOSIS — Z13.220 SCREENING FOR HYPERLIPIDEMIA: ICD-10-CM

## 2021-03-29 DIAGNOSIS — Z11.59 NEED FOR HEPATITIS C SCREENING TEST: ICD-10-CM

## 2021-03-29 LAB
CHOLEST SERPL-MCNC: 264 MG/DL (ref 120–199)
CHOLEST/HDLC SERPL: 7.3 {RATIO} (ref 2–5)
HDLC SERPL-MCNC: 36 MG/DL (ref 40–75)
HDLC SERPL: 13.6 % (ref 20–50)
LDLC SERPL CALC-MCNC: 181.4 MG/DL (ref 63–159)
NONHDLC SERPL-MCNC: 228 MG/DL
TRIGL SERPL-MCNC: 233 MG/DL (ref 30–150)

## 2021-03-29 PROCEDURE — 80061 LIPID PANEL: CPT | Performed by: NURSE PRACTITIONER

## 2021-03-29 PROCEDURE — 36415 COLL VENOUS BLD VENIPUNCTURE: CPT | Performed by: NURSE PRACTITIONER

## 2021-03-29 PROCEDURE — 86803 HEPATITIS C AB TEST: CPT | Performed by: FAMILY MEDICINE

## 2021-03-30 LAB — HCV AB SERPL QL IA: NEGATIVE

## 2021-03-31 ENCOUNTER — OFFICE VISIT (OUTPATIENT)
Dept: PSYCHIATRY | Facility: CLINIC | Age: 41
End: 2021-03-31
Payer: COMMERCIAL

## 2021-03-31 VITALS
HEART RATE: 79 BPM | WEIGHT: 279.63 LBS | DIASTOLIC BLOOD PRESSURE: 80 MMHG | HEIGHT: 66 IN | BODY MASS INDEX: 44.94 KG/M2 | SYSTOLIC BLOOD PRESSURE: 121 MMHG

## 2021-03-31 DIAGNOSIS — F42.9 OBSESSIVE-COMPULSIVE DISORDER, UNSPECIFIED TYPE: ICD-10-CM

## 2021-03-31 DIAGNOSIS — F90.2 ATTENTION DEFICIT HYPERACTIVITY DISORDER (ADHD), COMBINED TYPE: ICD-10-CM

## 2021-03-31 DIAGNOSIS — F41.1 GAD (GENERALIZED ANXIETY DISORDER): Primary | ICD-10-CM

## 2021-03-31 DIAGNOSIS — F84.0 AUTISM SPECTRUM DISORDER: ICD-10-CM

## 2021-03-31 DIAGNOSIS — F33.41 RECURRENT MAJOR DEPRESSIVE DISORDER, IN PARTIAL REMISSION: ICD-10-CM

## 2021-03-31 PROCEDURE — 99214 PR OFFICE/OUTPT VISIT, EST, LEVL IV, 30-39 MIN: ICD-10-PCS | Mod: S$GLB,,, | Performed by: PHYSICIAN ASSISTANT

## 2021-03-31 PROCEDURE — 99999 PR PBB SHADOW E&M-EST. PATIENT-LVL IV: ICD-10-PCS | Mod: PBBFAC,,, | Performed by: PHYSICIAN ASSISTANT

## 2021-03-31 PROCEDURE — 1125F AMNT PAIN NOTED PAIN PRSNT: CPT | Mod: S$GLB,,, | Performed by: PHYSICIAN ASSISTANT

## 2021-03-31 PROCEDURE — 3008F BODY MASS INDEX DOCD: CPT | Mod: CPTII,S$GLB,, | Performed by: PHYSICIAN ASSISTANT

## 2021-03-31 PROCEDURE — 99999 PR PBB SHADOW E&M-EST. PATIENT-LVL IV: CPT | Mod: PBBFAC,,, | Performed by: PHYSICIAN ASSISTANT

## 2021-03-31 PROCEDURE — 90833 PSYTX W PT W E/M 30 MIN: CPT | Mod: S$GLB,,, | Performed by: PHYSICIAN ASSISTANT

## 2021-03-31 PROCEDURE — 1125F PR PAIN SEVERITY QUANTIFIED, PAIN PRESENT: ICD-10-PCS | Mod: S$GLB,,, | Performed by: PHYSICIAN ASSISTANT

## 2021-03-31 PROCEDURE — 90833 PR PSYCHOTHERAPY W/PATIENT W/E&M, 30 MIN (ADD ON): ICD-10-PCS | Mod: S$GLB,,, | Performed by: PHYSICIAN ASSISTANT

## 2021-03-31 PROCEDURE — 3008F PR BODY MASS INDEX (BMI) DOCUMENTED: ICD-10-PCS | Mod: CPTII,S$GLB,, | Performed by: PHYSICIAN ASSISTANT

## 2021-03-31 PROCEDURE — 99214 OFFICE O/P EST MOD 30 MIN: CPT | Mod: S$GLB,,, | Performed by: PHYSICIAN ASSISTANT

## 2021-03-31 RX ORDER — SERTRALINE HYDROCHLORIDE 100 MG/1
200 TABLET, FILM COATED ORAL DAILY
Qty: 60 TABLET | Refills: 1 | Status: SHIPPED | OUTPATIENT
Start: 2021-03-31 | End: 2021-06-11 | Stop reason: SDUPTHER

## 2021-03-31 RX ORDER — QUETIAPINE FUMARATE 25 MG/1
TABLET, FILM COATED ORAL
Qty: 30 TABLET | Refills: 1 | Status: SHIPPED | OUTPATIENT
Start: 2021-03-31 | End: 2021-04-14

## 2021-03-31 RX ORDER — LISDEXAMFETAMINE DIMESYLATE 30 MG/1
30 CAPSULE ORAL EVERY MORNING
Qty: 30 CAPSULE | Refills: 0 | Status: SHIPPED | OUTPATIENT
Start: 2021-03-31 | End: 2021-04-14

## 2021-03-31 RX ORDER — TEMAZEPAM 15 MG/1
15 CAPSULE ORAL NIGHTLY PRN
Qty: 30 CAPSULE | Refills: 1 | Status: SHIPPED | OUTPATIENT
Start: 2021-03-31 | End: 2021-04-06

## 2021-04-01 ENCOUNTER — PATIENT MESSAGE (OUTPATIENT)
Dept: PSYCHIATRY | Facility: CLINIC | Age: 41
End: 2021-04-01

## 2021-04-06 ENCOUNTER — LAB VISIT (OUTPATIENT)
Dept: LAB | Facility: HOSPITAL | Age: 41
End: 2021-04-06
Payer: COMMERCIAL

## 2021-04-06 ENCOUNTER — OFFICE VISIT (OUTPATIENT)
Dept: FAMILY MEDICINE | Facility: CLINIC | Age: 41
End: 2021-04-06
Payer: COMMERCIAL

## 2021-04-06 VITALS
HEART RATE: 95 BPM | RESPIRATION RATE: 15 BRPM | SYSTOLIC BLOOD PRESSURE: 136 MMHG | WEIGHT: 278.69 LBS | OXYGEN SATURATION: 97 % | BODY MASS INDEX: 44.79 KG/M2 | HEIGHT: 66 IN | DIASTOLIC BLOOD PRESSURE: 89 MMHG

## 2021-04-06 DIAGNOSIS — E03.9 ACQUIRED HYPOTHYROIDISM: ICD-10-CM

## 2021-04-06 DIAGNOSIS — R10.11 RUQ PAIN: Primary | ICD-10-CM

## 2021-04-06 DIAGNOSIS — E55.9 VITAMIN D DEFICIENCY: ICD-10-CM

## 2021-04-06 DIAGNOSIS — E78.49 OTHER HYPERLIPIDEMIA: ICD-10-CM

## 2021-04-06 DIAGNOSIS — R10.11 RUQ PAIN: ICD-10-CM

## 2021-04-06 LAB
ALBUMIN SERPL BCP-MCNC: 4.1 G/DL (ref 3.5–5.2)
ALP SERPL-CCNC: 55 U/L (ref 55–135)
ALT SERPL W/O P-5'-P-CCNC: 27 U/L (ref 10–44)
ANION GAP SERPL CALC-SCNC: 8 MMOL/L (ref 8–16)
AST SERPL-CCNC: 20 U/L (ref 10–40)
BASOPHILS # BLD AUTO: 0.06 K/UL (ref 0–0.2)
BASOPHILS NFR BLD: 0.7 % (ref 0–1.9)
BILIRUB SERPL-MCNC: 0.6 MG/DL (ref 0.1–1)
BUN SERPL-MCNC: 11 MG/DL (ref 6–20)
CALCIUM SERPL-MCNC: 9.2 MG/DL (ref 8.7–10.5)
CHLORIDE SERPL-SCNC: 104 MMOL/L (ref 95–110)
CO2 SERPL-SCNC: 25 MMOL/L (ref 23–29)
CREAT SERPL-MCNC: 0.9 MG/DL (ref 0.5–1.4)
DIFFERENTIAL METHOD: ABNORMAL
EOSINOPHIL # BLD AUTO: 0.4 K/UL (ref 0–0.5)
EOSINOPHIL NFR BLD: 4.1 % (ref 0–8)
ERYTHROCYTE [DISTWIDTH] IN BLOOD BY AUTOMATED COUNT: 12.6 % (ref 11.5–14.5)
EST. GFR  (AFRICAN AMERICAN): >60 ML/MIN/1.73 M^2
EST. GFR  (NON AFRICAN AMERICAN): >60 ML/MIN/1.73 M^2
GLUCOSE SERPL-MCNC: 130 MG/DL (ref 70–110)
HCT VFR BLD AUTO: 42.5 % (ref 37–48.5)
HGB BLD-MCNC: 14.3 G/DL (ref 12–16)
IMM GRANULOCYTES # BLD AUTO: 0.03 K/UL (ref 0–0.04)
IMM GRANULOCYTES NFR BLD AUTO: 0.4 % (ref 0–0.5)
LYMPHOCYTES # BLD AUTO: 2.2 K/UL (ref 1–4.8)
LYMPHOCYTES NFR BLD: 25.6 % (ref 18–48)
MCH RBC QN AUTO: 30.2 PG (ref 27–31)
MCHC RBC AUTO-ENTMCNC: 33.6 G/DL (ref 32–36)
MCV RBC AUTO: 90 FL (ref 82–98)
MONOCYTES # BLD AUTO: 0.3 K/UL (ref 0.3–1)
MONOCYTES NFR BLD: 3.8 % (ref 4–15)
NEUTROPHILS # BLD AUTO: 5.6 K/UL (ref 1.8–7.7)
NEUTROPHILS NFR BLD: 65.4 % (ref 38–73)
NRBC BLD-RTO: 0 /100 WBC
PLATELET # BLD AUTO: 403 K/UL (ref 150–450)
PMV BLD AUTO: 8.6 FL (ref 9.2–12.9)
POTASSIUM SERPL-SCNC: 3.9 MMOL/L (ref 3.5–5.1)
PROT SERPL-MCNC: 8 G/DL (ref 6–8.4)
RBC # BLD AUTO: 4.74 M/UL (ref 4–5.4)
SODIUM SERPL-SCNC: 137 MMOL/L (ref 136–145)
T4 FREE SERPL-MCNC: 0.88 NG/DL (ref 0.71–1.51)
TSH SERPL DL<=0.005 MIU/L-ACNC: 2.16 UIU/ML (ref 0.34–5.6)
WBC # BLD AUTO: 8.49 K/UL (ref 3.9–12.7)

## 2021-04-06 PROCEDURE — 1125F PR PAIN SEVERITY QUANTIFIED, PAIN PRESENT: ICD-10-PCS | Mod: S$GLB,,, | Performed by: NURSE PRACTITIONER

## 2021-04-06 PROCEDURE — 36415 COLL VENOUS BLD VENIPUNCTURE: CPT | Performed by: NURSE PRACTITIONER

## 2021-04-06 PROCEDURE — 84439 ASSAY OF FREE THYROXINE: CPT | Performed by: NURSE PRACTITIONER

## 2021-04-06 PROCEDURE — 99999 PR PBB SHADOW E&M-EST. PATIENT-LVL IV: ICD-10-PCS | Mod: PBBFAC,,, | Performed by: NURSE PRACTITIONER

## 2021-04-06 PROCEDURE — 80053 COMPREHEN METABOLIC PANEL: CPT | Performed by: NURSE PRACTITIONER

## 2021-04-06 PROCEDURE — 84443 ASSAY THYROID STIM HORMONE: CPT | Performed by: NURSE PRACTITIONER

## 2021-04-06 PROCEDURE — 3008F PR BODY MASS INDEX (BMI) DOCUMENTED: ICD-10-PCS | Mod: S$GLB,,, | Performed by: NURSE PRACTITIONER

## 2021-04-06 PROCEDURE — 82306 VITAMIN D 25 HYDROXY: CPT | Performed by: NURSE PRACTITIONER

## 2021-04-06 PROCEDURE — 99214 PR OFFICE/OUTPT VISIT, EST, LEVL IV, 30-39 MIN: ICD-10-PCS | Mod: S$GLB,,, | Performed by: NURSE PRACTITIONER

## 2021-04-06 PROCEDURE — 84436 ASSAY OF TOTAL THYROXINE: CPT | Performed by: NURSE PRACTITIONER

## 2021-04-06 PROCEDURE — 85025 COMPLETE CBC W/AUTO DIFF WBC: CPT | Performed by: NURSE PRACTITIONER

## 2021-04-06 PROCEDURE — 99214 OFFICE O/P EST MOD 30 MIN: CPT | Mod: S$GLB,,, | Performed by: NURSE PRACTITIONER

## 2021-04-06 PROCEDURE — 3008F BODY MASS INDEX DOCD: CPT | Mod: S$GLB,,, | Performed by: NURSE PRACTITIONER

## 2021-04-06 PROCEDURE — 99999 PR PBB SHADOW E&M-EST. PATIENT-LVL IV: CPT | Mod: PBBFAC,,, | Performed by: NURSE PRACTITIONER

## 2021-04-06 PROCEDURE — 1125F AMNT PAIN NOTED PAIN PRSNT: CPT | Mod: S$GLB,,, | Performed by: NURSE PRACTITIONER

## 2021-04-06 RX ORDER — ATORVASTATIN CALCIUM 10 MG/1
10 TABLET, FILM COATED ORAL DAILY
Qty: 30 TABLET | Refills: 3 | Status: SHIPPED | OUTPATIENT
Start: 2021-04-06 | End: 2021-08-01

## 2021-04-06 RX ORDER — QUETIAPINE FUMARATE 50 MG/1
50 TABLET, FILM COATED ORAL NIGHTLY
COMMUNITY
End: 2021-04-19 | Stop reason: SDUPTHER

## 2021-04-07 ENCOUNTER — PATIENT MESSAGE (OUTPATIENT)
Dept: PSYCHIATRY | Facility: CLINIC | Age: 41
End: 2021-04-07

## 2021-04-07 ENCOUNTER — HOSPITAL ENCOUNTER (OUTPATIENT)
Dept: RADIOLOGY | Facility: HOSPITAL | Age: 41
Discharge: HOME | End: 2021-04-07
Attending: NURSE PRACTITIONER
Payer: COMMERCIAL

## 2021-04-07 DIAGNOSIS — R10.11 RUQ PAIN: ICD-10-CM

## 2021-04-07 LAB — 25(OH)D3+25(OH)D2 SERPL-MCNC: 12 NG/ML (ref 30–96)

## 2021-04-07 PROCEDURE — 76700 US EXAM ABDOM COMPLETE: CPT | Mod: TC

## 2021-04-07 PROCEDURE — 76700 US ABDOMEN COMPLETE: ICD-10-PCS | Mod: 26,,, | Performed by: RADIOLOGY

## 2021-04-07 PROCEDURE — 76700 US EXAM ABDOM COMPLETE: CPT | Mod: 26,,, | Performed by: RADIOLOGY

## 2021-04-08 ENCOUNTER — PATIENT MESSAGE (OUTPATIENT)
Dept: FAMILY MEDICINE | Facility: CLINIC | Age: 41
End: 2021-04-08

## 2021-04-08 DIAGNOSIS — E55.9 VITAMIN D DEFICIENCY: ICD-10-CM

## 2021-04-08 LAB — T4 SERPL-MCNC: 10.6 UG/DL (ref 4.5–11.5)

## 2021-04-08 RX ORDER — ERGOCALCIFEROL 1.25 MG/1
100000 CAPSULE ORAL
Qty: 8 CAPSULE | Refills: 2 | Status: SHIPPED | OUTPATIENT
Start: 2021-04-08 | End: 2021-06-28

## 2021-04-09 ENCOUNTER — PATIENT MESSAGE (OUTPATIENT)
Dept: FAMILY MEDICINE | Facility: CLINIC | Age: 41
End: 2021-04-09

## 2021-04-09 DIAGNOSIS — K80.20 CALCULUS OF GALLBLADDER WITHOUT CHOLECYSTITIS WITHOUT OBSTRUCTION: Primary | ICD-10-CM

## 2021-04-14 ENCOUNTER — OFFICE VISIT (OUTPATIENT)
Dept: FAMILY MEDICINE | Facility: CLINIC | Age: 41
End: 2021-04-14
Payer: COMMERCIAL

## 2021-04-14 VITALS
DIASTOLIC BLOOD PRESSURE: 74 MMHG | RESPIRATION RATE: 18 BRPM | BODY MASS INDEX: 44.65 KG/M2 | HEIGHT: 66 IN | HEART RATE: 84 BPM | OXYGEN SATURATION: 98 % | SYSTOLIC BLOOD PRESSURE: 121 MMHG | WEIGHT: 277.81 LBS | TEMPERATURE: 98 F

## 2021-04-14 DIAGNOSIS — R22.1 LUMP IN NECK: Primary | ICD-10-CM

## 2021-04-14 PROCEDURE — 99214 PR OFFICE/OUTPT VISIT, EST, LEVL IV, 30-39 MIN: ICD-10-PCS | Mod: S$GLB,,, | Performed by: FAMILY MEDICINE

## 2021-04-14 PROCEDURE — 3008F PR BODY MASS INDEX (BMI) DOCUMENTED: ICD-10-PCS | Mod: S$GLB,,, | Performed by: FAMILY MEDICINE

## 2021-04-14 PROCEDURE — 3008F BODY MASS INDEX DOCD: CPT | Mod: S$GLB,,, | Performed by: FAMILY MEDICINE

## 2021-04-14 PROCEDURE — 99214 OFFICE O/P EST MOD 30 MIN: CPT | Mod: S$GLB,,, | Performed by: FAMILY MEDICINE

## 2021-04-14 PROCEDURE — 1125F AMNT PAIN NOTED PAIN PRSNT: CPT | Mod: S$GLB,,, | Performed by: FAMILY MEDICINE

## 2021-04-14 PROCEDURE — 1125F PR PAIN SEVERITY QUANTIFIED, PAIN PRESENT: ICD-10-PCS | Mod: S$GLB,,, | Performed by: FAMILY MEDICINE

## 2021-04-14 RX ORDER — BUSPIRONE HYDROCHLORIDE 15 MG/1
7.5 TABLET ORAL 2 TIMES DAILY
COMMUNITY
Start: 2021-03-19 | End: 2021-05-17 | Stop reason: SDUPTHER

## 2021-04-14 RX ORDER — LISDEXAMFETAMINE DIMESYLATE 30 MG/1
30 CAPSULE ORAL DAILY
COMMUNITY
Start: 2021-03-31 | End: 2021-05-04 | Stop reason: SDUPTHER

## 2021-04-14 RX ORDER — QUETIAPINE FUMARATE 25 MG/1
50 TABLET, FILM COATED ORAL NIGHTLY
COMMUNITY
Start: 2021-03-31 | End: 2021-04-14

## 2021-04-15 ENCOUNTER — TELEPHONE (OUTPATIENT)
Dept: SURGERY | Facility: CLINIC | Age: 41
End: 2021-04-15

## 2021-04-15 ENCOUNTER — PATIENT MESSAGE (OUTPATIENT)
Dept: FAMILY MEDICINE | Facility: CLINIC | Age: 41
End: 2021-04-15

## 2021-04-19 ENCOUNTER — PATIENT MESSAGE (OUTPATIENT)
Dept: PSYCHIATRY | Facility: CLINIC | Age: 41
End: 2021-04-19

## 2021-04-19 DIAGNOSIS — F41.1 GAD (GENERALIZED ANXIETY DISORDER): Primary | ICD-10-CM

## 2021-04-19 RX ORDER — QUETIAPINE FUMARATE 100 MG/1
100 TABLET, FILM COATED ORAL NIGHTLY
Qty: 30 TABLET | Refills: 1 | Status: SHIPPED | OUTPATIENT
Start: 2021-04-19 | End: 2021-05-27 | Stop reason: SDUPTHER

## 2021-04-22 ENCOUNTER — PATIENT MESSAGE (OUTPATIENT)
Dept: FAMILY MEDICINE | Facility: CLINIC | Age: 41
End: 2021-04-22

## 2021-04-26 ENCOUNTER — OFFICE VISIT (OUTPATIENT)
Dept: PSYCHIATRY | Facility: CLINIC | Age: 41
End: 2021-04-26
Payer: COMMERCIAL

## 2021-04-26 VITALS
DIASTOLIC BLOOD PRESSURE: 90 MMHG | BODY MASS INDEX: 44.5 KG/M2 | WEIGHT: 276.88 LBS | SYSTOLIC BLOOD PRESSURE: 120 MMHG | HEART RATE: 82 BPM | HEIGHT: 66 IN

## 2021-04-26 DIAGNOSIS — F90.2 ATTENTION DEFICIT HYPERACTIVITY DISORDER (ADHD), COMBINED TYPE: ICD-10-CM

## 2021-04-26 DIAGNOSIS — F33.41 RECURRENT MAJOR DEPRESSIVE DISORDER, IN PARTIAL REMISSION: ICD-10-CM

## 2021-04-26 DIAGNOSIS — F42.9 OBSESSIVE-COMPULSIVE DISORDER, UNSPECIFIED TYPE: ICD-10-CM

## 2021-04-26 DIAGNOSIS — F84.0 AUTISM SPECTRUM DISORDER: ICD-10-CM

## 2021-04-26 DIAGNOSIS — F41.1 GAD (GENERALIZED ANXIETY DISORDER): Primary | ICD-10-CM

## 2021-04-26 PROCEDURE — 1125F AMNT PAIN NOTED PAIN PRSNT: CPT | Mod: S$GLB,,, | Performed by: PHYSICIAN ASSISTANT

## 2021-04-26 PROCEDURE — 1125F PR PAIN SEVERITY QUANTIFIED, PAIN PRESENT: ICD-10-PCS | Mod: S$GLB,,, | Performed by: PHYSICIAN ASSISTANT

## 2021-04-26 PROCEDURE — 99214 PR OFFICE/OUTPT VISIT, EST, LEVL IV, 30-39 MIN: ICD-10-PCS | Mod: S$GLB,,, | Performed by: PHYSICIAN ASSISTANT

## 2021-04-26 PROCEDURE — 3008F PR BODY MASS INDEX (BMI) DOCUMENTED: ICD-10-PCS | Mod: CPTII,S$GLB,, | Performed by: PHYSICIAN ASSISTANT

## 2021-04-26 PROCEDURE — 99999 PR PBB SHADOW E&M-EST. PATIENT-LVL IV: ICD-10-PCS | Mod: PBBFAC,,, | Performed by: PHYSICIAN ASSISTANT

## 2021-04-26 PROCEDURE — 3008F BODY MASS INDEX DOCD: CPT | Mod: CPTII,S$GLB,, | Performed by: PHYSICIAN ASSISTANT

## 2021-04-26 PROCEDURE — 99214 OFFICE O/P EST MOD 30 MIN: CPT | Mod: S$GLB,,, | Performed by: PHYSICIAN ASSISTANT

## 2021-04-26 PROCEDURE — 99999 PR PBB SHADOW E&M-EST. PATIENT-LVL IV: CPT | Mod: PBBFAC,,, | Performed by: PHYSICIAN ASSISTANT

## 2021-04-26 RX ORDER — CETIRIZINE HYDROCHLORIDE 10 MG/1
10 TABLET ORAL DAILY
COMMUNITY

## 2021-04-30 ENCOUNTER — OFFICE VISIT (OUTPATIENT)
Dept: FAMILY MEDICINE | Facility: CLINIC | Age: 41
End: 2021-04-30
Payer: COMMERCIAL

## 2021-04-30 VITALS
SYSTOLIC BLOOD PRESSURE: 117 MMHG | WEIGHT: 275.5 LBS | RESPIRATION RATE: 16 BRPM | HEART RATE: 86 BPM | HEIGHT: 66 IN | DIASTOLIC BLOOD PRESSURE: 70 MMHG | OXYGEN SATURATION: 96 % | BODY MASS INDEX: 44.27 KG/M2

## 2021-04-30 DIAGNOSIS — E78.49 OTHER HYPERLIPIDEMIA: ICD-10-CM

## 2021-04-30 DIAGNOSIS — E03.9 ACQUIRED HYPOTHYROIDISM: ICD-10-CM

## 2021-04-30 DIAGNOSIS — E55.9 VITAMIN D DEFICIENCY: ICD-10-CM

## 2021-04-30 DIAGNOSIS — L40.50 PSORIATIC ARTHRITIS: ICD-10-CM

## 2021-04-30 DIAGNOSIS — K80.20 CALCULUS OF GALLBLADDER WITHOUT CHOLECYSTITIS WITHOUT OBSTRUCTION: ICD-10-CM

## 2021-04-30 DIAGNOSIS — F51.01 PRIMARY INSOMNIA: Primary | ICD-10-CM

## 2021-04-30 PROCEDURE — 99214 OFFICE O/P EST MOD 30 MIN: CPT | Mod: S$GLB,,, | Performed by: FAMILY MEDICINE

## 2021-04-30 PROCEDURE — 3008F PR BODY MASS INDEX (BMI) DOCUMENTED: ICD-10-PCS | Mod: S$GLB,,, | Performed by: FAMILY MEDICINE

## 2021-04-30 PROCEDURE — 99214 PR OFFICE/OUTPT VISIT, EST, LEVL IV, 30-39 MIN: ICD-10-PCS | Mod: S$GLB,,, | Performed by: FAMILY MEDICINE

## 2021-04-30 PROCEDURE — 99999 PR PBB SHADOW E&M-EST. PATIENT-LVL IV: CPT | Mod: PBBFAC,,, | Performed by: FAMILY MEDICINE

## 2021-04-30 PROCEDURE — 99999 PR PBB SHADOW E&M-EST. PATIENT-LVL IV: ICD-10-PCS | Mod: PBBFAC,,, | Performed by: FAMILY MEDICINE

## 2021-04-30 PROCEDURE — 3008F BODY MASS INDEX DOCD: CPT | Mod: S$GLB,,, | Performed by: FAMILY MEDICINE

## 2021-05-04 DIAGNOSIS — F90.2 ATTENTION DEFICIT HYPERACTIVITY DISORDER (ADHD), COMBINED TYPE: Primary | ICD-10-CM

## 2021-05-04 RX ORDER — LISDEXAMFETAMINE DIMESYLATE 40 MG/1
40 CAPSULE ORAL DAILY
Qty: 30 CAPSULE | Refills: 0 | Status: SHIPPED | OUTPATIENT
Start: 2021-05-04 | End: 2021-06-02 | Stop reason: SDUPTHER

## 2021-05-14 ENCOUNTER — PATIENT MESSAGE (OUTPATIENT)
Dept: PSYCHIATRY | Facility: CLINIC | Age: 41
End: 2021-05-14

## 2021-05-17 ENCOUNTER — PATIENT OUTREACH (OUTPATIENT)
Dept: ADMINISTRATIVE | Facility: OTHER | Age: 41
End: 2021-05-17

## 2021-05-17 RX ORDER — BUSPIRONE HYDROCHLORIDE 7.5 MG/1
7.5 TABLET ORAL 2 TIMES DAILY
Qty: 60 TABLET | Refills: 1 | Status: SHIPPED | OUTPATIENT
Start: 2021-05-17 | End: 2021-08-11

## 2021-05-18 ENCOUNTER — OFFICE VISIT (OUTPATIENT)
Dept: SURGERY | Facility: CLINIC | Age: 41
End: 2021-05-18
Payer: COMMERCIAL

## 2021-05-18 VITALS
SYSTOLIC BLOOD PRESSURE: 129 MMHG | OXYGEN SATURATION: 97 % | HEIGHT: 66 IN | HEART RATE: 83 BPM | TEMPERATURE: 97 F | WEIGHT: 274.69 LBS | BODY MASS INDEX: 44.15 KG/M2 | DIASTOLIC BLOOD PRESSURE: 92 MMHG

## 2021-05-18 DIAGNOSIS — K80.20 SYMPTOMATIC CHOLELITHIASIS: Primary | ICD-10-CM

## 2021-05-18 PROCEDURE — 99205 PR OFFICE/OUTPT VISIT, NEW, LEVL V, 60-74 MIN: ICD-10-PCS | Mod: S$GLB,,, | Performed by: SURGERY

## 2021-05-18 PROCEDURE — 3008F PR BODY MASS INDEX (BMI) DOCUMENTED: ICD-10-PCS | Mod: S$GLB,,, | Performed by: SURGERY

## 2021-05-18 PROCEDURE — 3008F BODY MASS INDEX DOCD: CPT | Mod: S$GLB,,, | Performed by: SURGERY

## 2021-05-18 PROCEDURE — 1125F AMNT PAIN NOTED PAIN PRSNT: CPT | Mod: S$GLB,,, | Performed by: SURGERY

## 2021-05-18 PROCEDURE — 1125F PR PAIN SEVERITY QUANTIFIED, PAIN PRESENT: ICD-10-PCS | Mod: S$GLB,,, | Performed by: SURGERY

## 2021-05-18 PROCEDURE — 99205 OFFICE O/P NEW HI 60 MIN: CPT | Mod: S$GLB,,, | Performed by: SURGERY

## 2021-05-18 RX ORDER — METRONIDAZOLE 500 MG/100ML
500 INJECTION, SOLUTION INTRAVENOUS
Status: CANCELLED | OUTPATIENT
Start: 2021-05-18

## 2021-05-18 RX ORDER — SODIUM CHLORIDE 9 MG/ML
INJECTION, SOLUTION INTRAVENOUS CONTINUOUS
Status: CANCELLED | OUTPATIENT
Start: 2021-05-18

## 2021-05-27 ENCOUNTER — OFFICE VISIT (OUTPATIENT)
Dept: PSYCHIATRY | Facility: CLINIC | Age: 41
End: 2021-05-27
Payer: COMMERCIAL

## 2021-05-27 VITALS
SYSTOLIC BLOOD PRESSURE: 119 MMHG | DIASTOLIC BLOOD PRESSURE: 81 MMHG | HEIGHT: 66 IN | HEART RATE: 77 BPM | BODY MASS INDEX: 43.82 KG/M2 | WEIGHT: 272.69 LBS

## 2021-05-27 DIAGNOSIS — F90.2 ATTENTION DEFICIT HYPERACTIVITY DISORDER (ADHD), COMBINED TYPE: ICD-10-CM

## 2021-05-27 DIAGNOSIS — F41.1 GAD (GENERALIZED ANXIETY DISORDER): ICD-10-CM

## 2021-05-27 DIAGNOSIS — G47.00 INSOMNIA, UNSPECIFIED TYPE: ICD-10-CM

## 2021-05-27 DIAGNOSIS — F84.0 AUTISM SPECTRUM DISORDER: ICD-10-CM

## 2021-05-27 DIAGNOSIS — F33.41 RECURRENT MAJOR DEPRESSIVE DISORDER, IN PARTIAL REMISSION: Primary | ICD-10-CM

## 2021-05-27 DIAGNOSIS — F42.9 OBSESSIVE-COMPULSIVE DISORDER, UNSPECIFIED TYPE: ICD-10-CM

## 2021-05-27 PROCEDURE — 3008F PR BODY MASS INDEX (BMI) DOCUMENTED: ICD-10-PCS | Mod: CPTII,S$GLB,, | Performed by: PHYSICIAN ASSISTANT

## 2021-05-27 PROCEDURE — 99999 PR PBB SHADOW E&M-EST. PATIENT-LVL III: CPT | Mod: PBBFAC,,, | Performed by: PHYSICIAN ASSISTANT

## 2021-05-27 PROCEDURE — 99214 PR OFFICE/OUTPT VISIT, EST, LEVL IV, 30-39 MIN: ICD-10-PCS | Mod: S$GLB,,, | Performed by: PHYSICIAN ASSISTANT

## 2021-05-27 PROCEDURE — 1126F AMNT PAIN NOTED NONE PRSNT: CPT | Mod: S$GLB,,, | Performed by: PHYSICIAN ASSISTANT

## 2021-05-27 PROCEDURE — 99999 PR PBB SHADOW E&M-EST. PATIENT-LVL III: ICD-10-PCS | Mod: PBBFAC,,, | Performed by: PHYSICIAN ASSISTANT

## 2021-05-27 PROCEDURE — 3008F BODY MASS INDEX DOCD: CPT | Mod: CPTII,S$GLB,, | Performed by: PHYSICIAN ASSISTANT

## 2021-05-27 PROCEDURE — 1126F PR PAIN SEVERITY QUANTIFIED, NO PAIN PRESENT: ICD-10-PCS | Mod: S$GLB,,, | Performed by: PHYSICIAN ASSISTANT

## 2021-05-27 PROCEDURE — 99214 OFFICE O/P EST MOD 30 MIN: CPT | Mod: S$GLB,,, | Performed by: PHYSICIAN ASSISTANT

## 2021-05-27 RX ORDER — DOXEPIN 6 MG/1
6 TABLET, FILM COATED ORAL NIGHTLY
Qty: 30 TABLET | Refills: 1 | Status: SHIPPED | OUTPATIENT
Start: 2021-05-27 | End: 2021-07-22

## 2021-05-27 RX ORDER — FAMOTIDINE 20 MG/1
20 TABLET, FILM COATED ORAL 2 TIMES DAILY
COMMUNITY
Start: 2021-05-12

## 2021-05-27 RX ORDER — QUETIAPINE FUMARATE 50 MG/1
150 TABLET, FILM COATED ORAL NIGHTLY
Start: 2021-05-27 | End: 2021-06-14 | Stop reason: SDUPTHER

## 2021-05-31 ENCOUNTER — ANESTHESIA EVENT (OUTPATIENT)
Dept: SURGERY | Facility: HOSPITAL | Age: 41
End: 2021-05-31
Payer: COMMERCIAL

## 2021-05-31 ENCOUNTER — HOSPITAL ENCOUNTER (OUTPATIENT)
Dept: PREADMISSION TESTING | Facility: HOSPITAL | Age: 41
Discharge: HOME | End: 2021-05-31
Attending: SURGERY
Payer: COMMERCIAL

## 2021-05-31 PROCEDURE — 99900103 DSU ONLY-NO CHARGE-INITIAL HR (STAT)

## 2021-06-02 ENCOUNTER — PATIENT MESSAGE (OUTPATIENT)
Dept: PSYCHIATRY | Facility: CLINIC | Age: 41
End: 2021-06-02

## 2021-06-02 DIAGNOSIS — F90.2 ATTENTION DEFICIT HYPERACTIVITY DISORDER (ADHD), COMBINED TYPE: ICD-10-CM

## 2021-06-02 RX ORDER — LISDEXAMFETAMINE DIMESYLATE 40 MG/1
40 CAPSULE ORAL DAILY
Qty: 30 CAPSULE | Refills: 0 | Status: SHIPPED | OUTPATIENT
Start: 2021-06-02 | End: 2021-07-22 | Stop reason: SDUPTHER

## 2021-06-03 ENCOUNTER — ANESTHESIA (OUTPATIENT)
Dept: SURGERY | Facility: HOSPITAL | Age: 41
End: 2021-06-03
Payer: COMMERCIAL

## 2021-06-03 ENCOUNTER — HOSPITAL ENCOUNTER (OUTPATIENT)
Facility: HOSPITAL | Age: 41
Discharge: HOME | End: 2021-06-03
Attending: SURGERY | Admitting: SURGERY
Payer: COMMERCIAL

## 2021-06-03 VITALS
TEMPERATURE: 98 F | HEART RATE: 81 BPM | WEIGHT: 268 LBS | RESPIRATION RATE: 24 BRPM | DIASTOLIC BLOOD PRESSURE: 61 MMHG | OXYGEN SATURATION: 95 % | HEIGHT: 66 IN | SYSTOLIC BLOOD PRESSURE: 117 MMHG | BODY MASS INDEX: 43.07 KG/M2

## 2021-06-03 DIAGNOSIS — K80.20 SYMPTOMATIC CHOLELITHIASIS: Primary | ICD-10-CM

## 2021-06-03 LAB — B-HCG UR QL: NEGATIVE

## 2021-06-03 PROCEDURE — 47562 LAPAROSCOPIC CHOLECYSTECTOMY: CPT | Mod: 22,,, | Performed by: SURGERY

## 2021-06-03 PROCEDURE — D9220A PRA ANESTHESIA: Mod: ,,, | Performed by: ANESTHESIOLOGY

## 2021-06-03 PROCEDURE — 63600175 PHARM REV CODE 636 W HCPCS: Performed by: ANESTHESIOLOGY

## 2021-06-03 PROCEDURE — 71000015 HC POSTOP RECOV 1ST HR: Performed by: SURGERY

## 2021-06-03 PROCEDURE — 88304 PR  SURG PATH,LEVEL III: ICD-10-PCS | Mod: 26,,, | Performed by: PATHOLOGY

## 2021-06-03 PROCEDURE — 25000003 PHARM REV CODE 250: Performed by: SURGERY

## 2021-06-03 PROCEDURE — D9220A PRA ANESTHESIA: ICD-10-PCS | Mod: ,,, | Performed by: ANESTHESIOLOGY

## 2021-06-03 PROCEDURE — 25000003 PHARM REV CODE 250: Performed by: ANESTHESIOLOGY

## 2021-06-03 PROCEDURE — 88304 TISSUE EXAM BY PATHOLOGIST: CPT | Performed by: PATHOLOGY

## 2021-06-03 PROCEDURE — 37000009 HC ANESTHESIA EA ADD 15 MINS: Performed by: SURGERY

## 2021-06-03 PROCEDURE — 71000033 HC RECOVERY, INTIAL HOUR: Performed by: SURGERY

## 2021-06-03 PROCEDURE — 36000708 HC OR TIME LEV III 1ST 15 MIN: Performed by: SURGERY

## 2021-06-03 PROCEDURE — 81025 URINE PREGNANCY TEST: CPT | Performed by: SURGERY

## 2021-06-03 PROCEDURE — 88304 TISSUE EXAM BY PATHOLOGIST: CPT | Mod: 26,,, | Performed by: PATHOLOGY

## 2021-06-03 PROCEDURE — 27201423 OPTIME MED/SURG SUP & DEVICES STERILE SUPPLY: Performed by: SURGERY

## 2021-06-03 PROCEDURE — 36000709 HC OR TIME LEV III EA ADD 15 MIN: Performed by: SURGERY

## 2021-06-03 PROCEDURE — 63600175 PHARM REV CODE 636 W HCPCS: Performed by: NURSE ANESTHETIST, CERTIFIED REGISTERED

## 2021-06-03 PROCEDURE — 25000003 PHARM REV CODE 250: Performed by: NURSE ANESTHETIST, CERTIFIED REGISTERED

## 2021-06-03 PROCEDURE — 37000008 HC ANESTHESIA 1ST 15 MINUTES: Performed by: SURGERY

## 2021-06-03 PROCEDURE — 63600175 PHARM REV CODE 636 W HCPCS: Performed by: SURGERY

## 2021-06-03 PROCEDURE — S0030 INJECTION, METRONIDAZOLE: HCPCS | Performed by: SURGERY

## 2021-06-03 PROCEDURE — 47562 PR LAP,CHOLECYSTECTOMY: ICD-10-PCS | Mod: 22,,, | Performed by: SURGERY

## 2021-06-03 RX ORDER — BUPIVACAINE HYDROCHLORIDE AND EPINEPHRINE 2.5; 5 MG/ML; UG/ML
INJECTION, SOLUTION EPIDURAL; INFILTRATION; INTRACAUDAL; PERINEURAL
Status: DISCONTINUED | OUTPATIENT
Start: 2021-06-03 | End: 2021-06-03 | Stop reason: HOSPADM

## 2021-06-03 RX ORDER — METRONIDAZOLE 500 MG/100ML
500 INJECTION, SOLUTION INTRAVENOUS
Status: COMPLETED | OUTPATIENT
Start: 2021-06-03 | End: 2021-06-03

## 2021-06-03 RX ORDER — SUCCINYLCHOLINE CHLORIDE 20 MG/ML
INJECTION INTRAMUSCULAR; INTRAVENOUS
Status: DISCONTINUED | OUTPATIENT
Start: 2021-06-03 | End: 2021-06-03

## 2021-06-03 RX ORDER — SODIUM CHLORIDE 9 MG/ML
INJECTION, SOLUTION INTRAVENOUS CONTINUOUS
Status: DISCONTINUED | OUTPATIENT
Start: 2021-06-03 | End: 2021-06-03 | Stop reason: HOSPADM

## 2021-06-03 RX ORDER — ONDANSETRON 2 MG/ML
4 INJECTION INTRAMUSCULAR; INTRAVENOUS DAILY PRN
Status: COMPLETED | OUTPATIENT
Start: 2021-06-03 | End: 2021-06-03

## 2021-06-03 RX ORDER — SODIUM CHLORIDE, SODIUM LACTATE, POTASSIUM CHLORIDE, CALCIUM CHLORIDE 600; 310; 30; 20 MG/100ML; MG/100ML; MG/100ML; MG/100ML
INJECTION, SOLUTION INTRAVENOUS CONTINUOUS
Status: ACTIVE | OUTPATIENT
Start: 2021-06-03

## 2021-06-03 RX ORDER — LIDOCAINE HYDROCHLORIDE 10 MG/ML
1 INJECTION, SOLUTION EPIDURAL; INFILTRATION; INTRACAUDAL; PERINEURAL ONCE
Status: ACTIVE | OUTPATIENT
Start: 2021-06-03

## 2021-06-03 RX ORDER — SODIUM CHLORIDE, SODIUM LACTATE, POTASSIUM CHLORIDE, CALCIUM CHLORIDE 600; 310; 30; 20 MG/100ML; MG/100ML; MG/100ML; MG/100ML
125 INJECTION, SOLUTION INTRAVENOUS CONTINUOUS
Status: ACTIVE | OUTPATIENT
Start: 2021-06-03

## 2021-06-03 RX ORDER — PROPOFOL 10 MG/ML
VIAL (ML) INTRAVENOUS
Status: DISCONTINUED | OUTPATIENT
Start: 2021-06-03 | End: 2021-06-03

## 2021-06-03 RX ORDER — ROCURONIUM BROMIDE 10 MG/ML
INJECTION, SOLUTION INTRAVENOUS
Status: DISCONTINUED | OUTPATIENT
Start: 2021-06-03 | End: 2021-06-03

## 2021-06-03 RX ORDER — MORPHINE SULFATE 4 MG/ML
2 INJECTION, SOLUTION INTRAMUSCULAR; INTRAVENOUS EVERY 5 MIN PRN
Status: DISPENSED | OUTPATIENT
Start: 2021-06-03

## 2021-06-03 RX ORDER — ONDANSETRON 4 MG/1
4 TABLET, FILM COATED ORAL EVERY 6 HOURS PRN
Qty: 30 TABLET | Refills: 0 | Status: SHIPPED | OUTPATIENT
Start: 2021-06-03 | End: 2021-06-13

## 2021-06-03 RX ORDER — ONDANSETRON 2 MG/ML
INJECTION INTRAMUSCULAR; INTRAVENOUS
Status: DISCONTINUED | OUTPATIENT
Start: 2021-06-03 | End: 2021-06-03

## 2021-06-03 RX ORDER — LIDOCAINE HYDROCHLORIDE 20 MG/ML
INJECTION, SOLUTION EPIDURAL; INFILTRATION; INTRACAUDAL; PERINEURAL
Status: DISCONTINUED | OUTPATIENT
Start: 2021-06-03 | End: 2021-06-03

## 2021-06-03 RX ORDER — CIPROFLOXACIN 2 MG/ML
400 INJECTION, SOLUTION INTRAVENOUS
Status: COMPLETED | OUTPATIENT
Start: 2021-06-03 | End: 2021-06-03

## 2021-06-03 RX ORDER — FAMOTIDINE 10 MG/ML
20 INJECTION INTRAVENOUS ONCE
Status: COMPLETED | OUTPATIENT
Start: 2021-06-03 | End: 2021-06-03

## 2021-06-03 RX ORDER — MEPERIDINE HYDROCHLORIDE 50 MG/ML
INJECTION INTRAMUSCULAR; INTRAVENOUS; SUBCUTANEOUS
Status: DISCONTINUED | OUTPATIENT
Start: 2021-06-03 | End: 2021-06-03

## 2021-06-03 RX ORDER — OXYCODONE AND ACETAMINOPHEN 5; 325 MG/1; MG/1
1 TABLET ORAL EVERY 6 HOURS PRN
Qty: 30 TABLET | Refills: 0 | Status: SHIPPED | OUTPATIENT
Start: 2021-06-03 | End: 2021-06-13

## 2021-06-03 RX ORDER — MIDAZOLAM HYDROCHLORIDE 1 MG/ML
INJECTION INTRAMUSCULAR; INTRAVENOUS
Status: DISCONTINUED | OUTPATIENT
Start: 2021-06-03 | End: 2021-06-03

## 2021-06-03 RX ORDER — DIPHENHYDRAMINE HYDROCHLORIDE 50 MG/ML
12.5 INJECTION INTRAMUSCULAR; INTRAVENOUS
Status: ACTIVE | OUTPATIENT
Start: 2021-06-03

## 2021-06-03 RX ADMIN — SUCCINYLCHOLINE CHLORIDE 120 MG: 20 INJECTION, SOLUTION INTRAMUSCULAR; INTRAVENOUS at 10:06

## 2021-06-03 RX ADMIN — MORPHINE SULFATE 2 MG: 4 INJECTION, SOLUTION INTRAMUSCULAR; INTRAVENOUS at 12:06

## 2021-06-03 RX ADMIN — ROCURONIUM BROMIDE 30 MG: 10 INJECTION, SOLUTION INTRAVENOUS at 11:06

## 2021-06-03 RX ADMIN — ONDANSETRON 4 MG: 2 INJECTION INTRAMUSCULAR; INTRAVENOUS at 01:06

## 2021-06-03 RX ADMIN — FAMOTIDINE 20 MG: 10 INJECTION INTRAVENOUS at 10:06

## 2021-06-03 RX ADMIN — ONDANSETRON 4 MG: 2 INJECTION INTRAMUSCULAR; INTRAVENOUS at 12:06

## 2021-06-03 RX ADMIN — SUGAMMADEX 200 MG: 100 INJECTION, SOLUTION INTRAVENOUS at 12:06

## 2021-06-03 RX ADMIN — MIDAZOLAM HYDROCHLORIDE 2 MG: 1 INJECTION, SOLUTION INTRAMUSCULAR; INTRAVENOUS at 10:06

## 2021-06-03 RX ADMIN — METRONIDAZOLE 500 MG: 500 INJECTION, SOLUTION INTRAVENOUS at 10:06

## 2021-06-03 RX ADMIN — MORPHINE SULFATE 2 MG: 4 INJECTION, SOLUTION INTRAMUSCULAR; INTRAVENOUS at 01:06

## 2021-06-03 RX ADMIN — PROPOFOL 200 MG: 10 INJECTION, EMULSION INTRAVENOUS at 10:06

## 2021-06-03 RX ADMIN — CIPROFLOXACIN 400 MG: 2 INJECTION, SOLUTION INTRAVENOUS at 11:06

## 2021-06-03 RX ADMIN — SODIUM CHLORIDE, POTASSIUM CHLORIDE, SODIUM LACTATE AND CALCIUM CHLORIDE: 600; 310; 30; 20 INJECTION, SOLUTION INTRAVENOUS at 11:06

## 2021-06-03 RX ADMIN — MEPERIDINE HYDROCHLORIDE 50 MG: 50 INJECTION INTRAMUSCULAR; INTRAVENOUS; SUBCUTANEOUS at 10:06

## 2021-06-03 RX ADMIN — ROCURONIUM BROMIDE 20 MG: 10 INJECTION, SOLUTION INTRAVENOUS at 11:06

## 2021-06-03 RX ADMIN — ONDANSETRON 4 MG: 2 INJECTION INTRAMUSCULAR; INTRAVENOUS at 11:06

## 2021-06-03 RX ADMIN — LIDOCAINE HYDROCHLORIDE 100 MG: 20 INJECTION, SOLUTION EPIDURAL; INFILTRATION; INTRACAUDAL; PERINEURAL at 10:06

## 2021-06-03 RX ADMIN — SODIUM CHLORIDE, POTASSIUM CHLORIDE, SODIUM LACTATE AND CALCIUM CHLORIDE: 600; 310; 30; 20 INJECTION, SOLUTION INTRAVENOUS at 10:06

## 2021-06-03 RX ADMIN — GLYCOPYRROLATE 0.2 MG: 0.2 INJECTION INTRAMUSCULAR; INTRAVENOUS at 11:06

## 2021-06-04 ENCOUNTER — NURSE TRIAGE (OUTPATIENT)
Dept: ADMINISTRATIVE | Facility: CLINIC | Age: 41
End: 2021-06-04

## 2021-06-08 LAB
FINAL PATHOLOGIC DIAGNOSIS: NORMAL
GROSS: NORMAL
Lab: NORMAL

## 2021-06-11 ENCOUNTER — PATIENT MESSAGE (OUTPATIENT)
Dept: PSYCHIATRY | Facility: CLINIC | Age: 41
End: 2021-06-11

## 2021-06-11 DIAGNOSIS — F42.9 OBSESSIVE-COMPULSIVE DISORDER, UNSPECIFIED TYPE: ICD-10-CM

## 2021-06-11 RX ORDER — SERTRALINE HYDROCHLORIDE 100 MG/1
200 TABLET, FILM COATED ORAL DAILY
Qty: 60 TABLET | Refills: 1 | Status: SHIPPED | OUTPATIENT
Start: 2021-06-11 | End: 2021-09-02 | Stop reason: SDUPTHER

## 2021-06-14 ENCOUNTER — PATIENT MESSAGE (OUTPATIENT)
Dept: PSYCHIATRY | Facility: CLINIC | Age: 41
End: 2021-06-14

## 2021-06-14 DIAGNOSIS — F41.1 GAD (GENERALIZED ANXIETY DISORDER): ICD-10-CM

## 2021-06-14 RX ORDER — QUETIAPINE FUMARATE 50 MG/1
150 TABLET, FILM COATED ORAL NIGHTLY
Status: CANCELLED
Start: 2021-06-14

## 2021-06-14 RX ORDER — QUETIAPINE FUMARATE 200 MG/1
200 TABLET, FILM COATED ORAL NIGHTLY
Qty: 30 TABLET | Refills: 1 | Status: SHIPPED | OUTPATIENT
Start: 2021-06-14 | End: 2021-07-22 | Stop reason: SDUPTHER

## 2021-06-17 ENCOUNTER — TELEPHONE (OUTPATIENT)
Dept: PSYCHIATRY | Facility: CLINIC | Age: 41
End: 2021-06-17

## 2021-06-24 ENCOUNTER — PATIENT MESSAGE (OUTPATIENT)
Dept: SURGERY | Facility: CLINIC | Age: 41
End: 2021-06-24

## 2021-06-25 ENCOUNTER — OFFICE VISIT (OUTPATIENT)
Dept: SURGERY | Facility: CLINIC | Age: 41
End: 2021-06-25
Payer: COMMERCIAL

## 2021-06-25 VITALS
RESPIRATION RATE: 16 BRPM | SYSTOLIC BLOOD PRESSURE: 130 MMHG | WEIGHT: 270 LBS | OXYGEN SATURATION: 99 % | HEART RATE: 104 BPM | BODY MASS INDEX: 43.39 KG/M2 | DIASTOLIC BLOOD PRESSURE: 80 MMHG | HEIGHT: 66 IN

## 2021-06-25 DIAGNOSIS — K21.9 GASTROESOPHAGEAL REFLUX DISEASE, UNSPECIFIED WHETHER ESOPHAGITIS PRESENT: Primary | ICD-10-CM

## 2021-06-25 PROCEDURE — 99213 PR OFFICE/OUTPT VISIT, EST, LEVL III, 20-29 MIN: ICD-10-PCS | Mod: 24,S$GLB,, | Performed by: SURGERY

## 2021-06-25 PROCEDURE — 1126F AMNT PAIN NOTED NONE PRSNT: CPT | Mod: S$GLB,,, | Performed by: SURGERY

## 2021-06-25 PROCEDURE — 3008F PR BODY MASS INDEX (BMI) DOCUMENTED: ICD-10-PCS | Mod: S$GLB,,, | Performed by: SURGERY

## 2021-06-25 PROCEDURE — 1126F PR PAIN SEVERITY QUANTIFIED, NO PAIN PRESENT: ICD-10-PCS | Mod: S$GLB,,, | Performed by: SURGERY

## 2021-06-25 PROCEDURE — 3008F BODY MASS INDEX DOCD: CPT | Mod: S$GLB,,, | Performed by: SURGERY

## 2021-06-25 PROCEDURE — 99213 OFFICE O/P EST LOW 20 MIN: CPT | Mod: 24,S$GLB,, | Performed by: SURGERY

## 2021-06-25 RX ORDER — PANTOPRAZOLE SODIUM 40 MG/1
40 TABLET, DELAYED RELEASE ORAL DAILY
Qty: 30 TABLET | Refills: 6 | Status: SHIPPED | OUTPATIENT
Start: 2021-06-25 | End: 2021-12-27

## 2021-07-14 ENCOUNTER — PATIENT MESSAGE (OUTPATIENT)
Dept: FAMILY MEDICINE | Facility: CLINIC | Age: 41
End: 2021-07-14

## 2021-07-14 ENCOUNTER — TELEPHONE (OUTPATIENT)
Dept: FAMILY MEDICINE | Facility: CLINIC | Age: 41
End: 2021-07-14

## 2021-07-14 DIAGNOSIS — E78.49 OTHER HYPERLIPIDEMIA: Primary | ICD-10-CM

## 2021-07-14 DIAGNOSIS — Z13.1 DIABETES MELLITUS SCREENING: ICD-10-CM

## 2021-07-20 ENCOUNTER — LAB VISIT (OUTPATIENT)
Dept: LAB | Facility: HOSPITAL | Age: 41
End: 2021-07-20
Attending: FAMILY MEDICINE
Payer: COMMERCIAL

## 2021-07-20 DIAGNOSIS — E78.49 OTHER HYPERLIPIDEMIA: ICD-10-CM

## 2021-07-20 DIAGNOSIS — Z13.1 DIABETES MELLITUS SCREENING: ICD-10-CM

## 2021-07-20 LAB
ALBUMIN SERPL BCP-MCNC: 3.6 G/DL (ref 3.5–5.2)
ALP SERPL-CCNC: 65 U/L (ref 55–135)
ALT SERPL W/O P-5'-P-CCNC: 22 U/L (ref 10–44)
ANION GAP SERPL CALC-SCNC: 12 MMOL/L (ref 8–16)
AST SERPL-CCNC: 21 U/L (ref 10–40)
BILIRUB SERPL-MCNC: 0.4 MG/DL (ref 0.1–1)
BUN SERPL-MCNC: 12 MG/DL (ref 6–20)
CALCIUM SERPL-MCNC: 9.5 MG/DL (ref 8.7–10.5)
CHLORIDE SERPL-SCNC: 103 MMOL/L (ref 95–110)
CHOLEST SERPL-MCNC: 161 MG/DL (ref 120–199)
CHOLEST/HDLC SERPL: 4 {RATIO} (ref 2–5)
CO2 SERPL-SCNC: 21 MMOL/L (ref 23–29)
CREAT SERPL-MCNC: 0.9 MG/DL (ref 0.5–1.4)
EST. GFR  (AFRICAN AMERICAN): >60 ML/MIN/1.73 M^2
EST. GFR  (NON AFRICAN AMERICAN): >60 ML/MIN/1.73 M^2
ESTIMATED AVG GLUCOSE: 100 MG/DL (ref 68–131)
GLUCOSE SERPL-MCNC: 76 MG/DL (ref 70–110)
HBA1C MFR BLD: 5.1 % (ref 4–5.6)
HDLC SERPL-MCNC: 40 MG/DL (ref 40–75)
HDLC SERPL: 24.8 % (ref 20–50)
LDLC SERPL CALC-MCNC: 82.4 MG/DL (ref 63–159)
NONHDLC SERPL-MCNC: 121 MG/DL
POTASSIUM SERPL-SCNC: 4 MMOL/L (ref 3.5–5.1)
PROT SERPL-MCNC: 7.6 G/DL (ref 6–8.4)
SODIUM SERPL-SCNC: 136 MMOL/L (ref 136–145)
TRIGL SERPL-MCNC: 193 MG/DL (ref 30–150)

## 2021-07-20 PROCEDURE — 83036 HEMOGLOBIN GLYCOSYLATED A1C: CPT | Performed by: FAMILY MEDICINE

## 2021-07-20 PROCEDURE — 86769 SARS-COV-2 COVID-19 ANTIBODY: CPT | Performed by: FAMILY MEDICINE

## 2021-07-20 PROCEDURE — 80053 COMPREHEN METABOLIC PANEL: CPT | Performed by: FAMILY MEDICINE

## 2021-07-20 PROCEDURE — 80061 LIPID PANEL: CPT | Performed by: FAMILY MEDICINE

## 2021-07-20 PROCEDURE — 36415 COLL VENOUS BLD VENIPUNCTURE: CPT | Performed by: FAMILY MEDICINE

## 2021-07-21 ENCOUNTER — PATIENT MESSAGE (OUTPATIENT)
Dept: FAMILY MEDICINE | Facility: CLINIC | Age: 41
End: 2021-07-21

## 2021-07-21 ENCOUNTER — NURSE TRIAGE (OUTPATIENT)
Dept: ADMINISTRATIVE | Facility: CLINIC | Age: 41
End: 2021-07-21

## 2021-07-21 LAB
SARS-COV-2 IGG SERPL IA-ACNC: 2064.1 AU/ML
SARS-COV-2 IGG SERPL QL IA: POSITIVE

## 2021-07-22 ENCOUNTER — OFFICE VISIT (OUTPATIENT)
Dept: PSYCHIATRY | Facility: CLINIC | Age: 41
End: 2021-07-22
Payer: COMMERCIAL

## 2021-07-22 VITALS
BODY MASS INDEX: 43.93 KG/M2 | DIASTOLIC BLOOD PRESSURE: 79 MMHG | RESPIRATION RATE: 18 BRPM | WEIGHT: 273.38 LBS | SYSTOLIC BLOOD PRESSURE: 129 MMHG | HEIGHT: 66 IN | HEART RATE: 97 BPM

## 2021-07-22 DIAGNOSIS — G47.00 INSOMNIA, UNSPECIFIED TYPE: ICD-10-CM

## 2021-07-22 DIAGNOSIS — F84.0 AUTISM SPECTRUM DISORDER: ICD-10-CM

## 2021-07-22 DIAGNOSIS — F42.9 OBSESSIVE-COMPULSIVE DISORDER, UNSPECIFIED TYPE: ICD-10-CM

## 2021-07-22 DIAGNOSIS — F90.2 ATTENTION DEFICIT HYPERACTIVITY DISORDER (ADHD), COMBINED TYPE: ICD-10-CM

## 2021-07-22 DIAGNOSIS — F41.1 GAD (GENERALIZED ANXIETY DISORDER): ICD-10-CM

## 2021-07-22 DIAGNOSIS — F33.41 RECURRENT MAJOR DEPRESSIVE DISORDER, IN PARTIAL REMISSION: Primary | ICD-10-CM

## 2021-07-22 PROCEDURE — 99999 PR PBB SHADOW E&M-EST. PATIENT-LVL IV: ICD-10-PCS | Mod: PBBFAC,,, | Performed by: PHYSICIAN ASSISTANT

## 2021-07-22 PROCEDURE — 99214 PR OFFICE/OUTPT VISIT, EST, LEVL IV, 30-39 MIN: ICD-10-PCS | Mod: S$GLB,,, | Performed by: PHYSICIAN ASSISTANT

## 2021-07-22 PROCEDURE — 99999 PR PBB SHADOW E&M-EST. PATIENT-LVL IV: CPT | Mod: PBBFAC,,, | Performed by: PHYSICIAN ASSISTANT

## 2021-07-22 PROCEDURE — 3008F PR BODY MASS INDEX (BMI) DOCUMENTED: ICD-10-PCS | Mod: CPTII,S$GLB,, | Performed by: PHYSICIAN ASSISTANT

## 2021-07-22 PROCEDURE — 3008F BODY MASS INDEX DOCD: CPT | Mod: CPTII,S$GLB,, | Performed by: PHYSICIAN ASSISTANT

## 2021-07-22 PROCEDURE — 99214 OFFICE O/P EST MOD 30 MIN: CPT | Mod: S$GLB,,, | Performed by: PHYSICIAN ASSISTANT

## 2021-07-22 RX ORDER — QUETIAPINE FUMARATE 200 MG/1
200 TABLET, FILM COATED ORAL NIGHTLY
Qty: 30 TABLET | Refills: 2 | Status: SHIPPED | OUTPATIENT
Start: 2021-07-22 | End: 2021-11-18

## 2021-07-22 RX ORDER — LISDEXAMFETAMINE DIMESYLATE 50 MG/1
50 CAPSULE ORAL DAILY
Qty: 30 CAPSULE | Refills: 0 | Status: SHIPPED | OUTPATIENT
Start: 2021-07-22 | End: 2021-09-20 | Stop reason: SDUPTHER

## 2021-07-30 ENCOUNTER — OFFICE VISIT (OUTPATIENT)
Dept: FAMILY MEDICINE | Facility: CLINIC | Age: 41
End: 2021-07-30
Payer: COMMERCIAL

## 2021-07-30 VITALS
BODY MASS INDEX: 44.1 KG/M2 | HEART RATE: 87 BPM | SYSTOLIC BLOOD PRESSURE: 117 MMHG | WEIGHT: 274.38 LBS | RESPIRATION RATE: 15 BRPM | DIASTOLIC BLOOD PRESSURE: 76 MMHG | HEIGHT: 66 IN | OXYGEN SATURATION: 96 %

## 2021-07-30 DIAGNOSIS — E78.49 OTHER HYPERLIPIDEMIA: Primary | ICD-10-CM

## 2021-07-30 DIAGNOSIS — G43.809 OTHER MIGRAINE WITHOUT STATUS MIGRAINOSUS, NOT INTRACTABLE: ICD-10-CM

## 2021-07-30 PROCEDURE — 99214 OFFICE O/P EST MOD 30 MIN: CPT | Mod: S$GLB,,, | Performed by: FAMILY MEDICINE

## 2021-07-30 PROCEDURE — 3008F PR BODY MASS INDEX (BMI) DOCUMENTED: ICD-10-PCS | Mod: S$GLB,,, | Performed by: FAMILY MEDICINE

## 2021-07-30 PROCEDURE — 3078F PR MOST RECENT DIASTOLIC BLOOD PRESSURE < 80 MM HG: ICD-10-PCS | Mod: S$GLB,,, | Performed by: FAMILY MEDICINE

## 2021-07-30 PROCEDURE — 3078F DIAST BP <80 MM HG: CPT | Mod: S$GLB,,, | Performed by: FAMILY MEDICINE

## 2021-07-30 PROCEDURE — 99214 PR OFFICE/OUTPT VISIT, EST, LEVL IV, 30-39 MIN: ICD-10-PCS | Mod: S$GLB,,, | Performed by: FAMILY MEDICINE

## 2021-07-30 PROCEDURE — 3044F PR MOST RECENT HEMOGLOBIN A1C LEVEL <7.0%: ICD-10-PCS | Mod: S$GLB,,, | Performed by: FAMILY MEDICINE

## 2021-07-30 PROCEDURE — 3074F PR MOST RECENT SYSTOLIC BLOOD PRESSURE < 130 MM HG: ICD-10-PCS | Mod: S$GLB,,, | Performed by: FAMILY MEDICINE

## 2021-07-30 PROCEDURE — 99999 PR PBB SHADOW E&M-EST. PATIENT-LVL IV: CPT | Mod: PBBFAC,,, | Performed by: FAMILY MEDICINE

## 2021-07-30 PROCEDURE — 3044F HG A1C LEVEL LT 7.0%: CPT | Mod: S$GLB,,, | Performed by: FAMILY MEDICINE

## 2021-07-30 PROCEDURE — 3074F SYST BP LT 130 MM HG: CPT | Mod: S$GLB,,, | Performed by: FAMILY MEDICINE

## 2021-07-30 PROCEDURE — 3008F BODY MASS INDEX DOCD: CPT | Mod: S$GLB,,, | Performed by: FAMILY MEDICINE

## 2021-07-30 PROCEDURE — 1159F PR MEDICATION LIST DOCUMENTED IN MEDICAL RECORD: ICD-10-PCS | Mod: S$GLB,,, | Performed by: FAMILY MEDICINE

## 2021-07-30 PROCEDURE — 1159F MED LIST DOCD IN RCRD: CPT | Mod: S$GLB,,, | Performed by: FAMILY MEDICINE

## 2021-07-30 PROCEDURE — 99999 PR PBB SHADOW E&M-EST. PATIENT-LVL IV: ICD-10-PCS | Mod: PBBFAC,,, | Performed by: FAMILY MEDICINE

## 2021-07-30 RX ORDER — RIZATRIPTAN BENZOATE 10 MG/1
10 TABLET, ORALLY DISINTEGRATING ORAL
Qty: 9 TABLET | Refills: 11 | Status: SHIPPED | OUTPATIENT
Start: 2021-07-30 | End: 2023-07-31 | Stop reason: SDUPTHER

## 2021-07-30 RX ORDER — MONTELUKAST SODIUM 10 MG/1
10 TABLET ORAL DAILY
COMMUNITY
Start: 2021-07-27 | End: 2023-03-20

## 2021-07-30 RX ORDER — GUSELKUMAB 100 MG/ML
100 INJECTION SUBCUTANEOUS
COMMUNITY
Start: 2021-07-26

## 2021-08-05 ENCOUNTER — OFFICE VISIT (OUTPATIENT)
Dept: PSYCHIATRY | Facility: CLINIC | Age: 41
End: 2021-08-05
Payer: COMMERCIAL

## 2021-08-05 DIAGNOSIS — F84.0 AUTISM SPECTRUM DISORDER: ICD-10-CM

## 2021-08-05 DIAGNOSIS — F42.9 OBSESSIVE-COMPULSIVE DISORDER, UNSPECIFIED TYPE: ICD-10-CM

## 2021-08-05 DIAGNOSIS — F33.41 RECURRENT MAJOR DEPRESSIVE DISORDER, IN PARTIAL REMISSION: Primary | ICD-10-CM

## 2021-08-05 PROCEDURE — 90791 PR PSYCHIATRIC DIAGNOSTIC EVALUATION: ICD-10-PCS | Mod: S$GLB,,, | Performed by: SOCIAL WORKER

## 2021-08-05 PROCEDURE — 90791 PSYCH DIAGNOSTIC EVALUATION: CPT | Mod: S$GLB,,, | Performed by: SOCIAL WORKER

## 2021-08-05 PROCEDURE — 99999 PR PBB SHADOW E&M-EST. PATIENT-LVL III: ICD-10-PCS | Mod: PBBFAC,,, | Performed by: SOCIAL WORKER

## 2021-08-05 PROCEDURE — 1159F PR MEDICATION LIST DOCUMENTED IN MEDICAL RECORD: ICD-10-PCS | Mod: CPTII,S$GLB,, | Performed by: SOCIAL WORKER

## 2021-08-05 PROCEDURE — 99999 PR PBB SHADOW E&M-EST. PATIENT-LVL III: CPT | Mod: PBBFAC,,, | Performed by: SOCIAL WORKER

## 2021-08-05 PROCEDURE — 3044F HG A1C LEVEL LT 7.0%: CPT | Mod: CPTII,S$GLB,, | Performed by: SOCIAL WORKER

## 2021-08-05 PROCEDURE — 1159F MED LIST DOCD IN RCRD: CPT | Mod: CPTII,S$GLB,, | Performed by: SOCIAL WORKER

## 2021-08-05 PROCEDURE — 3044F PR MOST RECENT HEMOGLOBIN A1C LEVEL <7.0%: ICD-10-PCS | Mod: CPTII,S$GLB,, | Performed by: SOCIAL WORKER

## 2021-08-09 ENCOUNTER — PATIENT MESSAGE (OUTPATIENT)
Dept: FAMILY MEDICINE | Facility: CLINIC | Age: 41
End: 2021-08-09

## 2021-08-15 ENCOUNTER — PATIENT MESSAGE (OUTPATIENT)
Dept: PSYCHIATRY | Facility: CLINIC | Age: 41
End: 2021-08-15

## 2021-08-15 DIAGNOSIS — F41.1 GAD (GENERALIZED ANXIETY DISORDER): ICD-10-CM

## 2021-08-16 ENCOUNTER — OFFICE VISIT (OUTPATIENT)
Dept: PSYCHIATRY | Facility: CLINIC | Age: 41
End: 2021-08-16
Payer: COMMERCIAL

## 2021-08-16 DIAGNOSIS — F51.01 PRIMARY INSOMNIA: Primary | ICD-10-CM

## 2021-08-16 PROCEDURE — 1159F PR MEDICATION LIST DOCUMENTED IN MEDICAL RECORD: ICD-10-PCS | Mod: CPTII,,, | Performed by: PSYCHOLOGIST

## 2021-08-16 PROCEDURE — 1159F MED LIST DOCD IN RCRD: CPT | Mod: CPTII,,, | Performed by: PSYCHOLOGIST

## 2021-08-16 PROCEDURE — 3044F HG A1C LEVEL LT 7.0%: CPT | Mod: CPTII,,, | Performed by: PSYCHOLOGIST

## 2021-08-16 PROCEDURE — 90853 PR GROUP PSYCHOTHERAPY: ICD-10-PCS | Mod: 95,,, | Performed by: PSYCHOLOGIST

## 2021-08-16 PROCEDURE — 3044F PR MOST RECENT HEMOGLOBIN A1C LEVEL <7.0%: ICD-10-PCS | Mod: CPTII,,, | Performed by: PSYCHOLOGIST

## 2021-08-16 PROCEDURE — 90853 GROUP PSYCHOTHERAPY: CPT | Mod: 95,,, | Performed by: PSYCHOLOGIST

## 2021-08-19 ENCOUNTER — OFFICE VISIT (OUTPATIENT)
Dept: PSYCHIATRY | Facility: CLINIC | Age: 41
End: 2021-08-19
Payer: COMMERCIAL

## 2021-08-19 DIAGNOSIS — F41.1 GAD (GENERALIZED ANXIETY DISORDER): ICD-10-CM

## 2021-08-19 DIAGNOSIS — F42.9 OBSESSIVE-COMPULSIVE DISORDER, UNSPECIFIED TYPE: ICD-10-CM

## 2021-08-19 DIAGNOSIS — F33.41 RECURRENT MAJOR DEPRESSIVE DISORDER, IN PARTIAL REMISSION: Primary | ICD-10-CM

## 2021-08-19 PROCEDURE — 3044F HG A1C LEVEL LT 7.0%: CPT | Mod: CPTII,S$GLB,, | Performed by: SOCIAL WORKER

## 2021-08-19 PROCEDURE — 90834 PR PSYCHOTHERAPY W/PATIENT, 45 MIN: ICD-10-PCS | Mod: S$GLB,,, | Performed by: SOCIAL WORKER

## 2021-08-19 PROCEDURE — 1159F PR MEDICATION LIST DOCUMENTED IN MEDICAL RECORD: ICD-10-PCS | Mod: CPTII,S$GLB,, | Performed by: SOCIAL WORKER

## 2021-08-19 PROCEDURE — 90834 PSYTX W PT 45 MINUTES: CPT | Mod: S$GLB,,, | Performed by: SOCIAL WORKER

## 2021-08-19 PROCEDURE — 3044F PR MOST RECENT HEMOGLOBIN A1C LEVEL <7.0%: ICD-10-PCS | Mod: CPTII,S$GLB,, | Performed by: SOCIAL WORKER

## 2021-08-19 PROCEDURE — 99999 PR PBB SHADOW E&M-EST. PATIENT-LVL III: CPT | Mod: PBBFAC,,, | Performed by: SOCIAL WORKER

## 2021-08-19 PROCEDURE — 99999 PR PBB SHADOW E&M-EST. PATIENT-LVL III: ICD-10-PCS | Mod: PBBFAC,,, | Performed by: SOCIAL WORKER

## 2021-08-19 PROCEDURE — 1159F MED LIST DOCD IN RCRD: CPT | Mod: CPTII,S$GLB,, | Performed by: SOCIAL WORKER

## 2021-08-22 ENCOUNTER — PATIENT MESSAGE (OUTPATIENT)
Dept: PSYCHIATRY | Facility: CLINIC | Age: 41
End: 2021-08-22

## 2021-08-23 ENCOUNTER — OFFICE VISIT (OUTPATIENT)
Dept: PSYCHIATRY | Facility: CLINIC | Age: 41
End: 2021-08-23
Payer: COMMERCIAL

## 2021-08-23 ENCOUNTER — TELEPHONE (OUTPATIENT)
Dept: PSYCHIATRY | Facility: CLINIC | Age: 41
End: 2021-08-23

## 2021-08-23 DIAGNOSIS — F51.01 PRIMARY INSOMNIA: Primary | ICD-10-CM

## 2021-08-23 PROCEDURE — 3044F PR MOST RECENT HEMOGLOBIN A1C LEVEL <7.0%: ICD-10-PCS | Mod: CPTII,,, | Performed by: PSYCHOLOGIST

## 2021-08-23 PROCEDURE — 90853 GROUP PSYCHOTHERAPY: CPT | Mod: 95,,, | Performed by: PSYCHOLOGIST

## 2021-08-23 PROCEDURE — 3044F HG A1C LEVEL LT 7.0%: CPT | Mod: CPTII,,, | Performed by: PSYCHOLOGIST

## 2021-08-23 PROCEDURE — 90853 PR GROUP PSYCHOTHERAPY: ICD-10-PCS | Mod: 95,,, | Performed by: PSYCHOLOGIST

## 2021-09-02 ENCOUNTER — PATIENT MESSAGE (OUTPATIENT)
Dept: FAMILY MEDICINE | Facility: CLINIC | Age: 41
End: 2021-09-02

## 2021-09-02 ENCOUNTER — PATIENT MESSAGE (OUTPATIENT)
Dept: PSYCHIATRY | Facility: CLINIC | Age: 41
End: 2021-09-02

## 2021-09-02 DIAGNOSIS — F42.9 OBSESSIVE-COMPULSIVE DISORDER, UNSPECIFIED TYPE: ICD-10-CM

## 2021-09-03 RX ORDER — SERTRALINE HYDROCHLORIDE 100 MG/1
200 TABLET, FILM COATED ORAL DAILY
Qty: 60 TABLET | Refills: 1 | Status: SHIPPED | OUTPATIENT
Start: 2021-09-03 | End: 2021-11-18 | Stop reason: SDUPTHER

## 2021-09-09 ENCOUNTER — OFFICE VISIT (OUTPATIENT)
Dept: PSYCHIATRY | Facility: CLINIC | Age: 41
End: 2021-09-09
Payer: COMMERCIAL

## 2021-09-09 DIAGNOSIS — F51.01 PRIMARY INSOMNIA: Primary | ICD-10-CM

## 2021-09-09 PROCEDURE — 1159F PR MEDICATION LIST DOCUMENTED IN MEDICAL RECORD: ICD-10-PCS | Mod: CPTII,,, | Performed by: PSYCHOLOGIST

## 2021-09-09 PROCEDURE — 1159F MED LIST DOCD IN RCRD: CPT | Mod: CPTII,,, | Performed by: PSYCHOLOGIST

## 2021-09-09 PROCEDURE — 90863 PHARMACOLOGIC MGMT W/PSYTX: CPT | Mod: 95,,, | Performed by: PSYCHOLOGIST

## 2021-09-09 PROCEDURE — 3044F PR MOST RECENT HEMOGLOBIN A1C LEVEL <7.0%: ICD-10-PCS | Mod: CPTII,,, | Performed by: PSYCHOLOGIST

## 2021-09-09 PROCEDURE — 90834 PSYTX W PT 45 MINUTES: CPT | Mod: 95,,, | Performed by: PSYCHOLOGIST

## 2021-09-09 PROCEDURE — 90834 PR PSYCHOTHERAPY W/PATIENT, 45 MIN: ICD-10-PCS | Mod: 95,,, | Performed by: PSYCHOLOGIST

## 2021-09-09 PROCEDURE — 90863 PR PHARMACOLOGIC MANAGEMENT: ICD-10-PCS | Mod: 95,,, | Performed by: PSYCHOLOGIST

## 2021-09-09 PROCEDURE — 3044F HG A1C LEVEL LT 7.0%: CPT | Mod: CPTII,,, | Performed by: PSYCHOLOGIST

## 2021-09-10 ENCOUNTER — OFFICE VISIT (OUTPATIENT)
Dept: PSYCHIATRY | Facility: CLINIC | Age: 41
End: 2021-09-10
Payer: COMMERCIAL

## 2021-09-10 DIAGNOSIS — F42.9 OBSESSIVE-COMPULSIVE DISORDER, UNSPECIFIED TYPE: Primary | ICD-10-CM

## 2021-09-10 DIAGNOSIS — F84.0 AUTISM SPECTRUM DISORDER: ICD-10-CM

## 2021-09-10 DIAGNOSIS — F41.1 GAD (GENERALIZED ANXIETY DISORDER): ICD-10-CM

## 2021-09-10 PROCEDURE — 1159F PR MEDICATION LIST DOCUMENTED IN MEDICAL RECORD: ICD-10-PCS | Mod: CPTII,S$GLB,, | Performed by: SOCIAL WORKER

## 2021-09-10 PROCEDURE — 1159F MED LIST DOCD IN RCRD: CPT | Mod: CPTII,S$GLB,, | Performed by: SOCIAL WORKER

## 2021-09-10 PROCEDURE — 99999 PR PBB SHADOW E&M-EST. PATIENT-LVL III: ICD-10-PCS | Mod: PBBFAC,,, | Performed by: SOCIAL WORKER

## 2021-09-10 PROCEDURE — 90834 PSYTX W PT 45 MINUTES: CPT | Mod: S$GLB,,, | Performed by: SOCIAL WORKER

## 2021-09-10 PROCEDURE — 99999 PR PBB SHADOW E&M-EST. PATIENT-LVL III: CPT | Mod: PBBFAC,,, | Performed by: SOCIAL WORKER

## 2021-09-10 PROCEDURE — 3044F HG A1C LEVEL LT 7.0%: CPT | Mod: CPTII,S$GLB,, | Performed by: SOCIAL WORKER

## 2021-09-10 PROCEDURE — 90834 PR PSYCHOTHERAPY W/PATIENT, 45 MIN: ICD-10-PCS | Mod: S$GLB,,, | Performed by: SOCIAL WORKER

## 2021-09-10 PROCEDURE — 3044F PR MOST RECENT HEMOGLOBIN A1C LEVEL <7.0%: ICD-10-PCS | Mod: CPTII,S$GLB,, | Performed by: SOCIAL WORKER

## 2021-09-13 ENCOUNTER — OFFICE VISIT (OUTPATIENT)
Dept: PSYCHIATRY | Facility: CLINIC | Age: 41
End: 2021-09-13
Payer: COMMERCIAL

## 2021-09-13 DIAGNOSIS — F51.01 PRIMARY INSOMNIA: Primary | ICD-10-CM

## 2021-09-13 PROCEDURE — 1159F PR MEDICATION LIST DOCUMENTED IN MEDICAL RECORD: ICD-10-PCS | Mod: CPTII,95,, | Performed by: PSYCHOLOGIST

## 2021-09-13 PROCEDURE — 3044F PR MOST RECENT HEMOGLOBIN A1C LEVEL <7.0%: ICD-10-PCS | Mod: CPTII,95,, | Performed by: PSYCHOLOGIST

## 2021-09-13 PROCEDURE — 1159F MED LIST DOCD IN RCRD: CPT | Mod: CPTII,95,, | Performed by: PSYCHOLOGIST

## 2021-09-13 PROCEDURE — 3044F HG A1C LEVEL LT 7.0%: CPT | Mod: CPTII,95,, | Performed by: PSYCHOLOGIST

## 2021-09-13 PROCEDURE — 90853 PR GROUP PSYCHOTHERAPY: ICD-10-PCS | Mod: 95,,, | Performed by: PSYCHOLOGIST

## 2021-09-13 PROCEDURE — 90853 GROUP PSYCHOTHERAPY: CPT | Mod: 95,,, | Performed by: PSYCHOLOGIST

## 2021-09-20 ENCOUNTER — OFFICE VISIT (OUTPATIENT)
Dept: PSYCHIATRY | Facility: CLINIC | Age: 41
End: 2021-09-20
Payer: COMMERCIAL

## 2021-09-20 VITALS
HEART RATE: 104 BPM | DIASTOLIC BLOOD PRESSURE: 87 MMHG | HEIGHT: 66 IN | SYSTOLIC BLOOD PRESSURE: 131 MMHG | BODY MASS INDEX: 44.98 KG/M2 | WEIGHT: 279.88 LBS

## 2021-09-20 DIAGNOSIS — F90.2 ATTENTION DEFICIT HYPERACTIVITY DISORDER (ADHD), COMBINED TYPE: ICD-10-CM

## 2021-09-20 PROCEDURE — 99214 PR OFFICE/OUTPT VISIT, EST, LEVL IV, 30-39 MIN: ICD-10-PCS | Mod: S$GLB,,, | Performed by: PHYSICIAN ASSISTANT

## 2021-09-20 PROCEDURE — 3075F SYST BP GE 130 - 139MM HG: CPT | Mod: CPTII,S$GLB,, | Performed by: PHYSICIAN ASSISTANT

## 2021-09-20 PROCEDURE — 99214 OFFICE O/P EST MOD 30 MIN: CPT | Mod: S$GLB,,, | Performed by: PHYSICIAN ASSISTANT

## 2021-09-20 PROCEDURE — 3044F PR MOST RECENT HEMOGLOBIN A1C LEVEL <7.0%: ICD-10-PCS | Mod: CPTII,S$GLB,, | Performed by: PHYSICIAN ASSISTANT

## 2021-09-20 PROCEDURE — 3079F PR MOST RECENT DIASTOLIC BLOOD PRESSURE 80-89 MM HG: ICD-10-PCS | Mod: CPTII,S$GLB,, | Performed by: PHYSICIAN ASSISTANT

## 2021-09-20 PROCEDURE — 3075F PR MOST RECENT SYSTOLIC BLOOD PRESS GE 130-139MM HG: ICD-10-PCS | Mod: CPTII,S$GLB,, | Performed by: PHYSICIAN ASSISTANT

## 2021-09-20 PROCEDURE — 3008F BODY MASS INDEX DOCD: CPT | Mod: CPTII,S$GLB,, | Performed by: PHYSICIAN ASSISTANT

## 2021-09-20 PROCEDURE — 99999 PR PBB SHADOW E&M-EST. PATIENT-LVL V: ICD-10-PCS | Mod: PBBFAC,,, | Performed by: PHYSICIAN ASSISTANT

## 2021-09-20 PROCEDURE — 99999 PR PBB SHADOW E&M-EST. PATIENT-LVL V: CPT | Mod: PBBFAC,,, | Performed by: PHYSICIAN ASSISTANT

## 2021-09-20 PROCEDURE — 3079F DIAST BP 80-89 MM HG: CPT | Mod: CPTII,S$GLB,, | Performed by: PHYSICIAN ASSISTANT

## 2021-09-20 PROCEDURE — 3044F HG A1C LEVEL LT 7.0%: CPT | Mod: CPTII,S$GLB,, | Performed by: PHYSICIAN ASSISTANT

## 2021-09-20 PROCEDURE — 3008F PR BODY MASS INDEX (BMI) DOCUMENTED: ICD-10-PCS | Mod: CPTII,S$GLB,, | Performed by: PHYSICIAN ASSISTANT

## 2021-09-20 RX ORDER — LISDEXAMFETAMINE DIMESYLATE 50 MG/1
50 CAPSULE ORAL DAILY
Qty: 30 CAPSULE | Refills: 0 | Status: SHIPPED | OUTPATIENT
Start: 2021-09-20 | End: 2022-01-10

## 2021-09-23 ENCOUNTER — OFFICE VISIT (OUTPATIENT)
Dept: PSYCHIATRY | Facility: CLINIC | Age: 41
End: 2021-09-23
Payer: COMMERCIAL

## 2021-09-23 DIAGNOSIS — F41.1 GAD (GENERALIZED ANXIETY DISORDER): Primary | ICD-10-CM

## 2021-09-23 DIAGNOSIS — F33.41 RECURRENT MAJOR DEPRESSIVE DISORDER, IN PARTIAL REMISSION: ICD-10-CM

## 2021-09-23 PROCEDURE — 1159F PR MEDICATION LIST DOCUMENTED IN MEDICAL RECORD: ICD-10-PCS | Mod: CPTII,S$GLB,, | Performed by: SOCIAL WORKER

## 2021-09-23 PROCEDURE — 1159F MED LIST DOCD IN RCRD: CPT | Mod: CPTII,S$GLB,, | Performed by: SOCIAL WORKER

## 2021-09-23 PROCEDURE — 90834 PSYTX W PT 45 MINUTES: CPT | Mod: S$GLB,,, | Performed by: SOCIAL WORKER

## 2021-09-23 PROCEDURE — 3044F HG A1C LEVEL LT 7.0%: CPT | Mod: CPTII,S$GLB,, | Performed by: SOCIAL WORKER

## 2021-09-23 PROCEDURE — 3044F PR MOST RECENT HEMOGLOBIN A1C LEVEL <7.0%: ICD-10-PCS | Mod: CPTII,S$GLB,, | Performed by: SOCIAL WORKER

## 2021-09-23 PROCEDURE — 99999 PR PBB SHADOW E&M-EST. PATIENT-LVL III: CPT | Mod: PBBFAC,,, | Performed by: SOCIAL WORKER

## 2021-09-23 PROCEDURE — 90834 PR PSYCHOTHERAPY W/PATIENT, 45 MIN: ICD-10-PCS | Mod: S$GLB,,, | Performed by: SOCIAL WORKER

## 2021-09-23 PROCEDURE — 99999 PR PBB SHADOW E&M-EST. PATIENT-LVL III: ICD-10-PCS | Mod: PBBFAC,,, | Performed by: SOCIAL WORKER

## 2021-09-27 ENCOUNTER — OFFICE VISIT (OUTPATIENT)
Dept: PSYCHIATRY | Facility: CLINIC | Age: 41
End: 2021-09-27
Payer: COMMERCIAL

## 2021-09-27 DIAGNOSIS — F51.01 PRIMARY INSOMNIA: Primary | ICD-10-CM

## 2021-09-27 PROCEDURE — 90853 PR GROUP PSYCHOTHERAPY: ICD-10-PCS | Mod: 95,,, | Performed by: PSYCHOLOGIST

## 2021-09-27 PROCEDURE — 1159F PR MEDICATION LIST DOCUMENTED IN MEDICAL RECORD: ICD-10-PCS | Mod: CPTII,95,, | Performed by: PSYCHOLOGIST

## 2021-09-27 PROCEDURE — 3044F HG A1C LEVEL LT 7.0%: CPT | Mod: CPTII,95,, | Performed by: PSYCHOLOGIST

## 2021-09-27 PROCEDURE — 90853 GROUP PSYCHOTHERAPY: CPT | Mod: 95,,, | Performed by: PSYCHOLOGIST

## 2021-09-27 PROCEDURE — 3044F PR MOST RECENT HEMOGLOBIN A1C LEVEL <7.0%: ICD-10-PCS | Mod: CPTII,95,, | Performed by: PSYCHOLOGIST

## 2021-09-27 PROCEDURE — 1159F MED LIST DOCD IN RCRD: CPT | Mod: CPTII,95,, | Performed by: PSYCHOLOGIST

## 2021-10-07 ENCOUNTER — OFFICE VISIT (OUTPATIENT)
Dept: PSYCHIATRY | Facility: CLINIC | Age: 41
End: 2021-10-07
Payer: COMMERCIAL

## 2021-10-07 DIAGNOSIS — F33.41 RECURRENT MAJOR DEPRESSIVE DISORDER, IN PARTIAL REMISSION: ICD-10-CM

## 2021-10-07 DIAGNOSIS — F41.1 GAD (GENERALIZED ANXIETY DISORDER): Primary | ICD-10-CM

## 2021-10-07 PROCEDURE — 99999 PR PBB SHADOW E&M-EST. PATIENT-LVL III: CPT | Mod: PBBFAC,,, | Performed by: SOCIAL WORKER

## 2021-10-07 PROCEDURE — 90834 PSYTX W PT 45 MINUTES: CPT | Mod: S$GLB,,, | Performed by: SOCIAL WORKER

## 2021-10-07 PROCEDURE — 3044F PR MOST RECENT HEMOGLOBIN A1C LEVEL <7.0%: ICD-10-PCS | Mod: CPTII,S$GLB,, | Performed by: SOCIAL WORKER

## 2021-10-07 PROCEDURE — 1159F MED LIST DOCD IN RCRD: CPT | Mod: CPTII,S$GLB,, | Performed by: SOCIAL WORKER

## 2021-10-07 PROCEDURE — 90834 PR PSYCHOTHERAPY W/PATIENT, 45 MIN: ICD-10-PCS | Mod: S$GLB,,, | Performed by: SOCIAL WORKER

## 2021-10-07 PROCEDURE — 3044F HG A1C LEVEL LT 7.0%: CPT | Mod: CPTII,S$GLB,, | Performed by: SOCIAL WORKER

## 2021-10-07 PROCEDURE — 1159F PR MEDICATION LIST DOCUMENTED IN MEDICAL RECORD: ICD-10-PCS | Mod: CPTII,S$GLB,, | Performed by: SOCIAL WORKER

## 2021-10-07 PROCEDURE — 99999 PR PBB SHADOW E&M-EST. PATIENT-LVL III: ICD-10-PCS | Mod: PBBFAC,,, | Performed by: SOCIAL WORKER

## 2021-10-18 ENCOUNTER — OFFICE VISIT (OUTPATIENT)
Dept: PSYCHIATRY | Facility: CLINIC | Age: 41
End: 2021-10-18
Payer: COMMERCIAL

## 2021-10-18 VITALS
WEIGHT: 282.06 LBS | BODY MASS INDEX: 45.33 KG/M2 | HEART RATE: 81 BPM | DIASTOLIC BLOOD PRESSURE: 87 MMHG | SYSTOLIC BLOOD PRESSURE: 129 MMHG | HEIGHT: 66 IN

## 2021-10-18 DIAGNOSIS — G47.00 INSOMNIA, UNSPECIFIED TYPE: ICD-10-CM

## 2021-10-18 DIAGNOSIS — F84.0 AUTISM SPECTRUM DISORDER: ICD-10-CM

## 2021-10-18 DIAGNOSIS — F33.41 RECURRENT MAJOR DEPRESSIVE DISORDER, IN PARTIAL REMISSION: Primary | ICD-10-CM

## 2021-10-18 DIAGNOSIS — F41.1 GAD (GENERALIZED ANXIETY DISORDER): ICD-10-CM

## 2021-10-18 DIAGNOSIS — F42.9 OBSESSIVE-COMPULSIVE DISORDER, UNSPECIFIED TYPE: ICD-10-CM

## 2021-10-18 DIAGNOSIS — F43.9 TRAUMA AND STRESSOR-RELATED DISORDER: ICD-10-CM

## 2021-10-18 PROCEDURE — 1160F RVW MEDS BY RX/DR IN RCRD: CPT | Mod: CPTII,S$GLB,, | Performed by: PHYSICIAN ASSISTANT

## 2021-10-18 PROCEDURE — 1159F PR MEDICATION LIST DOCUMENTED IN MEDICAL RECORD: ICD-10-PCS | Mod: CPTII,S$GLB,, | Performed by: PHYSICIAN ASSISTANT

## 2021-10-18 PROCEDURE — 3008F PR BODY MASS INDEX (BMI) DOCUMENTED: ICD-10-PCS | Mod: CPTII,S$GLB,, | Performed by: PHYSICIAN ASSISTANT

## 2021-10-18 PROCEDURE — 99999 PR PBB SHADOW E&M-EST. PATIENT-LVL V: CPT | Mod: PBBFAC,,, | Performed by: PHYSICIAN ASSISTANT

## 2021-10-18 PROCEDURE — 3079F PR MOST RECENT DIASTOLIC BLOOD PRESSURE 80-89 MM HG: ICD-10-PCS | Mod: CPTII,S$GLB,, | Performed by: PHYSICIAN ASSISTANT

## 2021-10-18 PROCEDURE — 3074F PR MOST RECENT SYSTOLIC BLOOD PRESSURE < 130 MM HG: ICD-10-PCS | Mod: CPTII,S$GLB,, | Performed by: PHYSICIAN ASSISTANT

## 2021-10-18 PROCEDURE — 1159F MED LIST DOCD IN RCRD: CPT | Mod: CPTII,S$GLB,, | Performed by: PHYSICIAN ASSISTANT

## 2021-10-18 PROCEDURE — 3044F PR MOST RECENT HEMOGLOBIN A1C LEVEL <7.0%: ICD-10-PCS | Mod: CPTII,S$GLB,, | Performed by: PHYSICIAN ASSISTANT

## 2021-10-18 PROCEDURE — 3079F DIAST BP 80-89 MM HG: CPT | Mod: CPTII,S$GLB,, | Performed by: PHYSICIAN ASSISTANT

## 2021-10-18 PROCEDURE — 99214 PR OFFICE/OUTPT VISIT, EST, LEVL IV, 30-39 MIN: ICD-10-PCS | Mod: S$GLB,,, | Performed by: PHYSICIAN ASSISTANT

## 2021-10-18 PROCEDURE — 99214 OFFICE O/P EST MOD 30 MIN: CPT | Mod: S$GLB,,, | Performed by: PHYSICIAN ASSISTANT

## 2021-10-18 PROCEDURE — 3008F BODY MASS INDEX DOCD: CPT | Mod: CPTII,S$GLB,, | Performed by: PHYSICIAN ASSISTANT

## 2021-10-18 PROCEDURE — 1160F PR REVIEW ALL MEDS BY PRESCRIBER/CLIN PHARMACIST DOCUMENTED: ICD-10-PCS | Mod: CPTII,S$GLB,, | Performed by: PHYSICIAN ASSISTANT

## 2021-10-18 PROCEDURE — 3044F HG A1C LEVEL LT 7.0%: CPT | Mod: CPTII,S$GLB,, | Performed by: PHYSICIAN ASSISTANT

## 2021-10-18 PROCEDURE — 99999 PR PBB SHADOW E&M-EST. PATIENT-LVL V: ICD-10-PCS | Mod: PBBFAC,,, | Performed by: PHYSICIAN ASSISTANT

## 2021-10-18 PROCEDURE — 3074F SYST BP LT 130 MM HG: CPT | Mod: CPTII,S$GLB,, | Performed by: PHYSICIAN ASSISTANT

## 2021-10-18 RX ORDER — PRAZOSIN HYDROCHLORIDE 1 MG/1
1 CAPSULE ORAL NIGHTLY
Qty: 30 CAPSULE | Refills: 1 | Status: SHIPPED | OUTPATIENT
Start: 2021-10-18 | End: 2021-11-01 | Stop reason: SDUPTHER

## 2021-10-21 ENCOUNTER — OFFICE VISIT (OUTPATIENT)
Dept: PSYCHIATRY | Facility: CLINIC | Age: 41
End: 2021-10-21
Payer: COMMERCIAL

## 2021-10-21 DIAGNOSIS — F33.41 RECURRENT MAJOR DEPRESSIVE DISORDER, IN PARTIAL REMISSION: ICD-10-CM

## 2021-10-21 DIAGNOSIS — F41.1 GAD (GENERALIZED ANXIETY DISORDER): ICD-10-CM

## 2021-10-21 DIAGNOSIS — F42.9 OBSESSIVE-COMPULSIVE DISORDER, UNSPECIFIED TYPE: Primary | ICD-10-CM

## 2021-10-21 PROCEDURE — 99999 PR PBB SHADOW E&M-EST. PATIENT-LVL II: CPT | Mod: PBBFAC,,, | Performed by: SOCIAL WORKER

## 2021-10-21 PROCEDURE — 90834 PSYTX W PT 45 MINUTES: CPT | Mod: S$GLB,,, | Performed by: SOCIAL WORKER

## 2021-10-21 PROCEDURE — 3044F HG A1C LEVEL LT 7.0%: CPT | Mod: CPTII,S$GLB,, | Performed by: SOCIAL WORKER

## 2021-10-21 PROCEDURE — 1159F PR MEDICATION LIST DOCUMENTED IN MEDICAL RECORD: ICD-10-PCS | Mod: CPTII,S$GLB,, | Performed by: SOCIAL WORKER

## 2021-10-21 PROCEDURE — 90834 PR PSYCHOTHERAPY W/PATIENT, 45 MIN: ICD-10-PCS | Mod: S$GLB,,, | Performed by: SOCIAL WORKER

## 2021-10-21 PROCEDURE — 1159F MED LIST DOCD IN RCRD: CPT | Mod: CPTII,S$GLB,, | Performed by: SOCIAL WORKER

## 2021-10-21 PROCEDURE — 99999 PR PBB SHADOW E&M-EST. PATIENT-LVL II: ICD-10-PCS | Mod: PBBFAC,,, | Performed by: SOCIAL WORKER

## 2021-10-21 PROCEDURE — 3044F PR MOST RECENT HEMOGLOBIN A1C LEVEL <7.0%: ICD-10-PCS | Mod: CPTII,S$GLB,, | Performed by: SOCIAL WORKER

## 2021-10-25 ENCOUNTER — PATIENT MESSAGE (OUTPATIENT)
Dept: PSYCHIATRY | Facility: CLINIC | Age: 41
End: 2021-10-25
Payer: COMMERCIAL

## 2021-10-25 DIAGNOSIS — F41.1 GAD (GENERALIZED ANXIETY DISORDER): Primary | ICD-10-CM

## 2021-10-25 RX ORDER — BUSPIRONE HYDROCHLORIDE 7.5 MG/1
7.5 TABLET ORAL 2 TIMES DAILY
Qty: 60 TABLET | Refills: 1 | Status: SHIPPED | OUTPATIENT
Start: 2021-10-25 | End: 2021-11-18 | Stop reason: SDUPTHER

## 2021-11-01 ENCOUNTER — PATIENT MESSAGE (OUTPATIENT)
Dept: PSYCHIATRY | Facility: CLINIC | Age: 41
End: 2021-11-01
Payer: COMMERCIAL

## 2021-11-01 DIAGNOSIS — F43.9 TRAUMA AND STRESSOR-RELATED DISORDER: ICD-10-CM

## 2021-11-01 RX ORDER — PRAZOSIN HYDROCHLORIDE 2 MG/1
2 CAPSULE ORAL NIGHTLY
Qty: 30 CAPSULE | Refills: 1 | Status: SHIPPED | OUTPATIENT
Start: 2021-11-01 | End: 2021-11-18 | Stop reason: SDUPTHER

## 2021-11-04 ENCOUNTER — OFFICE VISIT (OUTPATIENT)
Dept: PSYCHIATRY | Facility: CLINIC | Age: 41
End: 2021-11-04
Payer: COMMERCIAL

## 2021-11-04 DIAGNOSIS — F41.1 GAD (GENERALIZED ANXIETY DISORDER): Primary | ICD-10-CM

## 2021-11-04 PROCEDURE — 90834 PR PSYCHOTHERAPY W/PATIENT, 45 MIN: ICD-10-PCS | Mod: S$GLB,,, | Performed by: SOCIAL WORKER

## 2021-11-04 PROCEDURE — 3044F PR MOST RECENT HEMOGLOBIN A1C LEVEL <7.0%: ICD-10-PCS | Mod: CPTII,S$GLB,, | Performed by: SOCIAL WORKER

## 2021-11-04 PROCEDURE — 1159F MED LIST DOCD IN RCRD: CPT | Mod: CPTII,S$GLB,, | Performed by: SOCIAL WORKER

## 2021-11-04 PROCEDURE — 99999 PR PBB SHADOW E&M-EST. PATIENT-LVL II: ICD-10-PCS | Mod: PBBFAC,,, | Performed by: SOCIAL WORKER

## 2021-11-04 PROCEDURE — 1159F PR MEDICATION LIST DOCUMENTED IN MEDICAL RECORD: ICD-10-PCS | Mod: CPTII,S$GLB,, | Performed by: SOCIAL WORKER

## 2021-11-04 PROCEDURE — 99999 PR PBB SHADOW E&M-EST. PATIENT-LVL II: CPT | Mod: PBBFAC,,, | Performed by: SOCIAL WORKER

## 2021-11-04 PROCEDURE — 3044F HG A1C LEVEL LT 7.0%: CPT | Mod: CPTII,S$GLB,, | Performed by: SOCIAL WORKER

## 2021-11-04 PROCEDURE — 90834 PSYTX W PT 45 MINUTES: CPT | Mod: S$GLB,,, | Performed by: SOCIAL WORKER

## 2021-11-05 ENCOUNTER — PATIENT MESSAGE (OUTPATIENT)
Dept: BARIATRICS | Facility: CLINIC | Age: 41
End: 2021-11-05
Payer: COMMERCIAL

## 2021-11-18 ENCOUNTER — OFFICE VISIT (OUTPATIENT)
Dept: PSYCHIATRY | Facility: CLINIC | Age: 41
End: 2021-11-18
Payer: COMMERCIAL

## 2021-11-18 VITALS
SYSTOLIC BLOOD PRESSURE: 133 MMHG | BODY MASS INDEX: 44.89 KG/M2 | HEART RATE: 77 BPM | HEIGHT: 66 IN | DIASTOLIC BLOOD PRESSURE: 87 MMHG | WEIGHT: 279.31 LBS

## 2021-11-18 DIAGNOSIS — F42.9 OBSESSIVE-COMPULSIVE DISORDER, UNSPECIFIED TYPE: Primary | ICD-10-CM

## 2021-11-18 DIAGNOSIS — G47.00 INSOMNIA, UNSPECIFIED TYPE: ICD-10-CM

## 2021-11-18 DIAGNOSIS — F42.9 OBSESSIVE-COMPULSIVE DISORDER, UNSPECIFIED TYPE: ICD-10-CM

## 2021-11-18 DIAGNOSIS — F90.2 ATTENTION DEFICIT HYPERACTIVITY DISORDER (ADHD), COMBINED TYPE: ICD-10-CM

## 2021-11-18 DIAGNOSIS — F43.9 TRAUMA AND STRESSOR-RELATED DISORDER: ICD-10-CM

## 2021-11-18 DIAGNOSIS — F41.1 GAD (GENERALIZED ANXIETY DISORDER): ICD-10-CM

## 2021-11-18 DIAGNOSIS — F33.41 RECURRENT MAJOR DEPRESSIVE DISORDER, IN PARTIAL REMISSION: ICD-10-CM

## 2021-11-18 DIAGNOSIS — F41.1 GAD (GENERALIZED ANXIETY DISORDER): Primary | ICD-10-CM

## 2021-11-18 PROCEDURE — 3008F BODY MASS INDEX DOCD: CPT | Mod: CPTII,S$GLB,, | Performed by: PHYSICIAN ASSISTANT

## 2021-11-18 PROCEDURE — 1160F RVW MEDS BY RX/DR IN RCRD: CPT | Mod: CPTII,S$GLB,, | Performed by: PHYSICIAN ASSISTANT

## 2021-11-18 PROCEDURE — 99999 PR PBB SHADOW E&M-EST. PATIENT-LVL II: ICD-10-PCS | Mod: PBBFAC,,, | Performed by: SOCIAL WORKER

## 2021-11-18 PROCEDURE — 3044F PR MOST RECENT HEMOGLOBIN A1C LEVEL <7.0%: ICD-10-PCS | Mod: CPTII,S$GLB,, | Performed by: PHYSICIAN ASSISTANT

## 2021-11-18 PROCEDURE — 90834 PR PSYCHOTHERAPY W/PATIENT, 45 MIN: ICD-10-PCS | Mod: S$GLB,,, | Performed by: SOCIAL WORKER

## 2021-11-18 PROCEDURE — 90834 PSYTX W PT 45 MINUTES: CPT | Mod: S$GLB,,, | Performed by: SOCIAL WORKER

## 2021-11-18 PROCEDURE — 99999 PR PBB SHADOW E&M-EST. PATIENT-LVL IV: ICD-10-PCS | Mod: PBBFAC,,, | Performed by: PHYSICIAN ASSISTANT

## 2021-11-18 PROCEDURE — 3079F PR MOST RECENT DIASTOLIC BLOOD PRESSURE 80-89 MM HG: ICD-10-PCS | Mod: CPTII,S$GLB,, | Performed by: PHYSICIAN ASSISTANT

## 2021-11-18 PROCEDURE — 99214 PR OFFICE/OUTPT VISIT, EST, LEVL IV, 30-39 MIN: ICD-10-PCS | Mod: S$GLB,,, | Performed by: PHYSICIAN ASSISTANT

## 2021-11-18 PROCEDURE — 3075F PR MOST RECENT SYSTOLIC BLOOD PRESS GE 130-139MM HG: ICD-10-PCS | Mod: CPTII,S$GLB,, | Performed by: PHYSICIAN ASSISTANT

## 2021-11-18 PROCEDURE — 99999 PR PBB SHADOW E&M-EST. PATIENT-LVL IV: CPT | Mod: PBBFAC,,, | Performed by: PHYSICIAN ASSISTANT

## 2021-11-18 PROCEDURE — 3008F PR BODY MASS INDEX (BMI) DOCUMENTED: ICD-10-PCS | Mod: CPTII,S$GLB,, | Performed by: PHYSICIAN ASSISTANT

## 2021-11-18 PROCEDURE — 3044F HG A1C LEVEL LT 7.0%: CPT | Mod: CPTII,S$GLB,, | Performed by: PHYSICIAN ASSISTANT

## 2021-11-18 PROCEDURE — 1159F MED LIST DOCD IN RCRD: CPT | Mod: CPTII,S$GLB,, | Performed by: PHYSICIAN ASSISTANT

## 2021-11-18 PROCEDURE — 1160F PR REVIEW ALL MEDS BY PRESCRIBER/CLIN PHARMACIST DOCUMENTED: ICD-10-PCS | Mod: CPTII,S$GLB,, | Performed by: PHYSICIAN ASSISTANT

## 2021-11-18 PROCEDURE — 99214 OFFICE O/P EST MOD 30 MIN: CPT | Mod: S$GLB,,, | Performed by: PHYSICIAN ASSISTANT

## 2021-11-18 PROCEDURE — 3079F DIAST BP 80-89 MM HG: CPT | Mod: CPTII,S$GLB,, | Performed by: PHYSICIAN ASSISTANT

## 2021-11-18 PROCEDURE — 3044F HG A1C LEVEL LT 7.0%: CPT | Mod: CPTII,S$GLB,, | Performed by: SOCIAL WORKER

## 2021-11-18 PROCEDURE — 1159F PR MEDICATION LIST DOCUMENTED IN MEDICAL RECORD: ICD-10-PCS | Mod: CPTII,S$GLB,, | Performed by: SOCIAL WORKER

## 2021-11-18 PROCEDURE — 3075F SYST BP GE 130 - 139MM HG: CPT | Mod: CPTII,S$GLB,, | Performed by: PHYSICIAN ASSISTANT

## 2021-11-18 PROCEDURE — 1159F MED LIST DOCD IN RCRD: CPT | Mod: CPTII,S$GLB,, | Performed by: SOCIAL WORKER

## 2021-11-18 PROCEDURE — 1159F PR MEDICATION LIST DOCUMENTED IN MEDICAL RECORD: ICD-10-PCS | Mod: CPTII,S$GLB,, | Performed by: PHYSICIAN ASSISTANT

## 2021-11-18 PROCEDURE — 99999 PR PBB SHADOW E&M-EST. PATIENT-LVL II: CPT | Mod: PBBFAC,,, | Performed by: SOCIAL WORKER

## 2021-11-18 PROCEDURE — 3044F PR MOST RECENT HEMOGLOBIN A1C LEVEL <7.0%: ICD-10-PCS | Mod: CPTII,S$GLB,, | Performed by: SOCIAL WORKER

## 2021-11-18 RX ORDER — PRAZOSIN HYDROCHLORIDE 2 MG/1
2 CAPSULE ORAL NIGHTLY
Qty: 30 CAPSULE | Refills: 1 | Status: SHIPPED | OUTPATIENT
Start: 2021-11-18 | End: 2021-12-15 | Stop reason: SDUPTHER

## 2021-11-18 RX ORDER — BUSPIRONE HYDROCHLORIDE 7.5 MG/1
7.5 TABLET ORAL 2 TIMES DAILY
Qty: 60 TABLET | Refills: 1 | Status: SHIPPED | OUTPATIENT
Start: 2021-11-18 | End: 2022-01-10 | Stop reason: SDUPTHER

## 2021-11-18 RX ORDER — SERTRALINE HYDROCHLORIDE 100 MG/1
200 TABLET, FILM COATED ORAL DAILY
Qty: 60 TABLET | Refills: 1 | Status: SHIPPED | OUTPATIENT
Start: 2021-11-18 | End: 2022-01-10 | Stop reason: SDUPTHER

## 2021-11-25 ENCOUNTER — PATIENT MESSAGE (OUTPATIENT)
Dept: PSYCHIATRY | Facility: CLINIC | Age: 41
End: 2021-11-25
Payer: COMMERCIAL

## 2021-11-25 DIAGNOSIS — F33.41 RECURRENT MAJOR DEPRESSIVE DISORDER, IN PARTIAL REMISSION: Primary | ICD-10-CM

## 2021-11-26 RX ORDER — QUETIAPINE FUMARATE 50 MG/1
50 TABLET, EXTENDED RELEASE ORAL DAILY
Qty: 30 TABLET | Refills: 1 | Status: SHIPPED | OUTPATIENT
Start: 2021-11-26 | End: 2022-01-10 | Stop reason: SDUPTHER

## 2021-11-30 ENCOUNTER — IMMUNIZATION (OUTPATIENT)
Dept: FAMILY MEDICINE | Facility: CLINIC | Age: 41
End: 2021-11-30
Payer: COMMERCIAL

## 2021-11-30 DIAGNOSIS — Z23 NEED FOR VACCINATION: Primary | ICD-10-CM

## 2021-11-30 PROCEDURE — 0013A COVID-19, MRNA, LNP-S, PF, 100 MCG/0.5 ML DOSE VACCINE: CPT | Mod: PBBFAC | Performed by: FAMILY MEDICINE

## 2021-11-30 PROCEDURE — 91301 COVID-19, MRNA, LNP-S, PF, 100 MCG/0.5 ML DOSE VACCINE: CPT | Mod: PBBFAC | Performed by: FAMILY MEDICINE

## 2021-12-01 ENCOUNTER — OFFICE VISIT (OUTPATIENT)
Dept: FAMILY MEDICINE | Facility: CLINIC | Age: 41
End: 2021-12-01
Payer: COMMERCIAL

## 2021-12-01 ENCOUNTER — PATIENT MESSAGE (OUTPATIENT)
Dept: PSYCHIATRY | Facility: CLINIC | Age: 41
End: 2021-12-01
Payer: COMMERCIAL

## 2021-12-01 VITALS
HEIGHT: 66 IN | BODY MASS INDEX: 45.03 KG/M2 | DIASTOLIC BLOOD PRESSURE: 75 MMHG | SYSTOLIC BLOOD PRESSURE: 123 MMHG | WEIGHT: 280.19 LBS | RESPIRATION RATE: 15 BRPM | HEART RATE: 93 BPM | OXYGEN SATURATION: 97 %

## 2021-12-01 DIAGNOSIS — M54.2 NECK PAIN: Primary | ICD-10-CM

## 2021-12-01 DIAGNOSIS — G43.809 OTHER MIGRAINE WITHOUT STATUS MIGRAINOSUS, NOT INTRACTABLE: ICD-10-CM

## 2021-12-01 DIAGNOSIS — R10.84 GENERALIZED ABDOMINAL PAIN: ICD-10-CM

## 2021-12-01 PROCEDURE — 99214 PR OFFICE/OUTPT VISIT, EST, LEVL IV, 30-39 MIN: ICD-10-PCS | Mod: S$GLB,,, | Performed by: FAMILY MEDICINE

## 2021-12-01 PROCEDURE — 99214 OFFICE O/P EST MOD 30 MIN: CPT | Mod: S$GLB,,, | Performed by: FAMILY MEDICINE

## 2021-12-01 PROCEDURE — 99999 PR PBB SHADOW E&M-EST. PATIENT-LVL V: ICD-10-PCS | Mod: PBBFAC,,, | Performed by: FAMILY MEDICINE

## 2021-12-01 PROCEDURE — 99999 PR PBB SHADOW E&M-EST. PATIENT-LVL V: CPT | Mod: PBBFAC,,, | Performed by: FAMILY MEDICINE

## 2021-12-01 RX ORDER — METHOCARBAMOL 500 MG/1
500 TABLET, FILM COATED ORAL 4 TIMES DAILY
Qty: 40 TABLET | Refills: 0 | Status: SHIPPED | OUTPATIENT
Start: 2021-12-01 | End: 2022-02-06

## 2021-12-15 ENCOUNTER — PATIENT MESSAGE (OUTPATIENT)
Dept: PSYCHIATRY | Facility: CLINIC | Age: 41
End: 2021-12-15
Payer: COMMERCIAL

## 2021-12-15 DIAGNOSIS — F43.9 TRAUMA AND STRESSOR-RELATED DISORDER: ICD-10-CM

## 2021-12-15 RX ORDER — PRAZOSIN HYDROCHLORIDE 2 MG/1
4 CAPSULE ORAL NIGHTLY
Qty: 60 CAPSULE | Refills: 1 | Status: SHIPPED | OUTPATIENT
Start: 2021-12-15 | End: 2022-01-10 | Stop reason: SDUPTHER

## 2021-12-16 ENCOUNTER — OFFICE VISIT (OUTPATIENT)
Dept: PSYCHIATRY | Facility: CLINIC | Age: 41
End: 2021-12-16
Payer: COMMERCIAL

## 2021-12-16 DIAGNOSIS — F90.2 ATTENTION DEFICIT HYPERACTIVITY DISORDER (ADHD), COMBINED TYPE: ICD-10-CM

## 2021-12-16 DIAGNOSIS — F42.9 OBSESSIVE-COMPULSIVE DISORDER, UNSPECIFIED TYPE: ICD-10-CM

## 2021-12-16 DIAGNOSIS — F41.1 GAD (GENERALIZED ANXIETY DISORDER): Primary | ICD-10-CM

## 2021-12-16 PROCEDURE — 99999 PR PBB SHADOW E&M-EST. PATIENT-LVL II: ICD-10-PCS | Mod: PBBFAC,,, | Performed by: SOCIAL WORKER

## 2021-12-16 PROCEDURE — 90832 PSYTX W PT 30 MINUTES: CPT | Mod: S$GLB,,, | Performed by: SOCIAL WORKER

## 2021-12-16 PROCEDURE — 90832 PR PSYCHOTHERAPY W/PATIENT, 30 MIN: ICD-10-PCS | Mod: S$GLB,,, | Performed by: SOCIAL WORKER

## 2021-12-16 PROCEDURE — 99999 PR PBB SHADOW E&M-EST. PATIENT-LVL II: CPT | Mod: PBBFAC,,, | Performed by: SOCIAL WORKER

## 2021-12-21 ENCOUNTER — PATIENT MESSAGE (OUTPATIENT)
Dept: PSYCHIATRY | Facility: CLINIC | Age: 41
End: 2021-12-21
Payer: COMMERCIAL

## 2021-12-29 ENCOUNTER — HOSPITAL ENCOUNTER (OUTPATIENT)
Dept: RADIOLOGY | Facility: HOSPITAL | Age: 41
Discharge: HOME | End: 2021-12-29
Attending: FAMILY MEDICINE
Payer: COMMERCIAL

## 2021-12-29 DIAGNOSIS — R10.84 GENERALIZED ABDOMINAL PAIN: ICD-10-CM

## 2021-12-29 PROCEDURE — 76700 US EXAM ABDOM COMPLETE: CPT | Mod: TC

## 2021-12-29 PROCEDURE — 76700 US ABDOMEN COMPLETE: ICD-10-PCS | Mod: 26,,, | Performed by: RADIOLOGY

## 2021-12-29 PROCEDURE — 76700 US EXAM ABDOM COMPLETE: CPT | Mod: 26,,, | Performed by: RADIOLOGY

## 2021-12-30 ENCOUNTER — OFFICE VISIT (OUTPATIENT)
Dept: PSYCHIATRY | Facility: CLINIC | Age: 41
End: 2021-12-30
Payer: COMMERCIAL

## 2021-12-30 DIAGNOSIS — F90.2 ATTENTION DEFICIT HYPERACTIVITY DISORDER (ADHD), COMBINED TYPE: Primary | ICD-10-CM

## 2021-12-30 DIAGNOSIS — F33.9 RECURRENT DEPRESSION: ICD-10-CM

## 2021-12-30 DIAGNOSIS — F41.1 GAD (GENERALIZED ANXIETY DISORDER): ICD-10-CM

## 2021-12-30 PROCEDURE — 1159F MED LIST DOCD IN RCRD: CPT | Mod: CPTII,S$GLB,, | Performed by: SOCIAL WORKER

## 2021-12-30 PROCEDURE — 90834 PR PSYCHOTHERAPY W/PATIENT, 45 MIN: ICD-10-PCS | Mod: S$GLB,,, | Performed by: SOCIAL WORKER

## 2021-12-30 PROCEDURE — 99999 PR PBB SHADOW E&M-EST. PATIENT-LVL II: CPT | Mod: PBBFAC,,, | Performed by: SOCIAL WORKER

## 2021-12-30 PROCEDURE — 3044F HG A1C LEVEL LT 7.0%: CPT | Mod: CPTII,S$GLB,, | Performed by: SOCIAL WORKER

## 2021-12-30 PROCEDURE — 99999 PR PBB SHADOW E&M-EST. PATIENT-LVL II: ICD-10-PCS | Mod: PBBFAC,,, | Performed by: SOCIAL WORKER

## 2021-12-30 PROCEDURE — 1159F PR MEDICATION LIST DOCUMENTED IN MEDICAL RECORD: ICD-10-PCS | Mod: CPTII,S$GLB,, | Performed by: SOCIAL WORKER

## 2021-12-30 PROCEDURE — 3044F PR MOST RECENT HEMOGLOBIN A1C LEVEL <7.0%: ICD-10-PCS | Mod: CPTII,S$GLB,, | Performed by: SOCIAL WORKER

## 2021-12-30 PROCEDURE — 90834 PSYTX W PT 45 MINUTES: CPT | Mod: S$GLB,,, | Performed by: SOCIAL WORKER

## 2022-01-10 ENCOUNTER — OFFICE VISIT (OUTPATIENT)
Dept: PSYCHIATRY | Facility: CLINIC | Age: 42
End: 2022-01-10
Payer: COMMERCIAL

## 2022-01-10 VITALS
HEART RATE: 72 BPM | HEIGHT: 66 IN | SYSTOLIC BLOOD PRESSURE: 122 MMHG | WEIGHT: 286.63 LBS | DIASTOLIC BLOOD PRESSURE: 81 MMHG | BODY MASS INDEX: 46.06 KG/M2

## 2022-01-10 DIAGNOSIS — F42.9 OBSESSIVE-COMPULSIVE DISORDER, UNSPECIFIED TYPE: ICD-10-CM

## 2022-01-10 DIAGNOSIS — F84.0 AUTISM SPECTRUM DISORDER: ICD-10-CM

## 2022-01-10 DIAGNOSIS — F41.1 GAD (GENERALIZED ANXIETY DISORDER): ICD-10-CM

## 2022-01-10 DIAGNOSIS — F33.41 RECURRENT MAJOR DEPRESSIVE DISORDER, IN PARTIAL REMISSION: ICD-10-CM

## 2022-01-10 DIAGNOSIS — F90.2 ATTENTION DEFICIT HYPERACTIVITY DISORDER (ADHD), COMBINED TYPE: Primary | ICD-10-CM

## 2022-01-10 DIAGNOSIS — F43.9 TRAUMA AND STRESSOR-RELATED DISORDER: ICD-10-CM

## 2022-01-10 PROCEDURE — 99214 OFFICE O/P EST MOD 30 MIN: CPT | Mod: S$GLB,,, | Performed by: PHYSICIAN ASSISTANT

## 2022-01-10 PROCEDURE — 1159F PR MEDICATION LIST DOCUMENTED IN MEDICAL RECORD: ICD-10-PCS | Mod: CPTII,S$GLB,, | Performed by: PHYSICIAN ASSISTANT

## 2022-01-10 PROCEDURE — 3008F PR BODY MASS INDEX (BMI) DOCUMENTED: ICD-10-PCS | Mod: CPTII,S$GLB,, | Performed by: PHYSICIAN ASSISTANT

## 2022-01-10 PROCEDURE — 3074F SYST BP LT 130 MM HG: CPT | Mod: CPTII,S$GLB,, | Performed by: PHYSICIAN ASSISTANT

## 2022-01-10 PROCEDURE — 1160F RVW MEDS BY RX/DR IN RCRD: CPT | Mod: CPTII,S$GLB,, | Performed by: PHYSICIAN ASSISTANT

## 2022-01-10 PROCEDURE — 99999 PR PBB SHADOW E&M-EST. PATIENT-LVL V: CPT | Mod: PBBFAC,,, | Performed by: PHYSICIAN ASSISTANT

## 2022-01-10 PROCEDURE — 99214 PR OFFICE/OUTPT VISIT, EST, LEVL IV, 30-39 MIN: ICD-10-PCS | Mod: S$GLB,,, | Performed by: PHYSICIAN ASSISTANT

## 2022-01-10 PROCEDURE — 1160F PR REVIEW ALL MEDS BY PRESCRIBER/CLIN PHARMACIST DOCUMENTED: ICD-10-PCS | Mod: CPTII,S$GLB,, | Performed by: PHYSICIAN ASSISTANT

## 2022-01-10 PROCEDURE — 1159F MED LIST DOCD IN RCRD: CPT | Mod: CPTII,S$GLB,, | Performed by: PHYSICIAN ASSISTANT

## 2022-01-10 PROCEDURE — 99999 PR PBB SHADOW E&M-EST. PATIENT-LVL V: ICD-10-PCS | Mod: PBBFAC,,, | Performed by: PHYSICIAN ASSISTANT

## 2022-01-10 PROCEDURE — 3079F PR MOST RECENT DIASTOLIC BLOOD PRESSURE 80-89 MM HG: ICD-10-PCS | Mod: CPTII,S$GLB,, | Performed by: PHYSICIAN ASSISTANT

## 2022-01-10 PROCEDURE — 3008F BODY MASS INDEX DOCD: CPT | Mod: CPTII,S$GLB,, | Performed by: PHYSICIAN ASSISTANT

## 2022-01-10 PROCEDURE — 3079F DIAST BP 80-89 MM HG: CPT | Mod: CPTII,S$GLB,, | Performed by: PHYSICIAN ASSISTANT

## 2022-01-10 PROCEDURE — 3074F PR MOST RECENT SYSTOLIC BLOOD PRESSURE < 130 MM HG: ICD-10-PCS | Mod: CPTII,S$GLB,, | Performed by: PHYSICIAN ASSISTANT

## 2022-01-10 RX ORDER — PRAZOSIN HYDROCHLORIDE 2 MG/1
4 CAPSULE ORAL NIGHTLY
Qty: 60 CAPSULE | Refills: 1 | Status: SHIPPED | OUTPATIENT
Start: 2022-01-10 | End: 2022-02-14 | Stop reason: SDUPTHER

## 2022-01-10 RX ORDER — SERTRALINE HYDROCHLORIDE 100 MG/1
200 TABLET, FILM COATED ORAL DAILY
Qty: 60 TABLET | Refills: 1 | Status: SHIPPED | OUTPATIENT
Start: 2022-01-10 | End: 2022-04-18 | Stop reason: SDUPTHER

## 2022-01-10 RX ORDER — BUPROPION HYDROCHLORIDE 150 MG/1
150 TABLET ORAL DAILY
Qty: 30 TABLET | Refills: 1 | Status: SHIPPED | OUTPATIENT
Start: 2022-01-10 | End: 2022-02-14 | Stop reason: SDUPTHER

## 2022-01-10 RX ORDER — QUETIAPINE FUMARATE 50 MG/1
50 TABLET, EXTENDED RELEASE ORAL DAILY
Qty: 30 TABLET | Refills: 1 | Status: SHIPPED | OUTPATIENT
Start: 2022-01-10 | End: 2022-04-04 | Stop reason: SDUPTHER

## 2022-01-10 RX ORDER — BUSPIRONE HYDROCHLORIDE 7.5 MG/1
7.5 TABLET ORAL 2 TIMES DAILY
Qty: 60 TABLET | Refills: 1 | Status: SHIPPED | OUTPATIENT
Start: 2022-01-10 | End: 2022-03-11
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
## 2022-01-10 NOTE — PATIENT INSTRUCTIONS
Please go to emergency department if feeling as though you are a harm to yourself or others or if you are in crisis. Please call the clinic to report any worsening of symptoms or problems associated with medication.          Patient Education       Bupropion (byoo PROJULISSA pee on)   Brand Names: US Aplenzin; Forfivo XL; Wellbutrin SR; Wellbutrin XL; Zyban [DSC]   Brand Names: Amari MYLAN-BuPROPion XL; ODAN Bupropion SR; PMS-BuPROPion SR; RATIO-BuPROPion SR [DSC]; TARO-Bupropion XL; TEVA-Bupropion XL; Wellbutrin SR; Wellbutrin XL; Zyban   Warning   · Drugs like this one have raised the chance of suicidal thoughts or actions in children and young adults. The risk may be greater in people who have had these thoughts or actions in the past. All people who take this drug need to be watched closely. Call the doctor right away if signs like low mood (depression), nervousness, restlessness, grouchiness, panic attacks, or changes in mood or actions are new or worse. Call the doctor right away if any thoughts or actions of suicide occur.    What is this drug used for?   · It is used to treat low mood (depression).  · It is used to prevent seasonal affective disorder (SAD).  · It is used to help you stop smoking.  · It may be given to you for other reasons. Talk with the doctor.    What do I need to tell my doctor BEFORE I take this drug?   · If you are allergic to this drug; any part of this drug; or any other drugs, foods, or substances. Tell your doctor about the allergy and what signs you had.  · If you have ever had seizures.  · If you drink a lot of alcohol and you stop drinking all of a sudden.  · If you use certain other drugs like drugs for seizures or anxiety and you stop using them all of a sudden.  · If you have ever had an eating problem like anorexia or bulimia.  · If you have any of these health problems: Kidney disease or liver disease.  · If you have taken certain drugs for depression or Parkinson's disease in  the last 14 days. This includes isocarboxazid, phenelzine, tranylcypromine, selegiline, or rasagiline. Very high blood pressure may happen.  · If you are taking any of these drugs: Linezolid or methylene blue.  · If you are taking another drug that has the same drug in it.  This is not a list of all drugs or health problems that interact with this drug.  Tell your doctor and pharmacist about all of your drugs (prescription or OTC, natural products, vitamins) and health problems. You must check to make sure that it is safe for you to take this drug with all of your drugs and health problems. Do not start, stop, or change the dose of any drug without checking with your doctor.  What are some things I need to know or do while I take this drug?   For all patients taking this drug:   · Tell all of your health care providers that you take this drug. This includes your doctors, nurses, pharmacists, and dentists.  · Avoid driving and doing other tasks or actions that call for you to be alert or have clear eyesight until you see how this drug affects you.  · This drug may affect certain lab tests. Tell all of your health care providers and lab workers that you take this drug.  · Do not stop taking this drug all of a sudden without calling your doctor. You may have a greater risk of side effects. If you need to stop this drug, you will want to slowly stop it as ordered by your doctor.  · High blood pressure has happened with this drug. Have your blood pressure checked as you have been told by your doctor.  · This drug may raise the chance of seizures. The risk may be higher in people who take higher doses, have certain health problems, or take certain other drugs. People who suddenly stop drinking a lot of alcohol or suddenly stop taking certain drugs (like drugs used for anxiety, sleep, or seizures) may also have a higher risk. Talk to your doctor to see if you have a greater chance of seizures.  · Avoid drinking alcohol  while taking this drug.  · Talk with your doctor before you use marijuana, other forms of cannabis, or prescription or OTC drugs that may slow your actions.  · It may take several weeks to see the full effects.  · This drug is not approved for use in children. Talk with the doctor.  · If you are 65 or older, use this drug with care. You could have more side effects.  · Tell your doctor if you are pregnant, plan on getting pregnant, or are breast-feeding. You will need to talk about the benefits and risks to you and the baby.  If you smoke:   · Not all products are approved for use to help stop smoking. Talk with the doctor to make sure that you have the right product.  · New or worse mental, mood, or behavior problems have happened when bupropion has been used to stop smoking. These problems include thoughts of suicide or murder, depression, forceful actions, fury, anxiety, and anger. These problems have happened in people with and without a history of mental or mood problems. Talk with the doctor.  What are some side effects that I need to call my doctor about right away?   WARNING/CAUTION: Even though it may be rare, some people may have very bad and sometimes deadly side effects when taking a drug. Tell your doctor or get medical help right away if you have any of the following signs or symptoms that may be related to a very bad side effect:  · Signs of an allergic reaction, like rash; hives; itching; red, swollen, blistered, or peeling skin with or without fever; wheezing; tightness in the chest or throat; trouble breathing, swallowing, or talking; unusual hoarseness; or swelling of the mouth, face, lips, tongue, or throat.  · Signs of high blood pressure like very bad headache or dizziness, passing out, or change in eyesight.  · Feeling confused, not able to focus, or change in behavior.  · Hallucinations (seeing or hearing things that are not there).  · If seizures are new or worse after starting this  drug.  · Chest pain or pressure, a fast heartbeat, or an abnormal heartbeat.  · Swelling.  · Shortness of breath.  · Change in hearing.  · Ringing in ears.  · Passing urine more often.  · Swollen gland.  · Trouble moving around.  · Some people may have a higher chance of eye problems with this drug. Your doctor may want you to have an eye exam to see if you have a higher chance of these eye problems. Call your doctor right away if you have eye pain, change in eyesight, or swelling or redness in or around the eye.  · A severe skin reaction (Sim-Markie syndrome/toxic epidermal necrolysis) may happen. It can cause severe health problems that may not go away, and sometimes death. Get medical help right away if you have signs like red, swollen, blistered, or peeling skin (with or without fever); red or irritated eyes; or sores in your mouth, throat, nose, or eyes.  What are some other side effects of this drug?   All drugs may cause side effects. However, many people have no side effects or only have minor side effects. Call your doctor or get medical help if any of these side effects or any other side effects bother you or do not go away:  All products:   · Dizziness or headache.  · Constipation, diarrhea, stomach pain, upset stomach, throwing up, or feeling less hungry.  · Shakiness.  · Feeling nervous and excitable.  · Strange or odd dreams.  · Gas.  · Dry mouth.  · Trouble sleeping.  · Muscle or joint pain.  · Nose or throat irritation.  · Sweating a lot.  · A change in weight without trying.  Extended-release tablets:   · For some brands, you may see the tablet shell in your stool. For these brands, this is normal and not a cause for concern. If you have questions, talk with your doctor.  These are not all of the side effects that may occur. If you have questions about side effects, call your doctor. Call your doctor for medical advice about side effects.  You may report side effects to your national [facility]  agency.  You may report side effects to the FDA at 1-964.499.3437. You may also report side effects at https://www.fda.gov/medwatch.  How is this drug best taken?   Use this drug as ordered by your doctor. Read all information given to you. Follow all instructions closely.  For all uses of this drug:   · Do not take this drug more often than you are told. This may raise the risk of seizures. Be sure you know how far apart to take your doses.  · Take in the morning if taking once a day.  · Take with or without food.  · If you are not able to sleep, do not take this drug too close to bedtime. Talk with your doctor.  · Swallow whole. Do not chew, break, or crush.  · Keep taking this drug as you have been told by your doctor or other health care provider, even if you feel well.  · If you have trouble swallowing, talk with your doctor.  For stopping smoking:   · You may take this drug for 1 week before you stop smoking.  · Nicotine products and counseling may be used at the same time for best results.  · If you have not been able to quit smoking after taking this drug for 12 weeks, talk with your doctor.  · You may have signs of nicotine withdrawal when you try to quit smoking even when using drugs like this one to help you quit smoking. There are many signs of nicotine withdrawal. Rarely depression and suicidal thoughts have happened in people trying to quit smoking. Talk with your doctor.  What do I do if I miss a dose?   · Skip the missed dose and go back to your normal time.  · Do not take 2 doses at the same time or extra doses.    How do I store and/or throw out this drug?   · Store at room temperature protected from light. Store in a dry place. Do not store in a bathroom.  · Keep all drugs in a safe place. Keep all drugs out of the reach of children and pets.  · Throw away unused or  drugs. Do not flush down a toilet or pour down a drain unless you are told to do so. Check with your pharmacist if you have  questions about the best way to throw out drugs. There may be drug take-back programs in your area.    General drug facts   · If your symptoms or health problems do not get better or if they become worse, call your doctor.  · Do not share your drugs with others and do not take anyone else's drugs.  · Some drugs may have another patient information leaflet. If you have any questions about this drug, please talk with your doctor, nurse, pharmacist, or other health care provider.  · This drug comes with an extra patient fact sheet called a Medication Guide. Read it with care. Read it again each time this drug is refilled. If you have any questions about this drug, please talk with the doctor, pharmacist, or other health care provider.  · If you think there has been an overdose, call your poison control center or get medical care right away. Be ready to tell or show what was taken, how much, and when it happened.    Consumer Information Use and Disclaimer   This generalized information is a limited summary of diagnosis, treatment, and/or medication information. It is not meant to be comprehensive and should be used as a tool to help the user understand and/or assess potential diagnostic and treatment options. It does NOT include all information about conditions, treatments, medications, side effects, or risks that may apply to a specific patient. It is not intended to be medical advice or a substitute for the medical advice, diagnosis, or treatment of a health care provider based on the health care provider's examination and assessment of a patient's specific and unique circumstances. Patients must speak with a health care provider for complete information about their health, medical questions, and treatment options, including any risks or benefits regarding use of medications. This information does not endorse any treatments or medications as safe, effective, or approved for treating a specific patient. [employer]  and its affiliates disclaim any warranty or liability relating to this information or the use thereof. The use of this information is governed by the Terms of Use, available at https://www.[employer].com/en/solutions/lexicomp/about/evie.  Last Reviewed Date   2021-07-28  Copyright   © 2021 [employer] and its affiliates and/or licensors. All rights reserved.

## 2022-01-27 ENCOUNTER — OFFICE VISIT (OUTPATIENT)
Dept: PSYCHIATRY | Facility: CLINIC | Age: 42
End: 2022-01-27
Payer: COMMERCIAL

## 2022-01-27 DIAGNOSIS — F43.9 TRAUMA AND STRESSOR-RELATED DISORDER: ICD-10-CM

## 2022-01-27 DIAGNOSIS — F84.0 AUTISM SPECTRUM DISORDER: ICD-10-CM

## 2022-01-27 DIAGNOSIS — F41.1 GAD (GENERALIZED ANXIETY DISORDER): Primary | ICD-10-CM

## 2022-01-27 DIAGNOSIS — F90.2 ATTENTION DEFICIT HYPERACTIVITY DISORDER (ADHD), COMBINED TYPE: ICD-10-CM

## 2022-01-27 PROCEDURE — 90837 PSYTX W PT 60 MINUTES: CPT | Mod: S$GLB,,, | Performed by: SOCIAL WORKER

## 2022-01-27 PROCEDURE — 99999 PR PBB SHADOW E&M-EST. PATIENT-LVL II: ICD-10-PCS | Mod: PBBFAC,,, | Performed by: SOCIAL WORKER

## 2022-01-27 PROCEDURE — 99999 PR PBB SHADOW E&M-EST. PATIENT-LVL II: CPT | Mod: PBBFAC,,, | Performed by: SOCIAL WORKER

## 2022-01-27 PROCEDURE — 90837 PR PSYCHOTHERAPY W/PATIENT, 60 MIN: ICD-10-PCS | Mod: S$GLB,,, | Performed by: SOCIAL WORKER

## 2022-01-27 PROCEDURE — 1159F MED LIST DOCD IN RCRD: CPT | Mod: CPTII,S$GLB,, | Performed by: SOCIAL WORKER

## 2022-01-27 PROCEDURE — 1159F PR MEDICATION LIST DOCUMENTED IN MEDICAL RECORD: ICD-10-PCS | Mod: CPTII,S$GLB,, | Performed by: SOCIAL WORKER

## 2022-01-31 ENCOUNTER — LAB VISIT (OUTPATIENT)
Dept: FAMILY MEDICINE | Facility: CLINIC | Age: 42
End: 2022-01-31
Payer: COMMERCIAL

## 2022-01-31 ENCOUNTER — PATIENT MESSAGE (OUTPATIENT)
Dept: ADMINISTRATIVE | Facility: OTHER | Age: 42
End: 2022-01-31
Payer: COMMERCIAL

## 2022-01-31 DIAGNOSIS — R05.9 COUGH: Primary | ICD-10-CM

## 2022-01-31 DIAGNOSIS — J02.9 SORE THROAT: ICD-10-CM

## 2022-01-31 LAB
SARS-COV-2 RNA RESP QL NAA+PROBE: NOT DETECTED
SARS-COV-2- CYCLE NUMBER: NORMAL

## 2022-01-31 PROCEDURE — U0005 INFEC AGEN DETEC AMPLI PROBE: HCPCS | Performed by: FAMILY MEDICINE

## 2022-01-31 PROCEDURE — U0003 INFECTIOUS AGENT DETECTION BY NUCLEIC ACID (DNA OR RNA); SEVERE ACUTE RESPIRATORY SYNDROME CORONAVIRUS 2 (SARS-COV-2) (CORONAVIRUS DISEASE [COVID-19]), AMPLIFIED PROBE TECHNIQUE, MAKING USE OF HIGH THROUGHPUT TECHNOLOGIES AS DESCRIBED BY CMS-2020-01-R: HCPCS | Performed by: FAMILY MEDICINE

## 2022-01-31 NOTE — PROGRESS NOTES
Mariana Mora presented to clinic for COVID-19 swab.   Mariana Mora verified x2, name and .   Mariana Mora instructed on what will be completed, asked if ever had COVID-19 swab.   Explained procedure to Mariana Mora.   Specimen obtained.   No questions or concerns voiced further at this time.   Mariana Mora left in satisfactory condition.

## 2022-02-14 ENCOUNTER — OFFICE VISIT (OUTPATIENT)
Dept: PSYCHIATRY | Facility: CLINIC | Age: 42
End: 2022-02-14
Payer: COMMERCIAL

## 2022-02-14 VITALS
BODY MASS INDEX: 45.83 KG/M2 | HEART RATE: 94 BPM | SYSTOLIC BLOOD PRESSURE: 126 MMHG | WEIGHT: 285.19 LBS | DIASTOLIC BLOOD PRESSURE: 83 MMHG | HEIGHT: 66 IN

## 2022-02-14 DIAGNOSIS — G47.00 INSOMNIA, UNSPECIFIED TYPE: ICD-10-CM

## 2022-02-14 DIAGNOSIS — F43.9 TRAUMA AND STRESSOR-RELATED DISORDER: ICD-10-CM

## 2022-02-14 DIAGNOSIS — F41.1 GAD (GENERALIZED ANXIETY DISORDER): ICD-10-CM

## 2022-02-14 DIAGNOSIS — F33.41 RECURRENT MAJOR DEPRESSIVE DISORDER, IN PARTIAL REMISSION: ICD-10-CM

## 2022-02-14 DIAGNOSIS — F42.9 OBSESSIVE-COMPULSIVE DISORDER, UNSPECIFIED TYPE: ICD-10-CM

## 2022-02-14 DIAGNOSIS — F90.2 ATTENTION DEFICIT HYPERACTIVITY DISORDER (ADHD), COMBINED TYPE: Primary | ICD-10-CM

## 2022-02-14 DIAGNOSIS — F84.0 AUTISM SPECTRUM DISORDER: ICD-10-CM

## 2022-02-14 PROCEDURE — 3008F BODY MASS INDEX DOCD: CPT | Mod: CPTII,S$GLB,, | Performed by: PHYSICIAN ASSISTANT

## 2022-02-14 PROCEDURE — 3074F PR MOST RECENT SYSTOLIC BLOOD PRESSURE < 130 MM HG: ICD-10-PCS | Mod: CPTII,S$GLB,, | Performed by: PHYSICIAN ASSISTANT

## 2022-02-14 PROCEDURE — 3008F PR BODY MASS INDEX (BMI) DOCUMENTED: ICD-10-PCS | Mod: CPTII,S$GLB,, | Performed by: PHYSICIAN ASSISTANT

## 2022-02-14 PROCEDURE — 3079F PR MOST RECENT DIASTOLIC BLOOD PRESSURE 80-89 MM HG: ICD-10-PCS | Mod: CPTII,S$GLB,, | Performed by: PHYSICIAN ASSISTANT

## 2022-02-14 PROCEDURE — 99214 PR OFFICE/OUTPT VISIT, EST, LEVL IV, 30-39 MIN: ICD-10-PCS | Mod: S$GLB,,, | Performed by: PHYSICIAN ASSISTANT

## 2022-02-14 PROCEDURE — 99214 OFFICE O/P EST MOD 30 MIN: CPT | Mod: S$GLB,,, | Performed by: PHYSICIAN ASSISTANT

## 2022-02-14 PROCEDURE — 1159F PR MEDICATION LIST DOCUMENTED IN MEDICAL RECORD: ICD-10-PCS | Mod: CPTII,S$GLB,, | Performed by: PHYSICIAN ASSISTANT

## 2022-02-14 PROCEDURE — 1160F RVW MEDS BY RX/DR IN RCRD: CPT | Mod: CPTII,S$GLB,, | Performed by: PHYSICIAN ASSISTANT

## 2022-02-14 PROCEDURE — 99999 PR PBB SHADOW E&M-EST. PATIENT-LVL IV: ICD-10-PCS | Mod: PBBFAC,,, | Performed by: PHYSICIAN ASSISTANT

## 2022-02-14 PROCEDURE — 99999 PR PBB SHADOW E&M-EST. PATIENT-LVL IV: CPT | Mod: PBBFAC,,, | Performed by: PHYSICIAN ASSISTANT

## 2022-02-14 PROCEDURE — 3074F SYST BP LT 130 MM HG: CPT | Mod: CPTII,S$GLB,, | Performed by: PHYSICIAN ASSISTANT

## 2022-02-14 PROCEDURE — 1159F MED LIST DOCD IN RCRD: CPT | Mod: CPTII,S$GLB,, | Performed by: PHYSICIAN ASSISTANT

## 2022-02-14 PROCEDURE — 1160F PR REVIEW ALL MEDS BY PRESCRIBER/CLIN PHARMACIST DOCUMENTED: ICD-10-PCS | Mod: CPTII,S$GLB,, | Performed by: PHYSICIAN ASSISTANT

## 2022-02-14 PROCEDURE — 3079F DIAST BP 80-89 MM HG: CPT | Mod: CPTII,S$GLB,, | Performed by: PHYSICIAN ASSISTANT

## 2022-02-14 RX ORDER — PRAZOSIN HYDROCHLORIDE 5 MG/1
5 CAPSULE ORAL NIGHTLY
Qty: 30 CAPSULE | Refills: 1 | Status: SHIPPED | OUTPATIENT
Start: 2022-02-14 | End: 2022-03-28 | Stop reason: SDUPTHER

## 2022-02-14 RX ORDER — BUPROPION HYDROCHLORIDE 300 MG/1
300 TABLET ORAL DAILY
Qty: 30 TABLET | Refills: 1 | Status: SHIPPED | OUTPATIENT
Start: 2022-02-14 | End: 2022-03-28 | Stop reason: SDUPTHER

## 2022-02-14 NOTE — PATIENT INSTRUCTIONS
Hypnagogic hallucinations    Please go to emergency department if feeling as though you are a harm to yourself or others or if you are in crisis. Please call the clinic to report any worsening of symptoms or problems associated with medication.
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
## 2022-02-17 ENCOUNTER — OFFICE VISIT (OUTPATIENT)
Dept: PSYCHIATRY | Facility: CLINIC | Age: 42
End: 2022-02-17
Payer: COMMERCIAL

## 2022-02-17 DIAGNOSIS — F42.9 OBSESSIVE-COMPULSIVE DISORDER, UNSPECIFIED TYPE: ICD-10-CM

## 2022-02-17 DIAGNOSIS — F43.9 TRAUMA AND STRESSOR-RELATED DISORDER: ICD-10-CM

## 2022-02-17 DIAGNOSIS — F90.2 ATTENTION DEFICIT HYPERACTIVITY DISORDER (ADHD), COMBINED TYPE: Primary | ICD-10-CM

## 2022-02-17 DIAGNOSIS — F41.1 GAD (GENERALIZED ANXIETY DISORDER): ICD-10-CM

## 2022-02-17 PROCEDURE — 99999 PR PBB SHADOW E&M-EST. PATIENT-LVL II: ICD-10-PCS | Mod: PBBFAC,,, | Performed by: SOCIAL WORKER

## 2022-02-17 PROCEDURE — 90834 PSYTX W PT 45 MINUTES: CPT | Mod: S$GLB,,, | Performed by: SOCIAL WORKER

## 2022-02-17 PROCEDURE — 90834 PR PSYCHOTHERAPY W/PATIENT, 45 MIN: ICD-10-PCS | Mod: S$GLB,,, | Performed by: SOCIAL WORKER

## 2022-02-17 PROCEDURE — 99999 PR PBB SHADOW E&M-EST. PATIENT-LVL II: CPT | Mod: PBBFAC,,, | Performed by: SOCIAL WORKER
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
## 2022-02-21 ENCOUNTER — PATIENT MESSAGE (OUTPATIENT)
Dept: FAMILY MEDICINE | Facility: CLINIC | Age: 42
End: 2022-02-21
Payer: COMMERCIAL

## 2022-02-21 DIAGNOSIS — R10.9 ABDOMINAL PAIN, UNSPECIFIED ABDOMINAL LOCATION: Primary | ICD-10-CM

## 2022-03-03 ENCOUNTER — HOSPITAL ENCOUNTER (OUTPATIENT)
Dept: RADIOLOGY | Facility: HOSPITAL | Age: 42
Discharge: HOME | End: 2022-03-03
Attending: FAMILY MEDICINE
Payer: COMMERCIAL

## 2022-03-03 DIAGNOSIS — R10.9 ABDOMINAL PAIN, UNSPECIFIED ABDOMINAL LOCATION: ICD-10-CM

## 2022-03-03 PROCEDURE — 76700 US EXAM ABDOM COMPLETE: CPT | Mod: TC

## 2022-03-03 PROCEDURE — 76700 US ABDOMEN COMPLETE: ICD-10-PCS | Mod: 26,,, | Performed by: RADIOLOGY

## 2022-03-03 PROCEDURE — 76700 US EXAM ABDOM COMPLETE: CPT | Mod: 26,,, | Performed by: RADIOLOGY

## 2022-03-04 ENCOUNTER — HOSPITAL ENCOUNTER (OUTPATIENT)
Dept: RADIOLOGY | Facility: HOSPITAL | Age: 42
Discharge: HOME | End: 2022-03-04
Attending: NURSE PRACTITIONER
Payer: COMMERCIAL

## 2022-03-04 VITALS — WEIGHT: 284.38 LBS | HEIGHT: 66 IN | BODY MASS INDEX: 45.7 KG/M2

## 2022-03-04 DIAGNOSIS — Z01.419 WOMEN'S ANNUAL ROUTINE GYNECOLOGICAL EXAMINATION: ICD-10-CM

## 2022-03-04 PROCEDURE — 77063 BREAST TOMOSYNTHESIS BI: CPT | Mod: 26,,, | Performed by: RADIOLOGY

## 2022-03-04 PROCEDURE — 77067 SCR MAMMO BI INCL CAD: CPT | Mod: 26,,, | Performed by: RADIOLOGY

## 2022-03-04 PROCEDURE — 77067 MAMMO DIGITAL SCREENING BILAT WITH TOMO: ICD-10-PCS | Mod: 26,,, | Performed by: RADIOLOGY

## 2022-03-04 PROCEDURE — 77067 SCR MAMMO BI INCL CAD: CPT | Mod: TC

## 2022-03-04 PROCEDURE — 77063 MAMMO DIGITAL SCREENING BILAT WITH TOMO: ICD-10-PCS | Mod: 26,,, | Performed by: RADIOLOGY

## 2022-03-17 ENCOUNTER — OFFICE VISIT (OUTPATIENT)
Dept: PSYCHIATRY | Facility: CLINIC | Age: 42
End: 2022-03-17
Payer: COMMERCIAL

## 2022-03-17 DIAGNOSIS — F41.1 GAD (GENERALIZED ANXIETY DISORDER): ICD-10-CM

## 2022-03-17 DIAGNOSIS — F42.9 OBSESSIVE-COMPULSIVE DISORDER, UNSPECIFIED TYPE: ICD-10-CM

## 2022-03-17 DIAGNOSIS — F43.9 TRAUMA AND STRESSOR-RELATED DISORDER: ICD-10-CM

## 2022-03-17 DIAGNOSIS — F90.2 ATTENTION DEFICIT HYPERACTIVITY DISORDER (ADHD), COMBINED TYPE: Primary | ICD-10-CM

## 2022-03-17 DIAGNOSIS — F33.9 RECURRENT DEPRESSION: ICD-10-CM

## 2022-03-17 PROCEDURE — 1159F PR MEDICATION LIST DOCUMENTED IN MEDICAL RECORD: ICD-10-PCS | Mod: CPTII,S$GLB,, | Performed by: SOCIAL WORKER

## 2022-03-17 PROCEDURE — 99999 PR PBB SHADOW E&M-EST. PATIENT-LVL II: ICD-10-PCS | Mod: PBBFAC,,, | Performed by: SOCIAL WORKER

## 2022-03-17 PROCEDURE — 1159F MED LIST DOCD IN RCRD: CPT | Mod: CPTII,S$GLB,, | Performed by: SOCIAL WORKER

## 2022-03-17 PROCEDURE — 99999 PR PBB SHADOW E&M-EST. PATIENT-LVL II: CPT | Mod: PBBFAC,,, | Performed by: SOCIAL WORKER

## 2022-03-17 PROCEDURE — 90834 PR PSYCHOTHERAPY W/PATIENT, 45 MIN: ICD-10-PCS | Mod: S$GLB,,, | Performed by: SOCIAL WORKER

## 2022-03-17 PROCEDURE — 90834 PSYTX W PT 45 MINUTES: CPT | Mod: S$GLB,,, | Performed by: SOCIAL WORKER

## 2022-03-17 NOTE — PROGRESS NOTES
Individual Psychotherapy (PhD/LCSW)    3/17/2022    Site:  [place]         Therapeutic Intervention: Met with patient.  Outpatient - Insight oriented psychotherapy 45 min - CPT code 96695, Outpatient - Behavior modifying psychotherapy 45 min - CPT code 85535 and Outpatient - Supportive psychotherapy 45 min - CPT Code 46247    Chief complaint/reason for encounter: attention deficit, depression and anxiety             Interval history and content of current session: Ct was referred to tx by Kiara LANDAVERDE to address depression and anxiety. Ct arrived to session on time and was fully engaged. Ct reported that her uncle is dying and does not have much longer. Ct shared with SW how this is affecting her. She reported that it is bringing back old memories of the painful relationship that she had with some of her family members. SW and ct discussed anxiety, ct setting realistic expectations of her family. Ct had apprehension about going to the  when the time comes for fear she will see some of the family members who hurt her. Ct to continue in 1:1 sessions.     Treatment plan:  · Target symptoms: distractability, lack of focus, recurrent depression, anxiety   · Why chosen therapy is appropriate versus another modality: relevant to diagnosis, patient responds to this modality, evidence based practice  · Outcome monitoring methods: self-report  · Therapeutic intervention type: insight oriented psychotherapy, behavior modifying psychotherapy, supportive psychotherapy    Risk parameters:  Patient reports no suicidal ideation  Patient reports no homicidal ideation  Patient reports no self-injurious behavior  Patient reports no violent behavior          CSSRS was completed: No risk    Verbal deficits: None    Patient's response to intervention:  The patient's response to intervention is accepting.    Progress toward goals and other mental status changes:  The patient's progress toward goals is fair , good.    Diagnosis:      ICD-10-CM ICD-9-CM   1. Attention deficit hyperactivity disorder (ADHD), combined type  F90.2 314.01   2. Trauma and stressor-related disorder  F43.9 309.81     308.9   3. JOSE ANGEL (generalized anxiety disorder)  F41.1 300.02   4. Obsessive-compulsive disorder, unspecified type  F42.9 300.3   5. Recurrent depression  F33.9 296.30       Plan:  individual psychotherapy and Ct to continue to see Kiara LANDAVERDE for psych med mgt Pt to go to ED or call 911 if symptoms worsen or if she has thoughts of harming self and/or others. Pt verbalized understanding.    Return to clinic: as scheduled    Length of Service (minutes): 45      Each patient to whom he or she provides medical services by telemedicine is: (1) informed of the relationship between the physician and patient and the respective role of any other health care provider with respect to management of the patient; and (2) notified that he or she may decline to receive medical services by telemedicine and may withdraw from such care at any time.

## 2022-03-28 DIAGNOSIS — F43.9 TRAUMA AND STRESSOR-RELATED DISORDER: ICD-10-CM

## 2022-03-28 DIAGNOSIS — F90.2 ATTENTION DEFICIT HYPERACTIVITY DISORDER (ADHD), COMBINED TYPE: ICD-10-CM

## 2022-03-28 RX ORDER — BUPROPION HYDROCHLORIDE 300 MG/1
300 TABLET ORAL DAILY
Qty: 30 TABLET | Refills: 1 | Status: SHIPPED | OUTPATIENT
Start: 2022-03-28 | End: 2022-04-21

## 2022-03-28 RX ORDER — PRAZOSIN HYDROCHLORIDE 5 MG/1
5 CAPSULE ORAL NIGHTLY
Qty: 30 CAPSULE | Refills: 1 | Status: SHIPPED | OUTPATIENT
Start: 2022-03-28 | End: 2022-05-27

## 2022-03-28 NOTE — TELEPHONE ENCOUNTER
Pt is requesting medication refill on Bupropion  mg, Prazosin 5 mg  Last refill: 2/14/22  Last visit: 2/14/22  Follow Up: 4/20/22

## 2022-03-31 ENCOUNTER — OFFICE VISIT (OUTPATIENT)
Dept: PSYCHIATRY | Facility: CLINIC | Age: 42
End: 2022-03-31
Payer: COMMERCIAL

## 2022-03-31 DIAGNOSIS — F41.1 GAD (GENERALIZED ANXIETY DISORDER): Primary | ICD-10-CM

## 2022-03-31 DIAGNOSIS — F90.2 ATTENTION DEFICIT HYPERACTIVITY DISORDER (ADHD), COMBINED TYPE: ICD-10-CM

## 2022-03-31 DIAGNOSIS — F33.9 RECURRENT DEPRESSION: ICD-10-CM

## 2022-03-31 DIAGNOSIS — F43.9 TRAUMA AND STRESSOR-RELATED DISORDER: ICD-10-CM

## 2022-03-31 DIAGNOSIS — F42.9 OBSESSIVE-COMPULSIVE DISORDER, UNSPECIFIED TYPE: ICD-10-CM

## 2022-03-31 DIAGNOSIS — F84.0 AUTISM SPECTRUM DISORDER: ICD-10-CM

## 2022-03-31 PROCEDURE — 99999 PR PBB SHADOW E&M-EST. PATIENT-LVL II: ICD-10-PCS | Mod: PBBFAC,,, | Performed by: SOCIAL WORKER

## 2022-03-31 PROCEDURE — 1159F MED LIST DOCD IN RCRD: CPT | Mod: CPTII,S$GLB,, | Performed by: SOCIAL WORKER

## 2022-03-31 PROCEDURE — 90834 PR PSYCHOTHERAPY W/PATIENT, 45 MIN: ICD-10-PCS | Mod: S$GLB,,, | Performed by: SOCIAL WORKER

## 2022-03-31 PROCEDURE — 1159F PR MEDICATION LIST DOCUMENTED IN MEDICAL RECORD: ICD-10-PCS | Mod: CPTII,S$GLB,, | Performed by: SOCIAL WORKER

## 2022-03-31 PROCEDURE — 90834 PSYTX W PT 45 MINUTES: CPT | Mod: S$GLB,,, | Performed by: SOCIAL WORKER

## 2022-03-31 PROCEDURE — 99999 PR PBB SHADOW E&M-EST. PATIENT-LVL II: CPT | Mod: PBBFAC,,, | Performed by: SOCIAL WORKER

## 2022-03-31 NOTE — PROGRESS NOTES
Individual Psychotherapy (PhD/LCSW)    3/31/2022    Site:  [place]         Therapeutic Intervention: Met with patient.  Outpatient - Insight oriented psychotherapy 45 min - CPT code 00927, Outpatient - Behavior modifying psychotherapy 45 min - CPT code 94087 and Outpatient - Supportive psychotherapy 45 min - CPT Code 59860    Chief complaint/reason for encounter: depression and anxiety, OCD, past trauma             Interval history and content of current session:  Client was referred to treatment by Kiara LANDAVERDE to address depression, anxiety, trauma, in other concerns.  Client arrived to session on time and was fully engaged.  Client reported that she has started that her skin picking and her online shopping have increased again.   and client discussed coping skills for client to manage  compulsive behaviors.  Client was receptive to feedback  and client reviewed the psychology behind conditioning and un conditioning behaviors.   provided client with examples of the costs of self-destructive behavior and things that could be done differently to avoid engaging in self-destructive behavior.  Client was receptive to feedback.  Client to continue in one-to-one sessions.    Treatment plan:  · Target symptoms: depression, distractability, lack of focus, anxiety , OCD  · Why chosen therapy is appropriate versus another modality: relevant to diagnosis, patient responds to this modality, evidence based practice  · Outcome monitoring methods: self-report  · Therapeutic intervention type: insight oriented psychotherapy, behavior modifying psychotherapy, supportive psychotherapy    Risk parameters:  Patient reports no suicidal ideation  Patient reports no homicidal ideation  Patient reports no self-injurious behavior  Patient reports no violent behavior          CSSRS was completed: No risk    Verbal deficits: None    Patient's response to intervention:  The patient's response to intervention  is accepting.    Progress toward goals and other mental status changes:  The patient's progress toward goals is fair , good.    Diagnosis:     ICD-10-CM ICD-9-CM   1. JOSE ANGEL (generalized anxiety disorder)  F41.1 300.02   2. Obsessive-compulsive disorder, unspecified type  F42.9 300.3   3. Recurrent depression  F33.9 296.30   4. Attention deficit hyperactivity disorder (ADHD), combined type  F90.2 314.01   5. Trauma and stressor-related disorder  F43.9 309.81     308.9   6. Autism spectrum disorder  F84.0 299.00       Plan:  individual psychotherapy and Ct to continue to see Kiara SAIMA for psych med mgt Pt to go to ED or call 911 if symptoms worsen or if she has thoughts of harming self and/or others. Pt verbalized understanding.    Return to clinic: as scheduled    Length of Service (minutes): 45      Each patient to whom he or she provides medical services by telemedicine is: (1) informed of the relationship between the physician and patient and the respective role of any other health care provider with respect to management of the patient; and (2) notified that he or she may decline to receive medical services by telemedicine and may withdraw from such care at any time.

## 2022-04-01 ENCOUNTER — OFFICE VISIT (OUTPATIENT)
Dept: FAMILY MEDICINE | Facility: CLINIC | Age: 42
End: 2022-04-01
Payer: COMMERCIAL

## 2022-04-01 VITALS
HEART RATE: 97 BPM | HEIGHT: 66 IN | BODY MASS INDEX: 45.96 KG/M2 | OXYGEN SATURATION: 97 % | SYSTOLIC BLOOD PRESSURE: 114 MMHG | DIASTOLIC BLOOD PRESSURE: 74 MMHG | RESPIRATION RATE: 20 BRPM | WEIGHT: 286 LBS

## 2022-04-01 DIAGNOSIS — R23.2 FACIAL FLUSHING: Primary | ICD-10-CM

## 2022-04-01 PROCEDURE — 1160F RVW MEDS BY RX/DR IN RCRD: CPT | Mod: S$GLB,,, | Performed by: FAMILY MEDICINE

## 2022-04-01 PROCEDURE — 99214 PR OFFICE/OUTPT VISIT, EST, LEVL IV, 30-39 MIN: ICD-10-PCS | Mod: S$GLB,,, | Performed by: FAMILY MEDICINE

## 2022-04-01 PROCEDURE — 1160F PR REVIEW ALL MEDS BY PRESCRIBER/CLIN PHARMACIST DOCUMENTED: ICD-10-PCS | Mod: S$GLB,,, | Performed by: FAMILY MEDICINE

## 2022-04-01 PROCEDURE — 3078F PR MOST RECENT DIASTOLIC BLOOD PRESSURE < 80 MM HG: ICD-10-PCS | Mod: S$GLB,,, | Performed by: FAMILY MEDICINE

## 2022-04-01 PROCEDURE — 3074F PR MOST RECENT SYSTOLIC BLOOD PRESSURE < 130 MM HG: ICD-10-PCS | Mod: S$GLB,,, | Performed by: FAMILY MEDICINE

## 2022-04-01 PROCEDURE — 99214 OFFICE O/P EST MOD 30 MIN: CPT | Mod: S$GLB,,, | Performed by: FAMILY MEDICINE

## 2022-04-01 PROCEDURE — 3008F PR BODY MASS INDEX (BMI) DOCUMENTED: ICD-10-PCS | Mod: S$GLB,,, | Performed by: FAMILY MEDICINE

## 2022-04-01 PROCEDURE — 3074F SYST BP LT 130 MM HG: CPT | Mod: S$GLB,,, | Performed by: FAMILY MEDICINE

## 2022-04-01 PROCEDURE — 3008F BODY MASS INDEX DOCD: CPT | Mod: S$GLB,,, | Performed by: FAMILY MEDICINE

## 2022-04-01 PROCEDURE — 3078F DIAST BP <80 MM HG: CPT | Mod: S$GLB,,, | Performed by: FAMILY MEDICINE

## 2022-04-01 PROCEDURE — 99999 PR PBB SHADOW E&M-EST. PATIENT-LVL IV: ICD-10-PCS | Mod: PBBFAC,,, | Performed by: FAMILY MEDICINE

## 2022-04-01 PROCEDURE — 1159F MED LIST DOCD IN RCRD: CPT | Mod: S$GLB,,, | Performed by: FAMILY MEDICINE

## 2022-04-01 PROCEDURE — 99999 PR PBB SHADOW E&M-EST. PATIENT-LVL IV: CPT | Mod: PBBFAC,,, | Performed by: FAMILY MEDICINE

## 2022-04-01 PROCEDURE — 1159F PR MEDICATION LIST DOCUMENTED IN MEDICAL RECORD: ICD-10-PCS | Mod: S$GLB,,, | Performed by: FAMILY MEDICINE

## 2022-04-01 RX ORDER — SPIRONOLACTONE 25 MG/1
25 TABLET ORAL DAILY
Qty: 90 TABLET | Refills: 3 | Status: SHIPPED | OUTPATIENT
Start: 2022-04-01 | End: 2022-04-12

## 2022-04-01 NOTE — PROGRESS NOTES
" Patient ID: Mariana Mora is a 41 y.o. female.    Chief Complaint: Flushing (face)      Reviewed family, medical, surgical, and social history.    No cp/sob  No change in mental status  No fever  No asymmetrical limb swelling    Objective:      /74 (BP Location: Left arm, Patient Position: Sitting, BP Method: Large (Automatic))   Pulse 97   Resp 20   Ht 5' 6" (1.676 m)   Wt 129.7 kg (286 lb)   SpO2 97%   BMI 46.16 kg/m²   RRR, CTAB, s/nt/nd, no c/c/e, non-toxic appearing, no focal weakness  Assessment:       1. Facial flushing        Plan:       Facial flushing  -     spironolactone (ALDACTONE) 25 MG tablet; Take 1 tablet (25 mg total) by mouth once daily.  Dispense: 90 tablet; Refill: 3            Reviewed, monitored, evaluated, and assessed chronic conditions as outlined above  Continue current medicines, any changes noted above  As shown  Labs, radiology studies, and procedures/notes from the last 3 months were reviewed.    Risks, benefits, and side effects were discussed with the patient. All questions were answered to the fullest satisfaction of the patient, and pt verbalized understanding and agreement to treatment plan. Pt was to call with any new or worsening symptoms, or present to the ER.    "

## 2022-04-04 ENCOUNTER — PATIENT MESSAGE (OUTPATIENT)
Dept: FAMILY MEDICINE | Facility: CLINIC | Age: 42
End: 2022-04-04
Payer: COMMERCIAL

## 2022-04-04 DIAGNOSIS — F33.41 RECURRENT MAJOR DEPRESSIVE DISORDER, IN PARTIAL REMISSION: ICD-10-CM

## 2022-04-04 RX ORDER — QUETIAPINE FUMARATE 50 MG/1
50 TABLET, EXTENDED RELEASE ORAL DAILY
Qty: 30 TABLET | Refills: 1 | Status: SHIPPED | OUTPATIENT
Start: 2022-04-04 | End: 2022-06-02

## 2022-04-04 NOTE — TELEPHONE ENCOUNTER
Rec'd fax from [facility] requesting refill on quetiapine 50 mg  Last refill: 1/10  Last visit: 2/14  Follow up: 4/20

## 2022-04-06 ENCOUNTER — PATIENT MESSAGE (OUTPATIENT)
Dept: FAMILY MEDICINE | Facility: CLINIC | Age: 42
End: 2022-04-06
Payer: COMMERCIAL

## 2022-04-12 ENCOUNTER — OFFICE VISIT (OUTPATIENT)
Dept: CARDIOLOGY | Facility: CLINIC | Age: 42
End: 2022-04-12
Payer: COMMERCIAL

## 2022-04-12 VITALS
BODY MASS INDEX: 45.96 KG/M2 | HEIGHT: 66 IN | WEIGHT: 286 LBS | OXYGEN SATURATION: 97 % | HEART RATE: 108 BPM | SYSTOLIC BLOOD PRESSURE: 109 MMHG | DIASTOLIC BLOOD PRESSURE: 74 MMHG

## 2022-04-12 DIAGNOSIS — H35.81 COTTON WOOL SPOTS: ICD-10-CM

## 2022-04-12 DIAGNOSIS — R06.09 DOE (DYSPNEA ON EXERTION): ICD-10-CM

## 2022-04-12 DIAGNOSIS — E78.5 DYSLIPIDEMIA (HIGH LDL; LOW HDL): ICD-10-CM

## 2022-04-12 DIAGNOSIS — R00.0 TACHYCARDIA: Primary | ICD-10-CM

## 2022-04-12 DIAGNOSIS — D47.3 ESSENTIAL THROMBOCYTOSIS: ICD-10-CM

## 2022-04-12 DIAGNOSIS — K76.0 NAFLD (NONALCOHOLIC FATTY LIVER DISEASE): ICD-10-CM

## 2022-04-12 DIAGNOSIS — G47.33 OSA ON CPAP: ICD-10-CM

## 2022-04-12 DIAGNOSIS — Z91.89 CARDIOVASCULAR EVENT RISK: ICD-10-CM

## 2022-04-12 DIAGNOSIS — E66.01 OBESITY, CLASS III, BMI 40-49.9 (MORBID OBESITY): ICD-10-CM

## 2022-04-12 PROCEDURE — 1159F MED LIST DOCD IN RCRD: CPT | Mod: S$GLB,,, | Performed by: INTERNAL MEDICINE

## 2022-04-12 PROCEDURE — 1159F PR MEDICATION LIST DOCUMENTED IN MEDICAL RECORD: ICD-10-PCS | Mod: S$GLB,,, | Performed by: INTERNAL MEDICINE

## 2022-04-12 PROCEDURE — 99999 PR PBB SHADOW E&M-EST. PATIENT-LVL V: ICD-10-PCS | Mod: PBBFAC,,, | Performed by: INTERNAL MEDICINE

## 2022-04-12 PROCEDURE — 3074F SYST BP LT 130 MM HG: CPT | Mod: S$GLB,,, | Performed by: INTERNAL MEDICINE

## 2022-04-12 PROCEDURE — 99205 PR OFFICE/OUTPT VISIT, NEW, LEVL V, 60-74 MIN: ICD-10-PCS | Mod: 25,S$GLB,, | Performed by: INTERNAL MEDICINE

## 2022-04-12 PROCEDURE — 3008F PR BODY MASS INDEX (BMI) DOCUMENTED: ICD-10-PCS | Mod: S$GLB,,, | Performed by: INTERNAL MEDICINE

## 2022-04-12 PROCEDURE — 93000 ELECTROCARDIOGRAM COMPLETE: CPT | Mod: S$GLB,,, | Performed by: INTERNAL MEDICINE

## 2022-04-12 PROCEDURE — 3078F PR MOST RECENT DIASTOLIC BLOOD PRESSURE < 80 MM HG: ICD-10-PCS | Mod: S$GLB,,, | Performed by: INTERNAL MEDICINE

## 2022-04-12 PROCEDURE — 99205 OFFICE O/P NEW HI 60 MIN: CPT | Mod: 25,S$GLB,, | Performed by: INTERNAL MEDICINE

## 2022-04-12 PROCEDURE — 93000 EKG 12-LEAD: ICD-10-PCS | Mod: S$GLB,,, | Performed by: INTERNAL MEDICINE

## 2022-04-12 PROCEDURE — 3074F PR MOST RECENT SYSTOLIC BLOOD PRESSURE < 130 MM HG: ICD-10-PCS | Mod: S$GLB,,, | Performed by: INTERNAL MEDICINE

## 2022-04-12 PROCEDURE — 3078F DIAST BP <80 MM HG: CPT | Mod: S$GLB,,, | Performed by: INTERNAL MEDICINE

## 2022-04-12 PROCEDURE — 99999 PR PBB SHADOW E&M-EST. PATIENT-LVL V: CPT | Mod: PBBFAC,,, | Performed by: INTERNAL MEDICINE

## 2022-04-12 PROCEDURE — 3008F BODY MASS INDEX DOCD: CPT | Mod: S$GLB,,, | Performed by: INTERNAL MEDICINE

## 2022-04-12 NOTE — PROGRESS NOTES
Subjective:    Patient ID:  Mariana Mora is a 41 y.o. female who presents for evaluation of Establish Care  Came on own for recurrent fast heartbeats with standing, vertigo, cotton wool spot OS on fundoscopy. HLD with LDL-C 181 now on statin  PCP: John Stahl MD  Eye: Darline Eng MD, request Carotid US  Lives with , Isra, non-smoker  Disabled due to mental health, 2018, prior  for pain clinic    Patient is a new patient to me.    Health literacy: high  Vaccinations: up-to-date, completed COVID   Activities: no regular exercise, do housework, play with dogs, tired after 20 minutes of shopping  Nicotine: never  Alcohol: none  Illicit drugs: none, off Vyvanse for 4 months, waiting for genetic testing  Cardiac symptoms: tachycardia with standing.  Home BP: do not check  Medication compliance: yes, no cardiac medication, placed on spironolactone 4/1 for facial flushing, does not work  Diet: regular, some salt  Caffeine: cola 2-3 times weekly  Labs:   Lab Results   Component Value Date    TSH 2.158 04/06/2021        Lab Results   Component Value Date    HGBA1C 5.1 07/20/2021       Lab Results   Component Value Date    WBC 8.0 11/10/2021    HGB 13.8 11/10/2021    HCT 41.7 11/10/2021    MCV 90.8 11/10/2021     (H) 11/10/2021       CMP  Sodium   Date Value Ref Range Status   07/20/2021 136 136 - 145 mmol/L Final     Potassium   Date Value Ref Range Status   07/20/2021 4.0 3.5 - 5.1 mmol/L Final     Chloride   Date Value Ref Range Status   07/20/2021 103 95 - 110 mmol/L Final     CO2   Date Value Ref Range Status   07/20/2021 21 (L) 23 - 29 mmol/L Final     Glucose   Date Value Ref Range Status   07/20/2021 76 70 - 110 mg/dL Final     BUN   Date Value Ref Range Status   07/20/2021 12 6 - 20 mg/dL Final     Creatinine   Date Value Ref Range Status   07/20/2021 0.9 0.5 - 1.4 mg/dL Final     Calcium   Date Value Ref Range Status   07/20/2021 9.5 8.7 - 10.5 mg/dL Final     Total  Protein   Date Value Ref Range Status   07/20/2021 7.6 6.0 - 8.4 g/dL Final     Albumin   Date Value Ref Range Status   07/20/2021 3.6 3.5 - 5.2 g/dL Final     Total Bilirubin   Date Value Ref Range Status   07/20/2021 0.4 0.1 - 1.0 mg/dL Final     Comment:     For infants and newborns, interpretation of results should be based  on gestational age, weight and in agreement with clinical  observations.    Premature Infant recommended reference ranges:  Up to 24 hours.............<8.0 mg/dL  Up to 48 hours............<12.0 mg/dL  3-5 days..................<15.0 mg/dL  6-29 days.................<15.0 mg/dL       Alkaline Phosphatase   Date Value Ref Range Status   07/20/2021 65 55 - 135 U/L Final     AST   Date Value Ref Range Status   07/20/2021 21 10 - 40 U/L Final     ALT   Date Value Ref Range Status   07/20/2021 22 10 - 44 U/L Final     Anion Gap   Date Value Ref Range Status   07/20/2021 12 8 - 16 mmol/L Final     eGFR if    Date Value Ref Range Status   07/20/2021 >60.0 >60 mL/min/1.73 m^2 Final     eGFR if non    Date Value Ref Range Status   07/20/2021 >60.0 >60 mL/min/1.73 m^2 Final     Comment:     Calculation used to obtain the estimated glomerular filtration  rate (eGFR) is the CKD-EPI equation.        @labrcntip(troponini)@  No results found for: BNP}   Lab Results   Component Value Date    CHOL 161 07/20/2021    CHOL 264 (H) 03/29/2021    CHOL 237 (H) 03/16/2020     Lab Results   Component Value Date    HDL 40 07/20/2021    HDL 36 (L) 03/29/2021    HDL 41 03/16/2020     Lab Results   Component Value Date    LDLCALC 82.4 07/20/2021    LDLCALC 181.4 (H) 03/29/2021    LDLCALC 160.8 (H) 03/16/2020     Lab Results   Component Value Date    TRIG 193 (H) 07/20/2021    TRIG 233 (H) 03/29/2021    TRIG 176 (H) 03/16/2020     Lab Results   Component Value Date    CHOLHDL 24.8 07/20/2021    CHOLHDL 13.6 (L) 03/29/2021    CHOLHDL 17.3 (L) 03/16/2020      Last Echo: none  Last stress  test: none  Cardiovascular angiogram: none  ECG: NSR, rate 90, low anterior forces.  Fundoscopic exam: recent, cotton wool spot in OS    WF self refer for recurrent tachycardia on standing associated with vertigo and dim vision. First noted over 15 years ago, had problem daily but no fall nor accidents and negative evaluation. Problem subsided after 10 year, about 5 year free of it. Symptoms return in last 2 week, was placed on spironolactone 25 mg daily on 4/1. The problem will last for up to 10 minutes and have to stop for resolution. Been able to continue normal activities without problem. No other cardiac history. HR noted to go from 79 in bed to 151 on standing with the symptoms. Overall low ASCVD 10-year event risk. Negative premature family history for CAD or stroke. Father have AF, no strokes. Fundoscopy examination was abnormal.    Abdominal US 2/2022 - Inferior vena cava: Normal in appearance.   Aorta: Seen segmentally and as visualized no aneurysm   Hepatosplenomegaly and increased echogenicity of the liver suggesting diffuse fatty infiltration with area of focal fatty sparing near the gallbladder fossa.       Review of Systems   Constitutional: Positive for decreased appetite, diaphoresis, malaise/fatigue, night sweats and weight gain (up 10 lbs over the past year). Negative for fever.   HENT: Negative for nosebleeds and tinnitus.    Eyes: Positive for photophobia, redness, vision loss in left eye and visual disturbance.   Cardiovascular: Positive for dyspnea on exertion. Negative for chest pain, claudication, cyanosis, irregular heartbeat, leg swelling, near-syncope, orthopnea, palpitations and paroxysmal nocturnal dyspnea.   Respiratory: Positive for snoring and sputum production. Negative for cough, shortness of breath, sleep disturbances due to breathing and wheezing.         [place] score 9, on CPAP 100% use, awaken refreshed.   Endocrine: Positive for heat intolerance. Negative for polydipsia and  "polyuria.   Hematologic/Lymphatic: Does not bruise/bleed easily.   Skin: Negative for color change, flushing, nail changes, poor wound healing and suspicious lesions.   Musculoskeletal: Negative for arthritis, falls, gout, joint pain, joint swelling, muscle cramps, muscle weakness and myalgias.   Gastrointestinal: Positive for abdominal pain, change in bowel habit, constipation, dysphagia, flatus, heartburn, hematochezia, hemorrhoids and nausea. Negative for hematemesis and melena.   Neurological: Positive for difficulty with concentration, excessive daytime sleepiness, dizziness, light-headedness, loss of balance, paresthesias, sensory change and vertigo. Negative for disturbances in coordination, focal weakness, headaches, numbness and weakness.   Psychiatric/Behavioral: Positive for depression. Negative for substance abuse. The patient has insomnia and is nervous/anxious.    Allergic/Immunologic: Positive for environmental allergies and hives.        Objective:    Physical Exam  Constitutional:       Appearance: She is well-developed.      Comments: RA O2 sat 97%  VS sitting HR 90, 118/75, standing with /74,    HENT:      Head: Normocephalic.   Eyes:      Conjunctiva/sclera: Conjunctivae normal.      Pupils: Pupils are equal, round, and reactive to light.   Neck:      Thyroid: No thyromegaly.      Vascular: No JVD.      Comments: Circumference 17"  Cardiovascular:      Rate and Rhythm: Normal rate and regular rhythm.      Pulses: Intact distal pulses.           Carotid pulses are 1+ on the right side and 1+ on the left side.       Radial pulses are 1+ on the right side and 1+ on the left side.        Dorsalis pedis pulses are 1+ on the right side and 1+ on the left side.        Posterior tibial pulses are 1+ on the right side and 1+ on the left side.      Heart sounds: Heart sounds are distant. No murmur heard.    No friction rub. No gallop.   Pulmonary:      Effort: Pulmonary effort is normal.      " "Breath sounds: Normal breath sounds. No rales.   Chest:      Chest wall: No tenderness.   Abdominal:      General: Bowel sounds are normal.      Palpations: Abdomen is soft.      Tenderness: There is no abdominal tenderness.      Comments: Waist 55", hip 58"   Musculoskeletal:         General: Normal range of motion.      Cervical back: Normal range of motion and neck supple.   Lymphadenopathy:      Cervical: No cervical adenopathy.   Skin:     General: Skin is warm and dry.      Findings: No rash.   Neurological:      Mental Status: She is alert and oriented to person, place, and time.           Assessment:       1. Tachycardia, with standing    2. Obesity, Class III, BMI 40-49.9 (morbid obesity)    3. Dyslipidemia (high LDL; low HDL), baseline LDL-C 181    4. Cotton wool spots, OS    5. NAFLD (nonalcoholic fatty liver disease)    6. Essential thrombocytosis    7. Cardiovascular event risk, ASCVD 10-years risk 2.4%, 2021    8. RASHID (dyspnea on exertion), onset 2016    9. SUNIL on CPAP, 100% use         Plan:       Mariana was seen today for establish care.    Diagnoses and all orders for this visit:    Tachycardia, with standing  -     IN OFFICE EKG 12-LEAD (to [facility])    Obesity, Class III, BMI 40-49.9 (morbid obesity)    Dyslipidemia (high LDL; low HDL), baseline LDL-C 181    Cotton wool spots, OS  -     US Carotid Bilateral; Future    NAFLD (nonalcoholic fatty liver disease)    Essential thrombocytosis    Cardiovascular event risk, ASCVD 10-years risk 2.4%, 2021    RASHID (dyspnea on exertion), onset 2016    SUNIL on CPAP, 100% use    - All medical issues reviewed, willing to stop spironolactone, statin Rx is optional for her level of ASCVD risk.  - CV status and all medications reviewed, patient acknowledge good understanding.  - Recommend healthy living: healthy diet and regular exercise aiming for fitness, restorative sleep and weight control  - Need good exercise program, 4 key elements: 1. Aerobic (walking, " swimming, dancing, jogging, biking, etc, 2. Muscle strengthening / resistance exercise, need to do 2-3 times weekly, 3. Stretching daily, good stretch takes a whole  total minute. 4. Balance exercise daily.   - Instruction for Mediterranean diet and heart healthy exercise given.  - Check home blood pressure, 2 days weekly, do 2 readings within 5 minutes in AM and PM, keep log for review. Target resting BP is less than 130/85.  - Weigh twice weekly, try to lose 1-2 lbs per week. Target weight loss of 5%-10%.  - High recommend consideration for bariatric procedure.  - Highly recommend 30-60 minutes of exercise / activities daily, can have Sunday off, with 2-3 sessions of muscle strengthening weekly. A  would be very helpful.  - At low ASCVD event risk, any testing is optional and will follow only as needed.  - Phone review / encourage use of [facility]    Greater than 50% of the time was spent in counseling and coordination of care. The above assessment and plan have been discussed at length. Prior record reviewed. Labs and procedure over the last 6 months reviewed. Problem List updated. Asked to bring in all active medications / pills bottles with next visit.

## 2022-04-12 NOTE — PATIENT INSTRUCTIONS
Recommended Mediterranean dietEating Heart-Healthy Food: Using the DASH Plan  Eating for your heart doesnt have to be hard or boring. You just need to know how to make healthier choices. The DASH eating plan has been developed to help you do just that. DASH stands for Dietary Approaches to Stop Hypertension. It is a plan that has been proven to be healthier for your heart and to lower your risk for high blood pressure. It can also help lower your risk for cancer, heart disease, osteoporosis, and diabetes.  Choosing from Each Food Group  Choose foods from each of the food groups below each day. Try to get the recommended number of servings for each food group. The serving numbers are based on a diet of 2,000 calories a day. Talk to your doctor if youre unsure about your calorie needs.  Grains   Servings: 7-8 a day  A serving is:  1 slice bread  1 ounce dry cereal  half a cup cooked rice or pasta  Best choices: Whole grains and any grains high in fiber.  Vegetables   Servings: 4-5 a day  A serving is:  1 cup raw leafy vegetable  Half a cup cooked vegetable  Three-quarter cup vegetable juice  Best choices: Fresh or frozen vegetable prepared without too much added salt or fat.    Fruits   Servings: 4-5 a day  A serving is:  Three-quarter cup fruit juice  1 medium fruit  One-quarter cup dried fruit  One-half cup fresh, frozen, or canned fruit  Best choices: A variety of fresh fruits of different colors. Whole fruits are a much better choice than fruit juices.  Low-fat or Fat Free Dairy   Servings: 2-3 a day  A serving is:  8 ounces milk  1 cup yogurt  One and a half ounces cheese  Best choices: Skim or 1% milk, low-fat or fat free yogurt or buttermilk, and low-fat cheeses.       Meat, Poultry, Fish   Servings: 2 or fewer a day  A serving is:  3 ounces cooked meat, poultry, or fish  Best choices: Lean meats and fish. Trim away visible fat. Broil, roast, or boil instead of frying. Remove skin from poultry before eating.   Nuts, Seeds, Beans   Servings: 4-5 a week  A serving is:  One third cup nuts (or one and a half ounces)  2 tablespoons sunflower seeds  Half a cup cooked beans  Best choices: Dry roasted nuts with no salt added, lentils, kidney beans, garbanzo beans, and whole sandoval beans.    Fats and Oils   Servings: 2 a day  A serving is:  1 teaspoon vegetable oil  1 teaspoon soft margarine  1 tablespoon low-fat mayonnaise  1 teaspoon regular mayonnaise  2 tablespoons light salad dressing  1 tablespoon regular salad dressing  Best choices: Monounsaturated and polyunsaturated fats such as olive, canola, or safflower oil.  Sweets   Servings: 5 a week or fewer  A serving is:  1 tablespoon sugar, maple syrup, or honey  1 tablespoon jam or jelly  1 half-ounce jelly beans (about 15)  8 ounces lemonade  Best choices: Dried fruit can be a satisfying sweet. Choose low-fat sweets when possible. And watch your serving sizes!       Aerobic Exercise for a Healthy Heart  Exercise is a lot more than an energy booster and a stress reliever. It also strengthens your heart muscle, lowers your blood pressure and blood cholesterol, and burns calories.      Remember, some activity is better than none.     Choose an Aerobic Activity  Choose a nonstop activity that makes your heart and lungs work harder than they do when you rest or walk normally. This aerobic exercise can improve the way your heart and other muscles use oxygen. Make it fun by exercising with a friend and choosing an activity you enjoy. Here are some ideas:  Walking  Swimming  Bicycling  Stair climbing  Dancing  Jogging  Exercise Regularly  If you havent been exercising regularly,  get your doctors okay first. Then start slowly.  Here are some tips:  Begin exercising 3 times a week for 5-10 minutes at a time.  When you feel comfortable, add a few minutes each week.  Slowly build up to exercising 3-4 times each week for 20-40 minutes. Aim for a total of 150 or more minutes a  week.  Be sure to carry your nitroglycerin with you when you exercise.  If you get angina when youre exercising, stop what youre doing, take your nitroglycerin, and call your doctor.  © 3407-7937 [employer], [address] [place] [address], [place] [address]. All rights reserved. This information is not intended as a substitute for professional medical care. Always follow your healthcare professional's instructions.    Losing Weight (Cardiovascular)  Excess weight is a major risk factor for heart disease. Losing weight may help keep your arteries open so that your heart can get the oxygen-rich blood it needs. Weight loss can also help lower your blood pressure and reduce your risk for diabetes. All in all, losing weight makes you healthier.          Exercise with a friend. When activity is fun, you're more likely to stick with it.        Calories and Weight Loss  Calories are the fuel your body burns for energy. You get the calories you need from the food you eat. For healthy weight loss, women should eat at least 1,200 calories a day, men at least 1,500.    When you eat more calories than you need, your body stores the extra calories as fat. One pound of fat equals 3,500 calories.    To lose weight, try to burn 500 calories a day more than you eat. To do this, eat 250 calories less each day. Add activity to burn the other 250 calories. Walking 21/2 miles burns about 250 calories.    Eat a variety of healthy foods. Its the best way to make calories count.     Tips for losing weight:  Drink 8 to 10 glasses of water a day.    Dont skip meals. Instead, eat smaller portions.       Brisk Activity Is Best  Brisk activity gets your heart pumping faster. It makes your heart healthier. Its also the best way to burn calories. In fact, your body may keep burning calories for hours after you stop a brisk activity.    Begin by walking 10 minutes most days.    Add more time and speed to your walk. Build up as you feel  able.    Try to walk briskly at least 30 minutes most days. If needed, you can break this into 2 shorter sessions.     Check off the ideas below that you could try to make your day more active:    Take the stairs instead of the elevator.    Park your car farther away and walk.    Ride a bike to work or to the store.    Walk laps around the mall.

## 2022-04-14 ENCOUNTER — OFFICE VISIT (OUTPATIENT)
Dept: PSYCHIATRY | Facility: CLINIC | Age: 42
End: 2022-04-14
Payer: COMMERCIAL

## 2022-04-14 DIAGNOSIS — F42.9 OBSESSIVE-COMPULSIVE DISORDER, UNSPECIFIED TYPE: ICD-10-CM

## 2022-04-14 DIAGNOSIS — F33.9 RECURRENT DEPRESSION: ICD-10-CM

## 2022-04-14 DIAGNOSIS — F90.2 ATTENTION DEFICIT HYPERACTIVITY DISORDER (ADHD), COMBINED TYPE: ICD-10-CM

## 2022-04-14 DIAGNOSIS — F84.0 AUTISM SPECTRUM DISORDER: ICD-10-CM

## 2022-04-14 DIAGNOSIS — F41.1 GAD (GENERALIZED ANXIETY DISORDER): Primary | ICD-10-CM

## 2022-04-14 PROCEDURE — 1159F MED LIST DOCD IN RCRD: CPT | Mod: CPTII,S$GLB,, | Performed by: SOCIAL WORKER

## 2022-04-14 PROCEDURE — 90834 PSYTX W PT 45 MINUTES: CPT | Mod: S$GLB,,, | Performed by: SOCIAL WORKER

## 2022-04-14 PROCEDURE — 1159F PR MEDICATION LIST DOCUMENTED IN MEDICAL RECORD: ICD-10-PCS | Mod: CPTII,S$GLB,, | Performed by: SOCIAL WORKER

## 2022-04-14 PROCEDURE — 90834 PR PSYCHOTHERAPY W/PATIENT, 45 MIN: ICD-10-PCS | Mod: S$GLB,,, | Performed by: SOCIAL WORKER

## 2022-04-14 PROCEDURE — 99999 PR PBB SHADOW E&M-EST. PATIENT-LVL II: ICD-10-PCS | Mod: PBBFAC,,, | Performed by: SOCIAL WORKER

## 2022-04-14 PROCEDURE — 99999 PR PBB SHADOW E&M-EST. PATIENT-LVL II: CPT | Mod: PBBFAC,,, | Performed by: SOCIAL WORKER

## 2022-04-14 NOTE — PROGRESS NOTES
Individual Psychotherapy (PhD/LCSW)    4/14/2022    Site:  [facility]         Therapeutic Intervention: Met with patient.  Outpatient - Insight oriented psychotherapy 45 min - CPT code 23040, Outpatient - Behavior modifying psychotherapy 45 min - CPT code 39523 and Outpatient - Supportive psychotherapy 45 min - CPT Code 54173    Chief complaint/reason for encounter: depression, anxiety and OCD             Interval history and content of current session:  Client was referred to treatment by Kiara LANDAVERDE to address depression and anxiety.  Client arrived to session on time and was fully engaged.  Client shared that things have been going well at home.  She reported that she had an issue with 1 of her providers and she plans to advocate for herself.  This is something that client would not have done in the past so this is progress.  Client shared that she has been receiving more support from her .  She reports that she still having some trouble sleeping but that it has improved slightly.  She reports that she continues to have bizarre dreams.   checked in with client on skin picking and client reports that the skin picking has decreased.   and client reviewed coping skills that client could do to help minimize the skin picking.  Client reported that she has not been shopping as much.   asked client in what ways could her  be a support with her compulsive shopping.  Client agreed to talk to her  about ways that he could support her and for them to come a with mutual agreement and consequences.  Client to continue in one-to-one sessions.    Treatment plan:  · Target symptoms: depression, anxiety , OCD  · Why chosen therapy is appropriate versus another modality: relevant to diagnosis, patient responds to this modality, evidence based practice  · Outcome monitoring methods: self-report  · Therapeutic intervention type: insight oriented psychotherapy, behavior modifying  psychotherapy, supportive psychotherapy    Risk parameters:  Patient reports no suicidal ideation  Patient reports no homicidal ideation  Patient reports no self-injurious behavior  Patient reports no violent behavior          CSSRS was completed: No risk    Verbal deficits: None    Patient's response to intervention:  The patient's response to intervention is accepting.    Progress toward goals and other mental status changes:  The patient's progress toward goals is fair , good.    Diagnosis:     ICD-10-CM ICD-9-CM   1. JOSE ANGEL (generalized anxiety disorder)  F41.1 300.02   2. Obsessive-compulsive disorder, unspecified type  F42.9 300.3   3. Recurrent depression  F33.9 296.30   4. Attention deficit hyperactivity disorder (ADHD), combined type  F90.2 314.01   5. Autism spectrum disorder  F84.0 299.00       Plan:  individual psychotherapy and Ct to continue to see Kiara LANDAVERDE for psych med mgt Pt to go to ED or call 911 if symptoms worsen or if she has thoughts of harming self and/or others. Pt verbalized understanding.    Return to clinic: as scheduled    Length of Service (minutes): 45      Each patient to whom he or she provides medical services by telemedicine is: (1) informed of the relationship between the physician and patient and the respective role of any other health care provider with respect to management of the patient; and (2) notified that he or she may decline to receive medical services by telemedicine and may withdraw from such care at any time.

## 2022-04-15 ENCOUNTER — PATIENT MESSAGE (OUTPATIENT)
Dept: PSYCHIATRY | Facility: CLINIC | Age: 42
End: 2022-04-15
Payer: COMMERCIAL

## 2022-04-15 DIAGNOSIS — F41.1 GAD (GENERALIZED ANXIETY DISORDER): ICD-10-CM

## 2022-04-18 DIAGNOSIS — F42.9 OBSESSIVE-COMPULSIVE DISORDER, UNSPECIFIED TYPE: ICD-10-CM

## 2022-04-18 RX ORDER — BUSPIRONE HYDROCHLORIDE 7.5 MG/1
TABLET ORAL
Qty: 60 TABLET | Refills: 0 | Status: SHIPPED | OUTPATIENT
Start: 2022-04-18 | End: 2022-05-20 | Stop reason: SDUPTHER

## 2022-04-18 RX ORDER — SERTRALINE HYDROCHLORIDE 100 MG/1
200 TABLET, FILM COATED ORAL DAILY
Qty: 60 TABLET | Refills: 1 | Status: SHIPPED | OUTPATIENT
Start: 2022-04-18 | End: 2022-06-20

## 2022-04-18 NOTE — TELEPHONE ENCOUNTER
Rec'd fax from [facility] requesting refill on sertraline 100 mg  Last refill: 1/10  Last visit: 2/14  Follow up: 4/20

## 2022-04-19 ENCOUNTER — PATIENT MESSAGE (OUTPATIENT)
Dept: PSYCHIATRY | Facility: CLINIC | Age: 42
End: 2022-04-19
Payer: COMMERCIAL

## 2022-04-21 ENCOUNTER — OFFICE VISIT (OUTPATIENT)
Dept: PSYCHIATRY | Facility: CLINIC | Age: 42
End: 2022-04-21
Payer: COMMERCIAL

## 2022-04-21 DIAGNOSIS — F33.41 RECURRENT MAJOR DEPRESSIVE DISORDER, IN PARTIAL REMISSION: ICD-10-CM

## 2022-04-21 DIAGNOSIS — F42.9 OBSESSIVE-COMPULSIVE DISORDER, UNSPECIFIED TYPE: Primary | ICD-10-CM

## 2022-04-21 DIAGNOSIS — F90.2 ATTENTION DEFICIT HYPERACTIVITY DISORDER (ADHD), COMBINED TYPE: ICD-10-CM

## 2022-04-21 DIAGNOSIS — F41.1 GAD (GENERALIZED ANXIETY DISORDER): ICD-10-CM

## 2022-04-21 DIAGNOSIS — F84.0 AUTISM SPECTRUM DISORDER: ICD-10-CM

## 2022-04-21 DIAGNOSIS — G47.00 INSOMNIA, UNSPECIFIED TYPE: ICD-10-CM

## 2022-04-21 DIAGNOSIS — F43.9 TRAUMA AND STRESSOR-RELATED DISORDER: ICD-10-CM

## 2022-04-21 PROCEDURE — 1160F PR REVIEW ALL MEDS BY PRESCRIBER/CLIN PHARMACIST DOCUMENTED: ICD-10-PCS | Mod: CPTII,95,, | Performed by: PHYSICIAN ASSISTANT

## 2022-04-21 PROCEDURE — 99214 OFFICE O/P EST MOD 30 MIN: CPT | Mod: 95,,, | Performed by: PHYSICIAN ASSISTANT

## 2022-04-21 PROCEDURE — 1160F RVW MEDS BY RX/DR IN RCRD: CPT | Mod: CPTII,95,, | Performed by: PHYSICIAN ASSISTANT

## 2022-04-21 PROCEDURE — 1159F PR MEDICATION LIST DOCUMENTED IN MEDICAL RECORD: ICD-10-PCS | Mod: CPTII,95,, | Performed by: PHYSICIAN ASSISTANT

## 2022-04-21 PROCEDURE — 99214 PR OFFICE/OUTPT VISIT, EST, LEVL IV, 30-39 MIN: ICD-10-PCS | Mod: 95,,, | Performed by: PHYSICIAN ASSISTANT

## 2022-04-21 PROCEDURE — 1159F MED LIST DOCD IN RCRD: CPT | Mod: CPTII,95,, | Performed by: PHYSICIAN ASSISTANT

## 2022-04-21 NOTE — PROGRESS NOTES
Outpatient Psychiatry Follow-Up Visit (MD/NP)    4/21/2022    Clinical Status of Patient:  Outpatient (Ambulatory)    The [place]  The chief complaint leading to consultation is: mood, anxiety, ADHD    Visit type: audiovisual    Face to Face time with patient: 24  40 minutes of total time spent on the encounter, which includes face to face time and non-face to face time preparing to see the patient (eg, review of tests), Obtaining and/or reviewing separately obtained history, Documenting clinical information in the electronic or other health record, Independently interpreting results (not separately reported) and communicating results to the patient/family/caregiver, or Care coordination (not separately reported).     Each patient to whom he or she provides medical services by telemedicine is:  (1) informed of the relationship between the physician and patient and the respective role of any other health care provider with respect to management of the patient; and (2) notified that he or she may decline to receive medical services by telemedicine and may withdraw from such care at any time.      Chief Complaint:  Mariana Jara Morgan is a 41 y.o. female who presents today for follow-up of depression and anxiety.  Met with patient.      Interval History and Content of Current Session:  Interim Events/Subjective Report/Content of Current Session:   Mariana is seen virtually today for medication follow up.  She has been battling a GI bug this week.  Discussed that she had significant fever, could not even keep water down.  She is feeling significantly better today but still did not want drinking lots of fluids.  One month ago, her uncle passed away.  She reports that he was her favorite uncle.  He had been sick for a while, had a heart and kidney transplant.  He then got COVID.  He lived in [place].  She was able to text with him a little bit when he was still well enough to respond.   She states that all she wants to do is sleep at this time.  She has found benefit from bupropion for focus and motivation, however it makes her incredibly nauseated.  She states she is stuck at home, next the toilet for 4-5 hours after taking it.  She does have upcoming cardiology visits to assess tachycardia and some other symptoms that she is having.  At this point, we will look at discontinuing appropriate on and seeing what Cardiology says.  She is sleeping, has some bizarre dreams but better with prazosin.  She does have a poor appetite.  Her hearing for disability is on June 9th.  She denies suicidal or homicidal ideation.  Has continued to work with MILENA Erwin for psychotherapy.  No other complaints today.      FROM PREVIOUS HPI  Mariana is seen today for medication follow up. She states she recently thought she had COVID but test was negative. She has been resting up. She has been mostly staying home with dogs and doing art which she is enjoying. Overall reports that things have been okay. Wellbutrin seems like it will mostly be an adequate substitute for Vyvanse. She would like to increase the dose today. Has continued to work on disability hearing. She states she does have intrusive thoughts at night regarding a MVA although she has not been in an MVA in the past. She denies SI/HI. No other complaints today.       GAD7 4/21/2022 4/14/2022 3/31/2022   1. Feeling nervous, anxious, or on edge? 2 2 1   2. Not being able to stop or control worrying? 2 1 2   3. Worrying too much about different things? 2 1 2   4. Trouble relaxing? 2 2 2   5. Being so restless that it is hard to sit still? 2 2 2   6. Becoming easily annoyed or irritable? 1 1 1   7. Feeling afraid as if something awful might happen? 2 2 1   8. If you checked off any problems, how difficult have these problems made it for you to do your work, take care of things at home, or get along with other people? 2 2 2   JOSE ANGEL-7 Score 13 11 11       PHQ9  2/14/2022   Little interest or pleasure in doing things: More than half the days   Feeling down, depressed or hopeless: Several days   Trouble falling asleep, staying asleep, or sleeping too much: Nearly every day   Feeling tired or having little energy: Nearly every day   Poor appetite or overeating: More than half the days   Feeling bad about yourself- or that you are a failure or have let yourself or family down Several days   Trouble concentrating on things, such as reading the newspaper or watching television: Nearly every day   Moving or speaking so slowly that other people could have noticed. Or the opposite- being so fidgety or restless that you have been moving around a lot more than usual: Several days   Thoughts that you would be better off dead or hurting yourself in some way: Not at all   If you indicated you have experienced any of the aforementioned problems, how difficult have these problems made it for you to do your work, take care of things at home or get along with other people? Very difficult   Total Score 16       Review of Systems   · PSYCHIATRIC: Pertinant items are noted in the narrative.  · RESPIRATORY: No shortness of breath.  · CARDIOVASCULAR: Reports tachycardia, no chest pain.  · GASTROINTESTINAL: Reports nausea, vomiting, diarrhea.     Past Medical, Family and Social History: The patient's past medical, family and social history have been reviewed and updated as appropriate within the electronic medical record - see encounter notes.    Compliance: yes    Side effects: None    Risk Parameters:  Patient reports no suicidal ideation  Patient reports no homicidal ideation  Patient reports no self-injurious behavior  Patient reports no violent behavior    Exam (detailed: at least 9 elements; comprehensive: all 15 elements)   Constitutional  Vitals:  Most recent vital signs, dated less than 90 days prior to this appointment, were reviewed.   There were no vitals filed for this visit.      General:  unremarkable, age appropriate     Musculoskeletal  Muscle Strength/Tone:  not examined   Gait & Station:  virtual visit     Psychiatric  Speech:  no latency; no press   Mood & Affect:  ok  appropriate   Thought Process:  normal and logical   Associations:  intact   Thought Content:  normal, no suicidality, no homicidality, delusions, or paranoia   Insight:  intact   Judgement: behavior is adequate to circumstances   Orientation:  grossly intact   Memory: intact for content of interview   Language: grossly intact   Attention Span & Concentration:  able to focus   Fund of Knowledge:  intact and appropriate to age and level of education     Assessment and Diagnosis   Status/Progress: Based on the examination today, the patient's problem(s) is/are inadequately controlled.  New problems have not been presented today.   Co-morbidities, Diagnostic uncertainty and Lack of compliance are not complicating management of the primary condition.      General Impression:   Major depressive disorder, mild  Generalized anxiety disorder  OCD by history  ADHD by history  Autism spectrum disorder, by patient report    Intervention/Counseling/Treatment Plan   · Medication Management: Continue current medications. The risks and benefits of medication were discussed with the patient.  · Counseling provided with patient as follows: importance of compliance with chosen treatment options was emphasized, risks and benefits of treatment options, including medications, were discussed with the patient, risk factor reduction, prognosis     Mariana is seen today for medication follow-up.  Doing quite well on current medication regimen.  Based on assessment today:    Continue with MILENA Erwin for psychotherapy.    Continue Prazosin to 5mg nightly for nightmares, discussed mechanism of action and to change positions slowly. This will be titrated to effect.  Continue seroquel XR 50mg nightly.  Continue sertraline 200 mg daily for  depression/anxiety/OCD symptoms.  Continue buspirone 7.5 mg twice daily for anxiety.  Discontinue bupropion due to side effects.    Gene site testing reivewed.    Please go to emergency department if feeling as though you are a harm to yourself or others or if you are in crisis.     Please call the clinic to report any worsening of symptoms or problems associated with medication.    Discussed risks of tardive dyskinesia, drug induced parkinsonism, metabolic side effects, including weight gain, neuroleptic malignant syndrome       Return to Clinic: 1 month, as needed

## 2022-04-25 RX ORDER — LEVOTHYROXINE SODIUM 100 UG/1
TABLET ORAL
Qty: 90 TABLET | Refills: 3 | Status: SHIPPED | OUTPATIENT
Start: 2022-04-25 | End: 2023-09-11 | Stop reason: DRUGHIGH

## 2022-04-28 ENCOUNTER — OFFICE VISIT (OUTPATIENT)
Dept: PSYCHIATRY | Facility: CLINIC | Age: 42
End: 2022-04-28
Payer: COMMERCIAL

## 2022-04-28 DIAGNOSIS — F90.2 ATTENTION DEFICIT HYPERACTIVITY DISORDER (ADHD), COMBINED TYPE: ICD-10-CM

## 2022-04-28 DIAGNOSIS — F33.9 RECURRENT DEPRESSION: ICD-10-CM

## 2022-04-28 DIAGNOSIS — F43.9 TRAUMA AND STRESSOR-RELATED DISORDER: ICD-10-CM

## 2022-04-28 DIAGNOSIS — F41.1 GAD (GENERALIZED ANXIETY DISORDER): ICD-10-CM

## 2022-04-28 DIAGNOSIS — F42.9 OBSESSIVE-COMPULSIVE DISORDER, UNSPECIFIED TYPE: Primary | ICD-10-CM

## 2022-04-28 PROCEDURE — 1159F PR MEDICATION LIST DOCUMENTED IN MEDICAL RECORD: ICD-10-PCS | Mod: CPTII,S$GLB,, | Performed by: SOCIAL WORKER

## 2022-04-28 PROCEDURE — 90834 PR PSYCHOTHERAPY W/PATIENT, 45 MIN: ICD-10-PCS | Mod: S$GLB,,, | Performed by: SOCIAL WORKER

## 2022-04-28 PROCEDURE — 90834 PSYTX W PT 45 MINUTES: CPT | Mod: S$GLB,,, | Performed by: SOCIAL WORKER

## 2022-04-28 PROCEDURE — 1159F MED LIST DOCD IN RCRD: CPT | Mod: CPTII,S$GLB,, | Performed by: SOCIAL WORKER

## 2022-04-28 PROCEDURE — 99999 PR PBB SHADOW E&M-EST. PATIENT-LVL II: ICD-10-PCS | Mod: PBBFAC,,, | Performed by: SOCIAL WORKER

## 2022-04-28 PROCEDURE — 99999 PR PBB SHADOW E&M-EST. PATIENT-LVL II: CPT | Mod: PBBFAC,,, | Performed by: SOCIAL WORKER

## 2022-04-28 NOTE — PROGRESS NOTES
"Individual Psychotherapy (PhD/LCSW)    4/28/2022    Site:  [facility]         Therapeutic Intervention: Met with patient.  Outpatient - Insight oriented psychotherapy 45 min - CPT code 10949, Outpatient - Behavior modifying psychotherapy 45 min - CPT code 46848 and Outpatient - Supportive psychotherapy 45 min - CPT Code 39494    Chief complaint/reason for encounter: depression, anxiety and trauma, OCD             Interval history and content of current session: Ct was referred to tx by Kiara LANDAVERDE to address depression, anxiety, trauma, OCD. Ct arrived to session on time and was fully engaged. Ct shared that she has been having feelings/ thoughts of "waiting for the other shoe to drop" or " something bad is about to happen". SW and ct processed the thought trap of castastrophinzing things and how to challenge that type of thinking. SW and ct reviewed coping thoughts and ct focusing on times in her life when bad things happened but she handled it and persevered. Ct to continue in 1:1 sessions.     Treatment plan:  · Target symptoms: depression, anxiety , trauma, OCD  · Why chosen therapy is appropriate versus another modality: relevant to diagnosis, patient responds to this modality, evidence based practice  · Outcome monitoring methods: self-report  · Therapeutic intervention type: insight oriented psychotherapy, behavior modifying psychotherapy, supportive psychotherapy    Risk parameters:  Patient reports no suicidal ideation  Patient reports no homicidal ideation  Patient reports no self-injurious behavior  Patient reports no violent behavior          CSSRS was completed: No risk    Verbal deficits: None    Patient's response to intervention:  The patient's response to intervention is accepting.    Progress toward goals and other mental status changes:  The patient's progress toward goals is fair , good.    Diagnosis:     ICD-10-CM ICD-9-CM   1. Obsessive-compulsive disorder, unspecified type  F42.9 300.3   2. JOSE ANGEL " (generalized anxiety disorder)  F41.1 300.02   3. Trauma and stressor-related disorder  F43.9 309.81     308.9   4. Recurrent depression  F33.9 296.30   5. Attention deficit hyperactivity disorder (ADHD), combined type  F90.2 314.01       Plan:  individual psychotherapy and Ct to continue to see Kiara LANDAVERDE for psych med mgt Pt to go to ED or call 911 if symptoms worsen or if she has thoughts of harming self and/or others. Pt verbalized understanding.    Return to clinic: as scheduled    Length of Service (minutes): 45      Each patient to whom he or she provides medical services by telemedicine is: (1) informed of the relationship between the physician and patient and the respective role of any other health care provider with respect to management of the patient; and (2) notified that he or she may decline to receive medical services by telemedicine and may withdraw from such care at any time.

## 2022-05-04 ENCOUNTER — HOSPITAL ENCOUNTER (OUTPATIENT)
Dept: RADIOLOGY | Facility: HOSPITAL | Age: 42
Discharge: HOME | End: 2022-05-04
Attending: INTERNAL MEDICINE
Payer: COMMERCIAL

## 2022-05-04 DIAGNOSIS — H35.81 COTTON WOOL SPOTS: ICD-10-CM

## 2022-05-04 LAB
LEFT CCA DIST DIAS: 31.9 CM/S
LEFT CCA DIST SYS: 89.4 CM/S
LEFT CCA MID DIAS: 27 CM/S
LEFT CCA MID SYS: 90.9 CM/S
LEFT CCA PROX DIAS: 29.4 CM/S
LEFT CCA PROX SYS: 92.6 CM/S
LEFT ECA DIAS: 20.6 CM/S
LEFT ECA SYS: 91.4 CM/S
LEFT ICA DIST DIAS: 30 CM/S
LEFT ICA DIST SYS: 74.2 CM/S
LEFT ICA MID DIAS: 37.8 CM/S
LEFT ICA MID SYS: 93.4 CM/S
LEFT ICA PROX DIAS: 23.2 CM/S
LEFT ICA PROX SYS: 57.1 CM/S
LEFT VERTEBRAL DIAS: 24.8 CM/S
LEFT VERTEBRAL SYS: 51.8 CM/S
OHS CV CAROTID RIGHT ICA EDV HIGHEST: 23.5
OHS CV CAROTID ULTRASOUND LEFT ICA/CCA RATIO: 1.04
OHS CV CAROTID ULTRASOUND RIGHT ICA/CCA RATIO: 0.81
OHS CV PV CAROTID LEFT HIGHEST CCA: 93
OHS CV PV CAROTID LEFT HIGHEST ICA: 93.4
OHS CV PV CAROTID RIGHT HIGHEST CCA: 138
OHS CV PV CAROTID RIGHT HIGHEST ICA: 57.5
OHS CV US CAROTID LEFT HIGHEST EDV: 37.8
RIGHT CCA DIST DIAS: 16.7 CM/S
RIGHT CCA DIST SYS: 70.6 CM/S
RIGHT CCA MID DIAS: 29.3 CM/S
RIGHT CCA MID SYS: 84.4 CM/S
RIGHT CCA PROX DIAS: 40.9 CM/S
RIGHT CCA PROX SYS: 138 CM/S
RIGHT ECA DIAS: 28.9 CM/S
RIGHT ECA SYS: 93.7 CM/S
RIGHT ICA DIST DIAS: 19.2 CM/S
RIGHT ICA DIST SYS: 50 CM/S
RIGHT ICA MID DIAS: 23.5 CM/S
RIGHT ICA MID SYS: 53.5 CM/S
RIGHT ICA PROX DIAS: 18.7 CM/S
RIGHT ICA PROX SYS: 57.5 CM/S
RIGHT VERTEBRAL DIAS: 23.6 CM/S
RIGHT VERTEBRAL SYS: 45.9 CM/S

## 2022-05-04 PROCEDURE — 93880 EXTRACRANIAL BILAT STUDY: CPT | Mod: 26,,, | Performed by: INTERNAL MEDICINE

## 2022-05-04 PROCEDURE — 93880 CV US DOPPLER CAROTID (CUPID ONLY): ICD-10-PCS | Mod: 26,,, | Performed by: INTERNAL MEDICINE

## 2022-05-04 PROCEDURE — 93880 EXTRACRANIAL BILAT STUDY: CPT

## 2022-05-12 ENCOUNTER — OFFICE VISIT (OUTPATIENT)
Dept: PSYCHIATRY | Facility: CLINIC | Age: 42
End: 2022-05-12
Payer: COMMERCIAL

## 2022-05-12 DIAGNOSIS — F41.1 GAD (GENERALIZED ANXIETY DISORDER): Primary | ICD-10-CM

## 2022-05-12 DIAGNOSIS — F43.9 TRAUMA AND STRESSOR-RELATED DISORDER: ICD-10-CM

## 2022-05-12 DIAGNOSIS — F84.0 AUTISM SPECTRUM DISORDER: ICD-10-CM

## 2022-05-12 DIAGNOSIS — F33.9 RECURRENT DEPRESSION: ICD-10-CM

## 2022-05-12 DIAGNOSIS — F42.9 OBSESSIVE-COMPULSIVE DISORDER, UNSPECIFIED TYPE: ICD-10-CM

## 2022-05-12 PROCEDURE — 99999 PR PBB SHADOW E&M-EST. PATIENT-LVL II: CPT | Mod: PBBFAC,,, | Performed by: SOCIAL WORKER

## 2022-05-12 PROCEDURE — 1159F MED LIST DOCD IN RCRD: CPT | Mod: CPTII,S$GLB,, | Performed by: SOCIAL WORKER

## 2022-05-12 PROCEDURE — 90834 PR PSYCHOTHERAPY W/PATIENT, 45 MIN: ICD-10-PCS | Mod: S$GLB,,, | Performed by: SOCIAL WORKER

## 2022-05-12 PROCEDURE — 1159F PR MEDICATION LIST DOCUMENTED IN MEDICAL RECORD: ICD-10-PCS | Mod: CPTII,S$GLB,, | Performed by: SOCIAL WORKER

## 2022-05-12 PROCEDURE — 99999 PR PBB SHADOW E&M-EST. PATIENT-LVL II: ICD-10-PCS | Mod: PBBFAC,,, | Performed by: SOCIAL WORKER

## 2022-05-12 PROCEDURE — 90834 PSYTX W PT 45 MINUTES: CPT | Mod: S$GLB,,, | Performed by: SOCIAL WORKER

## 2022-05-12 NOTE — PROGRESS NOTES
Individual Psychotherapy (PhD/LCSW)    5/12/2022    Site:  [place]         Therapeutic Intervention: Met with patient.  Outpatient - Insight oriented psychotherapy 45 min - CPT code 62625, Outpatient - Behavior modifying psychotherapy 45 min - CPT code 76705 and Outpatient - Supportive psychotherapy 45 min - CPT Code 58620    Chief complaint/reason for encounter: depression, anxiety and trauma, OCD             Interval history and content of current session:  Client was referred to treatment by Kiara LANDAVERDE to address depression, anxiety, trauma, and OCD.  Client arrived to session on time and was fully engaged.  Client shared about being nervous and anxious about her upcoming hearing.   and client processed client's anxiety and client was able to see that a lot of the negative self talk was related to her negative feelings about the situation.   and client completed a role play activity related to how thoughts control emotions and responses.  Client was receptive to feedback.   provided client with exercises on acceptance and focusing on things that she had control over and things that she had no control over.  Client to continue in one-to-one sessions.    Treatment plan:  · Target symptoms: depression, anxiety , trauma, OCD  · Why chosen therapy is appropriate versus another modality: relevant to diagnosis, patient responds to this modality, evidence based practice  · Outcome monitoring methods: self-report  · Therapeutic intervention type: insight oriented psychotherapy, behavior modifying psychotherapy, supportive psychotherapy    Risk parameters:  Patient reports no suicidal ideation  Patient reports no homicidal ideation  Patient reports no self-injurious behavior  Patient reports no violent behavior          CSSRS was completed: No risk    Verbal deficits: None    Patient's response to intervention:  The patient's response to intervention is accepting.    Progress toward  goals and other mental status changes:  The patient's progress toward goals is fair , good.    Diagnosis:     ICD-10-CM ICD-9-CM   1. JOSE ANGEL (generalized anxiety disorder)  F41.1 300.02   2. Trauma and stressor-related disorder  F43.9 309.81     308.9   3. Obsessive-compulsive disorder, unspecified type  F42.9 300.3   4. Recurrent depression  F33.9 296.30   5. Autism spectrum disorder  F84.0 299.00       Plan:  individual psychotherapy and Ct to continue to see Kiara LANDAVERDE for psych med mgt Pt to go to ED or call 911 if symptoms worsen or if she has thoughts of harming self and/or others. Pt verbalized understanding.    Return to clinic: as scheduled    Length of Service (minutes): 45      Each patient to whom he or she provides medical services by telemedicine is: (1) informed of the relationship between the physician and patient and the respective role of any other health care provider with respect to management of the patient; and (2) notified that he or she may decline to receive medical services by telemedicine and may withdraw from such care at any time.

## 2022-05-20 ENCOUNTER — OFFICE VISIT (OUTPATIENT)
Dept: PSYCHIATRY | Facility: CLINIC | Age: 42
End: 2022-05-20
Payer: COMMERCIAL

## 2022-05-20 VITALS — WEIGHT: 288.13 LBS | HEIGHT: 66 IN | BODY MASS INDEX: 46.3 KG/M2

## 2022-05-20 DIAGNOSIS — F42.9 OBSESSIVE-COMPULSIVE DISORDER, UNSPECIFIED TYPE: ICD-10-CM

## 2022-05-20 DIAGNOSIS — G47.00 INSOMNIA, UNSPECIFIED TYPE: ICD-10-CM

## 2022-05-20 DIAGNOSIS — F90.2 ATTENTION DEFICIT HYPERACTIVITY DISORDER (ADHD), COMBINED TYPE: Primary | ICD-10-CM

## 2022-05-20 DIAGNOSIS — F41.1 GAD (GENERALIZED ANXIETY DISORDER): ICD-10-CM

## 2022-05-20 DIAGNOSIS — F43.9 TRAUMA AND STRESSOR-RELATED DISORDER: ICD-10-CM

## 2022-05-20 PROCEDURE — 99999 PR PBB SHADOW E&M-EST. PATIENT-LVL V: CPT | Mod: PBBFAC,,, | Performed by: PHYSICIAN ASSISTANT

## 2022-05-20 PROCEDURE — 99999 PR PBB SHADOW E&M-EST. PATIENT-LVL V: ICD-10-PCS | Mod: PBBFAC,,, | Performed by: PHYSICIAN ASSISTANT

## 2022-05-20 PROCEDURE — 1160F RVW MEDS BY RX/DR IN RCRD: CPT | Mod: CPTII,S$GLB,, | Performed by: PHYSICIAN ASSISTANT

## 2022-05-20 PROCEDURE — 3008F BODY MASS INDEX DOCD: CPT | Mod: CPTII,S$GLB,, | Performed by: PHYSICIAN ASSISTANT

## 2022-05-20 PROCEDURE — 1160F PR REVIEW ALL MEDS BY PRESCRIBER/CLIN PHARMACIST DOCUMENTED: ICD-10-PCS | Mod: CPTII,S$GLB,, | Performed by: PHYSICIAN ASSISTANT

## 2022-05-20 PROCEDURE — 99214 PR OFFICE/OUTPT VISIT, EST, LEVL IV, 30-39 MIN: ICD-10-PCS | Mod: S$GLB,,, | Performed by: PHYSICIAN ASSISTANT

## 2022-05-20 PROCEDURE — 3008F PR BODY MASS INDEX (BMI) DOCUMENTED: ICD-10-PCS | Mod: CPTII,S$GLB,, | Performed by: PHYSICIAN ASSISTANT

## 2022-05-20 PROCEDURE — 99214 OFFICE O/P EST MOD 30 MIN: CPT | Mod: S$GLB,,, | Performed by: PHYSICIAN ASSISTANT

## 2022-05-20 PROCEDURE — 1159F MED LIST DOCD IN RCRD: CPT | Mod: CPTII,S$GLB,, | Performed by: PHYSICIAN ASSISTANT

## 2022-05-20 PROCEDURE — 1159F PR MEDICATION LIST DOCUMENTED IN MEDICAL RECORD: ICD-10-PCS | Mod: CPTII,S$GLB,, | Performed by: PHYSICIAN ASSISTANT

## 2022-05-20 RX ORDER — BUSPIRONE HYDROCHLORIDE 7.5 MG/1
TABLET ORAL
Qty: 60 TABLET | Refills: 0 | Status: SHIPPED | OUTPATIENT
Start: 2022-05-20 | End: 2022-07-06 | Stop reason: SDUPTHER

## 2022-05-20 RX ORDER — ATOMOXETINE 40 MG/1
40 CAPSULE ORAL DAILY
Qty: 30 CAPSULE | Refills: 0 | Status: SHIPPED | OUTPATIENT
Start: 2022-05-20 | End: 2022-06-13

## 2022-05-20 NOTE — PATIENT INSTRUCTIONS
Begin Strattera 40mg once daily in the morning.    Please go to emergency department if feeling as though you are a harm to yourself or others or if you are in crisis. Please call the clinic to report any worsening of symptoms or problems associated with medication.

## 2022-05-20 NOTE — PROGRESS NOTES
"Outpatient Psychiatry Follow-Up Visit (MD/NP)    5/20/2022    Clinical Status of Patient:  Outpatient (Ambulatory)    Chief Complaint:  Mariana Mora is a 41 y.o. female who presents today for follow-up of depression and anxiety.  Met with patient.  KATRIN Darnell is present for the visit.      Interval History and Content of Current Session:  Interim Events/Subjective Report/Content of Current Session:   Mariana is seen today for medication follow up. She has been busy with evaluation of medical concerns. Cardiology evaluating for POTS syndrome. Recent eye exam. She enjoys painting. She is working on disability paperwork and having her hearing 6/9/2022. She reports significant anxiety in anticipation of the hearing. She is seeing psychologist (Dr. Burgess) weekly. She reports autism is significant to her identity. She is sleeping well. She reports trouble focusing and feels like she is just "existing." She has been intolerant of stimulants so far. We discussed Strattera as viable option to increase concentration and she was again informed the norepinephrine component may produce side effects. She is looking forward to [email or web address] mid-June. She is scheduled with Yaima later this month. She denies suicidal or homicidal ideation. No other complaints today.      FROM PREVIOUS HPI  Mariana is seen virtually today for medication follow up.  She has been battling a GI bug this week.  Discussed that she had significant fever, could not even keep water down.  She is feeling significantly better today but still did not want drinking lots of fluids.  One month ago, her uncle passed away.  She reports that he was her favorite uncle.  He had been sick for a while, had a heart and kidney transplant.  He then got COVID.  He lived in [place].  She was able to text with him a little bit when he was still well enough to respond.  She states that all she wants to do is sleep at this time.  She has found benefit " from bupropion for focus and motivation, however it makes her incredibly nauseated.  She states she is stuck at home, next the toilet for 4-5 hours after taking it.  She does have upcoming cardiology visits to assess tachycardia and some other symptoms that she is having.  At this point, we will look at discontinuing appropriate on and seeing what Cardiology says.  She is sleeping, has some bizarre dreams but better with prazosin.  She does have a poor appetite.  Her hearing for disability is on June 9th.  She denies suicidal or homicidal ideation.  Has continued to work with MILENA Erwin for psychotherapy.  No other complaints today.        GAD7 5/20/2022 5/12/2022 4/28/2022   1. Feeling nervous, anxious, or on edge? 2 2 2   2. Not being able to stop or control worrying? 2 2 2   3. Worrying too much about different things? 2 2 2   4. Trouble relaxing? 2 2 2   5. Being so restless that it is hard to sit still? 2 1 1   6. Becoming easily annoyed or irritable? 2 1 1   7. Feeling afraid as if something awful might happen? 2 2 1   8. If you checked off any problems, how difficult have these problems made it for you to do your work, take care of things at home, or get along with other people? 2 2 2   JOSE ANGEL-7 Score 14 12 11       PHQ9 5/20/2022   Little interest or pleasure in doing things: More than half the days   Feeling down, depressed or hopeless: More than half the days   Trouble falling asleep, staying asleep, or sleeping too much: More than half the days   Feeling tired or having little energy: More than half the days   Poor appetite or overeating: Nearly every day   Feeling bad about yourself- or that you are a failure or have let yourself or family down Several days   Trouble concentrating on things, such as reading the newspaper or watching television: Nearly every day   Moving or speaking so slowly that other people could have noticed. Or the opposite- being so fidgety or restless that you have been moving around  a lot more than usual: Several days   Thoughts that you would be better off dead or hurting yourself in some way: Not at all   If you indicated you have experienced any of the aforementioned problems, how difficult have these problems made it for you to do your work, take care of things at home or get along with other people? Very difficult   Total Score 16       Review of Systems   · PSYCHIATRIC: Pertinant items are noted in the narrative.  · RESPIRATORY: No shortness of breath.  · CARDIOVASCULAR: Reports tachycardia, no chest pain.  · GASTROINTESTINAL: Reports nausea, vomiting, diarrhea.     Past Medical, Family and Social History: The patient's past medical, family and social history have been reviewed and updated as appropriate within the electronic medical record - see encounter notes.    Compliance: yes    Side effects: None    Risk Parameters:  Patient reports no suicidal ideation  Patient reports no homicidal ideation  Patient reports no self-injurious behavior  Patient reports no violent behavior    Exam (detailed: at least 9 elements; comprehensive: all 15 elements)   Constitutional  Vitals:  Most recent vital signs, dated less than 90 days prior to this appointment, were reviewed.   Vitals:        General:  unremarkable, age appropriate     Musculoskeletal  Muscle Strength/Tone:  not examined   Gait & Station:  virtual visit     Psychiatric  Speech:  no latency; no press   Mood & Affect:  ok  appropriate   Thought Process:  normal and logical   Associations:  intact   Thought Content:  normal, no suicidality, no homicidality, delusions, or paranoia   Insight:  intact   Judgement: behavior is adequate to circumstances   Orientation:  grossly intact   Memory: intact for content of interview   Language: grossly intact   Attention Span & Concentration:  able to focus   Fund of Knowledge:  intact and appropriate to age and level of education     Assessment and Diagnosis   Status/Progress: Based on the  examination today, the patient's problem(s) is/are inadequately controlled.  New problems have not been presented today.   Co-morbidities, Diagnostic uncertainty and Lack of compliance are not complicating management of the primary condition.      General Impression:   Major depressive disorder, mild  Generalized anxiety disorder  OCD by history  ADHD by history  Autism spectrum disorder, by patient report    Intervention/Counseling/Treatment Plan   · Medication Management: Continue current medications. The risks and benefits of medication were discussed with the patient.  · Counseling provided with patient as follows: importance of compliance with chosen treatment options was emphasized, risks and benefits of treatment options, including medications, were discussed with the patient, risk factor reduction, prognosis     Mariana is seen today for medication follow-up.  Inattention/concentration is primary concern.  Based on assessment today:    Continue with MILENA Erwin for psychotherapy.    Trial Strattera 40mg daily for concentration  Continue Prazosin 5mg nightly for nightmares, discussed mechanism of action and to change positions slowly. This will be titrated to effect.  Continue seroquel XR 50mg nightly for antidepressant augmentation and insomnia.   Continue sertraline 200 mg daily for depression/anxiety/OCD symptoms.  Continue buspirone 7.5 mg twice daily for anxiety.    Gene site testing reivewed.    Please go to emergency department if feeling as though you are a harm to yourself or others or if you are in crisis.     Please call the clinic to report any worsening of symptoms or problems associated with medication.    Discussed risks of tardive dyskinesia, drug induced parkinsonism, metabolic side effects, including weight gain, neuroleptic malignant syndrome       Return to Clinic: 1 month, as needed

## 2022-05-26 ENCOUNTER — OFFICE VISIT (OUTPATIENT)
Dept: PSYCHIATRY | Facility: CLINIC | Age: 42
End: 2022-05-26
Payer: COMMERCIAL

## 2022-05-26 DIAGNOSIS — F90.2 ATTENTION DEFICIT HYPERACTIVITY DISORDER (ADHD), COMBINED TYPE: Primary | ICD-10-CM

## 2022-05-26 DIAGNOSIS — F84.0 AUTISM SPECTRUM DISORDER: ICD-10-CM

## 2022-05-26 DIAGNOSIS — F43.9 TRAUMA AND STRESSOR-RELATED DISORDER: ICD-10-CM

## 2022-05-26 DIAGNOSIS — F42.9 OBSESSIVE-COMPULSIVE DISORDER, UNSPECIFIED TYPE: ICD-10-CM

## 2022-05-26 DIAGNOSIS — F33.9 RECURRENT DEPRESSION: ICD-10-CM

## 2022-05-26 DIAGNOSIS — F41.1 GAD (GENERALIZED ANXIETY DISORDER): ICD-10-CM

## 2022-05-26 PROCEDURE — 1159F MED LIST DOCD IN RCRD: CPT | Mod: CPTII,S$GLB,, | Performed by: SOCIAL WORKER

## 2022-05-26 PROCEDURE — 99999 PR PBB SHADOW E&M-EST. PATIENT-LVL II: ICD-10-PCS | Mod: PBBFAC,,, | Performed by: SOCIAL WORKER

## 2022-05-26 PROCEDURE — 99999 PR PBB SHADOW E&M-EST. PATIENT-LVL II: CPT | Mod: PBBFAC,,, | Performed by: SOCIAL WORKER

## 2022-05-26 PROCEDURE — 90834 PR PSYCHOTHERAPY W/PATIENT, 45 MIN: ICD-10-PCS | Mod: S$GLB,,, | Performed by: SOCIAL WORKER

## 2022-05-26 PROCEDURE — 90834 PSYTX W PT 45 MINUTES: CPT | Mod: S$GLB,,, | Performed by: SOCIAL WORKER

## 2022-05-26 PROCEDURE — 1159F PR MEDICATION LIST DOCUMENTED IN MEDICAL RECORD: ICD-10-PCS | Mod: CPTII,S$GLB,, | Performed by: SOCIAL WORKER

## 2022-05-26 NOTE — PROGRESS NOTES
Individual Psychotherapy (PhD/LCSW)    5/26/2022    Site:  [facility]         Therapeutic Intervention: Met with patient.  Outpatient - Insight oriented psychotherapy 45 min - CPT code 02424, Outpatient - Behavior modifying psychotherapy 45 min - CPT code 69818 and Outpatient - Supportive psychotherapy 45 min - CPT Code 87271    Chief complaint/reason for encounter: attention deficit, depression, anxiety and OCD, trauma-stress             Interval history and content of current session:  Client was referred to treatment by Kiara LANDAVERDE to address attention deficit, depression, anxiety and OCD, trauma-stress   .  Client arrived to time and was fully engaged.  Client shared that she continues to have anxiety regarding her disability hearing which is in 2 weeks.  She reported that she is also distressed because her dog, that she has had for the past 13 years is sick.  She reported that her dog has been a source of comfort and support to her.  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  She reported that she was in sensory overload several times.   and client processed how it is important for client to continue to put herself and social situations so that she can learn to adapt.  Client agreed.  Client shared with the  more details about her relationship with her biological mother and how it caused a rift between her and her entire family.  She reported that she had issues with her dad for a while but she and her dad now get along and they do stuff together.  Client is making progress regarding her depression, skin picking, and compulsive shopping.  Client to continue in one-to-one sessions.    Treatment plan:  · Target symptoms: depression, recurrent depression, anxiety , trauma and stress  · Why chosen therapy is appropriate versus another modality: relevant to diagnosis, patient responds to this modality, evidence  based practice  · Outcome monitoring methods: self-report  · Therapeutic intervention type: insight oriented psychotherapy, behavior modifying psychotherapy, supportive psychotherapy    Risk parameters:  Patient reports no suicidal ideation  Patient reports no homicidal ideation  Patient reports no self-injurious behavior  Patient reports no violent behavior          CSSRS was completed: No risk    Verbal deficits: None    Patient's response to intervention:  The patient's response to intervention is accepting.    Progress toward goals and other mental status changes:  The patient's progress toward goals is fair , good.    Diagnosis:     ICD-10-CM ICD-9-CM   1. Attention deficit hyperactivity disorder (ADHD), combined type  F90.2 314.01   2. Recurrent depression  F33.9 296.30   3. JOSE ANGEL (generalized anxiety disorder)  F41.1 300.02   4. Trauma and stressor-related disorder  F43.9 309.81     308.9   5. Obsessive-compulsive disorder, unspecified type  F42.9 300.3   6. Autism spectrum disorder  F84.0 299.00       Plan:  individual psychotherapy and Ct to continue to follow up with Kiara LANDAVERDE for psych med mgt Pt to go to ED or call 911 if symptoms worsen or if she has thoughts of harming self and/or others. Pt verbalized understanding.    Return to clinic: as scheduled    Length of Service (minutes): 45      Each patient to whom he or she provides medical services by telemedicine is: (1) informed of the relationship between the physician and patient and the respective role of any other health care provider with respect to management of the patient; and (2) notified that he or she may decline to receive medical services by telemedicine and may withdraw from such care at any time.

## 2022-06-10 ENCOUNTER — OFFICE VISIT (OUTPATIENT)
Dept: PSYCHIATRY | Facility: CLINIC | Age: 42
End: 2022-06-10
Payer: COMMERCIAL

## 2022-06-10 DIAGNOSIS — F43.9 TRAUMA AND STRESSOR-RELATED DISORDER: ICD-10-CM

## 2022-06-10 DIAGNOSIS — F84.0 AUTISM SPECTRUM DISORDER: ICD-10-CM

## 2022-06-10 DIAGNOSIS — F41.1 GAD (GENERALIZED ANXIETY DISORDER): ICD-10-CM

## 2022-06-10 DIAGNOSIS — F42.9 OBSESSIVE-COMPULSIVE DISORDER, UNSPECIFIED TYPE: ICD-10-CM

## 2022-06-10 DIAGNOSIS — F33.9 RECURRENT DEPRESSION: ICD-10-CM

## 2022-06-10 DIAGNOSIS — F90.2 ATTENTION DEFICIT HYPERACTIVITY DISORDER (ADHD), COMBINED TYPE: Primary | ICD-10-CM

## 2022-06-10 PROCEDURE — 99999 PR PBB SHADOW E&M-EST. PATIENT-LVL II: CPT | Mod: PBBFAC,,, | Performed by: SOCIAL WORKER

## 2022-06-10 PROCEDURE — 90834 PSYTX W PT 45 MINUTES: CPT | Mod: S$GLB,,, | Performed by: SOCIAL WORKER

## 2022-06-10 PROCEDURE — 99999 PR PBB SHADOW E&M-EST. PATIENT-LVL II: ICD-10-PCS | Mod: PBBFAC,,, | Performed by: SOCIAL WORKER

## 2022-06-10 PROCEDURE — 1159F MED LIST DOCD IN RCRD: CPT | Mod: CPTII,S$GLB,, | Performed by: SOCIAL WORKER

## 2022-06-10 PROCEDURE — 90834 PR PSYCHOTHERAPY W/PATIENT, 45 MIN: ICD-10-PCS | Mod: S$GLB,,, | Performed by: SOCIAL WORKER

## 2022-06-10 PROCEDURE — 1159F PR MEDICATION LIST DOCUMENTED IN MEDICAL RECORD: ICD-10-PCS | Mod: CPTII,S$GLB,, | Performed by: SOCIAL WORKER

## 2022-06-10 NOTE — PROGRESS NOTES
Individual Psychotherapy (PhD/LCSW)    6/10/2022    Site:  [facility]         Therapeutic Intervention: Met with patient.  Outpatient - Insight oriented psychotherapy 45 min - CPT code 57423, Outpatient - Behavior modifying psychotherapy 45 min - CPT code 39048 and Outpatient - Supportive psychotherapy 45 min - CPT Code 45228    Chief complaint/reason for encounter: attention deficit, mood swings, sleep and trauma,nightmare, OCD             Interval history and content of current session: Ct was referred to tx by Kiara LANDAVERDE to address attention deficit, mood swings, sleep and trauma,nightmare, OCD.  Client arrived to session on time and was fully engaged.  Client shared that she had to put her dog to sleep over the past week.  She and her  will both saddened by this however her dog was very ill.  Client shared that she started playing the Portuguese horn again and will be meeting with the [employer].  Client shared that she had her hearing and that she was glad that it was complete.  She reported that she answered the questions as best she could.  Client reported that her skin picking is at a minimal.  She reported that compulsive shopping is at a minimal as well.   and client discussed a relapse prevention plan to include things that can help client maintain her recovery. Like using a washable marker to write on her arm vs skin pick, stretching and breathing exercises.  Client was open-minded and receptive to feedback and plans to follow up with the safety plan.  Client to continue in one-to-one sessions.           Treatment plan:  · Target symptoms: depression, anxiety , attention deficit, trauma, OCD  · Why chosen therapy is appropriate versus another modality: relevant to diagnosis, patient responds to this modality, evidence based practice  · Outcome monitoring methods: self-report  · Therapeutic intervention type: insight oriented psychotherapy, behavior modifying psychotherapy, supportive  psychotherapy    Risk parameters:  Patient reports no suicidal ideation  Patient reports no homicidal ideation  Patient reports no self-injurious behavior  Patient reports no violent behavior          CSSRS was completed: No risk    Verbal deficits: None    Patient's response to intervention:  The patient's response to intervention is accepting.    Progress toward goals and other mental status changes:  The patient's progress toward goals is fair , good.    Diagnosis:     ICD-10-CM ICD-9-CM   1. Attention deficit hyperactivity disorder (ADHD), combined type  F90.2 314.01   2. Recurrent depression  F33.9 296.30   3. JOSE ANGEL (generalized anxiety disorder)  F41.1 300.02   4. Trauma and stressor-related disorder  F43.9 309.81     308.9   5. Obsessive-compulsive disorder, unspecified type  F42.9 300.3   6. Autism spectrum disorder  F84.0 299.00       Plan:  individual psychotherapy and Ct to continue to see Kiara SAIMA for psych med mgt Pt to go to ED or call 911 if symptoms worsen or if she has thoughts of harming self and/or others. Pt verbalized understanding.    Return to clinic: as scheduled    Length of Service (minutes): 45      Each patient to whom he or she provides medical services by telemedicine is: (1) informed of the relationship between the physician and patient and the respective role of any other health care provider with respect to management of the patient; and (2) notified that he or she may decline to receive medical services by telemedicine and may withdraw from such care at any time.

## 2022-06-13 ENCOUNTER — OFFICE VISIT (OUTPATIENT)
Dept: PSYCHIATRY | Facility: CLINIC | Age: 42
End: 2022-06-13
Payer: COMMERCIAL

## 2022-06-13 VITALS
HEIGHT: 66 IN | HEART RATE: 93 BPM | SYSTOLIC BLOOD PRESSURE: 138 MMHG | WEIGHT: 290.44 LBS | DIASTOLIC BLOOD PRESSURE: 92 MMHG | BODY MASS INDEX: 46.68 KG/M2

## 2022-06-13 DIAGNOSIS — F41.1 GAD (GENERALIZED ANXIETY DISORDER): ICD-10-CM

## 2022-06-13 DIAGNOSIS — F33.41 RECURRENT MAJOR DEPRESSIVE DISORDER, IN PARTIAL REMISSION: Primary | ICD-10-CM

## 2022-06-13 DIAGNOSIS — G47.00 INSOMNIA, UNSPECIFIED TYPE: ICD-10-CM

## 2022-06-13 DIAGNOSIS — F90.2 ATTENTION DEFICIT HYPERACTIVITY DISORDER (ADHD), COMBINED TYPE: ICD-10-CM

## 2022-06-13 PROCEDURE — 1160F PR REVIEW ALL MEDS BY PRESCRIBER/CLIN PHARMACIST DOCUMENTED: ICD-10-PCS | Mod: CPTII,S$GLB,, | Performed by: PHYSICIAN ASSISTANT

## 2022-06-13 PROCEDURE — 3075F SYST BP GE 130 - 139MM HG: CPT | Mod: CPTII,S$GLB,, | Performed by: PHYSICIAN ASSISTANT

## 2022-06-13 PROCEDURE — 1160F RVW MEDS BY RX/DR IN RCRD: CPT | Mod: CPTII,S$GLB,, | Performed by: PHYSICIAN ASSISTANT

## 2022-06-13 PROCEDURE — 3080F DIAST BP >= 90 MM HG: CPT | Mod: CPTII,S$GLB,, | Performed by: PHYSICIAN ASSISTANT

## 2022-06-13 PROCEDURE — 3075F PR MOST RECENT SYSTOLIC BLOOD PRESS GE 130-139MM HG: ICD-10-PCS | Mod: CPTII,S$GLB,, | Performed by: PHYSICIAN ASSISTANT

## 2022-06-13 PROCEDURE — 1159F MED LIST DOCD IN RCRD: CPT | Mod: CPTII,S$GLB,, | Performed by: PHYSICIAN ASSISTANT

## 2022-06-13 PROCEDURE — 3008F BODY MASS INDEX DOCD: CPT | Mod: CPTII,S$GLB,, | Performed by: PHYSICIAN ASSISTANT

## 2022-06-13 PROCEDURE — 3080F PR MOST RECENT DIASTOLIC BLOOD PRESSURE >= 90 MM HG: ICD-10-PCS | Mod: CPTII,S$GLB,, | Performed by: PHYSICIAN ASSISTANT

## 2022-06-13 PROCEDURE — 99999 PR PBB SHADOW E&M-EST. PATIENT-LVL IV: CPT | Mod: PBBFAC,,, | Performed by: PHYSICIAN ASSISTANT

## 2022-06-13 PROCEDURE — 3008F PR BODY MASS INDEX (BMI) DOCUMENTED: ICD-10-PCS | Mod: CPTII,S$GLB,, | Performed by: PHYSICIAN ASSISTANT

## 2022-06-13 PROCEDURE — 99999 PR PBB SHADOW E&M-EST. PATIENT-LVL IV: ICD-10-PCS | Mod: PBBFAC,,, | Performed by: PHYSICIAN ASSISTANT

## 2022-06-13 PROCEDURE — 1159F PR MEDICATION LIST DOCUMENTED IN MEDICAL RECORD: ICD-10-PCS | Mod: CPTII,S$GLB,, | Performed by: PHYSICIAN ASSISTANT

## 2022-06-13 PROCEDURE — 99214 PR OFFICE/OUTPT VISIT, EST, LEVL IV, 30-39 MIN: ICD-10-PCS | Mod: S$GLB,,, | Performed by: PHYSICIAN ASSISTANT

## 2022-06-13 PROCEDURE — 99214 OFFICE O/P EST MOD 30 MIN: CPT | Mod: S$GLB,,, | Performed by: PHYSICIAN ASSISTANT

## 2022-06-13 RX ORDER — PRAZOSIN HYDROCHLORIDE 2 MG/1
2 CAPSULE ORAL NIGHTLY
Qty: 30 CAPSULE | Refills: 1 | Status: SHIPPED | OUTPATIENT
Start: 2022-06-13 | End: 2022-06-21 | Stop reason: SDUPTHER

## 2022-06-13 NOTE — PROGRESS NOTES
Outpatient Psychiatry Follow-Up Visit (MD/NP)    6/13/2022    Clinical Status of Patient:  Outpatient (Ambulatory)    Chief Complaint:  Mariana Mora is a 41 y.o. female who presents today for follow-up of depression and anxiety.  Met with patient.        Interval History and Content of Current Session:  Interim Events/Subjective Report/Content of Current Session:   Mariana is seen today for medication follow-up.  Strattera was not helpful so she discontinued the medication. She had her disability hearing and she reports that she passed.  She is looking forward to this but now it is a waiting game until she is actually compensated.  She reports that one week before that, she lost her favorite dog which she is very sad about.  She thinks that he passed away due to stomach cancer.  He was 12 years old.  This is why her depression and anxiety scores are so high today.  She does feel that her medication regimen is working for her.  She declines need for medication adjustments today.  She did get a new [place] horn which she is looking forward to playing again.  She would like to resume stimulant therapy but wants to meet with a  prior to resuming.  This is her decision, she did just fine on a stimulant cardiovascular wise but she would like reassurance.  She is working with MILENA Erwin.  States she is impulse buying on [employer] in the [employer] a bit.  Sleep has been difficult since her dog passed away.  She does still continue to endorse bizarre dreams but prazosin is helping.  She denies suicidal or homicidal ideation.  No other complaints today.      FROM PREVIOUS HPI  Mariana is seen today for medication follow up. She has been busy with evaluation of medical concerns. Cardiology evaluating for POTS syndrome. Recent eye exam. She enjoys painting. She is working on disability paperwork and having her hearing 6/9/2022. She reports significant anxiety in anticipation of the hearing. She is seeing  "psychologist (Dr. Burgess) weekly. She reports autism is significant to her identity. She is sleeping well. She reports trouble focusing and feels like she is just "existing." She has been intolerant of stimulants so far. We discussed Strattera as viable option to increase concentration and she was again informed the norepinephrine component may produce side effects. She is looking forward to [facility] mid-June. She is scheduled with Yaima later this month. She denies suicidal or homicidal ideation. No other complaints today.      GAD7 6/12/2022 6/10/2022 5/26/2022   1. Feeling nervous, anxious, or on edge? 3 3 3   2. Not being able to stop or control worrying? 3 3 3   3. Worrying too much about different things? 3 3 3   4. Trouble relaxing? 3 3 3   5. Being so restless that it is hard to sit still? 3 3 3   6. Becoming easily annoyed or irritable? 2 2 2   7. Feeling afraid as if something awful might happen? 3 3 2   8. If you checked off any problems, how difficult have these problems made it for you to do your work, take care of things at home, or get along with other people? 3 3 3   JOSE ANGEL-7 Score 20 20 19       PHQ9 6/13/2022   Little interest or pleasure in doing things: More than half the days   Feeling down, depressed or hopeless: Nearly every day   Trouble falling asleep, staying asleep, or sleeping too much: Nearly every day   Feeling tired or having little energy: Nearly every day   Poor appetite or overeating: Nearly every day   Feeling bad about yourself- or that you are a failure or have let yourself or family down Nearly every day   Trouble concentrating on things, such as reading the newspaper or watching television: Nearly every day   Moving or speaking so slowly that other people could have noticed. Or the opposite- being so fidgety or restless that you have been moving around a lot more than usual: More than half the days   Thoughts that you would be better off dead or hurting yourself in some way: " Not at all   If you indicated you have experienced any of the aforementioned problems, how difficult have these problems made it for you to do your work, take care of things at home or get along with other people? Extremely difficult   Total Score 22       Review of Systems   · PSYCHIATRIC: Pertinant items are noted in the narrative.  · RESPIRATORY: No shortness of breath.  · CARDIOVASCULAR: Reports tachycardia, no chest pain.  · GASTROINTESTINAL: Reports nausea, vomiting, diarrhea.     Past Medical, Family and Social History: The patient's past medical, family and social history have been reviewed and updated as appropriate within the electronic medical record - see encounter notes.    Compliance: yes    Side effects: None    Risk Parameters:  Patient reports no suicidal ideation  Patient reports no homicidal ideation  Patient reports no self-injurious behavior  Patient reports no violent behavior    Exam (detailed: at least 9 elements; comprehensive: all 15 elements)   Constitutional  Vitals:  Most recent vital signs, dated less than 90 days prior to this appointment, were reviewed.   Vitals:    06/13/22 1351   BP: (!) 138/92   Pulse: 93        General:  unremarkable, age appropriate     Musculoskeletal  Muscle Strength/Tone:  not examined   Gait & Station:  virtual visit     Psychiatric  Speech:  no latency; no press   Mood & Affect:  ok  appropriate   Thought Process:  normal and logical   Associations:  intact   Thought Content:  normal, no suicidality, no homicidality, delusions, or paranoia   Insight:  intact   Judgement: behavior is adequate to circumstances   Orientation:  grossly intact   Memory: intact for content of interview   Language: grossly intact   Attention Span & Concentration:  able to focus   Fund of Knowledge:  intact and appropriate to age and level of education     Assessment and Diagnosis   Status/Progress: Based on the examination today, the patient's problem(s) is/are inadequately  controlled.  New problems have not been presented today.   Co-morbidities, Diagnostic uncertainty and Lack of compliance are not complicating management of the primary condition.      General Impression:   Major depressive disorder, mild  Generalized anxiety disorder  OCD by history  ADHD by history  Autism spectrum disorder, by patient report    Intervention/Counseling/Treatment Plan   · Medication Management: Continue current medications. The risks and benefits of medication were discussed with the patient.  · Counseling provided with patient as follows: importance of compliance with chosen treatment options was emphasized, risks and benefits of treatment options, including medications, were discussed with the patient, risk factor reduction, prognosis     Mariana is seen today for medication follow-up.  Inattention/concentration is primary concern.  Based on assessment today:    Continue with MILENA Erwin for psychotherapy.    D/c strattera, not helpful.   Increase Prazosin to 7mg nightly for nightmares, discussed mechanism of action and to change positions slowly. This will be titrated to effect.  Continue seroquel XR 50mg nightly for antidepressant augmentation and insomnia.   Continue sertraline 200 mg daily for depression/anxiety/OCD symptoms.  Continue buspirone 7.5 mg twice daily for anxiety.    Gene site testing reivewed.    Please go to emergency department if feeling as though you are a harm to yourself or others or if you are in crisis.     Please call the clinic to report any worsening of symptoms or problems associated with medication.    Discussed risks of tardive dyskinesia, drug induced parkinsonism, metabolic side effects, including weight gain, neuroleptic malignant syndrome       Return to Clinic: 1 month, as needed

## 2022-06-20 ENCOUNTER — TELEPHONE (OUTPATIENT)
Dept: GENETICS | Facility: CLINIC | Age: 42
End: 2022-06-20
Payer: COMMERCIAL

## 2022-06-20 NOTE — PROGRESS NOTES
[facility]  [address] [place] [address]    DATE OF CONSULTATION: 06/21/2022    REFERRING PHYSICIAN: Self, Aaareferral    REASON FOR CONSULTATION: We are requested by Aaareferral Self to consult on Mariana Mora regarding the diagnosis, management, and genetic counseling for the findings of generalized joint hypermobility. Mariana is unaccompanied to clinic today.      HISTORY OF PRESENT ILLNESS:  Mariana Mora is a 41 y.o. 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    She reports hypermobility and joint pain in her shoulders, knees, hips, hands, and feet. She is not currently in physical therapy but reports that she has been in physical therapy in the past (in high school for hip pain). She reports easy bruising and poor wound healing, but no reopening of wounds or scars. . She reports that she recently had a normal echocardiogram. She reports multiple cavities, enamel problems, and dental crowding. She does not report any genitourinary concerns at this time. Ms. Mora also has a diagnosis of Autism.    She has a history of cotton wool spots on retinal exam (left side only) found by her eye doctor. She went to see to Dr. Eng, retinal specialist. She has been diagnosed with white dot syndrome. She had a normal carotid US.    She has some stretchmarks - some of which came up in the setting of weight gain (stomach and inner thighs), but some that did not (upper arms).    She has a hiatal hernia but no inguinal or umbilical hernias.    She is having blood on the toilet paper when she bears down. No pain. Last saw GI in 2020 for hiatal hernia, diarrhea, and dysphagia.    No personal or family history of cleft lip/palate, congenital hip dislocation, clubfeet, pneumothorax, uterine rupture, colon rupture.       MEDICAL HISTORY:    Patient Active Problem List    Diagnosis Date Noted     Dyslipidemia (high LDL; low HDL), baseline LDL-C 181 04/12/2022    Cotton wool spots, OS 04/12/2022    NAFLD (nonalcoholic fatty liver disease) 04/12/2022    Essential thrombocytosis 04/12/2022    Cardiovascular event risk, ASCVD 10-years risk 2.4%, 2021 04/12/2022    RASHID (dyspnea on exertion), onset 2016 04/12/2022    SUNIL on CPAP, 100% use 04/12/2022    Tachycardia, with standing 04/12/2022    Facial flushing 04/01/2022    Symptomatic cholelithiasis 06/03/2021    Primary insomnia 07/24/2020    Functional diarrhea 07/24/2020    Nausea 03/26/2020    Vitamin D deficiency 03/26/2020    Diarrhea 01/21/2020    History of esophageal stricture 01/21/2020    GERD (gastroesophageal reflux disease) 12/17/2019    Preop examination 12/09/2019    Hiatal hernia 12/09/2019    Obesity, Class III, BMI 40-49.9 (morbid obesity) 12/09/2019    Dysphagia 11/14/2019    Gastroesophageal reflux disease with esophagitis 11/14/2019    Abdominal pain 11/14/2019    Anxiety 09/09/2019    Gastroesophageal reflux disease 09/09/2019    Psoriatic arthritis 09/09/2019    Vitamin B2 deficiency 02/07/2013    Vitamin B6 deficiency 02/07/2013    EMU 02/06/2013    Migraine with aura, with intractable migraine, so stated, without mention of status migrainosus 01/29/2013    Intermittent confusion 01/29/2013    Vitamin B12 deficiency 01/29/2013       Past Surgical History:   Procedure Laterality Date    ADENOIDECTOMY      ESOPHAGEAL MANOMETRY WITH MEASUREMENT OF IMPEDANCE N/A 2/10/2020    Procedure: MANOMETRY, ESOPHAGUS, WITH IMPEDANCE MEASUREMENT;  Surgeon: Fitz Murray MD;  Location: [facility] ENDO ([address] [place] [address]);  Service: Endoscopy;  Laterality: N/A;  2/3 - pt confirmed    ESOPHAGOGASTRODUODENOSCOPY N/A 12/17/2019    Procedure: EGD (ESOPHAGOGASTRODUODENOSCOPY);  Surgeon: David Stahl MD;  Location: [facility] ENDO;  Service: Endoscopy;  Laterality: N/A;    INNER EAR SURGERY Right     LAPAROSCOPIC CHOLECYSTECTOMY N/A 6/3/2021     Procedure: CHOLECYSTECTOMY-LAPAROSCOPIC;  Surgeon: Farrukh Lorenzo MD;  Location: [facility] OR;  Service: General;  Laterality: N/A;    NASAL SEPTUM SURGERY      TYMPANOPLASTY      bilateral    TYMPANOSTOMY TUBE PLACEMENT         Medications:    Current Outpatient Medications on File Prior to Visit   Medication Sig Dispense Refill    atorvastatin (LIPITOR) 10 MG tablet TAKE 1 TABLET(10 MG) BY MOUTH EVERY DAY 90 tablet 3    busPIRone (BUSPAR) 7.5 MG tablet TAKE 1 TABLET(7.5 MG) BY MOUTH TWICE DAILY 60 tablet 0    cetirizine (ZYRTEC) 10 MG tablet Take 10 mg by mouth once daily.      ergocalciferol (ERGOCALCIFEROL) 50,000 unit Cap TAKE 2 CAPSULES BY MOUTH EVERY 7 DAYS 8 capsule 2    famotidine (PEPCID) 20 MG tablet Take 20 mg by mouth 2 (two) times daily.      guselkumab (TREMFYA) 100 mg/mL AtIn Inject 100 mg into the skin every 8 weeks.      methocarbamoL (ROBAXIN) 500 MG Tab TAKE 1 TABLET(500 MG) BY MOUTH FOUR TIMES DAILY FOR 10 DAYS 40 tablet 0    montelukast (SINGULAIR) 10 mg tablet Take 10 mg by mouth once daily.      nystatin (MYCOSTATIN) ointment APPLY TOPICALLY TO THE AFFECTED AREA TWICE DAILY 15 g 1    NYSTOP powder APPLY TO THE AFFECTED AREA TWICE DAILY 30 g 1    omalizumab (XOLAIR) 150 mg/mL injection Inject 300 mg into the skin every 30 days.      OTEZLA 30 mg Tab Take 30 mg by mouth 2 (two) times daily.      pantoprazole (PROTONIX) 40 MG tablet TAKE 1 TABLET BY MOUTH ONCE DAILY EVERY MORNING BEFORE BREAKFAST; WAIT 30 MINUTES BEFORE EATING OR DRINKING ANYTHING 30 tablet 6    quetiapine 50 mg oral tb24 (SEROQUEL XR) 50 mg Tb24 TAKE 1 TABLET(50 MG) BY MOUTH EVERY DAY 30 tablet 1    rizatriptan (MAXALT-MLT) 10 MG disintegrating tablet Take 1 tablet (10 mg total) by mouth as needed for Migraine (No more than 2 tablets in 24 hours). 9 tablet 11    sertraline (ZOLOFT) 100 MG tablet TAKE 2 TABLETS(200 MG) BY MOUTH EVERY DAY 60 tablet 1    SIMPESSE 0.15 mg-30 mcg (84)/10 mcg (7) 3MPk TAKE 1  TABLET BY MOUTH EVERY DAY 91 each 3    SYNTHROID 100 mcg tablet TAKE 1 TABLET(100 MCG) BY MOUTH BEFORE BREAKFAST 90 tablet 3    [DISCONTINUED] prazosin (MINIPRESS) 2 MG Cap Take 1 capsule (2 mg total) by mouth every evening. 30 capsule 1     Current Facility-Administered Medications on File Prior to Visit   Medication Dose Route Frequency Provider Last Rate Last Admin    diphenhydrAMINE injection 12.5 mg  12.5 mg Intravenous PRN Steve Shoemaker MD        lactated ringers infusion   Intravenous Continuous Steve Shoemaker MD 10 mL/hr at 06/03/21 1131 New Bag at 06/03/21 1131    lactated ringers infusion  125 mL/hr Intravenous Continuous Steve Shoemaker MD        LIDOcaine (PF) 10 mg/ml (1%) injection 10 mg  1 mL Intradermal Once Steve Shoemaker MD        morphine injection 2 mg  2 mg Intravenous Q5 Min PRN Steve Shoemaker MD   2 mg at 06/03/21 1326       Allergies:  Review of patient's allergies indicates:   Allergen Reactions    Penicillins     Norco [hydrocodone-acetaminophen] Itching    Prednisone Other (See Comments) and Rash     PSORIASIS  PSORIASIS       Immunization History:  Immunization History   Administered Date(s) Administered    COVID-19, MRNA, LN-S, PF (MODERNA FULL 0.5 ML DOSE) 03/03/2021, 04/01/2021, 11/30/2021    Influenza - Quadrivalent - PF *Preferred* (6 months and older) 10/28/2021       Pertinent Laboratory Studies: None  I have reviewed the patient's labs.    Pertinent Radiology & Imaging Studies:   MRI brain 2020:  The ventricles and sulci are normal in size and configuration for age.   There is no hemorrhage, mass or midline shift. There are no   extra-axial fluid collections.     There is normal signal within the white matter in all sequences. There   is no abnormality on the diffusion-weighted images to suggest acute   infarct.     There are flow voids in the cavernous portions of the internal carotid   arteries and basilar artery indicating patency.     There is no  pathologic enhancement. The paranasal sinuses and mastoid   air cells are clear.     IMPRESSION: Negative MRI of the brain with and without contrast        Carotid US 5/4/22:  Conclusion    · No significant stenosis noted. Normal velocities  · Vertebral arteries shows antegrade flows.        DEVELOPMENT:  The patient was in gifted classes       REVIEW OF SYSTEMS: A complete review of systems was negative other than as stated above.    FAMILY HISTORY:   See Pedigree. Ms. Mora does not report hypermobility in other family members. She has one full brother whom she is not in contact with. She is not in contact with her mother. She reports her maternal grandfather passed away in his sleep in his 40s, cause unknown. She reports hemochromatosis in her father, paternal grandmother and paternal grandfather (d.72), a paternal uncle who has had a heart and kidney transplant and passed away from [place], a maternal aunt with a bad heart, and a paternal great aunt with Mcclelland Parkinson White Syndrome. She reports her paternal grandmother has had cancer (colon, unclear if primary) and Parkinsons disease. Her paternal grandfather passed from a stroke and had 5 heart attacks prior to his passing. He also reportedly had hemochromatosis.    SOCIAL HISTORY:   Social History     Socioeconomic History    Marital status:    Tobacco Use    Smoking status: Never Smoker    Smokeless tobacco: Never Used   Substance and Sexual Activity    Alcohol use: No    Drug use: Never    Sexual activity: Yes     Partners: Male     Birth control/protection: Other-see comments     Comment: Pill   Social History Narrative    Not working due to medical condition.             Plans from [facility]    [facility]     Contraceptive Counseling    Family hx of ovarian cancer        Visit Summary    [facility] results: negative but increase risk of breast cancer    [place] [facility] [place]iscussed is eligible for Breast MRI  yearly    SBE and Clinical breast exams recommended.        Prescriptions:    SIG: Seasonique 0.15 mg-30 mcg (84)/10 mcg (7) oral tablets,dose pack,3 month, 84 days, Dispense #84 Tablet, 3 Refills    Directions: Take 1 oral tablet once a day        Return for follow up appointment prn/annual.     GYN Exam (Annual/6Wk PP)10 [facility]8/7/2019     Annual    Psoriasis    Mild Yeast Vaginitis    Family hx of ovarian cancer    Obesity        Visit Summary    Pap done    Wet prep: mild yeast only    [facility] Genetic Testing today    Pt has appt with PCP Dr Stahl next week to establish care    Screening Mammo @ [facility] [place]: baseline, screening        Prescriptions:    SIG: Diflucan 150 mg oral tablet, 1 days, Dispense #1 Tablet, 1 Refills    Directions: Take 1 oral tablet once a day        Return for follow up appointment in one year or prn.     GYN Visit & [facility]1/8/2019     Contraceptive counseling.  PMS.  Psoriatic arthritis.  Hypothyroidism.  Obesity.      Patient is to continue Seasonique with skipping the placebo pills.    Patient given  name for   Vasectomy.    Return to clinic for annual exam in July.     [facility]     Annual exam.  Psoriatic arthritis.  Depression.  Contraceptive management.  Hypothyroidism.    We'll change medication to Seasonique.    Discontinue Loestrin.    Return to clinic in 6 months.    PCP: Dr. Kim.    Visit Summary     [facility]     PMS.  Menorrhagia.  Anxiety.  Depression.    Return to clinic in 2 months for annual exam.    Discussed cardiovascular exercise.        Visit Summary    Prescriptions:    SIG: Loestrin 1/20 (21) 1-20 mg-mcg oral tablet, 30 days, Dispense #30 Tablet, 11 Refills    Directions: Take 1 oral tablet once a day     Social Determinants of Health     Financial Resource Strain: Low Risk     Difficulty of Paying Living Expenses: Not very hard   Food Insecurity: No Food Insecurity    Worried About Running  "Out of Food in the Last Year: Never true    Ran Out of Food in the Last Year: Never true   Transportation Needs: No Transportation Needs    Lack of Transportation (Medical): No    Lack of Transportation (Non-Medical): No   Physical Activity: Sufficiently Active    Days of Exercise per Week: 4 days    Minutes of Exercise per Session: 40 min   Stress: Stress Concern Present    Feeling of Stress : To some extent   Social Connections: Unknown    Frequency of Communication with Friends and Family: More than three times a week    Frequency of Social Gatherings with Friends and Family: Once a week    Active Member of Clubs or Organizations: No    Attends Club or Organization Meetings: Patient refused    Marital Status:    Housing Stability: Low Risk     Unable to Pay for Housing in the Last Year: No    Number of Places Lived in the Last Year: 1    Unstable Housing in the Last Year: No        MEASUREMENTS:  Wt Readings from Last 3 Encounters:   06/21/22 129.8 kg (286 lb 0.8 oz)   04/12/22 129.7 kg (286 lb)   04/01/22 129.7 kg (286 lb)     Ht Readings from Last 3 Encounters:   06/21/22 5' 6.85" (1.698 m)   04/12/22 5' 6" (1.676 m)   04/01/22 5' 6" (1.676 m)       EXAM:  General: Size: Normal  Head: Size, shape, symmetry: HC 58.2cm (>97th percentile for average adult female)- macrocephalic  Face: Symmetric  Eyes: Size, position, spacing, shape and orientation of palpebral fissures: Downslanting palpebral fissures. Epicanthal folds not present  Ears: size, configuration, position, rotation: normal  Nose: size, configuration, position, rotation: normal  Mouth/Jaw: Palate is high arched with dental crowding of the mandibular dentition. Mild retrognathia.  Neck: Configuration: Normal  Thorax: Pectus: Normal  Abdomen: liver palpated 3-4 cm below the costal margin, non-distended, non-tender. No hernias appreciated  Arms/Hands: Size, symmetry, proportion, digits, palmar creases: Normal. Armspan to height ratio= " 1.01; negative wrist and thumb signs.  Legs/Feet: Size, symmetry, proportion, digits: No piezogenic papules bilaterally or pes planus  Back: Spine straight, intact  Skin: Texture: Normal, scars, lesions: Hyperextensibility noted on dorsum of hand only. Numerous scars on the arms and legs, none of which are atrophic. No bruising appreciated today.  Neurologic: DTRs, muscle bulk, tone: normal  Musculoskeletal:   Beighton Scale:    1. Passive dorsiflexion of little finger >90? (1 point each side) x1  2. Passive apposition of thumbs to forearm (1 point each side)   3. Hyperextension of elbows >10? (1 point each side)   4. Hyperextension of knees >10? (1 point each side)   5. Palms to floor with knees fully extended (1 point)     Total Beighton Score 5/9     Gait: Normal       ASSESSMENT/DISCUSSION:  Mariana is a 41 y.o. female with autism and hypermobility spectrum disorder and related features of generalized joint hypermobility, subluxations, and joint pain. She does not have other features that would warrant genetic testing for a subtype of Moody-Danlos syndrome (EDS) or other connective tissue disorder today. She reports that her echocardiogram is normal. I would like to obtain a copy for review. I would not expect the cotton wool spots to be related to the concern for a connective tissue disorder, particularly in the setting of a normal carotid US and normal MRI brain. However, would like to review retinal specialist records to ensure there is no further genetic workup indicated. Have asked that patient send us a copy of her glasses prescription as well.    Moody-Danlos syndrome is a group of disorders that affect connective tissue. Defects in connective tissue cause the signs and symptoms of Moody-Danlos syndrome, which vary from mildly loose joints to life-threatening complications. Although all types of Moody-Danlos syndrome affect the joints and many also affect the skin, features vary by type.     At this  point in time, the diagnosis of Moody-Danlos syndrome hypermobility type (hEDS) is based solely on clinical evaluation and family history, as the underlying molecular etiology has not been identified in most cases. To establish a diagnosis of hEDS, the following clinical diagnostic criteria have been proposed:     Diagnostic Criteria for hEDS:  (Sandra et al., 2017)     All three major diagnostic criteria should all be met to establish a diagnosis of hypermobile EDS (hEDS): (Criteria are bolded below)    Criterion 1: Generalized Joint Hypermobility defined as a score of at least 5 (6 for pre-pubertal children, may be 4 for older adults) on the Beighton scale     Beighton Scale     1. Passive dorsiflexion of little finger >90? (1 point each side)   2. Passive apposition of thumbs to forearm (1 point each side)   3. Hyperextension of elbows >10? (1 point each side)   4. Hyperextension of knees >10? (1 point each side)   5. Palms to floor with knees fully extended (1 point)     Total Beighton Score 5/9        Criterion 2: Two or more of A, B, and C (A & B, A & C, or B & C)     A: Systemic manifestations (must have 5+)      Soft/velvety skin    Skin hyperextensibility     Unexplained striae    Bilateral piezogenic papules    Recurrent hernias (umbilical, inguinal)    Atrophic scarring in 2+ sites    Pelvic floor, rectal, uterine prolapse    Dental crowding    Arachnodactyly (positive thumb or wrist sign)    Arm [id number] >1.05    Mitral valve prolapse    Aortic root dilation     B. Positive family history with one or more affected first degree relatives who independently meet complete diagnostic criteria     C. Musculoskeletal complications (must have at least one):      Musculoskeletal pain in 2+ limbs, occurring daily for 3+ months    Chronic widespread pain for >3 months    Recurrent dislocations or keith joint instability in the absence of trauma       Criterion 3: Absence of another heritable  or acquired connective tissue disorder, including autoimmune and rheumatologic conditions     Features that are NOT currently included in making a clinical diagnosis of hEDS include sleep disturbance, fatigue, muscle weakness, postural orthostatic tachycardia (POTs), functional gastrointestinal disorders, dysautonomia, anxiety, or depression.     To meet clinical criteria for classical EDS genetic testing, a person must have 1 major feature plus either a second major feature OR 3-8 minor features.     Major criteria:  1. Skin hyperextensibility and atrophic scarring   2. Joint hypermobility: Beighton score: 5/9     Minor diagnostic criteria:   Easy bruising (reported)   Soft, doughy skin    Skin fragility (or traumatic splitting)    Molluscoid pseudotumours    Subcutaneous spheroids    Hernia (or history thereof)    Epicanthal folds    Complications of joint hypermobility (e.g. sprains, luxation/subluxation, pain, flexible flatfoot)    Family history of a first degree relative who meets clinical criteria     Vascular EDS is a form of EDS that may be complicated by fragility of some organs and/or vasculature and is therefore another important disorder to evaluate for when a person has clinical findings suggestive of this condition. Vascular EDS should be suspected when a person has one major OR several minor criteria. Ms. Mora has no criteria warranting further evaluation for this condition.     Major diagnostic criteria:     · Arterial aneurysms, dissection, or rupture  · Intestinal rupture  · Uterine rupture during pregnancy  · Family history of vEDS     Minor diagnostic criteria:     · Thin, translucent skin (especially noticeable on the chest/abdomen)  · Characteristic facial appearance (thin vermilion of the lips, micrognathia, narrow nose, prominent eyes)  · Acrogeria (an aged appearance to the extremities, particularly the hands)  · Carotid-cavernous sinus arteriovenous fistula  · Hypermobility of  "small joints  · Tendon/muscle rupture  · Early-onset varicose veins  · Pneumothorax/hemopneumothorax  · Easy bruising (spontaneous or with minimal trauma) (reported)  · Chronic joint subluxations/dislocations  · Congenital dislocation of the hips  · Talipes equinovarus (clubfoot)       The information above has been extrapolated from "The 2017 International Classification of the Moody-Danlos syndromes" by Sandra et al, 2017.    Marfan syndrome is a connective tissue disorder that primarily affects the ocular, skeletal, and cardiovascular systems. The most common ocular findings include myopia and lens dislocation, although individuals with Marfan syndrome can also have retinal detachment, glaucoma, and early cataract formation. The skeletal findings associated with Marfan syndrome include bone overgrowth that leads to pectus excavatum or carinatum, scoliosis, kyphosis and tall stature. From a cardiovascular perspective, individuals with Marfan syndrome can have dilatation of the aorta at the level of the sinuses of Valsalva, a predisposition for aortic tear/rupture, mitral valve prolapse, tricuspid valve prolapse, and enlargement of the proximal pulmonary artery.      Marfan syndrome is caused by mutations in the FBN1 gene that codes for fibrillin-1. FBN1 molecular genetic testing, however, is not necessary to make a clinical diagnosis of Marfan syndrome.      Specific diagnostic criteria, the 2010 revised [place] nosology, have been developed based on clinical features to assist in the diagnosis of Marfan syndrome.      An individual with a family history of Marfan syndrome is said to have a diagnosis of Marfan syndrome when they have: dislocated lenses, aortic root enlargement or a systemic score of greater than or equal to 7.      An individual without a family history of Marfan syndrome is said to have a diagnosis of Marfan syndrome if they have: aortic root enlargement AND dislocated lenses, a pathogenic FBN1 " mutation, or a systemic score of greater than or equal to 7; OR if they have: dislocated lenses AND an FBN1 mutation previously associated with aortic enlargement.      The systemic score is calculated based on the presence or absence of systemic features that are associated with Marfan syndrome:      FEATURE  VALUE    Wrist sign AND thumb sign  3    Wrist sign OR thumb sign  1    Pectus carinatum deformity  2    Pectus excavatum or chest asymmetry  1    Hindfoot deformity  2    Pes planus  1    Pneumothorax  2    Dural ectasia  2    Protrusio acetabulae  2    Reduced upper segment / lower segment AND increased arm span/height ratios  1    Scoliosis or thoracolumbar kyphosis  1    Reduced elbow extension  1    3 of 5 facial features: (2/5) 1    Skin striae  1    Myopia (>-3.75 diopters) 1    Mitral valve prolapse  1      Mariana Mora has the bolded features noted above. her systemic score is 0. Have asked that she send us her prescription so that we can understand her degree of myopia.    At this time, a diagnosis of hypermobility spectrum disorder best fits Ms. Mora. No genetic testing for EDS or another connective tissue disorder is indicated at this time.     Regarding her diagnosis of autism, she does have macrocephaly. Will send PTEN gene testing today given potential for impact on medical management- cancer screening. Most recent mammogram normal. The patient was also interested in genetic testing for fragile X syndrome and microarray. Risks and benefits, including likely low yield in the setting of high-functioning autism, were reviewed. The patient elected to proceed with testing.    Without a specific diagnosis, I am unable to provide recurrence risk information to the family at this time. Should the etiology of Mariana's features be genetic, the risk for recurrence in a future pregnancy could be significant.    The patient reports that she is a carrier for hemochromatosis. No records  available today for our review. This would not be expected to confer disease status but could impact her reproductive risk should she decide to have children.     It was a pleasure to see Mariana today.  We would like to see Mariana back in Genetics clinic pending results of testing or sooner as needed. Should any questions or concerns arise following today's visit, we encourage the family to contact the Genetics Office.    RECOMMENDATIONS/PLAN:  · PT as needed for joint pain  · Follow-up with PCP re: GI bleeding  · PTEN gene testing, fragile X syndrome testing, microarray today  · RTC pending results of genetic testing    The approximate physician face-to-face time was 55 minutes. The majority of the time (>50%) was spent on counseling of the patient or coordination of care. Extended non-face-to-face time (85 minutes) was spent in chart review, literature review, and documentation on the day of this encounter.      Nathalie Calix MS                 Genetic Counselor  [facility]    Cecilia Chavez MD  Board-Certified Medical Geneticist  [facility]      EXTERNAL CC:    John Stahl MD  Self, Aaareferral      ADDENDUM 6/22/22:  Reviewed outside records received. Echo normal- no MVP or aortic root dilation reported. Retina specialist records reviewed- unilateral findings including CHPRE, chorioretinal scars, cotton wool spots, multiple evanescent white dot syndrome. 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    Extended non-face-to-face time was spent in reviewing records: 30 minutes    Cecilia Chavez MD  Board-Certified Medical Geneticist  [facility]

## 2022-06-21 ENCOUNTER — LAB VISIT (OUTPATIENT)
Dept: LAB | Facility: HOSPITAL | Age: 42
End: 2022-06-21
Attending: MEDICAL GENETICS
Payer: COMMERCIAL

## 2022-06-21 ENCOUNTER — OFFICE VISIT (OUTPATIENT)
Dept: GENETICS | Facility: CLINIC | Age: 42
End: 2022-06-21
Payer: COMMERCIAL

## 2022-06-21 VITALS — WEIGHT: 286.06 LBS | HEIGHT: 67 IN | BODY MASS INDEX: 44.9 KG/M2

## 2022-06-21 DIAGNOSIS — G47.00 INSOMNIA, UNSPECIFIED TYPE: ICD-10-CM

## 2022-06-21 DIAGNOSIS — F84.0 AUTISM: Primary | ICD-10-CM

## 2022-06-21 DIAGNOSIS — Q75.3 MACROCEPHALY: ICD-10-CM

## 2022-06-21 DIAGNOSIS — F84.0 AUTISM: ICD-10-CM

## 2022-06-21 PROCEDURE — 96040 PR GENETIC COUNSELING, EACH 30 MIN: ICD-10-PCS | Mod: S$GLB,,,

## 2022-06-21 PROCEDURE — 99999 PR PBB SHADOW E&M-EST. PATIENT-LVL III: ICD-10-PCS | Mod: PBBFAC,,, | Performed by: MEDICAL GENETICS

## 2022-06-21 PROCEDURE — 3008F BODY MASS INDEX DOCD: CPT | Mod: CPTII,S$GLB,, | Performed by: MEDICAL GENETICS

## 2022-06-21 PROCEDURE — 99999 PR PBB SHADOW E&M-EST. PATIENT-LVL III: CPT | Mod: PBBFAC,,, | Performed by: MEDICAL GENETICS

## 2022-06-21 PROCEDURE — 99205 OFFICE O/P NEW HI 60 MIN: CPT | Mod: 25,S$GLB,, | Performed by: MEDICAL GENETICS

## 2022-06-21 PROCEDURE — 96040 PR GENETIC COUNSELING, EACH 30 MIN: CPT | Mod: S$GLB,,,

## 2022-06-21 PROCEDURE — 81243 FMR1 GEN ALY DETC ABNL ALLEL: CPT | Performed by: MEDICAL GENETICS

## 2022-06-21 PROCEDURE — 1159F MED LIST DOCD IN RCRD: CPT | Mod: CPTII,S$GLB,, | Performed by: MEDICAL GENETICS

## 2022-06-21 PROCEDURE — 36415 COLL VENOUS BLD VENIPUNCTURE: CPT | Performed by: MEDICAL GENETICS

## 2022-06-21 PROCEDURE — 99205 PR OFFICE/OUTPT VISIT, NEW, LEVL V, 60-74 MIN: ICD-10-PCS | Mod: 25,S$GLB,, | Performed by: MEDICAL GENETICS

## 2022-06-21 PROCEDURE — 99417 PROLNG OP E/M EACH 15 MIN: CPT | Mod: S$GLB,,, | Performed by: MEDICAL GENETICS

## 2022-06-21 PROCEDURE — 99417 PR PROLONGED SVC, OUTPT, W/WO DIRECT PT CONTACT,  EA ADDTL 15 MIN: ICD-10-PCS | Mod: S$GLB,,, | Performed by: MEDICAL GENETICS

## 2022-06-21 PROCEDURE — 3008F PR BODY MASS INDEX (BMI) DOCUMENTED: ICD-10-PCS | Mod: CPTII,S$GLB,, | Performed by: MEDICAL GENETICS

## 2022-06-21 PROCEDURE — 1159F PR MEDICATION LIST DOCUMENTED IN MEDICAL RECORD: ICD-10-PCS | Mod: CPTII,S$GLB,, | Performed by: MEDICAL GENETICS

## 2022-06-21 RX ORDER — PRAZOSIN HYDROCHLORIDE 2 MG/1
2 CAPSULE ORAL NIGHTLY
Qty: 90 CAPSULE | Refills: 1 | Status: SHIPPED | OUTPATIENT
Start: 2022-06-21 | End: 2022-08-20

## 2022-06-21 NOTE — PROGRESS NOTES
Mariana Mora  DOS: 2022  : 1980  MRN: [id number]     Visit type: In Person     REFERRING MD: Fitz Lockwood MD    REASON FOR CONSULT: Our Medical Genetic Service was asked to evaluate this 41-year-old female regarding Moody-Danlos Syndrome. She presents alone for a genetics evaluation.    HISTORY OF PRESENT ILLNESS: Ms. Mariana Mora is a 41-year-old female referred to [facility] regarding concerns for Moody-Danlos syndrome (EDS). She reports hypermobility and joint pain in her shoulders, knees, hips, hands, and feet. She is not currently in physical therapy but reports that she has been in physical therapy in the past. She reports easy bruising and poor wound healing, but no reopening of wounds or scars. 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 She reports that she recently had a normal echocardiogram. She reports multiple cavities, enamel problems, and dental crowding. She does follow with gastroenterology for hiatal hernia, diarrhea, and dysphagia. She does not report any genitourinary concerns at this time. Ms. Mora also has a diagnosis of Autism.    MEDICAL HISTORY: as above    FAMILY HISTORY:    See Pedigree. Ms. Mora does not report hypermobility in other family members. She has one full brother whom she is not in contact with. She is not in contact with her mother. She reports her maternal grandfather passed away in his sleep in his 40s, cause unknown. She reports hemochromatosis in her father, paternal grandmother and paternal grandfather (d.72), a paternal uncle who has had a heart and kidney transplant and passed away from COVID, a maternal aunt with a bad heart, and a paternal great aunt with Mcclelland Parkinson White Syndrome. She reports her paternal grandmother has had cancer and has Parkinsons disease and her paternal grandfather passed from a stroke and had 5 heart attacks prior to his  passing.    IMPRESSION: Ms. Mora is an 41-year-old female with hypermobility and joint pain. Hypermobility is common in the general population and there is overlap between hypermobile Moody-Danlos syndrome (hEDS) and benign joint hypermobility. A diagnosis of hEDS, as well as other connective tissue disorders are made based off clinical criteria, and/or genetic testing.     Please see Dr. Huber note for physical examination criteria, medical management, and additional counseling.    RECOMMENDATIONS:  1. Please see Dr. Huber note for recommendations.    TIME SPENT: 30 minutes with over 50% spent counseling     Nathalie Calix [facility], [facility]  Genetic Counselor   [facility]    Cecilia Chavez M.D.                                                                                   Medical Geneticist                                                                                                               [facility]

## 2022-06-22 PROCEDURE — 99358 PR PROLONGED SERV,NO CONTACT,1ST HR: ICD-10-PCS | Mod: S$GLB,,, | Performed by: MEDICAL GENETICS

## 2022-06-22 PROCEDURE — 99358 PROLONG SERVICE W/O CONTACT: CPT | Mod: S$GLB,,, | Performed by: MEDICAL GENETICS

## 2022-06-23 ENCOUNTER — OFFICE VISIT (OUTPATIENT)
Dept: FAMILY MEDICINE | Facility: CLINIC | Age: 42
End: 2022-06-23
Payer: COMMERCIAL

## 2022-06-23 VITALS
HEIGHT: 66 IN | OXYGEN SATURATION: 94 % | BODY MASS INDEX: 46.28 KG/M2 | WEIGHT: 288 LBS | HEART RATE: 96 BPM | TEMPERATURE: 98 F | DIASTOLIC BLOOD PRESSURE: 84 MMHG | RESPIRATION RATE: 16 BRPM | SYSTOLIC BLOOD PRESSURE: 122 MMHG

## 2022-06-23 DIAGNOSIS — M79.81 PAROXYSMAL HEMATOMA OF FINGER: Primary | ICD-10-CM

## 2022-06-23 PROCEDURE — 3079F DIAST BP 80-89 MM HG: CPT | Mod: S$GLB,,,

## 2022-06-23 PROCEDURE — 99213 PR OFFICE/OUTPT VISIT, EST, LEVL III, 20-29 MIN: ICD-10-PCS | Mod: S$GLB,,,

## 2022-06-23 PROCEDURE — 99213 OFFICE O/P EST LOW 20 MIN: CPT | Mod: S$GLB,,,

## 2022-06-23 PROCEDURE — 3008F BODY MASS INDEX DOCD: CPT | Mod: S$GLB,,,

## 2022-06-23 PROCEDURE — 1159F MED LIST DOCD IN RCRD: CPT | Mod: S$GLB,,,

## 2022-06-23 PROCEDURE — 3079F PR MOST RECENT DIASTOLIC BLOOD PRESSURE 80-89 MM HG: ICD-10-PCS | Mod: S$GLB,,,

## 2022-06-23 PROCEDURE — 1159F PR MEDICATION LIST DOCUMENTED IN MEDICAL RECORD: ICD-10-PCS | Mod: S$GLB,,,

## 2022-06-23 PROCEDURE — 3008F PR BODY MASS INDEX (BMI) DOCUMENTED: ICD-10-PCS | Mod: S$GLB,,,

## 2022-06-23 PROCEDURE — 3074F SYST BP LT 130 MM HG: CPT | Mod: S$GLB,,,

## 2022-06-23 PROCEDURE — 3074F PR MOST RECENT SYSTOLIC BLOOD PRESSURE < 130 MM HG: ICD-10-PCS | Mod: S$GLB,,,

## 2022-06-23 PROCEDURE — 99999 PR PBB SHADOW E&M-EST. PATIENT-LVL V: ICD-10-PCS | Mod: PBBFAC,,,

## 2022-06-23 PROCEDURE — 99999 PR PBB SHADOW E&M-EST. PATIENT-LVL V: CPT | Mod: PBBFAC,,,

## 2022-06-23 RX ORDER — PRAZOSIN HYDROCHLORIDE 5 MG/1
5 CAPSULE ORAL NIGHTLY
COMMUNITY
Start: 2022-05-26 | End: 2022-07-06 | Stop reason: SDUPTHER

## 2022-06-23 RX ORDER — SULFAMETHOXAZOLE AND TRIMETHOPRIM 400; 80 MG/1; MG/1
1 TABLET ORAL 2 TIMES DAILY
Qty: 10 TABLET | Refills: 0 | Status: SHIPPED | OUTPATIENT
Start: 2022-06-23 | End: 2022-06-28

## 2022-06-23 RX ORDER — METOPROLOL TARTRATE 25 MG/1
25 TABLET, FILM COATED ORAL 2 TIMES DAILY
COMMUNITY
Start: 2022-06-22 | End: 2023-03-20

## 2022-06-23 NOTE — PROGRESS NOTES
"Subjective:       Patient ID: Mariana Mora is a 41 y.o. female.    Chief Complaint: Hand Injury (Pt states there is a purple bump on the finger near the knuckle, states she did not hit her finger on anything to cause the injury and first noticed the bump half an hour ago. )      HPI   Presents today with concerns of lesion noted on medial aspect of right middle finger. She noticed it today and is increasingly becoming more painful and starting to itch. Has not tried any treatments. She has a history of thrombocytosis and ganglion cysts.     Reviewed family, medical, surgical, and social history.    Review of Systems   Constitutional: Negative for chills and fever.   HENT: Negative for congestion.    Respiratory: Negative for cough and shortness of breath.    Cardiovascular: Negative for chest pain and leg swelling.   Gastrointestinal: Negative for abdominal pain, constipation, diarrhea, nausea and vomiting.   Genitourinary: Negative for difficulty urinating.   Skin:        Lesion on right middle finger   Neurological: Negative for dizziness and headaches.   All other systems reviewed and are negative.          Objective:      /84 (BP Location: Left arm, Patient Position: Sitting, BP Method: Medium (Automatic))   Pulse 96   Temp 98.1 °F (36.7 °C)   Resp 16   Ht 5' 6" (1.676 m)   Wt 130.6 kg (288 lb)   SpO2 (!) 94%   BMI 46.48 kg/m²   Physical Exam  Vitals and nursing note reviewed.   Constitutional:       Appearance: Normal appearance.   HENT:      Head: Normocephalic and atraumatic.      Nose: Nose normal.   Eyes:      Pupils: Pupils are equal, round, and reactive to light.   Cardiovascular:      Rate and Rhythm: Normal rate and regular rhythm.      Heart sounds: Normal heart sounds.   Pulmonary:      Effort: Pulmonary effort is normal.      Breath sounds: Normal breath sounds.   Abdominal:      General: Abdomen is flat.   Musculoskeletal:         General: Normal range of motion.      " Cervical back: Normal range of motion.   Skin:     General: Skin is warm and dry.      Capillary Refill: Capillary refill takes less than 2 seconds.      Findings: Lesion and rash present. Rash is nodular and purpuric.      Comments: Small purplish, soft nodule noted medial aspect of right middle finger along the medial MCP joint.     Neurological:      Mental Status: She is alert and oriented to person, place, and time.   Psychiatric:         Mood and Affect: Mood normal.         Behavior: Behavior normal.         Thought Content: Thought content normal.         Judgment: Judgment normal.         Assessment and Plan:       Mariana was seen today for hand injury.    Diagnoses and all orders for this visit:    Paroxysmal hematoma of finger  -     sulfamethoxazole-trimethoprim 400-80mg (BACTRIM,SEPTRA) 400-80 mg per tablet; Take 1 tablet by mouth 2 (two) times daily. for 5 days        PLAN: Applies to all diagnosis and orders listed above.  - Start Bactrim if redness, swelling, or heat develops in finger.   - No follow-ups on file.     Discussed with patient the plan of care. Medications reviewed. Medication side effects discussed. Patient has no questions or concerns at this time. Reviewed, monitored, evaluated, and assessed chronic conditions as outlined above. Informed patient to please notify me with any questions or concerns at anytime.    Thank you,  MURIEL Cohen  Family Medicine  [facility]

## 2022-06-24 ENCOUNTER — TELEPHONE (OUTPATIENT)
Dept: PSYCHIATRY | Facility: CLINIC | Age: 42
End: 2022-06-24
Payer: COMMERCIAL

## 2022-06-24 ENCOUNTER — PATIENT MESSAGE (OUTPATIENT)
Dept: PSYCHIATRY | Facility: CLINIC | Age: 42
End: 2022-06-24
Payer: COMMERCIAL

## 2022-06-24 NOTE — TELEPHONE ENCOUNTER
Appointment canceled   [facility] Message   Sent:   9:23 AM   To: [facility] Clinical Support Staff    Mariana Mora   MRN: [id number] : 1980   Pt Home: [phone]     Entered: [phone]          Message    Appointment canceled for Mariana Mora ([id number])   Visit Type: MYCHART FOLLOWUP/OFFICE VISIT   Date        Time      Length    Provider                  Department   2022   11:00 AM  60 mins.   MILENA Erwin  [facility]      Reason for Cancellation: COVID-19 Concern

## 2022-06-28 LAB
FMR1 GENE MUT ANL BLD/T: NORMAL
FRAGILE X MOLECULAR ANALYSIS RELEASED BY: NORMAL
FRAGILE X MOLECULAR ANALYSIS RESULT SUMMARY: NEGATIVE
FRAGILE X SPECIMEN: NORMAL
FRAGILE X, REASON FOR REFERRAL: NORMAL
GENETICIST REVIEW: NORMAL
REF LAB TEST METHOD: NORMAL
SPECIMEN SOURCE: NORMAL

## 2022-07-01 ENCOUNTER — OFFICE VISIT (OUTPATIENT)
Dept: FAMILY MEDICINE | Facility: CLINIC | Age: 42
End: 2022-07-01
Payer: COMMERCIAL

## 2022-07-01 VITALS
SYSTOLIC BLOOD PRESSURE: 124 MMHG | OXYGEN SATURATION: 97 % | BODY MASS INDEX: 46.63 KG/M2 | RESPIRATION RATE: 14 BRPM | WEIGHT: 290.13 LBS | HEIGHT: 66 IN | DIASTOLIC BLOOD PRESSURE: 77 MMHG | HEART RATE: 83 BPM

## 2022-07-01 DIAGNOSIS — L71.9 ROSACEA: ICD-10-CM

## 2022-07-01 DIAGNOSIS — E66.9 OBESITY, UNSPECIFIED CLASSIFICATION, UNSPECIFIED OBESITY TYPE, UNSPECIFIED WHETHER SERIOUS COMORBIDITY PRESENT: Primary | ICD-10-CM

## 2022-07-01 PROCEDURE — 3074F PR MOST RECENT SYSTOLIC BLOOD PRESSURE < 130 MM HG: ICD-10-PCS | Mod: S$GLB,,, | Performed by: FAMILY MEDICINE

## 2022-07-01 PROCEDURE — 3008F PR BODY MASS INDEX (BMI) DOCUMENTED: ICD-10-PCS | Mod: S$GLB,,, | Performed by: FAMILY MEDICINE

## 2022-07-01 PROCEDURE — 3078F DIAST BP <80 MM HG: CPT | Mod: S$GLB,,, | Performed by: FAMILY MEDICINE

## 2022-07-01 PROCEDURE — 1159F MED LIST DOCD IN RCRD: CPT | Mod: S$GLB,,, | Performed by: FAMILY MEDICINE

## 2022-07-01 PROCEDURE — 3078F PR MOST RECENT DIASTOLIC BLOOD PRESSURE < 80 MM HG: ICD-10-PCS | Mod: S$GLB,,, | Performed by: FAMILY MEDICINE

## 2022-07-01 PROCEDURE — 1159F PR MEDICATION LIST DOCUMENTED IN MEDICAL RECORD: ICD-10-PCS | Mod: S$GLB,,, | Performed by: FAMILY MEDICINE

## 2022-07-01 PROCEDURE — 99999 PR PBB SHADOW E&M-EST. PATIENT-LVL IV: ICD-10-PCS | Mod: PBBFAC,,, | Performed by: FAMILY MEDICINE

## 2022-07-01 PROCEDURE — 99214 OFFICE O/P EST MOD 30 MIN: CPT | Mod: S$GLB,,, | Performed by: FAMILY MEDICINE

## 2022-07-01 PROCEDURE — 3074F SYST BP LT 130 MM HG: CPT | Mod: S$GLB,,, | Performed by: FAMILY MEDICINE

## 2022-07-01 PROCEDURE — 99999 PR PBB SHADOW E&M-EST. PATIENT-LVL IV: CPT | Mod: PBBFAC,,, | Performed by: FAMILY MEDICINE

## 2022-07-01 PROCEDURE — 99214 PR OFFICE/OUTPT VISIT, EST, LEVL IV, 30-39 MIN: ICD-10-PCS | Mod: S$GLB,,, | Performed by: FAMILY MEDICINE

## 2022-07-01 PROCEDURE — 3008F BODY MASS INDEX DOCD: CPT | Mod: S$GLB,,, | Performed by: FAMILY MEDICINE

## 2022-07-01 RX ORDER — BUPROPION HYDROCHLORIDE 150 MG/1
TABLET ORAL
Qty: 74 TABLET | Refills: 0 | Status: SHIPPED | OUTPATIENT
Start: 2022-07-01 | End: 2022-09-06

## 2022-07-01 RX ORDER — METRONIDAZOLE 10 MG/G
GEL TOPICAL DAILY
Qty: 60 G | Refills: 2 | Status: SHIPPED | OUTPATIENT
Start: 2022-07-01 | End: 2022-09-07

## 2022-07-01 NOTE — PROGRESS NOTES
" Patient ID: Mariana Mora is a 41 y.o. female.    Chief Complaint: Follow-up (Facial flushing)      Reviewed family, medical, surgical, and social history.    No cp/sob  No change in mental status  No fever  No asymmetrical limb swelling    Objective:      /77 (BP Location: Right arm, Patient Position: Sitting, BP Method: Large (Automatic))   Pulse 83   Resp 14   Ht 5' 6" (1.676 m)   Wt 131.6 kg (290 lb 1.6 oz)   SpO2 97%   BMI 46.82 kg/m²   RRR, CTAB, s/nt/nd, no c/c/e, non-toxic appearing, no focal weakness  Assessment:       1. Obesity, unspecified classification, unspecified obesity type, unspecified whether serious comorbidity present    2. Rosacea        Plan:       Obesity, unspecified classification, unspecified obesity type, unspecified whether serious comorbidity present  -     buPROPion (WELLBUTRIN XL) 150 MG TB24 tablet; Take 1 tablet (150 mg total) by mouth once daily for 14 days, THEN 2 tablets (300 mg total) once daily.  Dispense: 74 tablet; Refill: 0    Rosacea  -     metronidazole 1% (METROGEL) 1 % Gel; Apply topically once daily.  Dispense: 60 g; Refill: 2            Reviewed, monitored, evaluated, and assessed chronic conditions as outlined above  Continue current medicines, any changes noted above  As shown  Labs, radiology studies, and procedures/notes from the last 3 months were reviewed.    Risks, benefits, and side effects were discussed with the patient. All questions were answered to the fullest satisfaction of the patient, and pt verbalized understanding and agreement to treatment plan. Pt was to call with any new or worsening symptoms, or present to the ER.    "

## 2022-07-06 ENCOUNTER — OFFICE VISIT (OUTPATIENT)
Dept: PSYCHIATRY | Facility: CLINIC | Age: 42
End: 2022-07-06
Payer: COMMERCIAL

## 2022-07-06 VITALS
DIASTOLIC BLOOD PRESSURE: 66 MMHG | WEIGHT: 289.56 LBS | BODY MASS INDEX: 46.53 KG/M2 | SYSTOLIC BLOOD PRESSURE: 104 MMHG | HEART RATE: 72 BPM | HEIGHT: 66 IN

## 2022-07-06 DIAGNOSIS — F41.1 GAD (GENERALIZED ANXIETY DISORDER): ICD-10-CM

## 2022-07-06 DIAGNOSIS — G47.00 INSOMNIA, UNSPECIFIED TYPE: ICD-10-CM

## 2022-07-06 DIAGNOSIS — F42.9 OBSESSIVE-COMPULSIVE DISORDER, UNSPECIFIED TYPE: ICD-10-CM

## 2022-07-06 DIAGNOSIS — F33.41 RECURRENT MAJOR DEPRESSIVE DISORDER, IN PARTIAL REMISSION: ICD-10-CM

## 2022-07-06 DIAGNOSIS — F90.2 ATTENTION DEFICIT HYPERACTIVITY DISORDER (ADHD), COMBINED TYPE: Primary | ICD-10-CM

## 2022-07-06 PROCEDURE — 3074F SYST BP LT 130 MM HG: CPT | Mod: CPTII,S$GLB,, | Performed by: PHYSICIAN ASSISTANT

## 2022-07-06 PROCEDURE — 99999 PR PBB SHADOW E&M-EST. PATIENT-LVL V: CPT | Mod: PBBFAC,,, | Performed by: PHYSICIAN ASSISTANT

## 2022-07-06 PROCEDURE — 1160F PR REVIEW ALL MEDS BY PRESCRIBER/CLIN PHARMACIST DOCUMENTED: ICD-10-PCS | Mod: CPTII,S$GLB,, | Performed by: PHYSICIAN ASSISTANT

## 2022-07-06 PROCEDURE — 1159F PR MEDICATION LIST DOCUMENTED IN MEDICAL RECORD: ICD-10-PCS | Mod: CPTII,S$GLB,, | Performed by: PHYSICIAN ASSISTANT

## 2022-07-06 PROCEDURE — 99214 PR OFFICE/OUTPT VISIT, EST, LEVL IV, 30-39 MIN: ICD-10-PCS | Mod: S$GLB,,, | Performed by: PHYSICIAN ASSISTANT

## 2022-07-06 PROCEDURE — 3008F BODY MASS INDEX DOCD: CPT | Mod: CPTII,S$GLB,, | Performed by: PHYSICIAN ASSISTANT

## 2022-07-06 PROCEDURE — 99999 PR PBB SHADOW E&M-EST. PATIENT-LVL V: ICD-10-PCS | Mod: PBBFAC,,, | Performed by: PHYSICIAN ASSISTANT

## 2022-07-06 PROCEDURE — 3078F DIAST BP <80 MM HG: CPT | Mod: CPTII,S$GLB,, | Performed by: PHYSICIAN ASSISTANT

## 2022-07-06 PROCEDURE — 99214 OFFICE O/P EST MOD 30 MIN: CPT | Mod: S$GLB,,, | Performed by: PHYSICIAN ASSISTANT

## 2022-07-06 PROCEDURE — 3078F PR MOST RECENT DIASTOLIC BLOOD PRESSURE < 80 MM HG: ICD-10-PCS | Mod: CPTII,S$GLB,, | Performed by: PHYSICIAN ASSISTANT

## 2022-07-06 PROCEDURE — 3008F PR BODY MASS INDEX (BMI) DOCUMENTED: ICD-10-PCS | Mod: CPTII,S$GLB,, | Performed by: PHYSICIAN ASSISTANT

## 2022-07-06 PROCEDURE — 1160F RVW MEDS BY RX/DR IN RCRD: CPT | Mod: CPTII,S$GLB,, | Performed by: PHYSICIAN ASSISTANT

## 2022-07-06 PROCEDURE — 3074F PR MOST RECENT SYSTOLIC BLOOD PRESSURE < 130 MM HG: ICD-10-PCS | Mod: CPTII,S$GLB,, | Performed by: PHYSICIAN ASSISTANT

## 2022-07-06 PROCEDURE — 1159F MED LIST DOCD IN RCRD: CPT | Mod: CPTII,S$GLB,, | Performed by: PHYSICIAN ASSISTANT

## 2022-07-06 RX ORDER — BUSPIRONE HYDROCHLORIDE 7.5 MG/1
TABLET ORAL
Qty: 60 TABLET | Refills: 1 | Status: SHIPPED | OUTPATIENT
Start: 2022-07-06 | End: 2022-09-07 | Stop reason: SDUPTHER

## 2022-07-06 RX ORDER — PRAZOSIN HYDROCHLORIDE 5 MG/1
5 CAPSULE ORAL NIGHTLY
Qty: 90 CAPSULE | Refills: 0 | Status: SHIPPED | OUTPATIENT
Start: 2022-07-06 | End: 2022-09-20 | Stop reason: SDUPTHER

## 2022-07-06 RX ORDER — QUETIAPINE FUMARATE 50 MG/1
50 TABLET, EXTENDED RELEASE ORAL DAILY
Qty: 30 TABLET | Refills: 1 | Status: SHIPPED | OUTPATIENT
Start: 2022-07-06 | End: 2022-10-03 | Stop reason: SDUPTHER

## 2022-07-06 RX ORDER — LISDEXAMFETAMINE DIMESYLATE 30 MG/1
30 CAPSULE ORAL EVERY MORNING
Qty: 30 CAPSULE | Refills: 0 | Status: SHIPPED | OUTPATIENT
Start: 2022-07-06 | End: 2022-09-07

## 2022-07-06 RX ORDER — SERTRALINE HYDROCHLORIDE 100 MG/1
200 TABLET, FILM COATED ORAL DAILY
Qty: 60 TABLET | Refills: 1 | Status: SHIPPED | OUTPATIENT
Start: 2022-07-06 | End: 2022-09-06

## 2022-07-06 NOTE — PROGRESS NOTES
Outpatient Psychiatry Follow-Up Visit (MD/NP)    7/6/2022    Clinical Status of Patient:  Outpatient (Ambulatory)    Chief Complaint:  Mariana Mora is a 41 y.o. female who presents today for follow-up of depression and anxiety.  Met with patient.        Interval History and Content of Current Session:  Interim Events/Subjective Report/Content of Current Session:   Mariana is seen today for medication follow-up.  She did have genetics counseling visit and at this time, EDS is not suspected.  She states it was a pretty extensive visit and she is going to have genetic testing.  She does feel good about her visit.  She does feel positive about her disability hearing is just waiting on final determination.  She would like to resume Vyvanse therapy.  She cannot get anything done without stimulant at this time.  She does feel that she is having lucid dreaming which bothers her.  She would like to see how she does with Vyvanse and consider future medication adjustments.  She denies suicidal or homicidal ideation.  No other complaints today.    FROM PREVIOUS HPI  Mariana is seen today for medication follow-up.  Strattera was not helpful so she discontinued the medication. She had her disability hearing and she reports that she passed.  She is looking forward to this but now it is a waiting game until she is actually compensated.  She reports that one week before that, she lost her favorite dog which she is very sad about.  She thinks that he passed away due to stomach cancer.  He was 12 years old.  This is why her depression and anxiety scores are so high today.  She does feel that her medication regimen is working for her.  She declines need for medication adjustments today.  She did get a new Romansh horn which she is looking forward to playing again.  She would like to resume stimulant therapy but wants to meet with a  prior to resuming.  This is her decision, she did just fine on a stimulant  "cardiovascular wise but she would like reassurance.  She is working with MILENA Erwin.  States she is impulse buying on [employer] in the [employer] a bit.  Sleep has been difficult since her dog passed away.  She does still continue to endorse bizarre dreams but prazosin is helping.  She denies suicidal or homicidal ideation.  No other complaints today.      FROM PREVIOUS HPI  Mariana is seen today for medication follow up. She has been busy with evaluation of medical concerns. Cardiology evaluating for POTS syndrome. Recent eye exam. She enjoys painting. She is working on disability paperwork and having her hearing 6/9/2022. She reports significant anxiety in anticipation of the hearing. She is seeing psychologist (Dr. Burgess) weekly. She reports autism is significant to her identity. She is sleeping well. She reports trouble focusing and feels like she is just "existing." She has been intolerant of stimulants so far. We discussed Strattera as viable option to increase concentration and she was again informed the norepinephrine component may produce side effects. She is looking forward to [facility] [employer] mid-June. She is scheduled with Yaima later this month. She denies suicidal or homicidal ideation. No other complaints today.      GAD7 7/6/2022 6/12/2022 6/10/2022   1. Feeling nervous, anxious, or on edge? 2 3 3   2. Not being able to stop or control worrying? 1 3 3   3. Worrying too much about different things? 1 3 3   4. Trouble relaxing? 2 3 3   5. Being so restless that it is hard to sit still? 2 3 3   6. Becoming easily annoyed or irritable? 1 2 2   7. Feeling afraid as if something awful might happen? 2 3 3   8. If you checked off any problems, how difficult have these problems made it for you to do your work, take care of things at home, or get along with other people? 2 3 3   JOSE ANGEL-7 Score 11 20 20       PHQ9 7/6/2022   Little interest or pleasure in doing things: Nearly every day   Feeling down, depressed or " hopeless: Several days   Trouble falling asleep, staying asleep, or sleeping too much: Nearly every day   Feeling tired or having little energy: Nearly every day   Poor appetite or overeating: More than half the days   Feeling bad about yourself- or that you are a failure or have let yourself or family down Several days   Trouble concentrating on things, such as reading the newspaper or watching television: Nearly every day   Moving or speaking so slowly that other people could have noticed. Or the opposite- being so fidgety or restless that you have been moving around a lot more than usual: Nearly every day   Thoughts that you would be better off dead or hurting yourself in some way: Not at all   If you indicated you have experienced any of the aforementioned problems, how difficult have these problems made it for you to do your work, take care of things at home or get along with other people? Somewhat difficult   Total Score 19       Review of Systems   · PSYCHIATRIC: Pertinant items are noted in the narrative.  · RESPIRATORY: No shortness of breath.  · CARDIOVASCULAR: Reports tachycardia, no chest pain.  · GASTROINTESTINAL: Reports nausea, vomiting, diarrhea.     Past Medical, Family and Social History: The patient's past medical, family and social history have been reviewed and updated as appropriate within the electronic medical record - see encounter notes.    Compliance: yes    Side effects: None    Risk Parameters:  Patient reports no suicidal ideation  Patient reports no homicidal ideation  Patient reports no self-injurious behavior  Patient reports no violent behavior    Exam (detailed: at least 9 elements; comprehensive: all 15 elements)   Constitutional  Vitals:  Most recent vital signs, dated less than 90 days prior to this appointment, were reviewed.   Vitals:    07/06/22 1429   BP: 104/66   Pulse: 72        General:  unremarkable, age appropriate     Musculoskeletal  Muscle Strength/Tone:  no  dyskinesia   Gait & Station:  non-ataxic     Psychiatric  Speech:  no latency; no press   Mood & Affect:  ok  appropriate   Thought Process:  normal and logical   Associations:  intact   Thought Content:  normal, no suicidality, no homicidality, delusions, or paranoia   Insight:  intact   Judgement: behavior is adequate to circumstances   Orientation:  grossly intact   Memory: intact for content of interview   Language: grossly intact   Attention Span & Concentration:  able to focus   Fund of Knowledge:  intact and appropriate to age and level of education     Assessment and Diagnosis   Status/Progress: Based on the examination today, the patient's problem(s) is/are inadequately controlled.  New problems have not been presented today.   Co-morbidities, Diagnostic uncertainty and Lack of compliance are not complicating management of the primary condition.      General Impression:   Major depressive disorder, mild  Generalized anxiety disorder  OCD by history  ADHD by history  Autism spectrum disorder, by patient report    Intervention/Counseling/Treatment Plan   · Medication Management: Continue current medications. The risks and benefits of medication were discussed with the patient.  · Counseling provided with patient as follows: importance of compliance with chosen treatment options was emphasized, risks and benefits of treatment options, including medications, were discussed with the patient, risk factor reduction, prognosis     Mariana is seen today for medication follow-up.  Inattention/concentration is primary concern.  Based on assessment today:    Continue with MILENA Erwin for psychotherapy.    Resume Vyvanse 30mg daily for ADHD.  Continue Prazosin 7mg nightly for nightmares, discussed mechanism of action and to change positions slowly. This will be titrated to effect.  Continue seroquel XR 50mg nightly for antidepressant augmentation and insomnia.   Continue sertraline 200 mg daily for  depression/anxiety/OCD symptoms.  Continue buspirone 7.5 mg twice daily for anxiety.    Gene site testing reivewed.    Please go to emergency department if feeling as though you are a harm to yourself or others or if you are in crisis.     Please call the clinic to report any worsening of symptoms or problems associated with medication.    Discussed risks of tardive dyskinesia, drug induced parkinsonism, metabolic side effects, including weight gain, neuroleptic malignant syndrome       Return to Clinic: 1 month, as needed

## 2022-07-06 NOTE — PATIENT INSTRUCTIONS
Restart Vyvanse 20mg daily for ADHD    Please go to emergency department if feeling as though you are a harm to yourself or others or if you are in crisis. Please call the clinic to report any worsening of symptoms or problems associated with medication.    Cardiovascular events: [US Boxed Warning]: Use has been associated with serious cardiovascular events including sudden death in patients with preexisting structural cardiac abnormalities or other serious heart problems (sudden death in children and adolescents; sudden death, stroke, and MI in adults).

## 2022-07-08 ENCOUNTER — OFFICE VISIT (OUTPATIENT)
Dept: PSYCHIATRY | Facility: CLINIC | Age: 42
End: 2022-07-08
Payer: COMMERCIAL

## 2022-07-08 DIAGNOSIS — F41.1 GAD (GENERALIZED ANXIETY DISORDER): ICD-10-CM

## 2022-07-08 DIAGNOSIS — F90.2 ATTENTION DEFICIT HYPERACTIVITY DISORDER (ADHD), COMBINED TYPE: Primary | ICD-10-CM

## 2022-07-08 DIAGNOSIS — F84.0 AUTISM SPECTRUM DISORDER: ICD-10-CM

## 2022-07-08 DIAGNOSIS — F42.9 OBSESSIVE-COMPULSIVE DISORDER, UNSPECIFIED TYPE: ICD-10-CM

## 2022-07-08 DIAGNOSIS — F33.41 RECURRENT MAJOR DEPRESSIVE DISORDER, IN PARTIAL REMISSION: ICD-10-CM

## 2022-07-08 DIAGNOSIS — G47.00 INSOMNIA, UNSPECIFIED TYPE: ICD-10-CM

## 2022-07-08 PROCEDURE — 1159F PR MEDICATION LIST DOCUMENTED IN MEDICAL RECORD: ICD-10-PCS | Mod: CPTII,S$GLB,, | Performed by: SOCIAL WORKER

## 2022-07-08 PROCEDURE — 99999 PR PBB SHADOW E&M-EST. PATIENT-LVL II: ICD-10-PCS | Mod: PBBFAC,,, | Performed by: SOCIAL WORKER

## 2022-07-08 PROCEDURE — 90834 PR PSYCHOTHERAPY W/PATIENT, 45 MIN: ICD-10-PCS | Mod: S$GLB,,, | Performed by: SOCIAL WORKER

## 2022-07-08 PROCEDURE — 90834 PSYTX W PT 45 MINUTES: CPT | Mod: S$GLB,,, | Performed by: SOCIAL WORKER

## 2022-07-08 PROCEDURE — 99999 PR PBB SHADOW E&M-EST. PATIENT-LVL II: CPT | Mod: PBBFAC,,, | Performed by: SOCIAL WORKER

## 2022-07-08 PROCEDURE — 1159F MED LIST DOCD IN RCRD: CPT | Mod: CPTII,S$GLB,, | Performed by: SOCIAL WORKER

## 2022-07-08 NOTE — PROGRESS NOTES
Individual Psychotherapy (PhD/LCSW)    7/8/2022    Site:  [facility]         Therapeutic Intervention: Met with patient.  Outpatient - Insight oriented psychotherapy 45 min - CPT code 74634, Outpatient - Behavior modifying psychotherapy 45 min - CPT code 57579 and Outpatient - Supportive psychotherapy 45 min - CPT Code 55832    Chief complaint/reason for encounter: depression, anxiety and insomnia, OCD, trauma            Interval history and content of current session: Ct was referred to tx by Kiara LANDAVERDE to address depression, anxiety, OCD, trauma. Ct arrived to session on time and was fully engaged. Ct shared that things have been going well. She reported that she went out to dinner with her dad and step mom and it was nice. She reported that she [place] and is looking forward ot that. She reported that she plans to wear a mask so that people dont' make fun of her facial expressions. SW and ct discussed how ct did not wear a mask prior to COVID pandemic and how using the mask as a security blanket could lower her tolerance for social interactions. Ct agreed. She reported that she uses the markers when she wants to skin pick, she reported that she has tattoo markers as well. She reported that she has not been compulsive shopping and that she still has bizarre dreams. She shared that she has been doing a better job at managing her anxiety but it takes a lot of effort. Ct is making progress. Ct to continue in 1:1 sessions.     Treatment plan:  · Target symptoms: depression, anxiety , OCD, Trauma  · Why chosen therapy is appropriate versus another modality: relevant to diagnosis, patient responds to this modality, evidence based practice  · Outcome monitoring methods: self-report  · Therapeutic intervention type: insight oriented psychotherapy, behavior modifying psychotherapy, supportive psychotherapy    Risk parameters:  Patient reports no suicidal ideation  Patient reports no homicidal  ideation  Patient reports no self-injurious behavior  Patient reports no violent behavior          CSSRS was completed: No risk    Verbal deficits: None    Patient's response to intervention:  The patient's response to intervention is accepting.    Progress toward goals and other mental status changes:  The patient's progress toward goals is good.    Diagnosis:     ICD-10-CM ICD-9-CM   1. Attention deficit hyperactivity disorder (ADHD), combined type  F90.2 314.01   2. Obsessive-compulsive disorder, unspecified type  F42.9 300.3   3. Recurrent major depressive disorder, in partial remission  F33.41 296.35   4. Autism spectrum disorder  F84.0 299.00   5. JOSE ANGEL (generalized anxiety disorder)  F41.1 300.02   6. Insomnia, unspecified type  G47.00 780.52       Plan:  individual psychotherapy and Ct to continue to see Kiara LANDAVERDE for psych med mgt Pt to go to ED or call 911 if symptoms worsen or if she has thoughts of harming self and/or others. Pt verbalized understanding.    Return to clinic: as scheduled    Length of Service (minutes): 45      Each patient to whom he or she provides medical services by telemedicine is: (1) informed of the relationship between the physician and patient and the respective role of any other health care provider with respect to management of the patient; and (2) notified that he or she may decline to receive medical services by telemedicine and may withdraw from such care at any time.

## 2022-07-22 ENCOUNTER — OFFICE VISIT (OUTPATIENT)
Dept: PSYCHIATRY | Facility: CLINIC | Age: 42
End: 2022-07-22
Payer: COMMERCIAL

## 2022-07-22 DIAGNOSIS — F43.9 TRAUMA AND STRESSOR-RELATED DISORDER: ICD-10-CM

## 2022-07-22 DIAGNOSIS — F33.9 RECURRENT DEPRESSION: ICD-10-CM

## 2022-07-22 DIAGNOSIS — F90.2 ATTENTION DEFICIT HYPERACTIVITY DISORDER (ADHD), COMBINED TYPE: ICD-10-CM

## 2022-07-22 DIAGNOSIS — F84.0 AUTISM SPECTRUM DISORDER: ICD-10-CM

## 2022-07-22 DIAGNOSIS — F41.1 GAD (GENERALIZED ANXIETY DISORDER): Primary | ICD-10-CM

## 2022-07-22 DIAGNOSIS — F42.9 OBSESSIVE-COMPULSIVE DISORDER, UNSPECIFIED TYPE: ICD-10-CM

## 2022-07-22 PROCEDURE — 90837 PR PSYCHOTHERAPY W/PATIENT, 60 MIN: ICD-10-PCS | Mod: S$GLB,,, | Performed by: SOCIAL WORKER

## 2022-07-22 PROCEDURE — 1159F PR MEDICATION LIST DOCUMENTED IN MEDICAL RECORD: ICD-10-PCS | Mod: CPTII,S$GLB,, | Performed by: SOCIAL WORKER

## 2022-07-22 PROCEDURE — 99999 PR PBB SHADOW E&M-EST. PATIENT-LVL II: ICD-10-PCS | Mod: PBBFAC,,, | Performed by: SOCIAL WORKER

## 2022-07-22 PROCEDURE — 99999 PR PBB SHADOW E&M-EST. PATIENT-LVL II: CPT | Mod: PBBFAC,,, | Performed by: SOCIAL WORKER

## 2022-07-22 PROCEDURE — 90837 PSYTX W PT 60 MINUTES: CPT | Mod: S$GLB,,, | Performed by: SOCIAL WORKER

## 2022-07-22 PROCEDURE — 1159F MED LIST DOCD IN RCRD: CPT | Mod: CPTII,S$GLB,, | Performed by: SOCIAL WORKER

## 2022-07-22 NOTE — PROGRESS NOTES
Individual Psychotherapy (PhD/LCSW)    7/22/2022    Site:  [facility]         Therapeutic Intervention: Met with patient.  Outpatient - Insight oriented psychotherapy 60 min - CPT code 31445, Outpatient - Behavior modifying psychotherapy 60 min - CPT code 45633 and Outpatient - Supportive psychotherapy 60 min - CPT Code 32983    Chief complaint/reason for encounter: depression, anxiety , OCD, ADHD, trauma             Interval history and content of current session: Ct was referred to tx by Kiara LANDAVERDE to address depression, anxiety, OCD, trauma. Ct arrived to session on time and was fully engaged. Ct shared that things have been going well. She reported that she has been having thoughts about her role in how others see her. Ct shared that some of the thoughts of self doubt have been creeping back up. Sw and ct processed thought stopping techniques and challenge questions to refute those thoughts. Ct reported that her skin picking, nightmares, and compulsive shopping continue to decrease. Ct to continue in 1:1 sessions.     Treatment plan:  · Target symptoms: depression, distractability, anxiety , past trauma  · Why chosen therapy is appropriate versus another modality: relevant to diagnosis, patient responds to this modality, evidence based practice  · Outcome monitoring methods: self-report  · Therapeutic intervention type: insight oriented psychotherapy, behavior modifying psychotherapy, supportive psychotherapy    Risk parameters:  Patient reports no suicidal ideation  Patient reports no homicidal ideation  Patient reports no self-injurious behavior  Patient reports no violent behavior          CSSRS was completed:     Verbal deficits: None    Patient's response to intervention:  The patient's response to intervention is accepting.    Progress toward goals and other mental status changes:  The patient's progress toward goals is fair , good.    Diagnosis:     ICD-10-CM ICD-9-CM   1. JOSE ANGEL (generalized anxiety disorder)   F41.1 300.02   2. Attention deficit hyperactivity disorder (ADHD), combined type  F90.2 314.01   3. Obsessive-compulsive disorder, unspecified type  F42.9 300.3   4. Autism spectrum disorder  F84.0 299.00   5. Recurrent depression  F33.9 296.30   6. Trauma and stressor-related disorder  F43.9 309.81     308.9       Plan:  individual psychotherapy and Ct to continue to see Kiara SAIMA for psych med mgt Pt to go to ED or call 911 if symptoms worsen or if she has thoughts of harming self and/or others. Pt verbalized understanding.    Return to clinic: as scheduled    Length of Service (minutes): 60      Each patient to whom he or she provides medical services by telemedicine is: (1) informed of the relationship between the physician and patient and the respective role of any other health care provider with respect to management of the patient; and (2) notified that he or she may decline to receive medical services by telemedicine and may withdraw from such care at any time.

## 2022-07-26 LAB — CHROMOSOMAL MICROARRAY (GENONEDX®): NORMAL

## 2022-07-28 ENCOUNTER — PATIENT MESSAGE (OUTPATIENT)
Dept: GENETICS | Facility: CLINIC | Age: 42
End: 2022-07-28
Payer: COMMERCIAL

## 2022-08-19 ENCOUNTER — OFFICE VISIT (OUTPATIENT)
Dept: PSYCHIATRY | Facility: CLINIC | Age: 42
End: 2022-08-19
Payer: COMMERCIAL

## 2022-08-19 DIAGNOSIS — F90.2 ATTENTION DEFICIT HYPERACTIVITY DISORDER (ADHD), COMBINED TYPE: ICD-10-CM

## 2022-08-19 DIAGNOSIS — F84.0 AUTISM SPECTRUM DISORDER: ICD-10-CM

## 2022-08-19 DIAGNOSIS — F33.9 RECURRENT DEPRESSION: ICD-10-CM

## 2022-08-19 DIAGNOSIS — F43.9 TRAUMA AND STRESSOR-RELATED DISORDER: ICD-10-CM

## 2022-08-19 DIAGNOSIS — F41.1 GAD (GENERALIZED ANXIETY DISORDER): Primary | ICD-10-CM

## 2022-08-19 DIAGNOSIS — F42.9 OBSESSIVE-COMPULSIVE DISORDER, UNSPECIFIED TYPE: ICD-10-CM

## 2022-08-19 PROCEDURE — 90834 PSYTX W PT 45 MINUTES: CPT | Mod: S$GLB,,, | Performed by: SOCIAL WORKER

## 2022-08-19 PROCEDURE — 90834 PR PSYCHOTHERAPY W/PATIENT, 45 MIN: ICD-10-PCS | Mod: S$GLB,,, | Performed by: SOCIAL WORKER

## 2022-08-19 PROCEDURE — 99999 PR PBB SHADOW E&M-EST. PATIENT-LVL II: CPT | Mod: PBBFAC,,, | Performed by: SOCIAL WORKER

## 2022-08-19 PROCEDURE — 1159F MED LIST DOCD IN RCRD: CPT | Mod: CPTII,S$GLB,, | Performed by: SOCIAL WORKER

## 2022-08-19 PROCEDURE — 1159F PR MEDICATION LIST DOCUMENTED IN MEDICAL RECORD: ICD-10-PCS | Mod: CPTII,S$GLB,, | Performed by: SOCIAL WORKER

## 2022-08-19 PROCEDURE — 99999 PR PBB SHADOW E&M-EST. PATIENT-LVL II: ICD-10-PCS | Mod: PBBFAC,,, | Performed by: SOCIAL WORKER

## 2022-08-19 NOTE — PROGRESS NOTES
"  Individual Psychotherapy (PhD/LCSW)    8/19/2022    Site:  [facility]         Therapeutic Intervention: Met with patient.  Outpatient - Insight oriented psychotherapy 45 min - CPT code 86134, Outpatient - Behavior modifying psychotherapy 45 min - CPT code 27309 and Outpatient - Supportive psychotherapy 45 min - CPT Code 85217    Chief complaint/reason for encounter: attention deficit, depression, anxiety and past trauma, nightmares, OCD             Interval history and content of current session: Ct was referred to tx by Kiara LANDAVERDE to address depression, anxiety, OCD, trauma. Ct arrived to session on time and was fully engaged.Ct shared that things have been okay. She has been having dreams about her "step" grandmother whom she has not seen in over a year and who has nothing to do with the family. Ct shared that she continues to play her instruments for leisure and they help her to relax. She reported that she was able to rid her office of a lot of junk and she felt good about the progress she made. SW and ct processed negative core beliefs that continue to plague the ct. SW and ct processed the importance of labeling thoughts as thoughts and ct remembering to challenge irrational thought processes. Ct reported that she plans to use the coping skill of thought labeling. Ct to continue in 1:1 session.       Treatment plan:  · Target symptoms: depression, anxiety , past trauma, OCD, ADHD  · Why chosen therapy is appropriate versus another modality: relevant to diagnosis, patient responds to this modality, evidence based practice  · Outcome monitoring methods: self-report  · Therapeutic intervention type: insight oriented psychotherapy, behavior modifying psychotherapy, supportive psychotherapy    Risk parameters:  Patient reports no suicidal ideation  Patient reports no homicidal ideation  Patient reports no self-injurious behavior  Patient reports no violent behavior          CSSRS was completed: No risk    Mental Status " Exam:  General Appearance:  unremarkable, age appropriate   Speech: normal tone, normal rate, normal pitch, normal volume      Level of Cooperation: cooperative      Thought Processes: normal and logical   Mood: steady      Thought Content: normal, no suicidality, no homicidality, delusions, or paranoia   Affect: congruent and appropriate   Orientation: Oriented x3   Memory: recent >  intact   Attention Span & Concentration: intact   Fund of General Knowledge: intact and appropriate to age and level of education   Abstract Reasoning: interpretation of similarities was abstract   Judgment & Insight: good, fair, intact     Language intact             Verbal deficits: None    Patient's response to intervention:  The patient's response to intervention is accepting.    Progress toward goals and other mental status changes:  The patient's progress toward goals is fair , good, some.    Diagnosis:     ICD-10-CM ICD-9-CM   1. JOSE ANGEL (generalized anxiety disorder)  F41.1 300.02   2. Attention deficit hyperactivity disorder (ADHD), combined type  F90.2 314.01   3. Obsessive-compulsive disorder, unspecified type  F42.9 300.3   4. Autism spectrum disorder  F84.0 299.00   5. Recurrent depression  F33.9 296.30   6. Trauma and stressor-related disorder  F43.9 309.81     308.9       Plan:  individual psychotherapy and Ct to continue to see Kiara LANDAVERDE for psych med mgt Pt to go to ED or call 911 if symptoms worsen or if she has thoughts of harming self and/or others. Pt verbalized understanding.    Return to clinic: as scheduled    Length of Service (minutes): 45      Each patient to whom he or she provides medical services by telemedicine is: (1) informed of the relationship between the physician and patient and the respective role of any other health care provider with respect to management of the patient; and (2) notified that he or she may decline to receive medical services by telemedicine and may withdraw from such care at any  time.

## 2022-08-24 ENCOUNTER — PATIENT MESSAGE (OUTPATIENT)
Dept: FAMILY MEDICINE | Facility: CLINIC | Age: 42
End: 2022-08-24
Payer: COMMERCIAL

## 2022-08-24 RX ORDER — POLYMYXIN B SULFATE AND TRIMETHOPRIM 1; 10000 MG/ML; [USP'U]/ML
1 SOLUTION OPHTHALMIC EVERY 6 HOURS
Qty: 10 ML | Refills: 0 | Status: SHIPPED | OUTPATIENT
Start: 2022-08-24 | End: 2022-09-07

## 2022-08-25 ENCOUNTER — TELEPHONE (OUTPATIENT)
Dept: GENETICS | Facility: CLINIC | Age: 42
End: 2022-08-25
Payer: COMMERCIAL

## 2022-09-07 ENCOUNTER — OFFICE VISIT (OUTPATIENT)
Dept: PSYCHIATRY | Facility: CLINIC | Age: 42
End: 2022-09-07
Payer: COMMERCIAL

## 2022-09-07 VITALS — BODY MASS INDEX: 46.59 KG/M2 | WEIGHT: 289.88 LBS | HEIGHT: 66 IN

## 2022-09-07 DIAGNOSIS — F42.9 OBSESSIVE-COMPULSIVE DISORDER, UNSPECIFIED TYPE: ICD-10-CM

## 2022-09-07 DIAGNOSIS — F90.2 ATTENTION DEFICIT HYPERACTIVITY DISORDER (ADHD), COMBINED TYPE: ICD-10-CM

## 2022-09-07 DIAGNOSIS — F41.1 GAD (GENERALIZED ANXIETY DISORDER): ICD-10-CM

## 2022-09-07 DIAGNOSIS — G47.33 OSA (OBSTRUCTIVE SLEEP APNEA): ICD-10-CM

## 2022-09-07 DIAGNOSIS — F33.41 RECURRENT MAJOR DEPRESSIVE DISORDER, IN PARTIAL REMISSION: Primary | ICD-10-CM

## 2022-09-07 PROCEDURE — 3008F PR BODY MASS INDEX (BMI) DOCUMENTED: ICD-10-PCS | Mod: CPTII,S$GLB,, | Performed by: PHYSICIAN ASSISTANT

## 2022-09-07 PROCEDURE — 3008F BODY MASS INDEX DOCD: CPT | Mod: CPTII,S$GLB,, | Performed by: PHYSICIAN ASSISTANT

## 2022-09-07 PROCEDURE — 99214 OFFICE O/P EST MOD 30 MIN: CPT | Mod: S$GLB,,, | Performed by: PHYSICIAN ASSISTANT

## 2022-09-07 PROCEDURE — 1160F PR REVIEW ALL MEDS BY PRESCRIBER/CLIN PHARMACIST DOCUMENTED: ICD-10-PCS | Mod: CPTII,S$GLB,, | Performed by: PHYSICIAN ASSISTANT

## 2022-09-07 PROCEDURE — 1159F PR MEDICATION LIST DOCUMENTED IN MEDICAL RECORD: ICD-10-PCS | Mod: CPTII,S$GLB,, | Performed by: PHYSICIAN ASSISTANT

## 2022-09-07 PROCEDURE — 99214 PR OFFICE/OUTPT VISIT, EST, LEVL IV, 30-39 MIN: ICD-10-PCS | Mod: S$GLB,,, | Performed by: PHYSICIAN ASSISTANT

## 2022-09-07 PROCEDURE — 1160F RVW MEDS BY RX/DR IN RCRD: CPT | Mod: CPTII,S$GLB,, | Performed by: PHYSICIAN ASSISTANT

## 2022-09-07 PROCEDURE — 99999 PR PBB SHADOW E&M-EST. PATIENT-LVL IV: ICD-10-PCS | Mod: PBBFAC,,, | Performed by: PHYSICIAN ASSISTANT

## 2022-09-07 PROCEDURE — 99999 PR PBB SHADOW E&M-EST. PATIENT-LVL IV: CPT | Mod: PBBFAC,,, | Performed by: PHYSICIAN ASSISTANT

## 2022-09-07 PROCEDURE — 1159F MED LIST DOCD IN RCRD: CPT | Mod: CPTII,S$GLB,, | Performed by: PHYSICIAN ASSISTANT

## 2022-09-07 RX ORDER — BUSPIRONE HYDROCHLORIDE 7.5 MG/1
TABLET ORAL
Qty: 60 TABLET | Refills: 1 | Status: SHIPPED | OUTPATIENT
Start: 2022-09-07 | End: 2022-11-09 | Stop reason: SDUPTHER

## 2022-09-07 RX ORDER — PRAZOSIN HYDROCHLORIDE 2 MG/1
2 CAPSULE ORAL NIGHTLY
COMMUNITY
Start: 2022-07-14 | End: 2022-09-15

## 2022-09-07 RX ORDER — SERTRALINE HYDROCHLORIDE 100 MG/1
200 TABLET, FILM COATED ORAL NIGHTLY
Qty: 60 TABLET | Refills: 1
Start: 2022-09-07 | End: 2022-11-01 | Stop reason: SDUPTHER

## 2022-09-07 RX ORDER — BUPROPION HYDROCHLORIDE 300 MG/1
300 TABLET ORAL DAILY PRN
COMMUNITY
Start: 2022-09-05 | End: 2022-09-07

## 2022-09-07 NOTE — PROGRESS NOTES
Outpatient Psychiatry Follow-Up Visit (MD/NP)    9/7/2022    Clinical Status of Patient:  Outpatient (Ambulatory)    Chief Complaint:  Mariana Mora is a 41 y.o. female who presents today for follow-up of depression and anxiety.  Met with patient.  Lisa Cohen is present for this visit.      Interval History and Content of Current Session:  Interim Events/Subjective Report/Content of Current Session:   Mariana is seen today for medication follow-up.  She has been picking up her home and doing a good job organizing which she is excited about.  She did get a new puppy as well. Endorses daytime fatigue and increased diaphoresis. Admits being especially sleepy after she eats. Agrees to think about visiting a nutritionist. Her dreams are still lucid which disturb her. Has a diagnosis of sleep apnea and is compliant with her C-PAP. Recommended moving sertraline to the night to improve daytime sleepiness. Mariana resumed Vyvanse but did not notice enough of a difference to continue the medication. Will continue to use Wellbutrin and monitor results. Reports good results with Wellbutrin. She still has some breakthrough nightmares with Prazosin and agrees to trial an increased dose. She is currently waiting on back pay from disability but it is approved. Continues to see psychologist in [place]. Continues to see Yaima Woodruff LCSW. She denies suicidal or homicidal ideation.  No other complaints today.    FROM PREVIOUS HPI  Mariana is seen today for medication follow-up.  She did have genetics counseling visit and at this time, EDS is not suspected.  She states it was a pretty extensive visit and she is going to have genetic testing.  She does feel good about her visit.  She does feel positive about her disability hearing is just waiting on final determination.  She would like to resume Vyvanse therapy.  She cannot get anything done without stimulant at this time.  She does feel that she is having lucid  dreaming which bothers her.  She would like to see how she does with Vyvanse and consider future medication adjustments.  She denies suicidal or homicidal ideation.  No other complaints today.      GAD7 9/7/2022 8/19/2022 7/22/2022   1. Feeling nervous, anxious, or on edge? 2 2 2   2. Not being able to stop or control worrying? 1 1 1   3. Worrying too much about different things? 1 2 1   4. Trouble relaxing? 2 2 2   5. Being so restless that it is hard to sit still? 2 1 1   6. Becoming easily annoyed or irritable? 1 1 1   7. Feeling afraid as if something awful might happen? 1 1 1   8. If you checked off any problems, how difficult have these problems made it for you to do your work, take care of things at home, or get along with other people? 1 2 1   JOSE ANGEL-7 Score 10 10 9       PHQ9 9/7/2022   Little interest or pleasure in doing things: More than half the days   Feeling down, depressed or hopeless: Several days   Trouble falling asleep, staying asleep, or sleeping too much: Nearly every day   Feeling tired or having little energy: Nearly every day   Poor appetite or overeating: More than half the days   Feeling bad about yourself- or that you are a failure or have let yourself or family down Several days   Trouble concentrating on things, such as reading the newspaper or watching television: Nearly every day   Moving or speaking so slowly that other people could have noticed. Or the opposite- being so fidgety or restless that you have been moving around a lot more than usual: More than half the days   Thoughts that you would be better off dead or hurting yourself in some way: Not at all   If you indicated you have experienced any of the aforementioned problems, how difficult have these problems made it for you to do your work, take care of things at home or get along with other people? Very difficult   Total Score 17       Review of Systems   PSYCHIATRIC: Pertinant items are noted in the narrative.  RESPIRATORY: No  shortness of breath.  CARDIOVASCULAR: Reports tachycardia, no chest pain.  GASTROINTESTINAL: Reports nausea, vomiting, diarrhea.     Past Medical, Family and Social History: The patient's past medical, family and social history have been reviewed and updated as appropriate within the electronic medical record - see encounter notes.    Compliance: yes    Side effects: None    Risk Parameters:  Patient reports no suicidal ideation  Patient reports no homicidal ideation  Patient reports no self-injurious behavior  Patient reports no violent behavior    Exam (detailed: at least 9 elements; comprehensive: all 15 elements)   Constitutional  Vitals:  Most recent vital signs, dated less than 90 days prior to this appointment, were reviewed.   Vitals:          General:  unremarkable, age appropriate     Musculoskeletal  Muscle Strength/Tone:  no dyskinesia   Gait & Station:  non-ataxic     Psychiatric  Speech:  no latency; no press   Mood & Affect:  ok  appropriate   Thought Process:  normal and logical   Associations:  intact   Thought Content:  normal, no suicidality, no homicidality, delusions, or paranoia   Insight:  intact   Judgement: behavior is adequate to circumstances   Orientation:  grossly intact   Memory: intact for content of interview   Language: grossly intact   Attention Span & Concentration:  able to focus   Fund of Knowledge:  intact and appropriate to age and level of education     Assessment and Diagnosis   Status/Progress: Based on the examination today, the patient's problem(s) is/are inadequately controlled.  New problems have not been presented today.   Co-morbidities, Diagnostic uncertainty and Lack of compliance are not complicating management of the primary condition.      General Impression:   Major depressive disorder, mild  Generalized anxiety disorder  OCD by history  ADHD by history  Autism spectrum disorder, by patient report    Intervention/Counseling/Treatment Plan   Medication Management:  Continue current medications. The risks and benefits of medication were discussed with the patient.  Counseling provided with patient as follows: importance of compliance with chosen treatment options was emphasized, risks and benefits of treatment options, including medications, were discussed with the patient, risk factor reduction, prognosis     Mariana is seen today for medication follow-up.  Based on assessment today:    Continue with MILENA Erwin for psychotherapy.    D/c Vyvanse at patient request.  Increase Prazosin to 10mg nightly for nightmares, discussed mechanism of action and to change positions slowly. This will be titrated to effect.  Continue seroquel XR 50mg nightly for antidepressant augmentation and insomnia.   Continue sertraline 200 mg daily for depression/anxiety/OCD symptoms.  Continue buspirone 7.5 mg twice daily for anxiety.    Referral to [facility] pulm for SUNIL management at patient request.    Gene site testing reivewed.    Please go to emergency department if feeling as though you are a harm to yourself or others or if you are in crisis.     Please call the clinic to report any worsening of symptoms or problems associated with medication.    Discussed risks of tardive dyskinesia, drug induced parkinsonism, metabolic side effects, including weight gain, neuroleptic malignant syndrome       Return to Clinic: 2 months, as needed

## 2022-09-07 NOTE — PATIENT INSTRUCTIONS
Please go to emergency department if feeling as though you are a harm to yourself or others or if you are in crisis. Please call the clinic to report any worsening of symptoms or problems associated with medication.    Switch zoloft to nightly dosing.  Trial increase of prazosin to 10mg nightly for nightmares.  Stop Vyvanse.  Keep wellbutrin, buspar, seroquel the same.

## 2022-09-08 ENCOUNTER — TELEPHONE (OUTPATIENT)
Dept: PSYCHIATRY | Facility: CLINIC | Age: 42
End: 2022-09-08
Payer: COMMERCIAL

## 2022-09-09 ENCOUNTER — TELEPHONE (OUTPATIENT)
Dept: PULMONOLOGY | Facility: CLINIC | Age: 42
End: 2022-09-09
Payer: COMMERCIAL

## 2022-09-13 LAB
GENETIC COUNSELING?: YES
GENSO SPECIMEN TYPE: NORMAL
MISCELLANEOUS GENETIC TEST NAME: NORMAL
PARTENTAL OR SIBLING TESTING?: NO
REFERENCE LAB: NORMAL
TEST RESULT: NORMAL

## 2022-09-20 ENCOUNTER — TELEPHONE (OUTPATIENT)
Dept: PSYCHIATRY | Facility: CLINIC | Age: 42
End: 2022-09-20
Payer: COMMERCIAL

## 2022-09-20 ENCOUNTER — PATIENT MESSAGE (OUTPATIENT)
Dept: PSYCHIATRY | Facility: CLINIC | Age: 42
End: 2022-09-20
Payer: COMMERCIAL

## 2022-09-20 ENCOUNTER — OFFICE VISIT (OUTPATIENT)
Dept: PULMONOLOGY | Facility: CLINIC | Age: 42
End: 2022-09-20
Payer: COMMERCIAL

## 2022-09-20 ENCOUNTER — PATIENT MESSAGE (OUTPATIENT)
Dept: PULMONOLOGY | Facility: CLINIC | Age: 42
End: 2022-09-20

## 2022-09-20 VITALS
OXYGEN SATURATION: 97 % | HEART RATE: 81 BPM | HEIGHT: 66 IN | BODY MASS INDEX: 46.59 KG/M2 | DIASTOLIC BLOOD PRESSURE: 72 MMHG | SYSTOLIC BLOOD PRESSURE: 114 MMHG | WEIGHT: 289.88 LBS

## 2022-09-20 DIAGNOSIS — G47.00 INSOMNIA, UNSPECIFIED TYPE: ICD-10-CM

## 2022-09-20 DIAGNOSIS — G47.33 OSA (OBSTRUCTIVE SLEEP APNEA): ICD-10-CM

## 2022-09-20 DIAGNOSIS — R05.9 COUGH: ICD-10-CM

## 2022-09-20 DIAGNOSIS — K21.9 GASTROESOPHAGEAL REFLUX DISEASE, UNSPECIFIED WHETHER ESOPHAGITIS PRESENT: ICD-10-CM

## 2022-09-20 DIAGNOSIS — L40.50 PSORIATIC ARTHRITIS: Primary | ICD-10-CM

## 2022-09-20 PROCEDURE — 99203 PR OFFICE/OUTPT VISIT, NEW, LEVL III, 30-44 MIN: ICD-10-PCS | Mod: S$GLB,,, | Performed by: NURSE PRACTITIONER

## 2022-09-20 PROCEDURE — 3074F SYST BP LT 130 MM HG: CPT | Mod: CPTII,S$GLB,, | Performed by: NURSE PRACTITIONER

## 2022-09-20 PROCEDURE — 3078F PR MOST RECENT DIASTOLIC BLOOD PRESSURE < 80 MM HG: ICD-10-PCS | Mod: CPTII,S$GLB,, | Performed by: NURSE PRACTITIONER

## 2022-09-20 PROCEDURE — 99999 PR PBB SHADOW E&M-EST. PATIENT-LVL IV: CPT | Mod: PBBFAC,,, | Performed by: NURSE PRACTITIONER

## 2022-09-20 PROCEDURE — 99203 OFFICE O/P NEW LOW 30 MIN: CPT | Mod: S$GLB,,, | Performed by: NURSE PRACTITIONER

## 2022-09-20 PROCEDURE — 1159F PR MEDICATION LIST DOCUMENTED IN MEDICAL RECORD: ICD-10-PCS | Mod: CPTII,S$GLB,, | Performed by: NURSE PRACTITIONER

## 2022-09-20 PROCEDURE — 3074F PR MOST RECENT SYSTOLIC BLOOD PRESSURE < 130 MM HG: ICD-10-PCS | Mod: CPTII,S$GLB,, | Performed by: NURSE PRACTITIONER

## 2022-09-20 PROCEDURE — 99999 PR PBB SHADOW E&M-EST. PATIENT-LVL IV: ICD-10-PCS | Mod: PBBFAC,,, | Performed by: NURSE PRACTITIONER

## 2022-09-20 PROCEDURE — 3078F DIAST BP <80 MM HG: CPT | Mod: CPTII,S$GLB,, | Performed by: NURSE PRACTITIONER

## 2022-09-20 PROCEDURE — 3008F BODY MASS INDEX DOCD: CPT | Mod: CPTII,S$GLB,, | Performed by: NURSE PRACTITIONER

## 2022-09-20 PROCEDURE — 1159F MED LIST DOCD IN RCRD: CPT | Mod: CPTII,S$GLB,, | Performed by: NURSE PRACTITIONER

## 2022-09-20 PROCEDURE — 3008F PR BODY MASS INDEX (BMI) DOCUMENTED: ICD-10-PCS | Mod: CPTII,S$GLB,, | Performed by: NURSE PRACTITIONER

## 2022-09-20 RX ORDER — PRAZOSIN HYDROCHLORIDE 5 MG/1
10 CAPSULE ORAL NIGHTLY
Qty: 60 CAPSULE | Refills: 1 | Status: SHIPPED | OUTPATIENT
Start: 2022-09-20 | End: 2022-09-20 | Stop reason: SDUPTHER

## 2022-09-20 RX ORDER — PRAZOSIN HYDROCHLORIDE 5 MG/1
10 CAPSULE ORAL NIGHTLY
Qty: 180 CAPSULE | Refills: 0 | Status: SHIPPED | OUTPATIENT
Start: 2022-09-20 | End: 2023-02-22 | Stop reason: SDUPTHER

## 2022-09-20 NOTE — TELEPHONE ENCOUNTER
Rec'd fax from [facility] requesting a 90 day supply on the Rx prazosin 5 mg sent today. Please advise.

## 2022-09-20 NOTE — PROGRESS NOTES
9/20/2022    Mariana Mora  New Patient Consult    Chief Complaint   Patient presents with    Apnea       HPI:  9/20/2022: Hx: SUNIL, asthma a child  Diagnosed with SUNIL years ago - was following per Dr. Borges. Underwent PSG in 2019 revealing AHI of 21.6 followed by titration study November 18, 2019 revealing need or CPAP with pressure set at 12-20 cm h20. Patient has CPAP machine, however it is under recall. Hasn't yet registered for recall but plans to.   Currently using CPAP nightly with in line filter - no issues reported. Using Full Face mask without issue.  Endorses daytime fatigue which could be secondary to medication. Takes medication for insomnia, reports nocturnal arousals. Denies morning headaches. Depression is treated.   On Xolair per Dr. Dai, Allergist - for chronic hives.  Cough: Onset two months ago, persistent - worse in the morning, productive with green, flat, hard mucous. Mucous is dime sized.  Hx septum repair - does endorse chronic allergies, PND. On Zyrtec daily.   Follows with Dr. Lockwood ([facility]) for psoriatic arthritis - currently on Tremfya with great reported benefit. Was on MTX in the past.   Denies frequent steroid or abx use.         Social Hx: Lives with  - four dogs in the home. Disabled, former middle school . No Asbestosis exposure, Smoking Hx: Never smoker  Family Hx: No Lung Cancer, No COPD, Father with Asthma  Medical Hx: No previous pneumonia ; No previous shoulder/chest surgery          The chief compliant  problem is new to me   PFSH:  Past Medical History:   Diagnosis Date    ADHD, predominantly inattentive type     Anxiety     Arthritis     Autism     Autism spectrum disorder     Depression     Hypertension     Hypothyroidism     Migraine headache     OCD (obsessive compulsive disorder)     Psoriasis     TMJ (temporomandibular joint syndrome)          Past Surgical History:   Procedure Laterality Date    ADENOIDECTOMY       ESOPHAGEAL MANOMETRY WITH MEASUREMENT OF IMPEDANCE N/A 2/10/2020    Procedure: MANOMETRY, ESOPHAGUS, WITH IMPEDANCE MEASUREMENT;  Surgeon: Fitz Murray MD;  Location: [facility] ENDO ([facility] FLR);  Service: Endoscopy;  Laterality: N/A;  2/3 - pt confirmed    ESOPHAGOGASTRODUODENOSCOPY N/A 12/17/2019    Procedure: EGD (ESOPHAGOGASTRODUODENOSCOPY);  Surgeon: David Stahl MD;  Location: [facility] ENDO;  Service: Endoscopy;  Laterality: N/A;    INNER EAR SURGERY Right     LAPAROSCOPIC CHOLECYSTECTOMY N/A 6/3/2021    Procedure: CHOLECYSTECTOMY-LAPAROSCOPIC;  Surgeon: Farrukh Lorenzo MD;  Location: [facility] OR;  Service: General;  Laterality: N/A;    NASAL SEPTUM SURGERY      TYMPANOPLASTY      bilateral    TYMPANOSTOMY TUBE PLACEMENT       Social History     Tobacco Use    Smoking status: Never    Smokeless tobacco: Never   Substance Use Topics    Alcohol use: No    Drug use: Never     Family History   Problem Relation Age of Onset    Diabetes Paternal Grandmother     Heart failure Paternal Grandmother     Breast cancer Paternal Grandmother     Arthritis Paternal Grandmother     Cancer Paternal Grandmother     Heart disease Paternal Grandmother     Diabetes Paternal Grandfather     Heart failure Paternal Grandfather     Cancer Paternal Grandfather     Heart disease Paternal Grandfather     Stroke Paternal Grandfather     Hemochromatosis Unknown         Father's side    Arthritis Father     Asthma Father     Heart disease Father     Hyperlipidemia Father     Cancer Paternal Uncle     Ovarian cancer Neg Hx      Review of patient's allergies indicates:   Allergen Reactions    Penicillins     Norco [hydrocodone-acetaminophen] Itching    Prednisone Other (See Comments) and Rash     PSORIASIS  PSORIASIS     I have reviewed past medical, family, and social history. I have reviewed previous nurse notes.    Performance Status:The patient's activity level is functions out of house.      Review of Systems   Constitutional:  Positive for  "fatigue. Negative for activity change, appetite change, chills, diaphoresis, fever and unexpected weight change.   HENT:  Positive for congestion, postnasal drip, rhinorrhea and trouble swallowing. Negative for sinus pressure, sinus pain and sore throat.    Eyes:  Negative for photophobia and visual disturbance.   Respiratory:  Negative for cough, choking, chest tightness, shortness of breath and wheezing.    Cardiovascular:  Negative for chest pain, palpitations and leg swelling.   Gastrointestinal:  Negative for abdominal distention, abdominal pain, blood in stool, constipation, diarrhea, nausea and vomiting.   Genitourinary:  Negative for difficulty urinating, dysuria, flank pain and hematuria.   Musculoskeletal:  Positive for back pain and neck pain. Negative for gait problem and joint swelling.   Skin:  Negative for rash and wound.   Allergic/Immunologic: Positive for environmental allergies and food allergies.        Watermelon     Neurological:  Positive for headaches. Negative for dizziness, seizures, syncope, weakness, light-headedness and numbness.   Hematological:  Does not bruise/bleed easily.   Psychiatric/Behavioral:  Positive for sleep disturbance. Negative for confusion. The patient is not nervous/anxious.        Exam:Comprehensive exam done. /72 (BP Location: Left arm, Patient Position: Sitting, BP Method: Large (Automatic))   Pulse 81   Ht 5' 6" (1.676 m)   Wt 131.5 kg (289 lb 14.5 oz)   LMP  (LMP Unknown) Comment: pt on seasonique  SpO2 97%   BMI 46.79 kg/m²   Exam included Vitals as listed  Constitutional: He is oriented to person, place, and time. He appears well-developed. No distress.   Nose: Nose normal.   Mouth/Throat: Uvula is midline, oropharynx is clear and moist and mucous membranes are normal. No dental caries. No oropharyngeal exudate, posterior oropharyngeal edema, posterior oropharyngeal erythema or tonsillar abscesses.    Eyes: Pupils are equal, round, and reactive to " light.   Neck: No JVD present. No thyromegaly present.   Cardiovascular: Normal rate, regular rhythm and normal heart sounds. Exam reveals no gallop and no friction rub.   No murmur heard.  Pulmonary/Chest: Effort normal and breath sounds normal. No accessory muscle usage or stridor. No apnea and no tachypnea. No respiratory distress, decreased breath sounds, wheezes, rhonchi, rales, or tenderness. Clear breath sounds throughout, on room air, in no acute distress.   Abdominal: Soft. He exhibits no mass. There is no tenderness. No hepatosplenomegaly, hernias and normoactive bowel sounds  Musculoskeletal: Normal range of motion. exhibits no edema.   Neurological:  alert and oriented to person, place, and time. not disoriented.   Skin: Skin is warm and dry. Capillary refill takes less 2 sec. No cyanosis or erythema. No pallor. Nails show no clubbing.   Psychiatric: normal mood and affect. behavior is normal. Judgment and thought content normal.       Radiographs (ct chest and cxr) reviewed: view by direct vision     Chest Xray 11/3/2011 - Normal      Labs reviewed    Lab Results   Component Value Date    WBC 7.7 08/22/2022    RBC 4.69 08/22/2022    HGB 13.9 08/22/2022    HCT 42.7 08/22/2022    MCV 91.0 08/22/2022    MCH 29.6 08/22/2022    MCHC 32.6 08/22/2022    RDW 42.7 08/22/2022     08/22/2022    MPV 9.3 (L) 08/22/2022    GRAN 5.6 04/06/2021    GRAN 65.4 04/06/2021    LYMPH 2.2 04/06/2021    LYMPH 25.6 04/06/2021    MONO 0.3 04/06/2021    MONO 3.8 (L) 04/06/2021    EOS 0.4 04/06/2021    BASO 0.06 04/06/2021    EOSINOPHIL 4.1 04/06/2021    BASOPHIL 0.7 04/06/2021       PFT was not done  Pulmonary Functions Testing Results:        Plan:  Clinical impression is apparently straight forward and impression with management as below.    Mariana was seen today for apnea.    Diagnoses and all orders for this visit:    Psoriatic arthritis    SUNIL (obstructive sleep apnea)  -     Ambulatory referral/consult to  Pulmonology    Gastroesophageal reflux disease, unspecified whether esophagitis present    Cough  -     X-Ray Chest PA And Lateral; Future      Follow up in about 6 months (around 3/20/2023), or if symptoms worsen or fail to improve.    Discussed with patient above for education the following:      Patient Instructions   [place] for CPAP recall. Call [phone]    Compliance report reviewed from 8/21/22-9/19/22  Average AHI 0.5  Average 90% CPAP - 15.1 cm H20  Average usage 8 h 23 minutes    Until then, can continue CPAP Nightly with use of filter.    Chest xray now.    If cough persists for longer than six months, can consider CT Chest.    Continue current medication regiment. Keep follow up appointment as scheduled. Please call the office if you have any questions or concerns.

## 2022-09-20 NOTE — PATIENT INSTRUCTIONS
[place] for CPAP recall. Call [phone]    Compliance report reviewed from 8/21/22-9/19/22  Average AHI 0.5  Average 90% CPAP - 15.1 cm H20  Average usage 8 h 23 minutes    Until then, can continue CPAP Nightly with use of filter.    Chest xray now.    If cough persists for longer than six months, can consider CT Chest.    Continue current medication regiment. Keep follow up appointment as scheduled. Please call the office if you have any questions or concerns.

## 2022-09-23 ENCOUNTER — TELEPHONE (OUTPATIENT)
Dept: PULMONOLOGY | Facility: CLINIC | Age: 42
End: 2022-09-23
Payer: COMMERCIAL

## 2022-09-23 ENCOUNTER — HOSPITAL ENCOUNTER (OUTPATIENT)
Dept: RADIOLOGY | Facility: HOSPITAL | Age: 42
Discharge: HOME | End: 2022-09-23
Attending: NURSE PRACTITIONER
Payer: COMMERCIAL

## 2022-09-23 ENCOUNTER — PATIENT MESSAGE (OUTPATIENT)
Dept: PULMONOLOGY | Facility: CLINIC | Age: 42
End: 2022-09-23
Payer: COMMERCIAL

## 2022-09-23 DIAGNOSIS — R05.9 COUGH: ICD-10-CM

## 2022-09-23 PROCEDURE — 71046 X-RAY EXAM CHEST 2 VIEWS: CPT | Mod: TC,PN

## 2022-09-23 PROCEDURE — 71046 XR CHEST PA AND LATERAL: ICD-10-PCS | Mod: 26,,, | Performed by: RADIOLOGY

## 2022-09-23 PROCEDURE — 71046 X-RAY EXAM CHEST 2 VIEWS: CPT | Mod: 26,,, | Performed by: RADIOLOGY

## 2022-09-23 NOTE — TELEPHONE ENCOUNTER
LVM to pt      ----- Message from Sarah Brand NP sent at 9/23/2022 11:40 AM CDT -----  Chest x-ray is normal. Continue current medication regiment. Keep follow up appointment as scheduled. Please call the office if you have any questions or concerns.

## 2022-09-26 ENCOUNTER — TELEPHONE (OUTPATIENT)
Dept: GENETICS | Facility: CLINIC | Age: 42
End: 2022-09-26
Payer: COMMERCIAL

## 2022-09-26 ENCOUNTER — PATIENT MESSAGE (OUTPATIENT)
Dept: GENETICS | Facility: CLINIC | Age: 42
End: 2022-09-26
Payer: COMMERCIAL

## 2022-09-26 NOTE — TELEPHONE ENCOUNTER
CHELAM for patient letting her know the results of her genetic testing are available. I will send a message to patient via the portal disclosing results of her testing.

## 2022-10-03 DIAGNOSIS — F33.41 RECURRENT MAJOR DEPRESSIVE DISORDER, IN PARTIAL REMISSION: ICD-10-CM

## 2022-10-03 RX ORDER — QUETIAPINE FUMARATE 50 MG/1
50 TABLET, EXTENDED RELEASE ORAL DAILY
Qty: 30 TABLET | Refills: 1 | Status: SHIPPED | OUTPATIENT
Start: 2022-10-03 | End: 2022-11-09 | Stop reason: SDUPTHER

## 2022-10-03 NOTE — TELEPHONE ENCOUNTER
Rec'd fax from [facility] requesting refill on quetiapine 50 mg  Last refill: 7/06  Last visit: 9/07  Follow up: 11/08

## 2022-11-01 DIAGNOSIS — F42.9 OBSESSIVE-COMPULSIVE DISORDER, UNSPECIFIED TYPE: ICD-10-CM

## 2022-11-01 DIAGNOSIS — E66.9 OBESITY, UNSPECIFIED CLASSIFICATION, UNSPECIFIED OBESITY TYPE, UNSPECIFIED WHETHER SERIOUS COMORBIDITY PRESENT: ICD-10-CM

## 2022-11-01 RX ORDER — BUPROPION HYDROCHLORIDE 150 MG/1
300 TABLET ORAL DAILY
Qty: 180 TABLET | Refills: 0 | Status: SHIPPED | OUTPATIENT
Start: 2022-11-01 | End: 2023-04-20

## 2022-11-01 RX ORDER — SERTRALINE HYDROCHLORIDE 100 MG/1
200 TABLET, FILM COATED ORAL NIGHTLY
Qty: 90 TABLET | Refills: 1 | Status: SHIPPED | OUTPATIENT
Start: 2022-11-01 | End: 2023-01-04

## 2022-11-01 NOTE — TELEPHONE ENCOUNTER
Pt is requesting medication refill on sertraline 100 mg, bupropion  mg   Last refill: 9/7/22, 9/6/22  Last visit: 9/7/22  Follow Up: 11/8/22

## 2022-11-08 ENCOUNTER — OFFICE VISIT (OUTPATIENT)
Dept: PSYCHIATRY | Facility: CLINIC | Age: 42
End: 2022-11-08
Payer: COMMERCIAL

## 2022-11-08 DIAGNOSIS — F33.41 RECURRENT MAJOR DEPRESSIVE DISORDER, IN PARTIAL REMISSION: ICD-10-CM

## 2022-11-08 DIAGNOSIS — F90.2 ATTENTION DEFICIT HYPERACTIVITY DISORDER (ADHD), COMBINED TYPE: ICD-10-CM

## 2022-11-08 DIAGNOSIS — F41.1 GAD (GENERALIZED ANXIETY DISORDER): ICD-10-CM

## 2022-11-08 DIAGNOSIS — F42.9 OBSESSIVE-COMPULSIVE DISORDER, UNSPECIFIED TYPE: Primary | ICD-10-CM

## 2022-11-08 DIAGNOSIS — G47.00 INSOMNIA, UNSPECIFIED TYPE: ICD-10-CM

## 2022-11-08 PROCEDURE — 99999 PR PBB SHADOW E&M-EST. PATIENT-LVL III: ICD-10-PCS | Mod: PBBFAC,,, | Performed by: PHYSICIAN ASSISTANT

## 2022-11-08 PROCEDURE — 1159F PR MEDICATION LIST DOCUMENTED IN MEDICAL RECORD: ICD-10-PCS | Mod: CPTII,95,, | Performed by: PHYSICIAN ASSISTANT

## 2022-11-08 PROCEDURE — 99214 PR OFFICE/OUTPT VISIT, EST, LEVL IV, 30-39 MIN: ICD-10-PCS | Mod: 95,,, | Performed by: PHYSICIAN ASSISTANT

## 2022-11-08 PROCEDURE — 1159F MED LIST DOCD IN RCRD: CPT | Mod: CPTII,95,, | Performed by: PHYSICIAN ASSISTANT

## 2022-11-08 PROCEDURE — 1160F PR REVIEW ALL MEDS BY PRESCRIBER/CLIN PHARMACIST DOCUMENTED: ICD-10-PCS | Mod: CPTII,95,, | Performed by: PHYSICIAN ASSISTANT

## 2022-11-08 PROCEDURE — 99999 PR PBB SHADOW E&M-EST. PATIENT-LVL III: CPT | Mod: PBBFAC,,, | Performed by: PHYSICIAN ASSISTANT

## 2022-11-08 PROCEDURE — 1160F RVW MEDS BY RX/DR IN RCRD: CPT | Mod: CPTII,95,, | Performed by: PHYSICIAN ASSISTANT

## 2022-11-08 PROCEDURE — 99214 OFFICE O/P EST MOD 30 MIN: CPT | Mod: 95,,, | Performed by: PHYSICIAN ASSISTANT

## 2022-11-09 RX ORDER — BUSPIRONE HYDROCHLORIDE 15 MG/1
15 TABLET ORAL 2 TIMES DAILY
Qty: 60 TABLET | Refills: 1 | Status: SHIPPED | OUTPATIENT
Start: 2022-11-09 | End: 2022-12-15

## 2022-11-09 RX ORDER — QUETIAPINE FUMARATE 50 MG/1
100 TABLET, EXTENDED RELEASE ORAL NIGHTLY
Qty: 60 TABLET | Refills: 1 | Status: SHIPPED | OUTPATIENT
Start: 2022-11-09 | End: 2022-12-12

## 2022-11-09 NOTE — PROGRESS NOTES
The patient location is: in her car in [place], [place]  The chief complaint leading to consultation is: depression/anxiety    Visit type: audiovisual    Face to Face time with patient: 16  26 minutes of total time spent on the encounter, which includes face to face time and non-face to face time preparing to see the patient (eg, review of tests), Obtaining and/or reviewing separately obtained history, Documenting clinical information in the electronic or other health record, Independently interpreting results (not separately reported) and communicating results to the patient/family/caregiver, or Care coordination (not separately reported).     Each patient to whom he or she provides medical services by telemedicine is:  (1) informed of the relationship between the physician and patient and the respective role of any other health care provider with respect to management of the patient; and (2) notified that he or she may decline to receive medical services by telemedicine and may withdraw from such care at any time.      Outpatient Psychiatry Follow-Up Visit (MD/NP)    11/8/2022    Clinical Status of Patient:  Outpatient (Ambulatory)    Chief Complaint:  Mariana Mora is a 41 y.o. female who presents today for follow-up of depression and anxiety.  Met with patient.        Interval History and Content of Current Session:  Interim Events/Subjective Report/Content of Current Session:  Mariana is seen virtually today for medication follow-up.  She reports she is doing okay, however her disability claim was denied.  She was very surprised about this as it had been considered to be excepted previously.  She is now going to go through the appeal process.  She has not been following up with MILENA Erwin, she has continued to follow-up with her psychologist in [place].  She is not sleeping as well as she was previously.  She is interested in increasing quetiapine as well as buspirone for anxiety.  She does   objectively appear more anxious today.  She denies suicidal or homicidal ideation.  No other complaints today.    FROM PREVIOUS HPI   Mariana is seen today for medication follow-up.  She has been picking up her home and doing a good job organizing which she is excited about.  She did get a new puppy as well. Endorses daytime fatigue and increased diaphoresis. Admits being especially sleepy after she eats. Agrees to think about visiting a nutritionist. Her dreams are still lucid which disturb her. Has a diagnosis of sleep apnea and is compliant with her C-PAP. Recommended moving sertraline to the night to improve daytime sleepiness. Mariana resumed Vyvanse but did not notice enough of a difference to continue the medication. Will continue to use Wellbutrin and monitor results. Reports good results with Wellbutrin. She still has some breakthrough nightmares with Prazosin and agrees to trial an increased dose. She is currently waiting on back pay from disability but it is approved. Continues to see psychologist in [place]. Continues to see Yaima Woodruff LCSW. She denies suicidal or homicidal ideation.  No other complaints today.      GAD7 11/8/2022 9/7/2022 8/19/2022   1. Feeling nervous, anxious, or on edge? 3 2 2   2. Not being able to stop or control worrying? 2 1 1   3. Worrying too much about different things? 2 1 2   4. Trouble relaxing? 3 2 2   5. Being so restless that it is hard to sit still? 2 2 1   6. Becoming easily annoyed or irritable? 1 1 1   7. Feeling afraid as if something awful might happen? 3 1 1   8. If you checked off any problems, how difficult have these problems made it for you to do your work, take care of things at home, or get along with other people? 2 1 2   JOSE ANGEL-7 Score 16 10 10       PHQ9 9/7/2022   Little interest or pleasure in doing things: More than half the days   Feeling down, depressed or hopeless: Several days   Trouble falling asleep, staying asleep, or sleeping too much:  Nearly every day   Feeling tired or having little energy: Nearly every day   Poor appetite or overeating: More than half the days   Feeling bad about yourself- or that you are a failure or have let yourself or family down Several days   Trouble concentrating on things, such as reading the newspaper or watching television: Nearly every day   Moving or speaking so slowly that other people could have noticed. Or the opposite- being so fidgety or restless that you have been moving around a lot more than usual: More than half the days   Thoughts that you would be better off dead or hurting yourself in some way: Not at all   If you indicated you have experienced any of the aforementioned problems, how difficult have these problems made it for you to do your work, take care of things at home or get along with other people? Very difficult   Total Score 17       Review of Systems   PSYCHIATRIC: Pertinant items are noted in the narrative.  RESPIRATORY: No shortness of breath.  CARDIOVASCULAR: Reports tachycardia, no chest pain.  GASTROINTESTINAL: Reports nausea, vomiting, diarrhea.     Past Medical, Family and Social History: The patient's past medical, family and social history have been reviewed and updated as appropriate within the electronic medical record - see encounter notes.    Compliance: yes    Side effects: None    Risk Parameters:  Patient reports no suicidal ideation  Patient reports no homicidal ideation  Patient reports no self-injurious behavior  Patient reports no violent behavior    Exam (detailed: at least 9 elements; comprehensive: all 15 elements)   Constitutional  Vitals:  Most recent vital signs, dated less than 90 days prior to this appointment, were reviewed.   There were no vitals filed for this visit.         General:  unremarkable, age appropriate     Musculoskeletal  Muscle Strength/Tone:  no dyskinesia   Gait & Station:  Virtual visit     Psychiatric  Speech:  no latency; no press   Mood &  Affect:  ok  appropriate   Thought Process:  normal and logical   Associations:  intact   Thought Content:  normal, no suicidality, no homicidality, delusions, or paranoia   Insight:  intact   Judgement: behavior is adequate to circumstances   Orientation:  grossly intact   Memory: intact for content of interview   Language: grossly intact   Attention Span & Concentration:  able to focus   Fund of Knowledge:  intact and appropriate to age and level of education     Assessment and Diagnosis   Status/Progress: Based on the examination today, the patient's problem(s) is/are inadequately controlled.  New problems have not been presented today.   Co-morbidities, Diagnostic uncertainty and Lack of compliance are not complicating management of the primary condition.      General Impression:   Major depressive disorder, mild  Generalized anxiety disorder  OCD by history  ADHD by history  Autism spectrum disorder, by patient report    Intervention/Counseling/Treatment Plan   Medication Management: Continue current medications. The risks and benefits of medication were discussed with the patient.  Counseling provided with patient as follows: importance of compliance with chosen treatment options was emphasized, risks and benefits of treatment options, including medications, were discussed with the patient, risk factor reduction, prognosis     Mariana is seen today for medication follow-up.  Based on assessment today:    Continue Prazosin 10mg nightly for nightmares, discussed mechanism of action and to change positions slowly. This will be titrated to effect.  Increase seroquel XR to 100mg nightly for antidepressant augmentation and insomnia.   Continue sertraline 200 mg daily for depression/anxiety/OCD symptoms.  Increase buspirone to 15 mg twice daily for anxiety.    Gene site testing reivewed.    Please go to emergency department if feeling as though you are a harm to yourself or others or if you are in crisis.     Please  call the clinic to report any worsening of symptoms or problems associated with medication.    Discussed risks of tardive dyskinesia, drug induced parkinsonism, metabolic side effects, including weight gain, neuroleptic malignant syndrome       Return to Clinic: 2 months, as needed

## 2022-12-07 ENCOUNTER — OFFICE VISIT (OUTPATIENT)
Dept: FAMILY MEDICINE | Facility: CLINIC | Age: 42
End: 2022-12-07
Payer: COMMERCIAL

## 2022-12-07 VITALS
HEART RATE: 85 BPM | BODY MASS INDEX: 46.99 KG/M2 | HEIGHT: 66 IN | DIASTOLIC BLOOD PRESSURE: 82 MMHG | SYSTOLIC BLOOD PRESSURE: 120 MMHG | WEIGHT: 292.38 LBS | OXYGEN SATURATION: 99 %

## 2022-12-07 DIAGNOSIS — R68.89 ILL FEELING: ICD-10-CM

## 2022-12-07 DIAGNOSIS — R30.0 DYSURIA: Primary | ICD-10-CM

## 2022-12-07 DIAGNOSIS — B37.31 YEAST VAGINITIS: ICD-10-CM

## 2022-12-07 DIAGNOSIS — Z13.1 SCREENING FOR DIABETES MELLITUS: ICD-10-CM

## 2022-12-07 DIAGNOSIS — E78.5 DYSLIPIDEMIA (HIGH LDL; LOW HDL): ICD-10-CM

## 2022-12-07 LAB
BILIRUBIN, UA POC OHS: ABNORMAL
BLOOD, UA POC OHS: NEGATIVE
CLARITY, UA POC OHS: ABNORMAL
COLOR, UA POC OHS: YELLOW
GLUCOSE, UA POC OHS: NEGATIVE
KETONES, UA POC OHS: NEGATIVE
LEUKOCYTES, UA POC OHS: NEGATIVE
NITRITE, UA POC OHS: NEGATIVE
PH, UA POC OHS: 5.5
PROTEIN, UA POC OHS: NEGATIVE
SPECIFIC GRAVITY, UA POC OHS: >=1.03
UROBILINOGEN, UA POC OHS: 0.2

## 2022-12-07 PROCEDURE — 3079F DIAST BP 80-89 MM HG: CPT | Mod: S$GLB,,, | Performed by: FAMILY MEDICINE

## 2022-12-07 PROCEDURE — 87186 SC STD MICRODIL/AGAR DIL: CPT | Performed by: FAMILY MEDICINE

## 2022-12-07 PROCEDURE — 99214 OFFICE O/P EST MOD 30 MIN: CPT | Mod: S$GLB,,, | Performed by: FAMILY MEDICINE

## 2022-12-07 PROCEDURE — 1160F RVW MEDS BY RX/DR IN RCRD: CPT | Mod: S$GLB,,, | Performed by: FAMILY MEDICINE

## 2022-12-07 PROCEDURE — 99999 PR PBB SHADOW E&M-EST. PATIENT-LVL IV: ICD-10-PCS | Mod: PBBFAC,,, | Performed by: FAMILY MEDICINE

## 2022-12-07 PROCEDURE — 87088 URINE BACTERIA CULTURE: CPT | Performed by: FAMILY MEDICINE

## 2022-12-07 PROCEDURE — 99214 PR OFFICE/OUTPT VISIT, EST, LEVL IV, 30-39 MIN: ICD-10-PCS | Mod: S$GLB,,, | Performed by: FAMILY MEDICINE

## 2022-12-07 PROCEDURE — 3008F BODY MASS INDEX DOCD: CPT | Mod: S$GLB,,, | Performed by: FAMILY MEDICINE

## 2022-12-07 PROCEDURE — 3074F SYST BP LT 130 MM HG: CPT | Mod: S$GLB,,, | Performed by: FAMILY MEDICINE

## 2022-12-07 PROCEDURE — 1160F PR REVIEW ALL MEDS BY PRESCRIBER/CLIN PHARMACIST DOCUMENTED: ICD-10-PCS | Mod: S$GLB,,, | Performed by: FAMILY MEDICINE

## 2022-12-07 PROCEDURE — 87077 CULTURE AEROBIC IDENTIFY: CPT | Performed by: FAMILY MEDICINE

## 2022-12-07 PROCEDURE — 81003 URINALYSIS AUTO W/O SCOPE: CPT | Mod: QW,S$GLB,, | Performed by: FAMILY MEDICINE

## 2022-12-07 PROCEDURE — 81003 POCT URINALYSIS(INSTRUMENT): ICD-10-PCS | Mod: QW,S$GLB,, | Performed by: FAMILY MEDICINE

## 2022-12-07 PROCEDURE — 3074F PR MOST RECENT SYSTOLIC BLOOD PRESSURE < 130 MM HG: ICD-10-PCS | Mod: S$GLB,,, | Performed by: FAMILY MEDICINE

## 2022-12-07 PROCEDURE — 87086 URINE CULTURE/COLONY COUNT: CPT | Performed by: FAMILY MEDICINE

## 2022-12-07 PROCEDURE — 1159F MED LIST DOCD IN RCRD: CPT | Mod: S$GLB,,, | Performed by: FAMILY MEDICINE

## 2022-12-07 PROCEDURE — 3008F PR BODY MASS INDEX (BMI) DOCUMENTED: ICD-10-PCS | Mod: S$GLB,,, | Performed by: FAMILY MEDICINE

## 2022-12-07 PROCEDURE — 3079F PR MOST RECENT DIASTOLIC BLOOD PRESSURE 80-89 MM HG: ICD-10-PCS | Mod: S$GLB,,, | Performed by: FAMILY MEDICINE

## 2022-12-07 PROCEDURE — 99999 PR PBB SHADOW E&M-EST. PATIENT-LVL IV: CPT | Mod: PBBFAC,,, | Performed by: FAMILY MEDICINE

## 2022-12-07 PROCEDURE — 1159F PR MEDICATION LIST DOCUMENTED IN MEDICAL RECORD: ICD-10-PCS | Mod: S$GLB,,, | Performed by: FAMILY MEDICINE

## 2022-12-07 RX ORDER — FLUCONAZOLE 150 MG/1
TABLET ORAL
Qty: 2 TABLET | Refills: 0 | Status: SHIPPED | OUTPATIENT
Start: 2022-12-07 | End: 2023-02-20

## 2022-12-07 RX ORDER — NITROFURANTOIN 25; 75 MG/1; MG/1
100 CAPSULE ORAL 2 TIMES DAILY
Qty: 10 CAPSULE | Refills: 0 | Status: SHIPPED | OUTPATIENT
Start: 2022-12-07 | End: 2022-12-12

## 2022-12-07 NOTE — PROGRESS NOTES
Subjective:       Patient ID: Mariana Mora is a 42 y.o. female.    Chief Complaint: Dysuria (Pt states she thinks she may have a UTI. Pt c/o vaginal itching. )    Dysuria and vaginal itching. Dysuria just started. Vaginal itching x 1 month. Relieved by vagisil itch wipes. Over all been feeling blah for the last month. Worried its a kidney infection.     Review of Systems   Constitutional:  Negative for activity change, appetite change, fatigue and fever.   Respiratory:  Negative for shortness of breath.    Gastrointestinal:  Negative for abdominal pain.   Integumentary:  Negative for rash.       Objective:      Physical Exam  Constitutional:       General: She is not in acute distress.     Appearance: Normal appearance. She is not ill-appearing.   Pulmonary:      Effort: Pulmonary effort is normal. No respiratory distress.   Neurological:      Mental Status: She is alert.   Psychiatric:         Mood and Affect: Mood normal.         Behavior: Behavior normal.         Thought Content: Thought content normal.         Judgment: Judgment normal.       Assessment:       1. Dysuria    2. Ill feeling    3. Yeast vaginitis    4. Dyslipidemia (high LDL; low HDL), baseline LDL-C 181    5. Screening for diabetes mellitus          Plan:       Problem List Items Addressed This Visit          Cardiac/Vascular    Dyslipidemia (high LDL; low HDL), baseline LDL-C 181    Relevant Orders    Lipid Panel    Comprehensive Metabolic Panel     Other Visit Diagnoses       Dysuria    -  Primary    Relevant Medications    nitrofurantoin, macrocrystal-monohydrate, (MACROBID) 100 MG capsule    Other Relevant Orders    POCT Urinalysis(Instrument)    Urine culture    Ill feeling        Relevant Orders    Urine culture    Yeast vaginitis        Relevant Medications    fluconazole (DIFLUCAN) 150 MG Tab    Screening for diabetes mellitus        Relevant Orders    Hemoglobin A1C

## 2022-12-10 ENCOUNTER — PATIENT MESSAGE (OUTPATIENT)
Dept: FAMILY MEDICINE | Facility: CLINIC | Age: 42
End: 2022-12-10
Payer: COMMERCIAL

## 2022-12-10 LAB — BACTERIA UR CULT: ABNORMAL

## 2022-12-11 DIAGNOSIS — F33.41 RECURRENT MAJOR DEPRESSIVE DISORDER, IN PARTIAL REMISSION: ICD-10-CM

## 2022-12-12 ENCOUNTER — PATIENT MESSAGE (OUTPATIENT)
Dept: PSYCHIATRY | Facility: CLINIC | Age: 42
End: 2022-12-12
Payer: COMMERCIAL

## 2022-12-12 RX ORDER — QUETIAPINE FUMARATE 50 MG/1
TABLET, EXTENDED RELEASE ORAL
Qty: 60 TABLET | Refills: 1 | Status: SHIPPED | OUTPATIENT
Start: 2022-12-12 | End: 2023-01-17 | Stop reason: SDUPTHER

## 2022-12-13 ENCOUNTER — LAB VISIT (OUTPATIENT)
Dept: LAB | Facility: HOSPITAL | Age: 42
End: 2022-12-13
Attending: FAMILY MEDICINE
Payer: COMMERCIAL

## 2022-12-13 DIAGNOSIS — Z13.1 SCREENING FOR DIABETES MELLITUS: ICD-10-CM

## 2022-12-13 DIAGNOSIS — E78.5 DYSLIPIDEMIA (HIGH LDL; LOW HDL): ICD-10-CM

## 2022-12-13 LAB
ALBUMIN SERPL BCP-MCNC: 3.5 G/DL (ref 3.5–5.2)
ALP SERPL-CCNC: 53 U/L (ref 55–135)
ALT SERPL W/O P-5'-P-CCNC: 26 U/L (ref 10–44)
ANION GAP SERPL CALC-SCNC: 11 MMOL/L (ref 8–16)
AST SERPL-CCNC: 24 U/L (ref 10–40)
BILIRUB SERPL-MCNC: 0.5 MG/DL (ref 0.1–1)
BUN SERPL-MCNC: 13 MG/DL (ref 6–20)
CALCIUM SERPL-MCNC: 9 MG/DL (ref 8.7–10.5)
CHLORIDE SERPL-SCNC: 103 MMOL/L (ref 95–110)
CHOLEST SERPL-MCNC: 157 MG/DL (ref 120–199)
CHOLEST/HDLC SERPL: 3.9 {RATIO} (ref 2–5)
CO2 SERPL-SCNC: 21 MMOL/L (ref 23–29)
CREAT SERPL-MCNC: 0.8 MG/DL (ref 0.5–1.4)
EST. GFR  (NO RACE VARIABLE): >60 ML/MIN/1.73 M^2
ESTIMATED AVG GLUCOSE: 103 MG/DL (ref 68–131)
GLUCOSE SERPL-MCNC: 85 MG/DL (ref 70–110)
HBA1C MFR BLD: 5.2 % (ref 4–5.6)
HDLC SERPL-MCNC: 40 MG/DL (ref 40–75)
HDLC SERPL: 25.5 % (ref 20–50)
LDLC SERPL CALC-MCNC: 87.2 MG/DL (ref 63–159)
NONHDLC SERPL-MCNC: 117 MG/DL
POTASSIUM SERPL-SCNC: 4.3 MMOL/L (ref 3.5–5.1)
PROT SERPL-MCNC: 7 G/DL (ref 6–8.4)
SODIUM SERPL-SCNC: 135 MMOL/L (ref 136–145)
TRIGL SERPL-MCNC: 149 MG/DL (ref 30–150)

## 2022-12-13 PROCEDURE — 80053 COMPREHEN METABOLIC PANEL: CPT | Performed by: FAMILY MEDICINE

## 2022-12-13 PROCEDURE — 83036 HEMOGLOBIN GLYCOSYLATED A1C: CPT | Performed by: FAMILY MEDICINE

## 2022-12-13 PROCEDURE — 36415 COLL VENOUS BLD VENIPUNCTURE: CPT | Performed by: FAMILY MEDICINE

## 2022-12-13 PROCEDURE — 80061 LIPID PANEL: CPT | Performed by: FAMILY MEDICINE

## 2022-12-17 DIAGNOSIS — E66.9 OBESITY, UNSPECIFIED CLASSIFICATION, UNSPECIFIED OBESITY TYPE, UNSPECIFIED WHETHER SERIOUS COMORBIDITY PRESENT: ICD-10-CM

## 2022-12-19 RX ORDER — BUPROPION HYDROCHLORIDE 150 MG/1
TABLET ORAL
Qty: 180 TABLET | Refills: 0 | OUTPATIENT
Start: 2022-12-19

## 2022-12-19 NOTE — TELEPHONE ENCOUNTER
Called pt and informed her that the Wellbutrin request was filled on 12/15/2022 by JULISSA BETHEA.

## 2022-12-29 ENCOUNTER — OFFICE VISIT (OUTPATIENT)
Dept: FAMILY MEDICINE | Facility: CLINIC | Age: 42
End: 2022-12-29
Payer: COMMERCIAL

## 2022-12-29 VITALS
OXYGEN SATURATION: 96 % | HEART RATE: 79 BPM | SYSTOLIC BLOOD PRESSURE: 128 MMHG | DIASTOLIC BLOOD PRESSURE: 85 MMHG | RESPIRATION RATE: 17 BRPM | HEIGHT: 66 IN | WEIGHT: 293 LBS | BODY MASS INDEX: 47.09 KG/M2

## 2022-12-29 DIAGNOSIS — E88.819 INSULIN RESISTANCE: Primary | ICD-10-CM

## 2022-12-29 PROCEDURE — 99214 OFFICE O/P EST MOD 30 MIN: CPT | Mod: S$GLB,,, | Performed by: FAMILY MEDICINE

## 2022-12-29 PROCEDURE — 3074F PR MOST RECENT SYSTOLIC BLOOD PRESSURE < 130 MM HG: ICD-10-PCS | Mod: S$GLB,,, | Performed by: FAMILY MEDICINE

## 2022-12-29 PROCEDURE — 1160F PR REVIEW ALL MEDS BY PRESCRIBER/CLIN PHARMACIST DOCUMENTED: ICD-10-PCS | Mod: S$GLB,,, | Performed by: FAMILY MEDICINE

## 2022-12-29 PROCEDURE — 99999 PR PBB SHADOW E&M-EST. PATIENT-LVL IV: ICD-10-PCS | Mod: PBBFAC,,, | Performed by: FAMILY MEDICINE

## 2022-12-29 PROCEDURE — 3079F DIAST BP 80-89 MM HG: CPT | Mod: S$GLB,,, | Performed by: FAMILY MEDICINE

## 2022-12-29 PROCEDURE — 3044F PR MOST RECENT HEMOGLOBIN A1C LEVEL <7.0%: ICD-10-PCS | Mod: S$GLB,,, | Performed by: FAMILY MEDICINE

## 2022-12-29 PROCEDURE — 1159F MED LIST DOCD IN RCRD: CPT | Mod: S$GLB,,, | Performed by: FAMILY MEDICINE

## 2022-12-29 PROCEDURE — 3074F SYST BP LT 130 MM HG: CPT | Mod: S$GLB,,, | Performed by: FAMILY MEDICINE

## 2022-12-29 PROCEDURE — 1159F PR MEDICATION LIST DOCUMENTED IN MEDICAL RECORD: ICD-10-PCS | Mod: S$GLB,,, | Performed by: FAMILY MEDICINE

## 2022-12-29 PROCEDURE — 1160F RVW MEDS BY RX/DR IN RCRD: CPT | Mod: S$GLB,,, | Performed by: FAMILY MEDICINE

## 2022-12-29 PROCEDURE — 3008F PR BODY MASS INDEX (BMI) DOCUMENTED: ICD-10-PCS | Mod: S$GLB,,, | Performed by: FAMILY MEDICINE

## 2022-12-29 PROCEDURE — 99214 PR OFFICE/OUTPT VISIT, EST, LEVL IV, 30-39 MIN: ICD-10-PCS | Mod: S$GLB,,, | Performed by: FAMILY MEDICINE

## 2022-12-29 PROCEDURE — 3079F PR MOST RECENT DIASTOLIC BLOOD PRESSURE 80-89 MM HG: ICD-10-PCS | Mod: S$GLB,,, | Performed by: FAMILY MEDICINE

## 2022-12-29 PROCEDURE — 99999 PR PBB SHADOW E&M-EST. PATIENT-LVL IV: CPT | Mod: PBBFAC,,, | Performed by: FAMILY MEDICINE

## 2022-12-29 PROCEDURE — 3008F BODY MASS INDEX DOCD: CPT | Mod: S$GLB,,, | Performed by: FAMILY MEDICINE

## 2022-12-29 PROCEDURE — 3044F HG A1C LEVEL LT 7.0%: CPT | Mod: S$GLB,,, | Performed by: FAMILY MEDICINE

## 2022-12-29 RX ORDER — TIRZEPATIDE 2.5 MG/.5ML
2.5 INJECTION, SOLUTION SUBCUTANEOUS
Qty: 4 PEN | Refills: 0 | Status: SHIPPED | OUTPATIENT
Start: 2022-12-29 | End: 2023-01-04 | Stop reason: SDUPTHER

## 2022-12-29 NOTE — PROGRESS NOTES
" Patient ID: Mariana Mora is a 42 y.o. female.    Chief Complaint: Follow-up and Obesity      Reviewed family, medical, surgical, and social history.    No cp/sob  No change in mental status  No fever  No asymmetrical limb swelling    Objective:      /85 (BP Location: Right arm, Patient Position: Sitting, BP Method: Large (Manual))   Pulse 79   Resp 17   Ht 5' 6" (1.676 m)   Wt 135.9 kg (299 lb 9.6 oz)   SpO2 96%   BMI 48.36 kg/m²   RRR, CTAB, s/nt/nd, no c/c/e, non-toxic appearing, no focal weakness  Assessment:       1. Insulin resistance        Plan:       Insulin resistance  -     tirzepatide (MOUNJARO) 2.5 mg/0.5 mL PnIj; Inject 2.5 mg into the skin every 7 days.  Dispense: 4 pen; Refill: 0              Continue current medicines, any changes noted above  Start mounjaro for weight loss, insulin resistance  Labs, radiology studies, and procedures/notes from the last 3 months were reviewed.    Risks, benefits, and side effects were discussed with the patient. All questions were answered to the fullest satisfaction of the patient, and pt verbalized understanding and agreement to treatment plan. Pt was to call with any new or worsening symptoms, or present to the ER.    "

## 2023-01-03 ENCOUNTER — OFFICE VISIT (OUTPATIENT)
Dept: FAMILY MEDICINE | Facility: CLINIC | Age: 43
End: 2023-01-03
Payer: COMMERCIAL

## 2023-01-03 VITALS
SYSTOLIC BLOOD PRESSURE: 134 MMHG | OXYGEN SATURATION: 97 % | RESPIRATION RATE: 16 BRPM | BODY MASS INDEX: 47.09 KG/M2 | DIASTOLIC BLOOD PRESSURE: 80 MMHG | HEART RATE: 83 BPM | HEIGHT: 66 IN | WEIGHT: 293 LBS

## 2023-01-03 DIAGNOSIS — H66.005 RECURRENT ACUTE SUPPURATIVE OTITIS MEDIA WITHOUT SPONTANEOUS RUPTURE OF LEFT TYMPANIC MEMBRANE: Primary | ICD-10-CM

## 2023-01-03 PROCEDURE — 3079F DIAST BP 80-89 MM HG: CPT | Mod: S$GLB,,, | Performed by: STUDENT IN AN ORGANIZED HEALTH CARE EDUCATION/TRAINING PROGRAM

## 2023-01-03 PROCEDURE — 3075F SYST BP GE 130 - 139MM HG: CPT | Mod: S$GLB,,, | Performed by: STUDENT IN AN ORGANIZED HEALTH CARE EDUCATION/TRAINING PROGRAM

## 2023-01-03 PROCEDURE — 99999 PR PBB SHADOW E&M-EST. PATIENT-LVL V: ICD-10-PCS | Mod: PBBFAC,,, | Performed by: STUDENT IN AN ORGANIZED HEALTH CARE EDUCATION/TRAINING PROGRAM

## 2023-01-03 PROCEDURE — 99214 OFFICE O/P EST MOD 30 MIN: CPT | Mod: S$GLB,,, | Performed by: STUDENT IN AN ORGANIZED HEALTH CARE EDUCATION/TRAINING PROGRAM

## 2023-01-03 PROCEDURE — 3075F PR MOST RECENT SYSTOLIC BLOOD PRESS GE 130-139MM HG: ICD-10-PCS | Mod: S$GLB,,, | Performed by: STUDENT IN AN ORGANIZED HEALTH CARE EDUCATION/TRAINING PROGRAM

## 2023-01-03 PROCEDURE — 1160F PR REVIEW ALL MEDS BY PRESCRIBER/CLIN PHARMACIST DOCUMENTED: ICD-10-PCS | Mod: S$GLB,,, | Performed by: STUDENT IN AN ORGANIZED HEALTH CARE EDUCATION/TRAINING PROGRAM

## 2023-01-03 PROCEDURE — 1159F PR MEDICATION LIST DOCUMENTED IN MEDICAL RECORD: ICD-10-PCS | Mod: S$GLB,,, | Performed by: STUDENT IN AN ORGANIZED HEALTH CARE EDUCATION/TRAINING PROGRAM

## 2023-01-03 PROCEDURE — 1160F RVW MEDS BY RX/DR IN RCRD: CPT | Mod: S$GLB,,, | Performed by: STUDENT IN AN ORGANIZED HEALTH CARE EDUCATION/TRAINING PROGRAM

## 2023-01-03 PROCEDURE — 99214 PR OFFICE/OUTPT VISIT, EST, LEVL IV, 30-39 MIN: ICD-10-PCS | Mod: S$GLB,,, | Performed by: STUDENT IN AN ORGANIZED HEALTH CARE EDUCATION/TRAINING PROGRAM

## 2023-01-03 PROCEDURE — 3079F PR MOST RECENT DIASTOLIC BLOOD PRESSURE 80-89 MM HG: ICD-10-PCS | Mod: S$GLB,,, | Performed by: STUDENT IN AN ORGANIZED HEALTH CARE EDUCATION/TRAINING PROGRAM

## 2023-01-03 PROCEDURE — 99999 PR PBB SHADOW E&M-EST. PATIENT-LVL V: CPT | Mod: PBBFAC,,, | Performed by: STUDENT IN AN ORGANIZED HEALTH CARE EDUCATION/TRAINING PROGRAM

## 2023-01-03 PROCEDURE — 3008F BODY MASS INDEX DOCD: CPT | Mod: S$GLB,,, | Performed by: STUDENT IN AN ORGANIZED HEALTH CARE EDUCATION/TRAINING PROGRAM

## 2023-01-03 PROCEDURE — 1159F MED LIST DOCD IN RCRD: CPT | Mod: S$GLB,,, | Performed by: STUDENT IN AN ORGANIZED HEALTH CARE EDUCATION/TRAINING PROGRAM

## 2023-01-03 PROCEDURE — 3008F PR BODY MASS INDEX (BMI) DOCUMENTED: ICD-10-PCS | Mod: S$GLB,,, | Performed by: STUDENT IN AN ORGANIZED HEALTH CARE EDUCATION/TRAINING PROGRAM

## 2023-01-03 RX ORDER — AMOXICILLIN 500 MG/1
1000 CAPSULE ORAL EVERY 8 HOURS
Qty: 60 CAPSULE | Refills: 0 | Status: SHIPPED | OUTPATIENT
Start: 2023-01-03 | End: 2023-01-13

## 2023-01-03 NOTE — PATIENT INSTRUCTIONS

## 2023-01-03 NOTE — PROGRESS NOTES
Subjective:       Patient ID: Mariana Mora is a 42 y.o. female.    Chief Complaint: Otalgia (Left ear pain 3-4 days )    Left ear pain for 3-4 days with radiation into the neck and drainage from the ear.  No blood. No fevers.  She has a significant history of otitis media and ruptured tm with tympanoplasty in the past.  Follows with ENT regularly and last time was 6 months ago.     Review of Systems   Constitutional:  Negative for activity change, appetite change, chills and fever.   HENT:  Positive for ear discharge and ear pain.    Integumentary:  Negative for rash.       Objective:      Physical Exam  Constitutional:       General: She is not in acute distress.     Appearance: Normal appearance. She is not ill-appearing.   HENT:      Right Ear: No middle ear effusion. Tympanic membrane is scarred. Tympanic membrane is not injected or perforated.      Left Ear: Swelling present. No drainage. Tympanic membrane is erythematous and bulging.      Ears:      Comments: Left TM is erythematous and bulging.  It has white spots on the TM that may be scarring or other process.  Eyes:      Conjunctiva/sclera: Conjunctivae normal.   Cardiovascular:      Rate and Rhythm: Normal rate and regular rhythm.      Heart sounds: Normal heart sounds. No murmur heard.  Pulmonary:      Effort: Pulmonary effort is normal. No respiratory distress.      Breath sounds: Normal breath sounds.   Skin:     General: Skin is warm and dry.   Neurological:      Mental Status: She is alert. Mental status is at baseline.      Gait: Gait normal.   Psychiatric:         Mood and Affect: Mood normal.         Behavior: Behavior normal.         Thought Content: Thought content normal.         Judgment: Judgment normal.       Assessment:       1. Recurrent acute suppurative otitis media without spontaneous rupture of left tympanic membrane          Plan:       Problem List Items Addressed This Visit    None  Visit Diagnoses       Recurrent acute  suppurative otitis media without spontaneous rupture of left tympanic membrane    -  Primary    treat with amoxicillin because she has tolerated even though allergy testing showed pcn allergy.  close followup with ENT    Relevant Medications    amoxicillin (AMOXIL) 500 MG capsule

## 2023-01-04 ENCOUNTER — PATIENT MESSAGE (OUTPATIENT)
Dept: FAMILY MEDICINE | Facility: CLINIC | Age: 43
End: 2023-01-04
Payer: COMMERCIAL

## 2023-01-04 ENCOUNTER — TELEPHONE (OUTPATIENT)
Dept: FAMILY MEDICINE | Facility: CLINIC | Age: 43
End: 2023-01-04
Payer: COMMERCIAL

## 2023-01-04 DIAGNOSIS — E88.819 INSULIN RESISTANCE: ICD-10-CM

## 2023-01-04 RX ORDER — TIRZEPATIDE 2.5 MG/.5ML
2.5 INJECTION, SOLUTION SUBCUTANEOUS
Qty: 4 PEN | Refills: 0 | Status: SHIPPED | OUTPATIENT
Start: 2023-01-04 | End: 2023-01-26 | Stop reason: SDUPTHER

## 2023-01-15 ENCOUNTER — PATIENT MESSAGE (OUTPATIENT)
Dept: PSYCHIATRY | Facility: CLINIC | Age: 43
End: 2023-01-15
Payer: COMMERCIAL

## 2023-01-17 ENCOUNTER — OFFICE VISIT (OUTPATIENT)
Dept: PSYCHIATRY | Facility: CLINIC | Age: 43
End: 2023-01-17
Payer: COMMERCIAL

## 2023-01-17 DIAGNOSIS — G47.33 OSA (OBSTRUCTIVE SLEEP APNEA): ICD-10-CM

## 2023-01-17 DIAGNOSIS — G47.00 INSOMNIA, UNSPECIFIED TYPE: ICD-10-CM

## 2023-01-17 DIAGNOSIS — F90.2 ATTENTION DEFICIT HYPERACTIVITY DISORDER (ADHD), COMBINED TYPE: ICD-10-CM

## 2023-01-17 DIAGNOSIS — F41.1 GAD (GENERALIZED ANXIETY DISORDER): ICD-10-CM

## 2023-01-17 DIAGNOSIS — F33.41 RECURRENT MAJOR DEPRESSIVE DISORDER, IN PARTIAL REMISSION: Primary | ICD-10-CM

## 2023-01-17 DIAGNOSIS — F42.9 OBSESSIVE-COMPULSIVE DISORDER, UNSPECIFIED TYPE: ICD-10-CM

## 2023-01-17 PROCEDURE — 1160F RVW MEDS BY RX/DR IN RCRD: CPT | Mod: CPTII,95,, | Performed by: PHYSICIAN ASSISTANT

## 2023-01-17 PROCEDURE — 1159F PR MEDICATION LIST DOCUMENTED IN MEDICAL RECORD: ICD-10-PCS | Mod: CPTII,95,, | Performed by: PHYSICIAN ASSISTANT

## 2023-01-17 PROCEDURE — 99214 PR OFFICE/OUTPT VISIT, EST, LEVL IV, 30-39 MIN: ICD-10-PCS | Mod: 95,,, | Performed by: PHYSICIAN ASSISTANT

## 2023-01-17 PROCEDURE — 1159F MED LIST DOCD IN RCRD: CPT | Mod: CPTII,95,, | Performed by: PHYSICIAN ASSISTANT

## 2023-01-17 PROCEDURE — 99214 OFFICE O/P EST MOD 30 MIN: CPT | Mod: 95,,, | Performed by: PHYSICIAN ASSISTANT

## 2023-01-17 PROCEDURE — 1160F PR REVIEW ALL MEDS BY PRESCRIBER/CLIN PHARMACIST DOCUMENTED: ICD-10-PCS | Mod: CPTII,95,, | Performed by: PHYSICIAN ASSISTANT

## 2023-01-17 RX ORDER — QUETIAPINE FUMARATE 50 MG/1
TABLET, EXTENDED RELEASE ORAL
Qty: 60 TABLET | Refills: 1 | Status: SHIPPED | OUTPATIENT
Start: 2023-01-17 | End: 2023-03-20 | Stop reason: SDUPTHER

## 2023-01-17 RX ORDER — TEMAZEPAM 7.5 MG/1
7.5 CAPSULE ORAL NIGHTLY PRN
Qty: 30 CAPSULE | Refills: 0 | Status: SHIPPED | OUTPATIENT
Start: 2023-01-17 | End: 2023-02-16

## 2023-01-17 NOTE — PROGRESS NOTES
The patient location is: in her car in [place], [place]  The chief complaint leading to consultation is: depression/anxiety    Visit type: audiovisual    Face to Face time with patient: 17  30 minutes of total time spent on the encounter, which includes face to face time and non-face to face time preparing to see the patient (eg, review of tests), Obtaining and/or reviewing separately obtained history, Documenting clinical information in the electronic or other health record, Independently interpreting results (not separately reported) and communicating results to the patient/family/caregiver, or Care coordination (not separately reported).     Each patient to whom he or she provides medical services by telemedicine is:  (1) informed of the relationship between the physician and patient and the respective role of any other health care provider with respect to management of the patient; and (2) notified that he or she may decline to receive medical services by telemedicine and may withdraw from such care at any time.    Outpatient Psychiatry Follow-Up Visit (MD/NP)    1/17/2023    Clinical Status of Patient:  Outpatient (Ambulatory)    Chief Complaint:  Mariana Jara Morgan is a 42 y.o. female who presents today for follow-up of depression and anxiety.  Met with patient.        Interval History and Content of Current Session:  Interim Events/Subjective Report/Content of Current Session:  Mariana is seen for a virtual visit today.  This was schedule a same-day appointment.  She states that she has been not feeling great lately, states she feels nothing.  Also reports feeling numb.  She is having very vivid nightmares, at night and even when just taking a nap.  States they are more random than anything else.  Most of the nightmares involve someone trying to kill her and they end up killing people around her.  She does feel that she is overall getting adequate sleep but states that she is even having dreams rather  rapidly, states she is falling asleep and having some hypnagogic hallucinations that seem extra vivid for what they typically should be or what would be considered normal.  She is trying to get Monjouro through primary care but unfortunately the pharmacies are out.  We discussed some strategies for sleep purposes and we will try a low-dose temazepam to see if this is helpful.  We do have some other options and she will just keep us updated through the [facility] system as far as what is going to be beneficial for nightmare purposes.  She denies suicidal or homicidal ideation.  No other complaints today.    FROM PREVIOUS HPI  Mariana is seen virtually today for medication follow-up.  She reports she is doing okay, however her disability claim was denied.  She was very surprised about this as it had been considered to be excepted previously.  She is now going to go through the appeal process.  She has not been following up with MILENA Erwin, she has continued to follow-up with her psychologist in [place].  She is not sleeping as well as she was previously.  She is interested in increasing quetiapine as well as buspirone for anxiety.  She does  objectively appear more anxious today.  She denies suicidal or homicidal ideation.  No other complaints today.    GAD7 1/17/2023 11/8/2022 9/7/2022   1. Feeling nervous, anxious, or on edge? 2 3 2   2. Not being able to stop or control worrying? 2 2 1   3. Worrying too much about different things? 2 2 1   4. Trouble relaxing? 2 3 2   5. Being so restless that it is hard to sit still? 2 2 2   6. Becoming easily annoyed or irritable? 1 1 1   7. Feeling afraid as if something awful might happen? 2 3 1   8. If you checked off any problems, how difficult have these problems made it for you to do your work, take care of things at home, or get along with other people? 2 2 1   JOSE ANGEL-7 Score 13 16 10       PHQ9 9/7/2022   Little interest or pleasure in doing things: More than half the days    Feeling down, depressed or hopeless: Several days   Trouble falling asleep, staying asleep, or sleeping too much: Nearly every day   Feeling tired or having little energy: Nearly every day   Poor appetite or overeating: More than half the days   Feeling bad about yourself- or that you are a failure or have let yourself or family down Several days   Trouble concentrating on things, such as reading the newspaper or watching television: Nearly every day   Moving or speaking so slowly that other people could have noticed. Or the opposite- being so fidgety or restless that you have been moving around a lot more than usual: More than half the days   Thoughts that you would be better off dead or hurting yourself in some way: Not at all   If you indicated you have experienced any of the aforementioned problems, how difficult have these problems made it for you to do your work, take care of things at home or get along with other people? Very difficult   Total Score 17       Review of Systems   PSYCHIATRIC: Pertinant items are noted in the narrative.  RESPIRATORY: No shortness of breath.  CARDIOVASCULAR: Reports tachycardia, no chest pain.  GASTROINTESTINAL: Reports nausea, vomiting, diarrhea.     Past Medical, Family and Social History: The patient's past medical, family and social history have been reviewed and updated as appropriate within the electronic medical record - see encounter notes.    Compliance: yes    Side effects: None    Risk Parameters:  Patient reports no suicidal ideation  Patient reports no homicidal ideation  Patient reports no self-injurious behavior  Patient reports no violent behavior    Exam (detailed: at least 9 elements; comprehensive: all 15 elements)   Constitutional  Vitals:  Most recent vital signs, dated less than 90 days prior to this appointment, were reviewed.   There were no vitals filed for this visit.         General:  unremarkable, age appropriate     Musculoskeletal  Muscle  Strength/Tone:  no dyskinesia   Gait & Station:  Virtual visit     Psychiatric  Speech:  no latency; no press   Mood & Affect:  ok  appropriate   Thought Process:  normal and logical   Associations:  intact   Thought Content:  normal, no suicidality, no homicidality, delusions, or paranoia   Insight:  intact   Judgement: behavior is adequate to circumstances   Orientation:  grossly intact   Memory: intact for content of interview   Language: grossly intact   Attention Span & Concentration:  able to focus   Fund of Knowledge:  intact and appropriate to age and level of education     Assessment and Diagnosis   Status/Progress: Based on the examination today, the patient's problem(s) is/are inadequately controlled.  New problems have not been presented today.   Co-morbidities, Diagnostic uncertainty and Lack of compliance are not complicating management of the primary condition.      General Impression:   Major depressive disorder, mild  Generalized anxiety disorder  OCD by history  ADHD by history  Autism spectrum disorder, by patient report  Nightmares    Intervention/Counseling/Treatment Plan   Medication Management: Continue current medications. The risks and benefits of medication were discussed with the patient.  Counseling provided with patient as follows: importance of compliance with chosen treatment options was emphasized, risks and benefits of treatment options, including medications, were discussed with the patient, risk factor reduction, prognosis     Mariana is seen today for medication follow-up.  Endorses vivid and bothersome nightmares. Based on assessment today:    Continue Prazosin 10mg nightly for nightmares, discussed mechanism of action and to change positions slowly. This will be titrated to effect.  Continue seroquel XR 50 to 100mg nightly for antidepressant augmentation and insomnia.   Continue sertraline 200 mg daily for depression/anxiety/OCD symptoms.  Continue buspirone 15 mg twice daily  for anxiety.  Trial temazepam 7.5mg night to assist with sleep/nightmares.    Gene site testing reivewed.    Please go to emergency department if feeling as though you are a harm to yourself or others or if you are in crisis.     Please call the clinic to report any worsening of symptoms or problems associated with medication.    Discussed risks of tardive dyskinesia, drug induced parkinsonism, metabolic side effects, including weight gain, neuroleptic malignant syndrome       Return to Clinic: 2 months, as needed

## 2023-01-25 ENCOUNTER — PATIENT MESSAGE (OUTPATIENT)
Dept: FAMILY MEDICINE | Facility: CLINIC | Age: 43
End: 2023-01-25
Payer: COMMERCIAL

## 2023-01-25 DIAGNOSIS — E88.819 INSULIN RESISTANCE: ICD-10-CM

## 2023-01-26 RX ORDER — TIRZEPATIDE 2.5 MG/.5ML
2.5 INJECTION, SOLUTION SUBCUTANEOUS
Qty: 4 PEN | Refills: 0 | Status: SHIPPED | OUTPATIENT
Start: 2023-01-26 | End: 2023-01-30

## 2023-01-26 RX ORDER — HYDROXYZINE HYDROCHLORIDE 10 MG/1
10 TABLET, FILM COATED ORAL 3 TIMES DAILY PRN
Qty: 30 TABLET | Refills: 0 | Status: SHIPPED | OUTPATIENT
Start: 2023-01-26 | End: 2023-03-28

## 2023-01-30 ENCOUNTER — PATIENT MESSAGE (OUTPATIENT)
Dept: FAMILY MEDICINE | Facility: CLINIC | Age: 43
End: 2023-01-30
Payer: COMMERCIAL

## 2023-01-30 DIAGNOSIS — E66.01 OBESITY, CLASS III, BMI 40-49.9 (MORBID OBESITY): Primary | ICD-10-CM

## 2023-01-30 DIAGNOSIS — E66.01 OBESITY, CLASS III, BMI 40-49.9 (MORBID OBESITY): ICD-10-CM

## 2023-02-03 ENCOUNTER — PATIENT MESSAGE (OUTPATIENT)
Dept: PSYCHIATRY | Facility: CLINIC | Age: 43
End: 2023-02-03
Payer: COMMERCIAL

## 2023-02-14 ENCOUNTER — PATIENT MESSAGE (OUTPATIENT)
Dept: PSYCHIATRY | Facility: CLINIC | Age: 43
End: 2023-02-14
Payer: COMMERCIAL

## 2023-02-14 ENCOUNTER — NURSE TRIAGE (OUTPATIENT)
Dept: ADMINISTRATIVE | Facility: CLINIC | Age: 43
End: 2023-02-14
Payer: COMMERCIAL

## 2023-02-14 ENCOUNTER — TELEPHONE (OUTPATIENT)
Dept: FAMILY MEDICINE | Facility: CLINIC | Age: 43
End: 2023-02-14
Payer: COMMERCIAL

## 2023-02-14 NOTE — TELEPHONE ENCOUNTER
PT states  tested positive for covid this AM. Pt asking what are the next steps. Pt denies SOB, chest pain, or fever.. Pt states did have headache last PM no pain at this time., 98% room air. Per protocol, home care advised. Discussed OTC meds, home isolation, symptoms to monitor for. Advised pt to call back for any further questions or concerns.     Reason for Disposition   COVID-19 Testing, questions about    Additional Information   Negative: SEVERE difficulty breathing (e.g., struggling for each breath, speaks in single words)   Negative: Difficult to awaken or acting confused (e.g., disoriented, slurred speech)   Negative: Bluish (or gray) lips or face now   Negative: Shock suspected (e.g., cold/pale/clammy skin, too weak to stand, low BP, rapid pulse)   Negative: Sounds like a life-threatening emergency to the triager   Negative: SEVERE or constant chest pain or pressure  (Exception: Mild central chest pain, present only when coughing.)   Negative: MODERATE difficulty breathing (e.g., speaks in phrases, SOB even at rest, pulse 100-120)   Negative: Headache and stiff neck (can't touch chin to chest)   Negative: Oxygen level (e.g., pulse oximetry) 90 percent or lower   Negative: Chest pain or pressure  (Exception: MILD central chest pain, present only when coughing.)   Negative: Patient sounds very sick or weak to the triager   Negative: MILD difficulty breathing (e.g., minimal/no SOB at rest, SOB with walking, pulse <100)   Negative: Fever > 103 F (39.4 C)   Negative: [1] Fever > 101 F (38.3 C) AND [2] over 60 years of age   Negative: [1] Fever > 100.0 F (37.8 C) AND [2] bedridden (e.g., CVA, chronic illness, recovering from surgery)   Negative: [1] HIGH RISK for severe COVID complications (e.g., weak immune system, age > 64 years, obesity with BMI of 30 or higher, pregnant, chronic lung disease or other chronic medical condition) AND [2] COVID symptoms (e.g., cough, fever)  (Exceptions: Already seen by  PCP and no new or worsening symptoms.)   Negative: [1] HIGH RISK patient AND [2] influenza is widespread in the community AND [3] ONE OR MORE respiratory symptoms: cough, sore throat, runny or stuffy nose   Negative: [1] HIGH RISK patient AND [2] influenza exposure within the last 7 days AND [3] ONE OR MORE respiratory symptoms: cough, sore throat, runny or stuffy nose   Negative: Oxygen level (e.g., pulse oximetry) 91 to 94 percent   Negative: [1] COVID-19 infection suspected by caller or triager AND [2] mild symptoms (cough, fever, or others) AND [3] negative COVID-19 rapid test   Negative: Fever present > 3 days (72 hours)   Negative: [1] Fever returns after gone for over 24 hours AND [2] symptoms worse or not improved   Negative: [1] Continuous (nonstop) coughing interferes with work or school AND [2] no improvement using cough treatment per Care Advice   Negative: Cough present > 3 weeks   Negative: [1] COVID-19 diagnosed by positive lab test (e.g., PCR, rapid self-test kit) AND [2] NO symptoms (e.g., cough, fever, others)   Negative: [1] COVID-19 diagnosed by positive lab test (e.g., PCR, rapid self-test kit) AND [2] mild symptoms (e.g., cough, fever, others) AND [3] no complications or SOB   Negative: [1] COVID-19 diagnosed by doctor (or NP/PA) AND [2] mild symptoms (e.g., cough, fever, others) AND [3] no complications or SOB   Negative: [1] COVID-19 diagnosed AND [2] has mild nausea, vomiting or diarrhea   Negative: [1] COVID-19 infection suspected by caller or triager AND [2] mild symptoms (cough, fever, or others) AND [3] has not gotten tested yet   Negative: COVID-19 Home Isolation, questions about    Protocols used: Coronavirus (COVID-19) Diagnosed or [id number]

## 2023-02-14 NOTE — TELEPHONE ENCOUNTER
----- Message from Bree Aldridge sent at 2/14/2023  3:45 PM CST -----  Contact: self  Type:  Patient Returning Call    Who Called:Pt  Who Left Message for Patient:Leanne  Does the patient know what this is regarding?:???  Would the patient rather a call back or a response via [facility]? call  Best Call Back Number:[phone]    Additional Information: Please call pt to consult...     Thank you...

## 2023-02-14 NOTE — TELEPHONE ENCOUNTER
Attempted to contact Mariana Mora to discuss  covid .    Left voice mail to return our call at [phone] on [phone] (home).    Leanne Bynum LPN

## 2023-02-14 NOTE — TELEPHONE ENCOUNTER
Attempted to contact Mariana Mora to discuss  covid .    Left voice mail to return our call at [phone] on [phone] (home).    Leanne Bynum LPN

## 2023-02-15 ENCOUNTER — PATIENT MESSAGE (OUTPATIENT)
Dept: FAMILY MEDICINE | Facility: CLINIC | Age: 43
End: 2023-02-15

## 2023-02-15 ENCOUNTER — E-VISIT (OUTPATIENT)
Dept: FAMILY MEDICINE | Facility: CLINIC | Age: 43
End: 2023-02-15
Payer: COMMERCIAL

## 2023-02-15 DIAGNOSIS — U07.1 COVID-19: Primary | ICD-10-CM

## 2023-02-15 PROCEDURE — 99422 OL DIG E/M SVC 11-20 MIN: CPT | Mod: S$GLB,,,

## 2023-02-15 PROCEDURE — 99422 PR E&M, ONLINE DIGIT, EST, < 7 DAYS,  11-20 MINS: ICD-10-PCS | Mod: S$GLB,,,

## 2023-02-15 RX ORDER — PROMETHAZINE HYDROCHLORIDE AND DEXTROMETHORPHAN HYDROBROMIDE 6.25; 15 MG/5ML; MG/5ML
5 SYRUP ORAL EVERY 6 HOURS PRN
Qty: 240 ML | Refills: 0 | Status: SHIPPED | OUTPATIENT
Start: 2023-02-15 | End: 2023-03-28

## 2023-02-15 RX ORDER — METHYLPREDNISOLONE 4 MG/1
TABLET ORAL
Qty: 21 EACH | Refills: 0 | Status: SHIPPED | OUTPATIENT
Start: 2023-02-15 | End: 2023-02-20

## 2023-02-15 RX ORDER — CETIRIZINE HYDROCHLORIDE, PSEUDOEPHEDRINE HYDROCHLORIDE 5; 120 MG/1; MG/1
1 TABLET, FILM COATED, EXTENDED RELEASE ORAL EVERY 12 HOURS PRN
Qty: 24 TABLET | Refills: 0 | Status: SHIPPED | OUTPATIENT
Start: 2023-02-15 | End: 2023-03-01

## 2023-02-15 NOTE — PROGRESS NOTES
Patient ID: Mariana Mora is a 42 y.o. female.    Chief Complaint: URI    The patient initiated a request through [employer] on 2/15/2023 for evaluation and management with a chief complaint of URI     I evaluated the questionnaire submission on 2/15/2023.    [facility] Peq Evisit Upper Respitatory/Cough Questionnaire    2/15/2023  4:12 PM CST - Filed by Patient   Do you agree to participate in an E-Visit? Yes   If you have any of the following symptoms, please present to your local ER or call 911:  I acknowledge   What is the main issue that you would like for your doctor to address today? Covid   Are you able to take your vital signs? No   Are you currently pregnant, could you be pregnant, or are you breast feeding? None of the above   What symptoms do you currently have?  Cough;  Fatigue;  Headache;  Nasal Congestion;  Runny nose;  Sore throat   Have you had a fever? No   When did your symptoms first appear? 2/14/2023   In the last two weeks, have you been in close contact with someone who has COVID-19 or the Flu? Yes , Covid-19   In the last two weeks, have you worked or volunteered in a healthcare facility or as a ? Healthcare facilities include a hospital, medical or dental clinic, long-term care facility, or nursing home No   Do you live in a long-term care facility, nursing home, group home, or homeless shelter? No   List what you have done or taken to help your symptoms. Rest, water, DayQuil, Zicam   How severe are your symptoms? Mild   Have you taken an at home Covid test? Yes   What were the results? Positive   Have you taken a Flu test? No   Have you been fully vaccinated for COVID? (2 Pfizer, 2 Moderna or 1 Markie & Markie vaccine injections) Yes   Have you received a booster? Yes   Have you recieved a Flu shot? Yes   When did you recieve your Flu shot? 11/10/2022   Do you have transportation to get tested for COVID if it is indicated and ordered for you at an [facility] location? Yes    Provide any information you feel is important to your history not asked above    Please attach any relevant images or files           Active Problem List with Overview Notes    Diagnosis Date Noted    Dyslipidemia (high LDL; low HDL), baseline LDL-C 181 04/12/2022    Cotton wool spots, OS 04/12/2022    NAFLD (nonalcoholic fatty liver disease) 04/12/2022    Essential thrombocytosis 04/12/2022    Cardiovascular event risk, ASCVD 10-years risk 2.4%, 2021 04/12/2022    RASHID (dyspnea on exertion), onset 2016 04/12/2022    SUNIL on CPAP, 100% use 04/12/2022    Tachycardia, with standing 04/12/2022    Facial flushing 04/01/2022    Symptomatic cholelithiasis 06/03/2021    Primary insomnia 07/24/2020    Functional diarrhea 07/24/2020    Nausea 03/26/2020    Vitamin D deficiency 03/26/2020    Diarrhea 01/21/2020    History of esophageal stricture 01/21/2020    GERD (gastroesophageal reflux disease) 12/17/2019    Preop examination 12/09/2019    Hiatal hernia 12/09/2019    Obesity, Class III, BMI 40-49.9 (morbid obesity) 12/09/2019    Dysphagia 11/14/2019    Gastroesophageal reflux disease with esophagitis 11/14/2019    Abdominal pain 11/14/2019    Anxiety 09/09/2019    Gastroesophageal reflux disease 09/09/2019    Psoriatic arthritis 09/09/2019    Vitamin B2 deficiency 02/07/2013    Vitamin B6 deficiency 02/07/2013    EMU 02/06/2013    Migraine with aura, with intractable migraine, so stated, without mention of status migrainosus 01/29/2013     Dx updated per 2019 IMO Load      Intermittent confusion 01/29/2013    Vitamin B12 deficiency 01/29/2013      Recent Labs Obtained:  No visits with results within 7 Day(s) from this visit.   Latest known visit with results is:   Lab Visit on 12/13/2022   Component Date Value Ref Range Status    Hemoglobin A1C 12/13/2022 5.2  4.0 - 5.6 % Final    Comment: ADA Screening Guidelines:  5.7-6.4%  Consistent with prediabetes  >or=6.5%  Consistent with diabetes    High levels of fetal hemoglobin  interfere with the HbA1C  assay. Heterozygous hemoglobin variants (HbS, HgC, etc)do  not significantly interfere with this assay.   However, presence of multiple variants may affect accuracy.      Estimated Avg Glucose 12/13/2022 103  68 - 131 mg/dL Final    Cholesterol 12/13/2022 157  120 - 199 mg/dL Final    Comment: The National Cholesterol Education Program (NCEP) has set the  following guidelines (reference ranges) for Cholesterol:  Optimal.....................<200 mg/dL  Borderline High.............200-239 mg/dL  High........................> or = 240 mg/dL      Triglycerides 12/13/2022 149  30 - 150 mg/dL Final    Comment: The National Cholesterol Education Program (NCEP) has set the  following guidelines (reference values) for triglycerides:  Normal......................<150 mg/dL  Borderline High.............150-199 mg/dL  High........................200-499 mg/dL      HDL 12/13/2022 40  40 - 75 mg/dL Final    Comment: The National Cholesterol Education Program (NCEP) has set the  following guidelines (reference values) for HDL Cholesterol:  Low...............<40 mg/dL  Optimal...........>60 mg/dL      LDL Cholesterol 12/13/2022 87.2  63.0 - 159.0 mg/dL Final    Comment: The National Cholesterol Education Program (NCEP) has set the  following guidelines (reference values) for LDL Cholesterol:  Optimal.......................<130 mg/dL  Borderline High...............130-159 mg/dL  High..........................160-189 mg/dL  Very High.....................>190 mg/dL      HDL/Cholesterol Ratio 12/13/2022 25.5  20.0 - 50.0 % Final    Total Cholesterol/HDL Ratio 12/13/2022 3.9  2.0 - 5.0 Final    Non-HDL Cholesterol 12/13/2022 117  mg/dL Final    Comment: Risk category and Non-HDL cholesterol goals:  Coronary heart disease (CHD)or equivalent (10-year risk of CHD >20%):  Non-HDL cholesterol goal     <130 mg/dL  Two or more CHD risk factors and 10-year risk of CHD <= 20%:  Non-HDL cholesterol goal     <160 mg/dL  0  to 1 CHD risk factor:  Non-HDL cholesterol goal     <190 mg/dL      Sodium 2022 135 (L)  136 - 145 mmol/L Final    Potassium 2022 4.3  3.5 - 5.1 mmol/L Final    Chloride 2022 103  95 - 110 mmol/L Final    CO2 2022 21 (L)  23 - 29 mmol/L Final    Glucose 2022 85  70 - 110 mg/dL Final    BUN 2022 13  6 - 20 mg/dL Final    Creatinine 2022 0.8  0.5 - 1.4 mg/dL Final    Calcium 2022 9.0  8.7 - 10.5 mg/dL Final    Total Protein 2022 7.0  6.0 - 8.4 g/dL Final    Albumin 2022 3.5  3.5 - 5.2 g/dL Final    Total Bilirubin 2022 0.5  0.1 - 1.0 mg/dL Final    Comment: For infants and newborns, interpretation of results should be based  on gestational age, weight and in agreement with clinical  observations.    Premature Infant recommended reference ranges:  Up to 24 hours.............<8.0 mg/dL  Up to 48 hours............<12.0 mg/dL  3-5 days..................<15.0 mg/dL  6-29 days.................<15.0 mg/dL      Alkaline Phosphatase 2022 53 (L)  55 - 135 U/L Final    AST 2022 24  10 - 40 U/L Final    ALT 2022 26  10 - 44 U/L Final    Anion Gap 2022 11  8 - 16 mmol/L Final    eGFR 2022 >60.0  >60 mL/min/1.73 m^2 Final       Encounter Diagnosis   Name Primary?    COVID-19 Yes        No orders of the defined types were placed in this encounter.     Medications Ordered This Encounter   Medications    cetirizine-pseudoephedrine 5-120 mg Tb12     Sig: Take 1 tablet by mouth every 12 (twelve) hours as needed (runny nose and sinus congestion).     Dispense:  24 tablet     Refill:  0    methylPREDNISolone (MEDROL DOSEPACK) 4 mg tablet     Sig: use as directed     Dispense:  21 each     Refill:  0    promethazine-dextromethorphan (PROMETHAZINE-DM) 6.25-15 mg/5 mL Syrp     Sig: Take 5 mLs by mouth every 6 (six) hours as needed (cough).     Dispense:  240 mL     Refill:  0        E-Visit Time Trackin min total on encounter

## 2023-02-17 ENCOUNTER — NURSE TRIAGE (OUTPATIENT)
Dept: ADMINISTRATIVE | Facility: CLINIC | Age: 43
End: 2023-02-17
Payer: COMMERCIAL

## 2023-02-17 NOTE — TELEPHONE ENCOUNTER
HA, fever (99), cough w/ loss of taste and smell. Reports she tested positive for Covid on Wednesday. Advised, per protocol. Verbalizes understanding, may utilize [facility].    Reason for Disposition   [1] COVID-19 diagnosed by positive lab test (e.g., PCR, rapid self-test kit) AND [2] mild symptoms (e.g., cough, fever, others) AND [3] no complications or SOB    Additional Information   Negative: SEVERE difficulty breathing (e.g., struggling for each breath, speaks in single words)   Negative: Difficult to awaken or acting confused (e.g., disoriented, slurred speech)   Negative: Bluish (or gray) lips or face now   Negative: Shock suspected (e.g., cold/pale/clammy skin, too weak to stand, low BP, rapid pulse)   Negative: Sounds like a life-threatening emergency to the triager   Negative: SEVERE or constant chest pain or pressure  (Exception: Mild central chest pain, present only when coughing.)   Negative: MODERATE difficulty breathing (e.g., speaks in phrases, SOB even at rest, pulse 100-120)   Negative: [1] Headache AND [2] stiff neck (can't touch chin to chest)   Negative: Oxygen level (e.g., pulse oximetry) 90 percent or lower   Negative: Chest pain or pressure  (Exception: MILD central chest pain, present only when coughing)   Negative: Patient sounds very sick or weak to the triager   Negative: MILD difficulty breathing (e.g., minimal/no SOB at rest, SOB with walking, pulse <100)   Negative: Fever > 103 F (39.4 C)   Negative: [1] Fever > 101 F (38.3 C) AND [2] age > 60 years   Negative: [1] Fever > 100.0 F (37.8 C) AND [2] bedridden (e.g., CVA, chronic illness, recovering from surgery)   Negative: Oxygen level (e.g., pulse oximetry) 91 to 94 percent   Negative: [1] HIGH RISK for severe COVID complications (e.g., weak immune system, age > 64 years, obesity with BMI 30 or higher, pregnant, chronic lung disease or other chronic medical condition) AND [2] COVID symptoms (e.g., cough, fever)  (Exceptions: Already seen by  PCP and no new or worsening symptoms.)   Negative: [1] HIGH RISK patient AND [2] influenza is widespread in the community AND [3] ONE OR MORE respiratory symptoms: cough, sore throat, runny or stuffy nose   Negative: [1] HIGH RISK patient AND [2] influenza exposure within the last 7 days AND [3] ONE OR MORE respiratory symptoms: cough, sore throat, runny or stuffy nose   Negative: Fever present > 3 days (72 hours)   Negative: [1] Fever returns after gone for over 24 hours AND [2] symptoms worse or not improved   Negative: [1] Continuous (nonstop) coughing interferes with work or school AND [2] no improvement using cough treatment per Care Advice   Negative: [1] COVID-19 infection suspected by caller or triager AND [2] mild symptoms (cough, fever, or others) AND [3] negative COVID-19 rapid test   Negative: Cough present > 3 weeks    Protocols used: Coronavirus (COVID-19) Diagnosed or [id number]

## 2023-02-20 ENCOUNTER — OFFICE VISIT (OUTPATIENT)
Dept: FAMILY MEDICINE | Facility: CLINIC | Age: 43
End: 2023-02-20
Payer: COMMERCIAL

## 2023-02-20 ENCOUNTER — OFFICE VISIT (OUTPATIENT)
Dept: PSYCHIATRY | Facility: CLINIC | Age: 43
End: 2023-02-20
Payer: COMMERCIAL

## 2023-02-20 VITALS
HEIGHT: 66 IN | SYSTOLIC BLOOD PRESSURE: 126 MMHG | DIASTOLIC BLOOD PRESSURE: 88 MMHG | WEIGHT: 293 LBS | OXYGEN SATURATION: 97 % | BODY MASS INDEX: 47.09 KG/M2 | HEART RATE: 81 BPM

## 2023-02-20 DIAGNOSIS — F41.1 GAD (GENERALIZED ANXIETY DISORDER): ICD-10-CM

## 2023-02-20 DIAGNOSIS — R10.11 RIGHT UPPER QUADRANT PAIN: Primary | ICD-10-CM

## 2023-02-20 DIAGNOSIS — F42.9 OBSESSIVE-COMPULSIVE DISORDER, UNSPECIFIED TYPE: ICD-10-CM

## 2023-02-20 DIAGNOSIS — F33.41 RECURRENT MAJOR DEPRESSIVE DISORDER, IN PARTIAL REMISSION: Primary | ICD-10-CM

## 2023-02-20 DIAGNOSIS — F90.2 ATTENTION DEFICIT HYPERACTIVITY DISORDER (ADHD), COMBINED TYPE: ICD-10-CM

## 2023-02-20 DIAGNOSIS — G47.33 OSA (OBSTRUCTIVE SLEEP APNEA): ICD-10-CM

## 2023-02-20 DIAGNOSIS — H10.33 ACUTE CONJUNCTIVITIS OF BOTH EYES, UNSPECIFIED ACUTE CONJUNCTIVITIS TYPE: ICD-10-CM

## 2023-02-20 PROCEDURE — 3074F SYST BP LT 130 MM HG: CPT | Mod: S$GLB,,, | Performed by: FAMILY MEDICINE

## 2023-02-20 PROCEDURE — 99999 PR PBB SHADOW E&M-EST. PATIENT-LVL V: ICD-10-PCS | Mod: PBBFAC,,, | Performed by: FAMILY MEDICINE

## 2023-02-20 PROCEDURE — 3079F PR MOST RECENT DIASTOLIC BLOOD PRESSURE 80-89 MM HG: ICD-10-PCS | Mod: S$GLB,,, | Performed by: FAMILY MEDICINE

## 2023-02-20 PROCEDURE — 3008F BODY MASS INDEX DOCD: CPT | Mod: S$GLB,,, | Performed by: FAMILY MEDICINE

## 2023-02-20 PROCEDURE — 1160F PR REVIEW ALL MEDS BY PRESCRIBER/CLIN PHARMACIST DOCUMENTED: ICD-10-PCS | Mod: CPTII,95,, | Performed by: PHYSICIAN ASSISTANT

## 2023-02-20 PROCEDURE — 1159F MED LIST DOCD IN RCRD: CPT | Mod: S$GLB,,, | Performed by: FAMILY MEDICINE

## 2023-02-20 PROCEDURE — 99214 PR OFFICE/OUTPT VISIT, EST, LEVL IV, 30-39 MIN: ICD-10-PCS | Mod: S$GLB,,, | Performed by: FAMILY MEDICINE

## 2023-02-20 PROCEDURE — 1160F RVW MEDS BY RX/DR IN RCRD: CPT | Mod: S$GLB,,, | Performed by: FAMILY MEDICINE

## 2023-02-20 PROCEDURE — 1160F PR REVIEW ALL MEDS BY PRESCRIBER/CLIN PHARMACIST DOCUMENTED: ICD-10-PCS | Mod: S$GLB,,, | Performed by: FAMILY MEDICINE

## 2023-02-20 PROCEDURE — 3074F PR MOST RECENT SYSTOLIC BLOOD PRESSURE < 130 MM HG: ICD-10-PCS | Mod: S$GLB,,, | Performed by: FAMILY MEDICINE

## 2023-02-20 PROCEDURE — 99214 OFFICE O/P EST MOD 30 MIN: CPT | Mod: S$GLB,,, | Performed by: FAMILY MEDICINE

## 2023-02-20 PROCEDURE — 3008F PR BODY MASS INDEX (BMI) DOCUMENTED: ICD-10-PCS | Mod: S$GLB,,, | Performed by: FAMILY MEDICINE

## 2023-02-20 PROCEDURE — 3079F DIAST BP 80-89 MM HG: CPT | Mod: S$GLB,,, | Performed by: FAMILY MEDICINE

## 2023-02-20 PROCEDURE — 99214 OFFICE O/P EST MOD 30 MIN: CPT | Mod: 95,,, | Performed by: PHYSICIAN ASSISTANT

## 2023-02-20 PROCEDURE — 99999 PR PBB SHADOW E&M-EST. PATIENT-LVL V: CPT | Mod: PBBFAC,,, | Performed by: FAMILY MEDICINE

## 2023-02-20 PROCEDURE — 99214 PR OFFICE/OUTPT VISIT, EST, LEVL IV, 30-39 MIN: ICD-10-PCS | Mod: 95,,, | Performed by: PHYSICIAN ASSISTANT

## 2023-02-20 PROCEDURE — 1160F RVW MEDS BY RX/DR IN RCRD: CPT | Mod: CPTII,95,, | Performed by: PHYSICIAN ASSISTANT

## 2023-02-20 PROCEDURE — 1159F PR MEDICATION LIST DOCUMENTED IN MEDICAL RECORD: ICD-10-PCS | Mod: CPTII,95,, | Performed by: PHYSICIAN ASSISTANT

## 2023-02-20 PROCEDURE — 1159F MED LIST DOCD IN RCRD: CPT | Mod: CPTII,95,, | Performed by: PHYSICIAN ASSISTANT

## 2023-02-20 PROCEDURE — 1159F PR MEDICATION LIST DOCUMENTED IN MEDICAL RECORD: ICD-10-PCS | Mod: S$GLB,,, | Performed by: FAMILY MEDICINE

## 2023-02-20 RX ORDER — OFLOXACIN 3 MG/ML
1 SOLUTION/ DROPS OPHTHALMIC 4 TIMES DAILY
Qty: 5 ML | Refills: 0 | Status: SHIPPED | OUTPATIENT
Start: 2023-02-20 | End: 2023-02-27

## 2023-02-20 RX ORDER — BUSPIRONE HYDROCHLORIDE 15 MG/1
15 TABLET ORAL 2 TIMES DAILY
Qty: 60 TABLET | Refills: 2 | Status: SHIPPED | OUTPATIENT
Start: 2023-02-20 | End: 2023-05-25 | Stop reason: SDUPTHER

## 2023-02-20 RX ORDER — TEMAZEPAM 7.5 MG/1
7.5 CAPSULE ORAL NIGHTLY PRN
Qty: 30 CAPSULE | Refills: 0 | Status: SHIPPED | OUTPATIENT
Start: 2023-02-20 | End: 2023-03-20

## 2023-02-20 RX ORDER — PRAZOSIN HYDROCHLORIDE 2 MG/1
2 CAPSULE ORAL NIGHTLY
Qty: 90 CAPSULE | Refills: 0 | Status: SHIPPED | OUTPATIENT
Start: 2023-02-20 | End: 2023-03-28

## 2023-02-20 NOTE — PROGRESS NOTES
Subjective:       Patient ID: Mariana Mora is a 42 y.o. female.    Chief Complaint: Conjunctivitis (Pt states c/o swelling, red and itching in right eye. Pt states left eye started itching this afternoon. ), Abdominal Pain (RUQ pain x3 days. Pt does not have a gallbladder. ), and Diarrhea (X3 days)    Covid positive 5 days ago.     Conjunctivitis  This is a new problem. The current episode started today. The problem occurs constantly. The problem has been gradually worsening. Associated symptoms include abdominal pain and fatigue. Nothing aggravates the symptoms. She has tried nothing for the symptoms.   Abdominal Pain  This is a new problem. The current episode started in the past 7 days. The onset quality is sudden. The problem occurs 2 to 4 times per day. The problem has been waxing and waning. The pain is located in the RUQ. The pain is moderate. The quality of the pain is aching and colicky. The abdominal pain does not radiate. Associated symptoms include diarrhea (pale colored). Exacerbated by: Feels like she did before she had her gallbladder out.   Review of Systems   Constitutional:  Positive for fatigue.   Gastrointestinal:  Positive for abdominal pain and diarrhea (pale colored).       Objective:      Physical Exam  Vitals and nursing note reviewed.   Constitutional:       General: She is not in acute distress.     Appearance: She is obese. She is ill-appearing.   Eyes:      General: Lids are normal.         Right eye: Discharge (scant) present.         Left eye: No discharge.      Conjunctiva/sclera:      Right eye: Right conjunctiva is injected.      Left eye: Left conjunctiva is injected.   Cardiovascular:      Rate and Rhythm: Normal rate and regular rhythm.      Heart sounds: No murmur heard.  Pulmonary:      Effort: Pulmonary effort is normal.      Breath sounds: Normal breath sounds. No wheezing.   Abdominal:      Palpations: Abdomen is soft. There is no mass.      Tenderness: There is  abdominal tenderness (RUQ). There is guarding.   Skin:     General: Skin is warm and dry.      Findings: No rash.   Neurological:      Mental Status: She is alert.   Psychiatric:         Mood and Affect: Mood normal.         Behavior: Behavior normal.       Assessment:       1. Right upper quadrant pain    2. Acute conjunctivitis of both eyes, unspecified acute conjunctivitis type        Plan:       Problem List Items Addressed This Visit    None  Visit Diagnoses       Right upper quadrant pain    -  Primary    Relevant Orders    US Abdomen Limited    Comprehensive Metabolic Panel    CBC Auto Differential    Acute conjunctivitis of both eyes, unspecified acute conjunctivitis type        Relevant Medications    ofloxacin (OCUFLOX) 0.3 % ophthalmic solution            Discussed that conjunctivitis is likely viral due to recent symptoms but due to her concern of having a puppy at home who licks her in the face  - will treat for bacterial. Allergies reviewed.     H/o cholecystectomy but symptoms returning. Will get US of the RUQ and CMP to check on liver enzymes.

## 2023-02-20 NOTE — PROGRESS NOTES
The patient location is: in her car in [place], [place]  The chief complaint leading to consultation is: depression/anxiety    Visit type: audiovisual    Face to Face time with patient: 15  30 minutes of total time spent on the encounter, which includes face to face time and non-face to face time preparing to see the patient (eg, review of tests), Obtaining and/or reviewing separately obtained history, Documenting clinical information in the electronic or other health record, Independently interpreting results (not separately reported) and communicating results to the patient/family/caregiver, or Care coordination (not separately reported).     Each patient to whom he or she provides medical services by telemedicine is:  (1) informed of the relationship between the physician and patient and the respective role of any other health care provider with respect to management of the patient; and (2) notified that he or she may decline to receive medical services by telemedicine and may withdraw from such care at any time.    Outpatient Psychiatry Follow-Up Visit (MD/NP)    2/20/2023    Clinical Status of Patient:  Outpatient (Ambulatory)    Chief Complaint:  Mariana Jara Morgan is a 42 y.o. female who presents today for follow-up of depression and anxiety.  Met with patient.        Interval History and Content of Current Session:  Interim Events/Subjective Report/Content of Current Session:  Mariana is is seen for virtual medication follow-up today.  Unfortunately, she did test positive for COVID but she wanted to have a visit and she is currently at [facility] in her vehicle she is a [place] resident.  She states that her  was ill first and he contracted COVID from work.  She is been sleeping quite a bit but she feels that her anxiety has ramped up with COVID diagnosis.  She is going to an appointment with her medical doctor this afternoon.  She states that temazepam has been helpful for nightmares  but we discussed that we are looking at long-term resolution so will look at increasing prazosin for the disturbing nightmares and she can utilize temazepam as needed she is agreeable to this.  She does feel that her medications are supporting her.  She denies suicidal or homicidal ideation.  No other complaints today.    FROM PREVIOUS HPI  Mariana is seen for a virtual visit today.  This was schedule a same-day appointment.  She states that she has been not feeling great lately, states she feels nothing.  Also reports feeling numb.  She is having very vivid nightmares, at night and even when just taking a nap.  States they are more random than anything else.  Most of the nightmares involve someone trying to kill her and they end up killing people around her.  She does feel that she is overall getting adequate sleep but states that she is even having dreams rather rapidly, states she is falling asleep and having some hypnagogic hallucinations that seem extra vivid for what they typically should be or what would be considered normal.  She is trying to get Monjouro through primary care but unfortunately the pharmacies are out.  We discussed some strategies for sleep purposes and we will try a low-dose temazepam to see if this is helpful.  We do have some other options and she will just keep us updated through the [facility] as far as what is going to be beneficial for nightmare purposes.  She denies suicidal or homicidal ideation.  No other complaints today.      GAD7 2/20/2023 1/17/2023 11/8/2022   1. Feeling nervous, anxious, or on edge? 2 2 3   2. Not being able to stop or control worrying? 2 2 2   3. Worrying too much about different things? 2 2 2   4. Trouble relaxing? 2 2 3   5. Being so restless that it is hard to sit still? 2 2 2   6. Becoming easily annoyed or irritable? 2 1 1   7. Feeling afraid as if something awful might happen? 2 2 3   8. If you checked off any problems, how difficult have these  problems made it for you to do your work, take care of things at home, or get along with other people? 2 2 2   JOSE ANGEL-7 Score 14 13 16       PHQ9 9/7/2022   Little interest or pleasure in doing things: More than half the days   Feeling down, depressed or hopeless: Several days   Trouble falling asleep, staying asleep, or sleeping too much: Nearly every day   Feeling tired or having little energy: Nearly every day   Poor appetite or overeating: More than half the days   Feeling bad about yourself- or that you are a failure or have let yourself or family down Several days   Trouble concentrating on things, such as reading the newspaper or watching television: Nearly every day   Moving or speaking so slowly that other people could have noticed. Or the opposite- being so fidgety or restless that you have been moving around a lot more than usual: More than half the days   Thoughts that you would be better off dead or hurting yourself in some way: Not at all   If you indicated you have experienced any of the aforementioned problems, how difficult have these problems made it for you to do your work, take care of things at home or get along with other people? Very difficult   Total Score 17       Review of Systems   PSYCHIATRIC: Pertinant items are noted in the narrative.  RESPIRATORY: No shortness of breath.  CARDIOVASCULAR: Reports tachycardia, no chest pain.  GASTROINTESTINAL: Reports nausea, vomiting, diarrhea.     Past Medical, Family and Social History: The patient's past medical, family and social history have been reviewed and updated as appropriate within the electronic medical record - see encounter notes.    Compliance: yes    Side effects: None    Risk Parameters:  Patient reports no suicidal ideation  Patient reports no homicidal ideation  Patient reports no self-injurious behavior  Patient reports no violent behavior    Exam (detailed: at least 9 elements; comprehensive: all 15 elements)   Constitutional  Vitals:   Most recent vital signs, dated less than 90 days prior to this appointment, were reviewed.   There were no vitals filed for this visit.         General:  unremarkable, age appropriate     Musculoskeletal  Muscle Strength/Tone:  no dyskinesia   Gait & Station:  Virtual visit     Psychiatric  Speech:  no latency; no press   Mood & Affect:  ok  appropriate   Thought Process:  normal and logical   Associations:  intact   Thought Content:  normal, no suicidality, no homicidality, delusions, or paranoia   Insight:  intact   Judgement: behavior is adequate to circumstances   Orientation:  grossly intact   Memory: intact for content of interview   Language: grossly intact   Attention Span & Concentration:  able to focus   Fund of Knowledge:  intact and appropriate to age and level of education     Assessment and Diagnosis   Status/Progress: Based on the examination today, the patient's problem(s) is/are inadequately controlled.  New problems have not been presented today.   Co-morbidities, Diagnostic uncertainty and Lack of compliance are not complicating management of the primary condition.      General Impression:   Major depressive disorder, mild  Generalized anxiety disorder  OCD by history  ADHD by history  Autism spectrum disorder, by patient report  Nightmares    Intervention/Counseling/Treatment Plan   Medication Management: Continue current medications. The risks and benefits of medication were discussed with the patient.  Counseling provided with patient as follows: importance of compliance with chosen treatment options was emphasized, risks and benefits of treatment options, including medications, were discussed with the patient, risk factor reduction, prognosis     Mariana is seen today for medication follow-up.  Endorses vivid and bothersome nightmares. Based on assessment today:    Increase Prazosin to 12mg nightly for nightmares, discussed mechanism of action and to change positions slowly. This will be  titrated to effect.  Continue seroquel XR 50 to 100mg nightly for antidepressant augmentation and insomnia.   Continue sertraline 200 mg daily for depression/anxiety/OCD symptoms.  Continue buspirone 15 mg twice daily for anxiety.  Continue temazepam 7.5mg night to assist with sleep/nightmares.    Gene site testing reviewed.    Please go to emergency department if feeling as though you are a harm to yourself or others or if you are in crisis.     Please call the clinic to report any worsening of symptoms or problems associated with medication.    Discussed risks of tardive dyskinesia, drug induced parkinsonism, metabolic side effects, including weight gain, neuroleptic malignant syndrome       Return to Clinic: 1 month, as needed

## 2023-02-21 ENCOUNTER — PATIENT MESSAGE (OUTPATIENT)
Dept: FAMILY MEDICINE | Facility: CLINIC | Age: 43
End: 2023-02-21
Payer: COMMERCIAL

## 2023-02-22 ENCOUNTER — HOSPITAL ENCOUNTER (OUTPATIENT)
Dept: RADIOLOGY | Facility: HOSPITAL | Age: 43
Discharge: HOME | End: 2023-02-22
Attending: FAMILY MEDICINE
Payer: COMMERCIAL

## 2023-02-22 DIAGNOSIS — R10.11 RIGHT UPPER QUADRANT PAIN: ICD-10-CM

## 2023-02-22 PROCEDURE — 76705 ECHO EXAM OF ABDOMEN: CPT | Mod: TC

## 2023-02-22 PROCEDURE — 76705 ECHO EXAM OF ABDOMEN: CPT | Mod: 26,,, | Performed by: RADIOLOGY

## 2023-02-22 PROCEDURE — 76705 US ABDOMEN LIMITED: ICD-10-PCS | Mod: 26,,, | Performed by: RADIOLOGY

## 2023-02-23 ENCOUNTER — PATIENT MESSAGE (OUTPATIENT)
Dept: FAMILY MEDICINE | Facility: CLINIC | Age: 43
End: 2023-02-23
Payer: COMMERCIAL

## 2023-02-23 DIAGNOSIS — K83.8 DILATED CBD, ACQUIRED: Primary | ICD-10-CM

## 2023-03-05 ENCOUNTER — PATIENT MESSAGE (OUTPATIENT)
Dept: FAMILY MEDICINE | Facility: CLINIC | Age: 43
End: 2023-03-05
Payer: COMMERCIAL

## 2023-03-05 DIAGNOSIS — E88.819 INSULIN RESISTANCE: Primary | ICD-10-CM

## 2023-03-06 RX ORDER — TIRZEPATIDE 2.5 MG/.5ML
2.5 INJECTION, SOLUTION SUBCUTANEOUS
Qty: 4 PEN | Refills: 0 | Status: SHIPPED | OUTPATIENT
Start: 2023-03-06 | End: 2023-03-20

## 2023-03-08 DIAGNOSIS — Z12.31 OTHER SCREENING MAMMOGRAM: ICD-10-CM

## 2023-03-09 ENCOUNTER — PATIENT MESSAGE (OUTPATIENT)
Dept: FAMILY MEDICINE | Facility: CLINIC | Age: 43
End: 2023-03-09
Payer: COMMERCIAL

## 2023-03-13 ENCOUNTER — PATIENT MESSAGE (OUTPATIENT)
Dept: ADMINISTRATIVE | Facility: HOSPITAL | Age: 43
End: 2023-03-13
Payer: COMMERCIAL

## 2023-03-20 ENCOUNTER — OFFICE VISIT (OUTPATIENT)
Dept: PSYCHIATRY | Facility: CLINIC | Age: 43
End: 2023-03-20
Payer: COMMERCIAL

## 2023-03-20 VITALS
HEART RATE: 80 BPM | HEIGHT: 66 IN | SYSTOLIC BLOOD PRESSURE: 126 MMHG | BODY MASS INDEX: 47.09 KG/M2 | DIASTOLIC BLOOD PRESSURE: 88 MMHG | WEIGHT: 293 LBS

## 2023-03-20 DIAGNOSIS — G47.33 OSA (OBSTRUCTIVE SLEEP APNEA): ICD-10-CM

## 2023-03-20 DIAGNOSIS — F33.41 RECURRENT MAJOR DEPRESSIVE DISORDER, IN PARTIAL REMISSION: Primary | ICD-10-CM

## 2023-03-20 DIAGNOSIS — F41.1 GAD (GENERALIZED ANXIETY DISORDER): ICD-10-CM

## 2023-03-20 DIAGNOSIS — F42.9 OBSESSIVE-COMPULSIVE DISORDER, UNSPECIFIED TYPE: ICD-10-CM

## 2023-03-20 DIAGNOSIS — G47.00 INSOMNIA, UNSPECIFIED TYPE: ICD-10-CM

## 2023-03-20 PROCEDURE — 3008F BODY MASS INDEX DOCD: CPT | Mod: CPTII,S$GLB,, | Performed by: PHYSICIAN ASSISTANT

## 2023-03-20 PROCEDURE — 3079F PR MOST RECENT DIASTOLIC BLOOD PRESSURE 80-89 MM HG: ICD-10-PCS | Mod: CPTII,S$GLB,, | Performed by: PHYSICIAN ASSISTANT

## 2023-03-20 PROCEDURE — 3074F SYST BP LT 130 MM HG: CPT | Mod: CPTII,S$GLB,, | Performed by: PHYSICIAN ASSISTANT

## 2023-03-20 PROCEDURE — 99214 PR OFFICE/OUTPT VISIT, EST, LEVL IV, 30-39 MIN: ICD-10-PCS | Mod: S$GLB,,, | Performed by: PHYSICIAN ASSISTANT

## 2023-03-20 PROCEDURE — 3079F DIAST BP 80-89 MM HG: CPT | Mod: CPTII,S$GLB,, | Performed by: PHYSICIAN ASSISTANT

## 2023-03-20 PROCEDURE — 1159F MED LIST DOCD IN RCRD: CPT | Mod: CPTII,S$GLB,, | Performed by: PHYSICIAN ASSISTANT

## 2023-03-20 PROCEDURE — 99214 OFFICE O/P EST MOD 30 MIN: CPT | Mod: S$GLB,,, | Performed by: PHYSICIAN ASSISTANT

## 2023-03-20 PROCEDURE — 3008F PR BODY MASS INDEX (BMI) DOCUMENTED: ICD-10-PCS | Mod: CPTII,S$GLB,, | Performed by: PHYSICIAN ASSISTANT

## 2023-03-20 PROCEDURE — 99999 PR PBB SHADOW E&M-EST. PATIENT-LVL III: ICD-10-PCS | Mod: PBBFAC,,, | Performed by: PHYSICIAN ASSISTANT

## 2023-03-20 PROCEDURE — 1159F PR MEDICATION LIST DOCUMENTED IN MEDICAL RECORD: ICD-10-PCS | Mod: CPTII,S$GLB,, | Performed by: PHYSICIAN ASSISTANT

## 2023-03-20 PROCEDURE — 99999 PR PBB SHADOW E&M-EST. PATIENT-LVL III: CPT | Mod: PBBFAC,,, | Performed by: PHYSICIAN ASSISTANT

## 2023-03-20 PROCEDURE — 1160F PR REVIEW ALL MEDS BY PRESCRIBER/CLIN PHARMACIST DOCUMENTED: ICD-10-PCS | Mod: CPTII,S$GLB,, | Performed by: PHYSICIAN ASSISTANT

## 2023-03-20 PROCEDURE — 3074F PR MOST RECENT SYSTOLIC BLOOD PRESSURE < 130 MM HG: ICD-10-PCS | Mod: CPTII,S$GLB,, | Performed by: PHYSICIAN ASSISTANT

## 2023-03-20 PROCEDURE — 1160F RVW MEDS BY RX/DR IN RCRD: CPT | Mod: CPTII,S$GLB,, | Performed by: PHYSICIAN ASSISTANT

## 2023-03-20 RX ORDER — QUETIAPINE FUMARATE 50 MG/1
TABLET, EXTENDED RELEASE ORAL
Qty: 30 TABLET | Refills: 0 | Status: SHIPPED | OUTPATIENT
Start: 2023-03-20 | End: 2023-03-28

## 2023-03-20 RX ORDER — LEFLUNOMIDE 20 MG/1
20 TABLET ORAL
COMMUNITY
Start: 2023-03-20 | End: 2024-04-01

## 2023-03-20 NOTE — PROGRESS NOTES
Outpatient Psychiatry Follow-Up Visit (MD/NP)    3/20/2023    Clinical Status of Patient:  Outpatient (Ambulatory)    Chief Complaint:  Mariana Mora is a 42 y.o. female who presents today for follow-up of depression and anxiety.  Met with patient.        Interval History and Content of Current Session:  Interim Events/Subjective Report/Content of Current Session:  Mariana is seen today for medication follow-up.  She had COVID during our last virtual visit and still feels pretty tired out.  Despite feeling quite tired, she can not sleep at night.  States she sleeps only about 5 hours.  She wakes up and feels tired.  Not having the nightmares that she was having previously with prazosin.  She also has had weight gain since our prior visit.  We discussed adjusting her medication regimen to more metabolically friendly medications which she is agreeable to. She states she does track meals but she does not participate in any sort of structured exercise program.  She will try to designate some exercise time at home.  She declines a nutrition referral or referral to our comprehensive weight management program.  She does plan to see MILENA Erwin at some point in the future but states she does not have too much to talk about.  She is still working on getting approved for social security disability.  We discussed utilizing Belsomra for sleep purposes and she is agreeable to this.  Discussed risk of complex sleep disorder behavior and she is understanding.  She denies suicidal or homicidal ideation.  No other complaints today.    FROM PREVIOUS HPI  Mariana is is seen for virtual medication follow-up today.  Unfortunately, she did test positive for COVID but she wanted to have a visit and she is currently at [facility] in her vehicle she is a [place] resident.  She states that her  was ill first and he contracted COVID from work.  She is been sleeping quite a bit but she feels that her anxiety  has ramped up with COVID diagnosis.  She is going to an appointment with her medical doctor this afternoon.  She states that temazepam has been helpful for nightmares but we discussed that we are looking at long-term resolution so will look at increasing prazosin for the disturbing nightmares and she can utilize temazepam as needed she is agreeable to this.  She does feel that her medications are supporting her.  She denies suicidal or homicidal ideation.  No other complaints today.    GAD7 3/20/2023 2/20/2023 1/17/2023   1. Feeling nervous, anxious, or on edge? 2 2 2   2. Not being able to stop or control worrying? 2 2 2   3. Worrying too much about different things? 2 2 2   4. Trouble relaxing? 2 2 2   5. Being so restless that it is hard to sit still? 1 2 2   6. Becoming easily annoyed or irritable? 1 2 1   7. Feeling afraid as if something awful might happen? 2 2 2   8. If you checked off any problems, how difficult have these problems made it for you to do your work, take care of things at home, or get along with other people? 2 2 2   JOSE ANGEL-7 Score 12 14 13       PHQ9 3/20/2023   Little interest or pleasure in doing things: More than half the days   Feeling down, depressed or hopeless: Several days   Trouble falling asleep, staying asleep, or sleeping too much: Nearly every day   Feeling tired or having little energy: More than half the days   Poor appetite or overeating: Nearly every day   Feeling bad about yourself- or that you are a failure or have let yourself or family down Several days   Trouble concentrating on things, such as reading the newspaper or watching television: Nearly every day   Moving or speaking so slowly that other people could have noticed. Or the opposite- being so fidgety or restless that you have been moving around a lot more than usual: More than half the days   Thoughts that you would be better off dead or hurting yourself in some way: Not at all   If you indicated you have experienced  any of the aforementioned problems, how difficult have these problems made it for you to do your work, take care of things at home or get along with other people? Very difficult   Total Score 17       Review of Systems   PSYCHIATRIC: Pertinant items are noted in the narrative.  RESPIRATORY: No shortness of breath.  CARDIOVASCULAR: Reports tachycardia, no chest pain.  GASTROINTESTINAL: Reports nausea, vomiting, diarrhea.     Past Medical, Family and Social History: The patient's past medical, family and social history have been reviewed and updated as appropriate within the electronic medical record - see encounter notes.    Compliance: yes    Side effects: None    Risk Parameters:  Patient reports no suicidal ideation  Patient reports no homicidal ideation  Patient reports no self-injurious behavior  Patient reports no violent behavior    Exam (detailed: at least 9 elements; comprehensive: all 15 elements)   Constitutional  Vitals:  Most recent vital signs, dated less than 90 days prior to this appointment, were reviewed.   Vitals:    03/20/23 1022   BP: 126/88   Pulse: 80            General:  unremarkable, age appropriate     Musculoskeletal  Muscle Strength/Tone:  no dyskinesia   Gait & Station:  non-ataxic     Psychiatric  Speech:  no latency; no press   Mood & Affect:  ok  appropriate   Thought Process:  normal and logical   Associations:  intact   Thought Content:  normal, no suicidality, no homicidality, delusions, or paranoia   Insight:  intact   Judgement: behavior is adequate to circumstances   Orientation:  grossly intact   Memory: intact for content of interview   Language: grossly intact   Attention Span & Concentration:  able to focus   Fund of Knowledge:  intact and appropriate to age and level of education     Assessment and Diagnosis   Status/Progress: Based on the examination today, the patient's problem(s) is/are inadequately controlled.  New problems have not been presented today.    Co-morbidities, Diagnostic uncertainty and Lack of compliance are not complicating management of the primary condition.      General Impression:   Major depressive disorder, mild  Generalized anxiety disorder  OCD by history  ADHD by history  Autism spectrum disorder, by patient report  Nightmares    Intervention/Counseling/Treatment Plan   Medication Management: Continue current medications. The risks and benefits of medication were discussed with the patient.  Counseling provided with patient as follows: importance of compliance with chosen treatment options was emphasized, risks and benefits of treatment options, including medications, were discussed with the patient, risk factor reduction, prognosis     Mariana is seen today for medication follow-up.  Based on assessment today:    Continue Prazosin 12mg nightly for nightmares, discussed mechanism of action and to change positions slowly. This will be titrated to effect.  Reduce Seroquel to 50 mg once nightly for seven days then discontinue.    Trial Belsomra 10 mg nightly for insomnia.  Continue sertraline 200 mg daily for depression/anxiety/OCD symptoms.  Continue buspirone 15 mg twice daily for anxiety.  Continue bupropion  mg for depression.    Gene site testing reviewed.    Please go to emergency department if feeling as though you are a harm to yourself or others or if you are in crisis.     Please call the clinic to report any worsening of symptoms or problems associated with medication.    Discussed risks of tardive dyskinesia, drug induced parkinsonism, metabolic side effects, including weight gain, neuroleptic malignant syndrome       Return to Clinic: 1 month, as needed

## 2023-03-22 ENCOUNTER — PATIENT MESSAGE (OUTPATIENT)
Dept: PSYCHIATRY | Facility: CLINIC | Age: 43
End: 2023-03-22
Payer: COMMERCIAL

## 2023-03-22 DIAGNOSIS — G47.00 INSOMNIA, UNSPECIFIED TYPE: ICD-10-CM

## 2023-03-28 ENCOUNTER — PATIENT MESSAGE (OUTPATIENT)
Dept: PSYCHIATRY | Facility: CLINIC | Age: 43
End: 2023-03-28
Payer: COMMERCIAL

## 2023-03-28 ENCOUNTER — TELEPHONE (OUTPATIENT)
Dept: PSYCHIATRY | Facility: CLINIC | Age: 43
End: 2023-03-28
Payer: COMMERCIAL

## 2023-03-30 ENCOUNTER — PATIENT MESSAGE (OUTPATIENT)
Dept: FAMILY MEDICINE | Facility: CLINIC | Age: 43
End: 2023-03-30
Payer: COMMERCIAL

## 2023-04-10 ENCOUNTER — PATIENT MESSAGE (OUTPATIENT)
Dept: PSYCHIATRY | Facility: CLINIC | Age: 43
End: 2023-04-10
Payer: COMMERCIAL

## 2023-04-10 DIAGNOSIS — G47.00 INSOMNIA, UNSPECIFIED TYPE: ICD-10-CM

## 2023-04-11 RX ORDER — PRAZOSIN HYDROCHLORIDE 5 MG/1
10 CAPSULE ORAL NIGHTLY
Qty: 180 CAPSULE | Refills: 0 | Status: SHIPPED | OUTPATIENT
Start: 2023-04-11 | End: 2023-05-25 | Stop reason: SDUPTHER

## 2023-04-11 NOTE — TELEPHONE ENCOUNTER
"I contacted [facility]. They attempted a "lost supply" override and it was rejected. Pt's insurance requires that she call the insurance company herself to request the override.   "

## 2023-04-14 ENCOUNTER — OFFICE VISIT (OUTPATIENT)
Dept: FAMILY MEDICINE | Facility: CLINIC | Age: 43
End: 2023-04-14
Payer: COMMERCIAL

## 2023-04-14 VITALS
RESPIRATION RATE: 15 BRPM | DIASTOLIC BLOOD PRESSURE: 88 MMHG | HEART RATE: 79 BPM | HEIGHT: 66 IN | SYSTOLIC BLOOD PRESSURE: 130 MMHG | WEIGHT: 293 LBS | BODY MASS INDEX: 47.09 KG/M2 | OXYGEN SATURATION: 98 %

## 2023-04-14 DIAGNOSIS — L40.9 PSORIASIS: ICD-10-CM

## 2023-04-14 DIAGNOSIS — L50.9 HIVES: ICD-10-CM

## 2023-04-14 DIAGNOSIS — R23.2 FACIAL FLUSHING: Primary | ICD-10-CM

## 2023-04-14 PROCEDURE — 99214 OFFICE O/P EST MOD 30 MIN: CPT | Mod: S$GLB,,, | Performed by: STUDENT IN AN ORGANIZED HEALTH CARE EDUCATION/TRAINING PROGRAM

## 2023-04-14 PROCEDURE — 3008F BODY MASS INDEX DOCD: CPT | Mod: S$GLB,,, | Performed by: STUDENT IN AN ORGANIZED HEALTH CARE EDUCATION/TRAINING PROGRAM

## 2023-04-14 PROCEDURE — 99999 PR PBB SHADOW E&M-EST. PATIENT-LVL V: CPT | Mod: PBBFAC,,, | Performed by: STUDENT IN AN ORGANIZED HEALTH CARE EDUCATION/TRAINING PROGRAM

## 2023-04-14 PROCEDURE — 1159F MED LIST DOCD IN RCRD: CPT | Mod: S$GLB,,, | Performed by: STUDENT IN AN ORGANIZED HEALTH CARE EDUCATION/TRAINING PROGRAM

## 2023-04-14 PROCEDURE — 1160F RVW MEDS BY RX/DR IN RCRD: CPT | Mod: S$GLB,,, | Performed by: STUDENT IN AN ORGANIZED HEALTH CARE EDUCATION/TRAINING PROGRAM

## 2023-04-14 PROCEDURE — 99214 PR OFFICE/OUTPT VISIT, EST, LEVL IV, 30-39 MIN: ICD-10-PCS | Mod: S$GLB,,, | Performed by: STUDENT IN AN ORGANIZED HEALTH CARE EDUCATION/TRAINING PROGRAM

## 2023-04-14 PROCEDURE — 3079F PR MOST RECENT DIASTOLIC BLOOD PRESSURE 80-89 MM HG: ICD-10-PCS | Mod: S$GLB,,, | Performed by: STUDENT IN AN ORGANIZED HEALTH CARE EDUCATION/TRAINING PROGRAM

## 2023-04-14 PROCEDURE — 3075F SYST BP GE 130 - 139MM HG: CPT | Mod: S$GLB,,, | Performed by: STUDENT IN AN ORGANIZED HEALTH CARE EDUCATION/TRAINING PROGRAM

## 2023-04-14 PROCEDURE — 99999 PR PBB SHADOW E&M-EST. PATIENT-LVL V: ICD-10-PCS | Mod: PBBFAC,,, | Performed by: STUDENT IN AN ORGANIZED HEALTH CARE EDUCATION/TRAINING PROGRAM

## 2023-04-14 PROCEDURE — 3008F PR BODY MASS INDEX (BMI) DOCUMENTED: ICD-10-PCS | Mod: S$GLB,,, | Performed by: STUDENT IN AN ORGANIZED HEALTH CARE EDUCATION/TRAINING PROGRAM

## 2023-04-14 PROCEDURE — 1159F PR MEDICATION LIST DOCUMENTED IN MEDICAL RECORD: ICD-10-PCS | Mod: S$GLB,,, | Performed by: STUDENT IN AN ORGANIZED HEALTH CARE EDUCATION/TRAINING PROGRAM

## 2023-04-14 PROCEDURE — 3075F PR MOST RECENT SYSTOLIC BLOOD PRESS GE 130-139MM HG: ICD-10-PCS | Mod: S$GLB,,, | Performed by: STUDENT IN AN ORGANIZED HEALTH CARE EDUCATION/TRAINING PROGRAM

## 2023-04-14 PROCEDURE — 1160F PR REVIEW ALL MEDS BY PRESCRIBER/CLIN PHARMACIST DOCUMENTED: ICD-10-PCS | Mod: S$GLB,,, | Performed by: STUDENT IN AN ORGANIZED HEALTH CARE EDUCATION/TRAINING PROGRAM

## 2023-04-14 PROCEDURE — 3079F DIAST BP 80-89 MM HG: CPT | Mod: S$GLB,,, | Performed by: STUDENT IN AN ORGANIZED HEALTH CARE EDUCATION/TRAINING PROGRAM

## 2023-04-14 NOTE — PROGRESS NOTES
Subjective:       Patient ID: Mariana Mora is a 42 y.o. female.    Chief Complaint: Rash (Face/[id number])    Pt reports many years of facial flushing but constant after covid (5 weeks ago)  Occurs with eating, getting overheated, She is getting increased hives since covid.  Reports all previous treatments of her facial flushing have failed.  She has seen dermatology in the past.  She also has brain fog, fatigue  She sees allergy, rheumatology    Review of Systems   Constitutional:  Negative for activity change and appetite change.   HENT:          Facial flushing   Respiratory:  Negative for shortness of breath.    Cardiovascular:  Negative for chest pain.   Gastrointestinal:  Negative for anal bleeding.   Genitourinary:  Negative for dysuria.   Integumentary:  Negative for rash.   Psychiatric/Behavioral:  Negative for sleep disturbance.        Objective:      Physical Exam  Constitutional:       General: She is not in acute distress.     Appearance: Normal appearance. She is obese. She is not ill-appearing.   Eyes:      Conjunctiva/sclera: Conjunctivae normal.   Cardiovascular:      Rate and Rhythm: Normal rate and regular rhythm.      Heart sounds: Normal heart sounds. No murmur heard.  Pulmonary:      Effort: Pulmonary effort is normal. No respiratory distress.      Breath sounds: Normal breath sounds.   Musculoskeletal:      Right lower leg: No edema.      Left lower leg: No edema.   Skin:     General: Skin is warm and dry.      Comments: Erythema of cheeks, some on nose, under chin   Neurological:      Mental Status: She is alert. Mental status is at baseline.      Gait: Gait normal.   Psychiatric:         Mood and Affect: Mood normal.         Behavior: Behavior normal.         Thought Content: Thought content normal.         Judgment: Judgment normal.       Assessment:       1. Facial flushing    2. Psoriasis    3. Hives        Plan:       Problem List Items Addressed This Visit          Other     Facial flushing - Primary    Relevant Orders    BLAYNE Screen w/Reflex    Ambulatory referral/consult to Dermatology    Ambulatory referral/consult to Allergy    CBC Auto Differential    Comprehensive Metabolic Panel    Urinalysis Microscopic     Other Visit Diagnoses       Psoriasis        Relevant Orders    Ambulatory referral/consult to Dermatology    Hives        Relevant Orders    Ambulatory referral/consult to Allergy              Second opinon on derm for her psoriasis and her facial redness  Eval by allergy for mast cell disorder  Rehcek BLAYNE for lupus (as she has had a friend get lupus after covid).

## 2023-04-17 ENCOUNTER — LAB VISIT (OUTPATIENT)
Dept: LAB | Facility: HOSPITAL | Age: 43
End: 2023-04-17
Attending: FAMILY MEDICINE
Payer: COMMERCIAL

## 2023-04-17 DIAGNOSIS — R23.2 FACIAL FLUSHING: ICD-10-CM

## 2023-04-17 LAB
ALBUMIN SERPL BCP-MCNC: 3.6 G/DL (ref 3.5–5.2)
ALP SERPL-CCNC: 55 U/L (ref 55–135)
ALT SERPL W/O P-5'-P-CCNC: 31 U/L (ref 10–44)
ANION GAP SERPL CALC-SCNC: 10 MMOL/L (ref 8–16)
AST SERPL-CCNC: 23 U/L (ref 10–40)
BASOPHILS # BLD AUTO: 0.05 K/UL (ref 0–0.2)
BASOPHILS NFR BLD: 0.7 % (ref 0–1.9)
BILIRUB SERPL-MCNC: 0.4 MG/DL (ref 0.1–1)
BUN SERPL-MCNC: 15 MG/DL (ref 6–20)
CALCIUM SERPL-MCNC: 9.3 MG/DL (ref 8.7–10.5)
CHLORIDE SERPL-SCNC: 109 MMOL/L (ref 95–110)
CO2 SERPL-SCNC: 20 MMOL/L (ref 23–29)
CREAT SERPL-MCNC: 0.7 MG/DL (ref 0.5–1.4)
DIFFERENTIAL METHOD: ABNORMAL
EOSINOPHIL # BLD AUTO: 0.4 K/UL (ref 0–0.5)
EOSINOPHIL NFR BLD: 5.3 % (ref 0–8)
ERYTHROCYTE [DISTWIDTH] IN BLOOD BY AUTOMATED COUNT: 13.1 % (ref 11.5–14.5)
EST. GFR  (NO RACE VARIABLE): >60 ML/MIN/1.73 M^2
GLUCOSE SERPL-MCNC: 82 MG/DL (ref 70–110)
HCT VFR BLD AUTO: 41 % (ref 37–48.5)
HGB BLD-MCNC: 14.1 G/DL (ref 12–16)
IMM GRANULOCYTES # BLD AUTO: 0.02 K/UL (ref 0–0.04)
IMM GRANULOCYTES NFR BLD AUTO: 0.3 % (ref 0–0.5)
LYMPHOCYTES # BLD AUTO: 1.9 K/UL (ref 1–4.8)
LYMPHOCYTES NFR BLD: 26.5 % (ref 18–48)
MCH RBC QN AUTO: 31 PG (ref 27–31)
MCHC RBC AUTO-ENTMCNC: 34.4 G/DL (ref 32–36)
MCV RBC AUTO: 90 FL (ref 82–98)
MONOCYTES # BLD AUTO: 0.5 K/UL (ref 0.3–1)
MONOCYTES NFR BLD: 7.4 % (ref 4–15)
NEUTROPHILS # BLD AUTO: 4.2 K/UL (ref 1.8–7.7)
NEUTROPHILS NFR BLD: 59.8 % (ref 38–73)
NRBC BLD-RTO: 0 /100 WBC
PLATELET # BLD AUTO: 303 K/UL (ref 150–450)
PMV BLD AUTO: 8.8 FL (ref 9.2–12.9)
POTASSIUM SERPL-SCNC: 4 MMOL/L (ref 3.5–5.1)
PROT SERPL-MCNC: 7.2 G/DL (ref 6–8.4)
RBC # BLD AUTO: 4.55 M/UL (ref 4–5.4)
SODIUM SERPL-SCNC: 139 MMOL/L (ref 136–145)
WBC # BLD AUTO: 7.02 K/UL (ref 3.9–12.7)

## 2023-04-17 PROCEDURE — 85025 COMPLETE CBC W/AUTO DIFF WBC: CPT | Performed by: STUDENT IN AN ORGANIZED HEALTH CARE EDUCATION/TRAINING PROGRAM

## 2023-04-17 PROCEDURE — 80053 COMPREHEN METABOLIC PANEL: CPT | Performed by: STUDENT IN AN ORGANIZED HEALTH CARE EDUCATION/TRAINING PROGRAM

## 2023-04-17 PROCEDURE — 36415 COLL VENOUS BLD VENIPUNCTURE: CPT | Performed by: STUDENT IN AN ORGANIZED HEALTH CARE EDUCATION/TRAINING PROGRAM

## 2023-04-18 ENCOUNTER — PATIENT MESSAGE (OUTPATIENT)
Dept: FAMILY MEDICINE | Facility: CLINIC | Age: 43
End: 2023-04-18
Payer: COMMERCIAL

## 2023-04-19 ENCOUNTER — LAB VISIT (OUTPATIENT)
Dept: LAB | Facility: HOSPITAL | Age: 43
End: 2023-04-19
Attending: STUDENT IN AN ORGANIZED HEALTH CARE EDUCATION/TRAINING PROGRAM
Payer: COMMERCIAL

## 2023-04-19 DIAGNOSIS — R23.2 FACIAL FLUSHING: ICD-10-CM

## 2023-04-19 PROCEDURE — 86235 NUCLEAR ANTIGEN ANTIBODY: CPT | Mod: 59 | Performed by: STUDENT IN AN ORGANIZED HEALTH CARE EDUCATION/TRAINING PROGRAM

## 2023-04-19 PROCEDURE — 86038 ANTINUCLEAR ANTIBODIES: CPT | Performed by: STUDENT IN AN ORGANIZED HEALTH CARE EDUCATION/TRAINING PROGRAM

## 2023-04-19 PROCEDURE — 86039 ANTINUCLEAR ANTIBODIES (ANA): CPT | Performed by: STUDENT IN AN ORGANIZED HEALTH CARE EDUCATION/TRAINING PROGRAM

## 2023-04-19 PROCEDURE — 36415 COLL VENOUS BLD VENIPUNCTURE: CPT | Performed by: STUDENT IN AN ORGANIZED HEALTH CARE EDUCATION/TRAINING PROGRAM

## 2023-04-20 ENCOUNTER — PATIENT MESSAGE (OUTPATIENT)
Dept: FAMILY MEDICINE | Facility: CLINIC | Age: 43
End: 2023-04-20
Payer: COMMERCIAL

## 2023-04-20 ENCOUNTER — OFFICE VISIT (OUTPATIENT)
Dept: PSYCHIATRY | Facility: CLINIC | Age: 43
End: 2023-04-20
Payer: COMMERCIAL

## 2023-04-20 VITALS
HEIGHT: 66 IN | WEIGHT: 293 LBS | BODY MASS INDEX: 47.09 KG/M2 | DIASTOLIC BLOOD PRESSURE: 89 MMHG | SYSTOLIC BLOOD PRESSURE: 140 MMHG | HEART RATE: 77 BPM

## 2023-04-20 DIAGNOSIS — G47.00 INSOMNIA, UNSPECIFIED TYPE: ICD-10-CM

## 2023-04-20 DIAGNOSIS — F33.9 RECURRENT DEPRESSION: ICD-10-CM

## 2023-04-20 DIAGNOSIS — F42.9 OBSESSIVE-COMPULSIVE DISORDER, UNSPECIFIED TYPE: ICD-10-CM

## 2023-04-20 DIAGNOSIS — F33.41 RECURRENT MAJOR DEPRESSIVE DISORDER, IN PARTIAL REMISSION: Primary | ICD-10-CM

## 2023-04-20 DIAGNOSIS — F41.1 GAD (GENERALIZED ANXIETY DISORDER): ICD-10-CM

## 2023-04-20 DIAGNOSIS — G47.33 OSA (OBSTRUCTIVE SLEEP APNEA): ICD-10-CM

## 2023-04-20 LAB
ANA PATTERN 1: NORMAL
ANA SER QL IF: POSITIVE
ANA TITR SER IF: NORMAL {TITER}

## 2023-04-20 PROCEDURE — 99999 PR PBB SHADOW E&M-EST. PATIENT-LVL IV: CPT | Mod: PBBFAC,,, | Performed by: PHYSICIAN ASSISTANT

## 2023-04-20 PROCEDURE — 3008F PR BODY MASS INDEX (BMI) DOCUMENTED: ICD-10-PCS | Mod: CPTII,S$GLB,, | Performed by: PHYSICIAN ASSISTANT

## 2023-04-20 PROCEDURE — 3079F PR MOST RECENT DIASTOLIC BLOOD PRESSURE 80-89 MM HG: ICD-10-PCS | Mod: CPTII,S$GLB,, | Performed by: PHYSICIAN ASSISTANT

## 2023-04-20 PROCEDURE — 90833 PR PSYCHOTHERAPY W/PATIENT W/E&M, 30 MIN (ADD ON): ICD-10-PCS | Mod: S$GLB,,, | Performed by: PHYSICIAN ASSISTANT

## 2023-04-20 PROCEDURE — 1160F PR REVIEW ALL MEDS BY PRESCRIBER/CLIN PHARMACIST DOCUMENTED: ICD-10-PCS | Mod: CPTII,S$GLB,, | Performed by: PHYSICIAN ASSISTANT

## 2023-04-20 PROCEDURE — 90833 PSYTX W PT W E/M 30 MIN: CPT | Mod: S$GLB,,, | Performed by: PHYSICIAN ASSISTANT

## 2023-04-20 PROCEDURE — 1160F RVW MEDS BY RX/DR IN RCRD: CPT | Mod: CPTII,S$GLB,, | Performed by: PHYSICIAN ASSISTANT

## 2023-04-20 PROCEDURE — 3008F BODY MASS INDEX DOCD: CPT | Mod: CPTII,S$GLB,, | Performed by: PHYSICIAN ASSISTANT

## 2023-04-20 PROCEDURE — 1159F PR MEDICATION LIST DOCUMENTED IN MEDICAL RECORD: ICD-10-PCS | Mod: CPTII,S$GLB,, | Performed by: PHYSICIAN ASSISTANT

## 2023-04-20 PROCEDURE — 3077F PR MOST RECENT SYSTOLIC BLOOD PRESSURE >= 140 MM HG: ICD-10-PCS | Mod: CPTII,S$GLB,, | Performed by: PHYSICIAN ASSISTANT

## 2023-04-20 PROCEDURE — 3077F SYST BP >= 140 MM HG: CPT | Mod: CPTII,S$GLB,, | Performed by: PHYSICIAN ASSISTANT

## 2023-04-20 PROCEDURE — 3079F DIAST BP 80-89 MM HG: CPT | Mod: CPTII,S$GLB,, | Performed by: PHYSICIAN ASSISTANT

## 2023-04-20 PROCEDURE — 99214 OFFICE O/P EST MOD 30 MIN: CPT | Mod: S$GLB,,, | Performed by: PHYSICIAN ASSISTANT

## 2023-04-20 PROCEDURE — 1159F MED LIST DOCD IN RCRD: CPT | Mod: CPTII,S$GLB,, | Performed by: PHYSICIAN ASSISTANT

## 2023-04-20 PROCEDURE — 99214 PR OFFICE/OUTPT VISIT, EST, LEVL IV, 30-39 MIN: ICD-10-PCS | Mod: S$GLB,,, | Performed by: PHYSICIAN ASSISTANT

## 2023-04-20 PROCEDURE — 99999 PR PBB SHADOW E&M-EST. PATIENT-LVL IV: ICD-10-PCS | Mod: PBBFAC,,, | Performed by: PHYSICIAN ASSISTANT

## 2023-04-20 RX ORDER — PRAZOSIN HYDROCHLORIDE 2 MG/1
2 CAPSULE ORAL NIGHTLY
Qty: 90 CAPSULE | Refills: 0 | Status: SHIPPED | OUTPATIENT
Start: 2023-04-20 | End: 2023-05-25

## 2023-04-20 NOTE — PROGRESS NOTES
Outpatient Psychiatry Follow-Up Visit (MD/NP)    4/20/2023    Clinical Status of Patient:  Outpatient (Ambulatory)    Chief Complaint:  Mariana Mora is a 42 y.o. female who presents today for follow-up of depression and anxiety.  Met with patient.      Interval History and Content of Current Session:  Interim Events/Subjective Report/Content of Current Session:  Mariana is seen today for medication follow-up.  She has had some medical investigation regarding a rash on her face.  She is going to have some rheumatologic factors checked.  She reports that it has been a pretty busy week.  They are trying to get some things organized at her parent's house as her parents neighbors want to buy the house.  She has some upcoming regularly scheduled visits with other providers.  She did stop bupropion because she felt that it was at exacerbating anxiety.  She did not end up getting Belsomra but states she can hold at this time as sleep has improved a bit.  She has been utilizing diphenhydramine due to her rash and this has been helping with sleep.  She is not utilizing caffeine at this time.  No other alcohol use or other substance use.  She denies suicidal or homicidal ideation.  No other complaints today.    FROM PREVIOUS HPI  Mariana is seen today for medication follow-up.  She had COVID during our last virtual visit and still feels pretty tired out.  Despite feeling quite tired, she can not sleep at night.  States she sleeps only about 5 hours.  She wakes up and feels tired.  Not having the nightmares that she was having previously with prazosin.  She also has had weight gain since our prior visit.  We discussed adjusting her medication regimen to more metabolically friendly medications which she is agreeable to. She states she does track meals but she does not participate in any sort of structured exercise program.  She will try to designate some exercise time at home.  She declines a nutrition referral or  referral to our comprehensive weight management program.  She does plan to see MILENA Erwin at some point in the future but states she does not have too much to talk about.  She is still working on getting approved for social security disability.  We discussed utilizing Belsomra for sleep purposes and she is agreeable to this.  Discussed risk of complex sleep disorder behavior and she is understanding.  She denies suicidal or homicidal ideation.  No other complaints today.      GAD7 4/20/2023 3/20/2023 2/20/2023   1. Feeling nervous, anxious, or on edge? 3 2 2   2. Not being able to stop or control worrying? 2 2 2   3. Worrying too much about different things? 2 2 2   4. Trouble relaxing? 2 2 2   5. Being so restless that it is hard to sit still? 1 1 2   6. Becoming easily annoyed or irritable? 1 1 2   7. Feeling afraid as if something awful might happen? 2 2 2   8. If you checked off any problems, how difficult have these problems made it for you to do your work, take care of things at home, or get along with other people? 2 2 2   JOSE ANGEL-7 Score 13 12 14       PHQ9 4/20/2023   Little interest or pleasure in doing things: Several days   Feeling down, depressed or hopeless: More than half the days   Trouble falling asleep, staying asleep, or sleeping too much: Nearly every day   Feeling tired or having little energy: Nearly every day   Poor appetite or overeating: Nearly every day   Feeling bad about yourself - or that you are a failure or have let yourself or family down: Several days   Trouble concentrating on things, such as reading the newspaper or watching television: Nearly every day   Moving or speaking so slowly that other people could have noticed. Or the opposite,being so fidgety or restless that you have been moving around a lot more than usual: More than half the days   Thoughts that you would be better off dead or hurting yourself in some way: Not at all   If you indicated you have experienced any of the  previous problems, how difficult have these problems made it for you to do your work, take care of things at home or get along with other people? -   Total Score 18     Psychotherapy:  Target symptoms: depression, distractability, lack of focus, recurrent depression, anxiety , adjustment  Why chosen therapy is appropriate versus another modality: relevant to diagnosis  Outcome monitoring methods: self-report, observation, checklist/rating scale  Therapeutic intervention type: supportive psychotherapy  Topics discussed/themes: stress related to medical comorbidities, difficulty managing affect in interpersonal relationships, building skills sets for symptom management, symptom recognition, life stage transitional issues  The patient's response to the intervention is accepting. The patient's progress toward treatment goals is fair.   Duration of intervention: 16 minutes.    Review of Systems   PSYCHIATRIC: Pertinant items are noted in the narrative.  RESPIRATORY: No shortness of breath.  CARDIOVASCULAR: Reports tachycardia, no chest pain.  GASTROINTESTINAL: Reports nausea, vomiting, diarrhea.     Past Medical, Family and Social History: The patient's past medical, family and social history have been reviewed and updated as appropriate within the electronic medical record - see encounter notes.    Compliance: yes    Side effects: None    Risk Parameters:  Patient reports no suicidal ideation  Patient reports no homicidal ideation  Patient reports no self-injurious behavior  Patient reports no violent behavior    Exam (detailed: at least 9 elements; comprehensive: all 15 elements)   Constitutional  Vitals:  Most recent vital signs, dated less than 90 days prior to this appointment, were reviewed.   Vitals:    04/20/23 1104   BP: (!) 140/89   Pulse: 77              General:  unremarkable, age appropriate     Musculoskeletal  Muscle Strength/Tone:  no dyskinesia   Gait & Station:  non-ataxic     Psychiatric  Speech:  no  latency; no press   Mood & Affect:  ok  appropriate   Thought Process:  normal and logical   Associations:  intact   Thought Content:  normal, no suicidality, no homicidality, delusions, or paranoia   Insight:  intact   Judgement: behavior is adequate to circumstances   Orientation:  grossly intact   Memory: intact for content of interview   Language: grossly intact   Attention Span & Concentration:  able to focus   Fund of Knowledge:  intact and appropriate to age and level of education     Assessment and Diagnosis   Status/Progress: Based on the examination today, the patient's problem(s) is/are inadequately controlled.  New problems have not been presented today.   Co-morbidities, Diagnostic uncertainty and Lack of compliance are not complicating management of the primary condition.      General Impression:   Major depressive disorder, mild  Generalized anxiety disorder  OCD by history  ADHD by history  Autism spectrum disorder, by patient report  Nightmares    Intervention/Counseling/Treatment Plan   Medication Management: Continue current medications. The risks and benefits of medication were discussed with the patient.  Counseling provided with patient as follows: importance of compliance with chosen treatment options was emphasized, risks and benefits of treatment options, including medications, were discussed with the patient, risk factor reduction, prognosis     Mariana is seen today for medication follow-up.  Based on assessment today:    1.Continue Prazosin 12mg nightly for nightmares, discussed mechanism of action and to change positions slowly. This will be titrated to effect.  2. Consider Belsomra in the future.   3. Continue sertraline 200 mg daily for depression/anxiety/OCD symptoms.  4. Continue buspirone 15 mg twice daily for anxiety.  5. Wellbutrin has been d/c'd.     Gene site testing reviewed.    Please go to emergency department if feeling as though you are a harm to yourself or others or if  you are in crisis.     Please call the clinic to report any worsening of symptoms or problems associated with medication.    Discussed risks of tardive dyskinesia, drug induced parkinsonism, metabolic side effects, including weight gain, neuroleptic malignant syndrome       Return to Clinic: 1 month, as needed

## 2023-04-20 NOTE — PATIENT INSTRUCTIONS
Please go to emergency department if feeling as though you are a harm to yourself or others or if you are in crisis. Please call the clinic to report any worsening of symptoms or problems associated with medication.

## 2023-04-21 ENCOUNTER — PATIENT MESSAGE (OUTPATIENT)
Dept: FAMILY MEDICINE | Facility: CLINIC | Age: 43
End: 2023-04-21
Payer: COMMERCIAL

## 2023-04-25 LAB
ANTI SM ANTIBODY: 0.08 RATIO (ref 0–0.99)
ANTI SM/RNP ANTIBODY: 0.09 RATIO (ref 0–0.99)
ANTI-SM INTERPRETATION: NEGATIVE
ANTI-SM/RNP INTERPRETATION: NEGATIVE
ANTI-SSA ANTIBODY: 0.06 RATIO (ref 0–0.99)
ANTI-SSA INTERPRETATION: NEGATIVE
ANTI-SSB ANTIBODY: 0.06 RATIO (ref 0–0.99)
ANTI-SSB INTERPRETATION: NEGATIVE
DSDNA AB SER-ACNC: NORMAL [IU]/ML

## 2023-04-28 ENCOUNTER — TELEPHONE (OUTPATIENT)
Dept: FAMILY MEDICINE | Facility: CLINIC | Age: 43
End: 2023-04-28
Payer: COMMERCIAL

## 2023-04-28 NOTE — TELEPHONE ENCOUNTER
Called pt to speak with her to get clarification of her needs. Went over test results with pt she states she has a rheumatologist and will follow up with them as recommended by Dr. Lerma. Faxing test results of recent labs to his office.

## 2023-05-03 ENCOUNTER — HOSPITAL ENCOUNTER (OUTPATIENT)
Dept: RADIOLOGY | Facility: HOSPITAL | Age: 43
Discharge: HOME | End: 2023-05-03
Attending: FAMILY MEDICINE
Payer: COMMERCIAL

## 2023-05-03 DIAGNOSIS — Z12.31 OTHER SCREENING MAMMOGRAM: ICD-10-CM

## 2023-05-03 PROCEDURE — 77067 SCR MAMMO BI INCL CAD: CPT | Mod: TC

## 2023-05-03 PROCEDURE — 77067 SCR MAMMO BI INCL CAD: CPT | Mod: 26,,, | Performed by: RADIOLOGY

## 2023-05-03 PROCEDURE — 77063 MAMMO DIGITAL SCREENING BILAT WITH TOMO: ICD-10-PCS | Mod: 26,,, | Performed by: RADIOLOGY

## 2023-05-03 PROCEDURE — 77063 BREAST TOMOSYNTHESIS BI: CPT | Mod: 26,,, | Performed by: RADIOLOGY

## 2023-05-03 PROCEDURE — 77067 MAMMO DIGITAL SCREENING BILAT WITH TOMO: ICD-10-PCS | Mod: 26,,, | Performed by: RADIOLOGY

## 2023-05-05 ENCOUNTER — TELEPHONE (OUTPATIENT)
Dept: FAMILY MEDICINE | Facility: CLINIC | Age: 43
End: 2023-05-05
Payer: COMMERCIAL

## 2023-05-05 NOTE — TELEPHONE ENCOUNTER
----- Message from Aileen Middleton LPN sent at 5/4/2023 11:12 AM CDT -----  Regarding: FW: Student Loan discharge form    ----- Message -----  From: Leann Hinojosa  Sent: 5/4/2023  11:11 AM CDT  To: [facility] Staff  Subject: Student Loan discharge form                      This patient just dropped off an envelope with her ssi and student loan form that she needs  to fill out and sign. Please call patient when it is completed.

## 2023-05-18 ENCOUNTER — PATIENT MESSAGE (OUTPATIENT)
Dept: FAMILY MEDICINE | Facility: CLINIC | Age: 43
End: 2023-05-18
Payer: COMMERCIAL

## 2023-05-19 DIAGNOSIS — R19.7 DIARRHEA, UNSPECIFIED TYPE: Primary | ICD-10-CM

## 2023-05-25 ENCOUNTER — TELEPHONE (OUTPATIENT)
Dept: GASTROENTEROLOGY | Facility: CLINIC | Age: 43
End: 2023-05-25
Payer: COMMERCIAL

## 2023-05-25 ENCOUNTER — OFFICE VISIT (OUTPATIENT)
Dept: PSYCHIATRY | Facility: CLINIC | Age: 43
End: 2023-05-25
Payer: COMMERCIAL

## 2023-05-25 VITALS
BODY MASS INDEX: 47.09 KG/M2 | SYSTOLIC BLOOD PRESSURE: 124 MMHG | DIASTOLIC BLOOD PRESSURE: 96 MMHG | WEIGHT: 293 LBS | HEIGHT: 66 IN | HEART RATE: 75 BPM

## 2023-05-25 DIAGNOSIS — F42.9 OBSESSIVE-COMPULSIVE DISORDER, UNSPECIFIED TYPE: ICD-10-CM

## 2023-05-25 DIAGNOSIS — F33.41 RECURRENT MAJOR DEPRESSIVE DISORDER, IN PARTIAL REMISSION: Primary | ICD-10-CM

## 2023-05-25 DIAGNOSIS — G47.00 INSOMNIA, UNSPECIFIED TYPE: ICD-10-CM

## 2023-05-25 DIAGNOSIS — F41.1 GAD (GENERALIZED ANXIETY DISORDER): ICD-10-CM

## 2023-05-25 PROCEDURE — 3074F SYST BP LT 130 MM HG: CPT | Mod: CPTII,S$GLB,, | Performed by: PHYSICIAN ASSISTANT

## 2023-05-25 PROCEDURE — 99999 PR PBB SHADOW E&M-EST. PATIENT-LVL V: CPT | Mod: PBBFAC,,, | Performed by: PHYSICIAN ASSISTANT

## 2023-05-25 PROCEDURE — 3074F PR MOST RECENT SYSTOLIC BLOOD PRESSURE < 130 MM HG: ICD-10-PCS | Mod: CPTII,S$GLB,, | Performed by: PHYSICIAN ASSISTANT

## 2023-05-25 PROCEDURE — 3008F PR BODY MASS INDEX (BMI) DOCUMENTED: ICD-10-PCS | Mod: CPTII,S$GLB,, | Performed by: PHYSICIAN ASSISTANT

## 2023-05-25 PROCEDURE — 3080F PR MOST RECENT DIASTOLIC BLOOD PRESSURE >= 90 MM HG: ICD-10-PCS | Mod: CPTII,S$GLB,, | Performed by: PHYSICIAN ASSISTANT

## 2023-05-25 PROCEDURE — 3080F DIAST BP >= 90 MM HG: CPT | Mod: CPTII,S$GLB,, | Performed by: PHYSICIAN ASSISTANT

## 2023-05-25 PROCEDURE — 1159F MED LIST DOCD IN RCRD: CPT | Mod: CPTII,S$GLB,, | Performed by: PHYSICIAN ASSISTANT

## 2023-05-25 PROCEDURE — 99214 PR OFFICE/OUTPT VISIT, EST, LEVL IV, 30-39 MIN: ICD-10-PCS | Mod: S$GLB,,, | Performed by: PHYSICIAN ASSISTANT

## 2023-05-25 PROCEDURE — 99999 PR PBB SHADOW E&M-EST. PATIENT-LVL V: ICD-10-PCS | Mod: PBBFAC,,, | Performed by: PHYSICIAN ASSISTANT

## 2023-05-25 PROCEDURE — 3008F BODY MASS INDEX DOCD: CPT | Mod: CPTII,S$GLB,, | Performed by: PHYSICIAN ASSISTANT

## 2023-05-25 PROCEDURE — 1160F PR REVIEW ALL MEDS BY PRESCRIBER/CLIN PHARMACIST DOCUMENTED: ICD-10-PCS | Mod: CPTII,S$GLB,, | Performed by: PHYSICIAN ASSISTANT

## 2023-05-25 PROCEDURE — 1160F RVW MEDS BY RX/DR IN RCRD: CPT | Mod: CPTII,S$GLB,, | Performed by: PHYSICIAN ASSISTANT

## 2023-05-25 PROCEDURE — 99214 OFFICE O/P EST MOD 30 MIN: CPT | Mod: S$GLB,,, | Performed by: PHYSICIAN ASSISTANT

## 2023-05-25 PROCEDURE — 1159F PR MEDICATION LIST DOCUMENTED IN MEDICAL RECORD: ICD-10-PCS | Mod: CPTII,S$GLB,, | Performed by: PHYSICIAN ASSISTANT

## 2023-05-25 RX ORDER — BUSPIRONE HYDROCHLORIDE 10 MG/1
10 TABLET ORAL 2 TIMES DAILY
Qty: 60 TABLET | Refills: 1 | Status: SHIPPED | OUTPATIENT
Start: 2023-05-25 | End: 2023-07-30 | Stop reason: SDUPTHER

## 2023-05-25 RX ORDER — MONTELUKAST SODIUM 10 MG/1
10 TABLET ORAL
COMMUNITY
Start: 2023-05-08

## 2023-05-25 RX ORDER — LEFLUNOMIDE 10 MG/1
10 TABLET ORAL
COMMUNITY
Start: 2023-05-08 | End: 2023-09-11

## 2023-05-25 RX ORDER — QUETIAPINE FUMARATE 25 MG/1
25 TABLET, FILM COATED ORAL NIGHTLY
Qty: 30 TABLET | Refills: 0 | Status: SHIPPED | OUTPATIENT
Start: 2023-05-25 | End: 2023-06-05 | Stop reason: SDUPTHER

## 2023-05-25 NOTE — PATIENT INSTRUCTIONS
Re-trial seroquel 25mg nightly for sleep/mood.    Reduce buspar to 10mg twice daily for anxiety.    Continue zoloft 200mg.    Continue prazosin 10mg.     Please go to emergency department if feeling as though you are a harm to yourself or others or if you are in crisis. Please call the clinic to report any worsening of symptoms or problems associated with medication.

## 2023-05-25 NOTE — PROGRESS NOTES
Outpatient Psychiatry Follow-Up Visit (MD/NP)    5/25/2023    Clinical Status of Patient:  Outpatient (Ambulatory)    Chief Complaint:  Mariana Mora is a 42 y.o. female who presents today for follow-up of depression and anxiety.  Met with patient.      Interval History and Content of Current Session:  Interim Events/Subjective Report/Content of Current Session:  Mariana is seen today for medication follow-up.  She states she has been getting out on the weekends and has been enjoying spending time with her .  She is been more active with walking.  Sleep remains a challenge.  She has completed cognitive behavioral therapy for insomnia but unfortunately did not find benefit.  She is going to do a insomnia assessment with her current psychologist in [place].  She would like to resume quetiapine for sleep purposes.  She denies suicidal or homicidal ideation.  No other complaints today.    FROM PREVIOUS HPI  Mariana is seen today for medication follow-up.  She has had some medical investigation regarding a rash on her face.  She is going to have some rheumatologic factors checked.  She reports that it has been a pretty busy week.  They are trying to get some things organized at her parent's house as her parents neighbors want to buy the house.  She has some upcoming regularly scheduled visits with other providers.  She did stop bupropion because she felt that it was at exacerbating anxiety.  She did not end up getting Belsomra but states she can hold at this time as sleep has improved a bit.  She has been utilizing diphenhydramine due to her rash and this has been helping with sleep.  She is not utilizing caffeine at this time.  No other alcohol use or other substance use.  She denies suicidal or homicidal ideation.  No other complaints today.    GAD7 5/25/2023 4/20/2023 3/20/2023   1. Feeling nervous, anxious, or on edge? 1 3 2   2. Not being able to stop or control worrying? 1 2 2   3. Worrying  too much about different things? 2 2 2   4. Trouble relaxing? 3 2 2   5. Being so restless that it is hard to sit still? 2 1 1   6. Becoming easily annoyed or irritable? 2 1 1   7. Feeling afraid as if something awful might happen? 3 2 2   8. If you checked off any problems, how difficult have these problems made it for you to do your work, take care of things at home, or get along with other people? 2 2 2   JOSE ANGEL-7 Score 14 13 12       PHQ9 5/25/2023   Little interest or pleasure in doing things: More than half the days   Feeling down, depressed or hopeless: Several days   Trouble falling asleep, staying asleep, or sleeping too much: Nearly every day   Feeling tired or having little energy: Nearly every day   Poor appetite or overeating: Nearly every day   Feeling bad about yourself - or that you are a failure or have let yourself or family down: Several days   Trouble concentrating on things, such as reading the newspaper or watching television: Nearly every day   Moving or speaking so slowly that other people could have noticed. Or the opposite,being so fidgety or restless that you have been moving around a lot more than usual: Nearly every day   Thoughts that you would be better off dead or hurting yourself in some way: Not at all   If you indicated you have experienced any of the previous problems, how difficult have these problems made it for you to do your work, take care of things at home or get along with other people? -   Total Score 19       Review of Systems   PSYCHIATRIC: Pertinant items are noted in the narrative.  RESPIRATORY: No shortness of breath.  CARDIOVASCULAR: Reports tachycardia, no chest pain.  GASTROINTESTINAL: Reports nausea, vomiting, diarrhea.     Past Medical, Family and Social History: The patient's past medical, family and social history have been reviewed and updated as appropriate within the electronic medical record - see encounter notes.    Compliance: yes    Side effects:  None    Risk Parameters:  Patient reports no suicidal ideation  Patient reports no homicidal ideation  Patient reports no self-injurious behavior  Patient reports no violent behavior    Exam (detailed: at least 9 elements; comprehensive: all 15 elements)   Constitutional  Vitals:  Most recent vital signs, dated less than 90 days prior to this appointment, were reviewed.   Vitals:    05/25/23 1136   BP: (!) 124/96   Pulse:             General:  unremarkable, age appropriate     Musculoskeletal  Muscle Strength/Tone:  no dyskinesia   Gait & Station:  non-ataxic     Psychiatric  Speech:  no latency; no press   Mood & Affect:  ok  appropriate   Thought Process:  normal and logical   Associations:  intact   Thought Content:  normal, no suicidality, no homicidality, delusions, or paranoia   Insight:  intact   Judgement: behavior is adequate to circumstances   Orientation:  grossly intact   Memory: intact for content of interview   Language: grossly intact   Attention Span & Concentration:  able to focus   Fund of Knowledge:  intact and appropriate to age and level of education     Assessment and Diagnosis   Status/Progress: Based on the examination today, the patient's problem(s) is/are inadequately controlled.  New problems have not been presented today.   Co-morbidities, Diagnostic uncertainty and Lack of compliance are not complicating management of the primary condition.      General Impression:   Major depressive disorder, mild  Generalized anxiety disorder  OCD by history  ADHD by history  Autism spectrum disorder, by patient report  Nightmares    Intervention/Counseling/Treatment Plan   Medication Management: Continue current medications. The risks and benefits of medication were discussed with the patient.  Counseling provided with patient as follows: importance of compliance with chosen treatment options was emphasized, risks and benefits of treatment options, including medications, were discussed with the  patient, risk factor reduction, prognosis     Mariana is seen today for medication follow-up.  Based on assessment today:    1.Continue Prazosin 10mg nightly for nightmares, discussed mechanism of action and to change positions slowly. This will be titrated to effect.  2. Consider Belsomra in the future.   3. Continue sertraline 200 mg daily for depression/anxiety/OCD symptoms.  4. Reduce buspirone to 10 mg twice daily for anxiety at pt request.  5. Re-trial seroquel 25mg nightly for mood/insomnia.     Gene site testing reviewed.    Please go to emergency department if feeling as though you are a harm to yourself or others or if you are in crisis.     Please call the clinic to report any worsening of symptoms or problems associated with medication.    Discussed risks of tardive dyskinesia, drug induced parkinsonism, metabolic side effects, including weight gain, neuroleptic malignant syndrome       Return to Clinic: 1 month, as needed

## 2023-05-25 NOTE — TELEPHONE ENCOUNTER
GI referral scheduled with patient: 5/31/2023 1500 Karlene Cantor NP [facility].

## 2023-05-26 RX ORDER — PRAZOSIN HYDROCHLORIDE 5 MG/1
10 CAPSULE ORAL NIGHTLY
Qty: 180 CAPSULE | Refills: 1 | Status: SHIPPED | OUTPATIENT
Start: 2023-05-26 | End: 2023-09-11 | Stop reason: SDUPTHER

## 2023-05-31 ENCOUNTER — OFFICE VISIT (OUTPATIENT)
Dept: GASTROENTEROLOGY | Facility: CLINIC | Age: 43
End: 2023-05-31
Payer: COMMERCIAL

## 2023-05-31 VITALS
BODY MASS INDEX: 47.09 KG/M2 | HEART RATE: 81 BPM | SYSTOLIC BLOOD PRESSURE: 132 MMHG | HEIGHT: 66 IN | WEIGHT: 293 LBS | DIASTOLIC BLOOD PRESSURE: 78 MMHG

## 2023-05-31 DIAGNOSIS — K76.0 FATTY LIVER: ICD-10-CM

## 2023-05-31 DIAGNOSIS — R10.10 UPPER ABDOMINAL PAIN: ICD-10-CM

## 2023-05-31 DIAGNOSIS — R19.4 CHANGE IN BOWEL HABITS: ICD-10-CM

## 2023-05-31 DIAGNOSIS — Z90.49 S/P CHOLECYSTECTOMY: ICD-10-CM

## 2023-05-31 DIAGNOSIS — R13.10 DYSPHAGIA, UNSPECIFIED TYPE: ICD-10-CM

## 2023-05-31 DIAGNOSIS — K83.8 DILATED BILE DUCT: ICD-10-CM

## 2023-05-31 DIAGNOSIS — Z79.899 LONG-TERM CURRENT USE OF PROTON PUMP INHIBITOR THERAPY: ICD-10-CM

## 2023-05-31 DIAGNOSIS — Z80.0 FAMILY HISTORY OF COLON CANCER: ICD-10-CM

## 2023-05-31 DIAGNOSIS — R19.7 DIARRHEA, UNSPECIFIED TYPE: Primary | ICD-10-CM

## 2023-05-31 DIAGNOSIS — R14.0 ABDOMINAL BLOATING: ICD-10-CM

## 2023-05-31 DIAGNOSIS — R11.2 NAUSEA AND VOMITING, UNSPECIFIED VOMITING TYPE: ICD-10-CM

## 2023-05-31 DIAGNOSIS — K21.9 GASTROESOPHAGEAL REFLUX DISEASE, UNSPECIFIED WHETHER ESOPHAGITIS PRESENT: ICD-10-CM

## 2023-05-31 PROCEDURE — 1159F PR MEDICATION LIST DOCUMENTED IN MEDICAL RECORD: ICD-10-PCS | Mod: CPTII,S$GLB,,

## 2023-05-31 PROCEDURE — 3078F PR MOST RECENT DIASTOLIC BLOOD PRESSURE < 80 MM HG: ICD-10-PCS | Mod: CPTII,S$GLB,,

## 2023-05-31 PROCEDURE — 3075F PR MOST RECENT SYSTOLIC BLOOD PRESS GE 130-139MM HG: ICD-10-PCS | Mod: CPTII,S$GLB,,

## 2023-05-31 PROCEDURE — 1159F MED LIST DOCD IN RCRD: CPT | Mod: CPTII,S$GLB,,

## 2023-05-31 PROCEDURE — 99204 OFFICE O/P NEW MOD 45 MIN: CPT | Mod: S$GLB,,,

## 2023-05-31 PROCEDURE — 1160F RVW MEDS BY RX/DR IN RCRD: CPT | Mod: CPTII,S$GLB,,

## 2023-05-31 PROCEDURE — 3008F PR BODY MASS INDEX (BMI) DOCUMENTED: ICD-10-PCS | Mod: CPTII,S$GLB,,

## 2023-05-31 PROCEDURE — 99999 PR PBB SHADOW E&M-EST. PATIENT-LVL V: ICD-10-PCS | Mod: PBBFAC,,,

## 2023-05-31 PROCEDURE — 99999 PR PBB SHADOW E&M-EST. PATIENT-LVL V: CPT | Mod: PBBFAC,,,

## 2023-05-31 PROCEDURE — 3075F SYST BP GE 130 - 139MM HG: CPT | Mod: CPTII,S$GLB,,

## 2023-05-31 PROCEDURE — 3078F DIAST BP <80 MM HG: CPT | Mod: CPTII,S$GLB,,

## 2023-05-31 PROCEDURE — 3008F BODY MASS INDEX DOCD: CPT | Mod: CPTII,S$GLB,,

## 2023-05-31 PROCEDURE — 99204 PR OFFICE/OUTPT VISIT, NEW, LEVL IV, 45-59 MIN: ICD-10-PCS | Mod: S$GLB,,,

## 2023-05-31 PROCEDURE — 1160F PR REVIEW ALL MEDS BY PRESCRIBER/CLIN PHARMACIST DOCUMENTED: ICD-10-PCS | Mod: CPTII,S$GLB,,

## 2023-06-04 ENCOUNTER — PATIENT MESSAGE (OUTPATIENT)
Dept: PSYCHIATRY | Facility: CLINIC | Age: 43
End: 2023-06-04
Payer: COMMERCIAL

## 2023-06-04 DIAGNOSIS — G47.00 INSOMNIA, UNSPECIFIED TYPE: ICD-10-CM

## 2023-06-05 ENCOUNTER — PATIENT MESSAGE (OUTPATIENT)
Dept: GASTROENTEROLOGY | Facility: CLINIC | Age: 43
End: 2023-06-05
Payer: COMMERCIAL

## 2023-06-05 ENCOUNTER — PATIENT MESSAGE (OUTPATIENT)
Dept: PSYCHIATRY | Facility: CLINIC | Age: 43
End: 2023-06-05
Payer: COMMERCIAL

## 2023-06-05 DIAGNOSIS — G47.00 INSOMNIA, UNSPECIFIED TYPE: ICD-10-CM

## 2023-06-05 RX ORDER — QUETIAPINE FUMARATE 25 MG/1
50 TABLET, FILM COATED ORAL NIGHTLY
Qty: 30 TABLET | Refills: 0
Start: 2023-06-05 | End: 2023-06-05 | Stop reason: SDUPTHER

## 2023-06-05 RX ORDER — QUETIAPINE FUMARATE 50 MG/1
50 TABLET, FILM COATED ORAL NIGHTLY
Qty: 30 TABLET | Refills: 1 | Status: SHIPPED | OUTPATIENT
Start: 2023-06-05 | End: 2023-07-17

## 2023-06-06 ENCOUNTER — LAB VISIT (OUTPATIENT)
Dept: LAB | Facility: HOSPITAL | Age: 43
End: 2023-06-06
Payer: COMMERCIAL

## 2023-06-06 DIAGNOSIS — Z79.899 LONG-TERM CURRENT USE OF PROTON PUMP INHIBITOR THERAPY: ICD-10-CM

## 2023-06-06 DIAGNOSIS — R19.7 DIARRHEA, UNSPECIFIED TYPE: ICD-10-CM

## 2023-06-06 LAB — MAGNESIUM SERPL-MCNC: 2.1 MG/DL (ref 1.6–2.6)

## 2023-06-06 PROCEDURE — 82784 ASSAY IGA/IGD/IGG/IGM EACH: CPT

## 2023-06-06 PROCEDURE — 86364 TISS TRNSGLTMNASE EA IG CLAS: CPT

## 2023-06-06 PROCEDURE — 83735 ASSAY OF MAGNESIUM: CPT

## 2023-06-06 PROCEDURE — 36415 COLL VENOUS BLD VENIPUNCTURE: CPT

## 2023-06-06 PROCEDURE — 82607 VITAMIN B-12: CPT

## 2023-06-07 LAB
IGA SERPL-MCNC: 142 MG/DL (ref 40–350)
VIT B12 SERPL-MCNC: 311 PG/ML (ref 210–950)

## 2023-06-08 ENCOUNTER — PATIENT MESSAGE (OUTPATIENT)
Dept: FAMILY MEDICINE | Facility: CLINIC | Age: 43
End: 2023-06-08
Payer: COMMERCIAL

## 2023-06-09 ENCOUNTER — PATIENT MESSAGE (OUTPATIENT)
Dept: GASTROENTEROLOGY | Facility: CLINIC | Age: 43
End: 2023-06-09
Payer: COMMERCIAL

## 2023-06-09 LAB — TTG IGA SER-ACNC: <0.2 U/ML

## 2023-06-12 ENCOUNTER — PATIENT MESSAGE (OUTPATIENT)
Dept: FAMILY MEDICINE | Facility: CLINIC | Age: 43
End: 2023-06-12
Payer: COMMERCIAL

## 2023-06-20 ENCOUNTER — HOSPITAL ENCOUNTER (OUTPATIENT)
Dept: RADIOLOGY | Facility: HOSPITAL | Age: 43
Discharge: HOME | End: 2023-06-20
Attending: FAMILY MEDICINE
Payer: COMMERCIAL

## 2023-06-20 DIAGNOSIS — K83.8 DILATED CBD, ACQUIRED: ICD-10-CM

## 2023-06-20 PROCEDURE — 76376 3D RENDER W/INTRP POSTPROCES: CPT | Mod: 26,,, | Performed by: RADIOLOGY

## 2023-06-20 PROCEDURE — 76376 MRI ABDOMEN WITHOUT CONTRAST MRCP: ICD-10-PCS | Mod: 26,,, | Performed by: RADIOLOGY

## 2023-06-20 PROCEDURE — 74181 MRI ABDOMEN W/O CONTRAST: CPT | Mod: 26,,, | Performed by: RADIOLOGY

## 2023-06-20 PROCEDURE — 74181 MRI ABDOMEN WITHOUT CONTRAST MRCP: ICD-10-PCS | Mod: 26,,, | Performed by: RADIOLOGY

## 2023-06-20 PROCEDURE — 74181 MRI ABDOMEN W/O CONTRAST: CPT | Mod: TC

## 2023-06-27 ENCOUNTER — PATIENT MESSAGE (OUTPATIENT)
Dept: PSYCHIATRY | Facility: CLINIC | Age: 43
End: 2023-06-27
Payer: COMMERCIAL

## 2023-06-27 DIAGNOSIS — G47.00 INSOMNIA, UNSPECIFIED TYPE: Primary | ICD-10-CM

## 2023-06-27 RX ORDER — SUVOREXANT 10 MG/1
10 TABLET, FILM COATED ORAL NIGHTLY
Qty: 30 TABLET | Refills: 0 | Status: SHIPPED | OUTPATIENT
Start: 2023-06-27 | End: 2023-07-30 | Stop reason: SDUPTHER

## 2023-07-17 ENCOUNTER — OFFICE VISIT (OUTPATIENT)
Dept: PSYCHIATRY | Facility: CLINIC | Age: 43
End: 2023-07-17
Payer: COMMERCIAL

## 2023-07-17 VITALS
BODY MASS INDEX: 47.09 KG/M2 | WEIGHT: 293 LBS | HEIGHT: 66 IN | HEART RATE: 82 BPM | SYSTOLIC BLOOD PRESSURE: 140 MMHG | DIASTOLIC BLOOD PRESSURE: 96 MMHG

## 2023-07-17 DIAGNOSIS — F41.1 GAD (GENERALIZED ANXIETY DISORDER): ICD-10-CM

## 2023-07-17 DIAGNOSIS — F33.41 RECURRENT MAJOR DEPRESSIVE DISORDER, IN PARTIAL REMISSION: Primary | ICD-10-CM

## 2023-07-17 DIAGNOSIS — G47.00 INSOMNIA, UNSPECIFIED TYPE: ICD-10-CM

## 2023-07-17 PROCEDURE — 3077F PR MOST RECENT SYSTOLIC BLOOD PRESSURE >= 140 MM HG: ICD-10-PCS | Mod: CPTII,S$GLB,, | Performed by: PHYSICIAN ASSISTANT

## 2023-07-17 PROCEDURE — 1160F RVW MEDS BY RX/DR IN RCRD: CPT | Mod: CPTII,S$GLB,, | Performed by: PHYSICIAN ASSISTANT

## 2023-07-17 PROCEDURE — 1160F PR REVIEW ALL MEDS BY PRESCRIBER/CLIN PHARMACIST DOCUMENTED: ICD-10-PCS | Mod: CPTII,S$GLB,, | Performed by: PHYSICIAN ASSISTANT

## 2023-07-17 PROCEDURE — 99999 PR PBB SHADOW E&M-EST. PATIENT-LVL V: CPT | Mod: PBBFAC,,, | Performed by: PHYSICIAN ASSISTANT

## 2023-07-17 PROCEDURE — 1159F PR MEDICATION LIST DOCUMENTED IN MEDICAL RECORD: ICD-10-PCS | Mod: CPTII,S$GLB,, | Performed by: PHYSICIAN ASSISTANT

## 2023-07-17 PROCEDURE — 3008F PR BODY MASS INDEX (BMI) DOCUMENTED: ICD-10-PCS | Mod: CPTII,S$GLB,, | Performed by: PHYSICIAN ASSISTANT

## 2023-07-17 PROCEDURE — 99214 PR OFFICE/OUTPT VISIT, EST, LEVL IV, 30-39 MIN: ICD-10-PCS | Mod: S$GLB,,, | Performed by: PHYSICIAN ASSISTANT

## 2023-07-17 PROCEDURE — 3077F SYST BP >= 140 MM HG: CPT | Mod: CPTII,S$GLB,, | Performed by: PHYSICIAN ASSISTANT

## 2023-07-17 PROCEDURE — 3080F DIAST BP >= 90 MM HG: CPT | Mod: CPTII,S$GLB,, | Performed by: PHYSICIAN ASSISTANT

## 2023-07-17 PROCEDURE — 3008F BODY MASS INDEX DOCD: CPT | Mod: CPTII,S$GLB,, | Performed by: PHYSICIAN ASSISTANT

## 2023-07-17 PROCEDURE — 3080F PR MOST RECENT DIASTOLIC BLOOD PRESSURE >= 90 MM HG: ICD-10-PCS | Mod: CPTII,S$GLB,, | Performed by: PHYSICIAN ASSISTANT

## 2023-07-17 PROCEDURE — 1159F MED LIST DOCD IN RCRD: CPT | Mod: CPTII,S$GLB,, | Performed by: PHYSICIAN ASSISTANT

## 2023-07-17 PROCEDURE — 99214 OFFICE O/P EST MOD 30 MIN: CPT | Mod: S$GLB,,, | Performed by: PHYSICIAN ASSISTANT

## 2023-07-17 PROCEDURE — 99999 PR PBB SHADOW E&M-EST. PATIENT-LVL V: ICD-10-PCS | Mod: PBBFAC,,, | Performed by: PHYSICIAN ASSISTANT

## 2023-07-17 NOTE — PROGRESS NOTES
Outpatient Psychiatry Follow-Up Visit (MD/NP)    7/17/2023    Clinical Status of Patient:  Outpatient (Ambulatory)    Chief Complaint:  Mariana Mora is a 42 y.o. female who presents today for follow-up of depression and anxiety.  Met with patient.      Interval History and Content of Current Session:  Interim Events/Subjective Report/Content of Current Session:  Mariana is seen today for medication follow-up.  She reports that she is doing okay.  Has quite a bit going on.  Unfortunately her step grandmother has stage IV cancer and then her step mom found a cancerous lump on her breast.  She does feel that she is sleeping better with Belsomra.  She is seeing her primary care provider tomorrow.  Feels that her blood sugars are running low.  She is going to have to reapply for disability which is stressful.  She denies suicidal or homicidal ideation.  No other complaints today.    FROM PREVIOUS HPI  Mariana is seen today for medication follow-up.  She states she has been getting out on the weekends and has been enjoying spending time with her .  She is been more active with walking.  Sleep remains a challenge.  She has completed cognitive behavioral therapy for insomnia but unfortunately did not find benefit.  She is going to do a insomnia assessment with her current psychologist in [place].  She would like to resume quetiapine for sleep purposes.  She denies suicidal or homicidal ideation.  No other complaints today.      GAD7 7/16/2023 5/25/2023 4/20/2023   1. Feeling nervous, anxious, or on edge? 2 1 3   2. Not being able to stop or control worrying? 1 1 2   3. Worrying too much about different things? 1 2 2   4. Trouble relaxing? 3 3 2   5. Being so restless that it is hard to sit still? 2 2 1   6. Becoming easily annoyed or irritable? 1 2 1   7. Feeling afraid as if something awful might happen? 2 3 2   8. If you checked off any problems, how difficult have these problems made it for you  to do your work, take care of things at home, or get along with other people? 2 2 2   JOSE ANGEL-7 Score 12 14 13       PHQ9 7/17/2023   Little interest or pleasure in doing things: Several days   Feeling down, depressed or hopeless: Several days   Trouble falling asleep, staying asleep, or sleeping too much: Nearly every day   Feeling tired or having little energy: Nearly every day   Poor appetite or overeating: Nearly every day   Feeling bad about yourself - or that you are a failure or have let yourself or family down: Several days   Trouble concentrating on things, such as reading the newspaper or watching television: Nearly every day   Moving or speaking so slowly that other people could have noticed. Or the opposite,being so fidgety or restless that you have been moving around a lot more than usual: Nearly every day   Thoughts that you would be better off dead or hurting yourself in some way: Not at all   If you indicated you have experienced any of the previous problems, how difficult have these problems made it for you to do your work, take care of things at home or get along with other people? -   Total Score 18       Review of Systems   PSYCHIATRIC: Pertinant items are noted in the narrative.  RESPIRATORY: No shortness of breath.  CARDIOVASCULAR: Reports tachycardia, no chest pain.  GASTROINTESTINAL: Reports nausea, vomiting, diarrhea.     Past Medical, Family and Social History: The patient's past medical, family and social history have been reviewed and updated as appropriate within the electronic medical record - see encounter notes.    Compliance: yes    Side effects: None    Risk Parameters:  Patient reports no suicidal ideation  Patient reports no homicidal ideation  Patient reports no self-injurious behavior  Patient reports no violent behavior    Exam (detailed: at least 9 elements; comprehensive: all 15 elements)   Constitutional  Vitals:  Most recent vital signs, dated less than 90 days prior to this  appointment, were reviewed.   Vitals:    07/17/23 1431   BP: (!) 140/96   Pulse: 82            General:  unremarkable, age appropriate     Musculoskeletal  Muscle Strength/Tone:  no dyskinesia   Gait & Station:  non-ataxic     Psychiatric  Speech:  no latency; no press   Mood & Affect:  ok  appropriate   Thought Process:  normal and logical   Associations:  intact   Thought Content:  normal, no suicidality, no homicidality, delusions, or paranoia   Insight:  intact   Judgement: behavior is adequate to circumstances   Orientation:  grossly intact   Memory: intact for content of interview   Language: grossly intact   Attention Span & Concentration:  able to focus   Fund of Knowledge:  intact and appropriate to age and level of education     Assessment and Diagnosis   Status/Progress: Based on the examination today, the patient's problem(s) is/are inadequately controlled.  New problems have not been presented today.   Co-morbidities, Diagnostic uncertainty and Lack of compliance are not complicating management of the primary condition.      General Impression:   Major depressive disorder, mild  Generalized anxiety disorder  OCD by history  ADHD by history  Autism spectrum disorder, by patient report  Nightmares    Intervention/Counseling/Treatment Plan   Medication Management: Continue current medications. The risks and benefits of medication were discussed with the patient.  Counseling provided with patient as follows: importance of compliance with chosen treatment options was emphasized, risks and benefits of treatment options, including medications, were discussed with the patient, risk factor reduction, prognosis     Mariana is seen today for medication follow-up.  Based on assessment today:    Continue Prazosin 10mg nightly for nightmares, discussed mechanism of action and to change positions slowly. This will be titrated to effect.  Continue Belsomra 10mg nightly for sleep.   Continue sertraline 200 mg daily for  depression/anxiety/OCD symptoms.  Continue buspirone to 10 mg twice daily for anxiety at pt request.      Gene site testing reviewed.    Please go to emergency department if feeling as though you are a harm to yourself or others or if you are in crisis.     Please call the clinic to report any worsening of symptoms or problems associated with medication.    Discussed risks of tardive dyskinesia, drug induced parkinsonism, metabolic side effects, including weight gain, neuroleptic malignant syndrome       Return to Clinic: 1 month, as needed                   Hemigard Postcare Instructions: The HEMIGARD strips are to remain completely dry for at least 5-7 days.

## 2023-07-18 ENCOUNTER — OFFICE VISIT (OUTPATIENT)
Dept: FAMILY MEDICINE | Facility: CLINIC | Age: 43
End: 2023-07-18
Payer: COMMERCIAL

## 2023-07-18 VITALS
OXYGEN SATURATION: 98 % | WEIGHT: 293 LBS | RESPIRATION RATE: 16 BRPM | HEIGHT: 66 IN | HEART RATE: 95 BPM | BODY MASS INDEX: 47.09 KG/M2 | SYSTOLIC BLOOD PRESSURE: 142 MMHG | DIASTOLIC BLOOD PRESSURE: 95 MMHG

## 2023-07-18 DIAGNOSIS — E03.9 HYPOTHYROIDISM, UNSPECIFIED TYPE: Primary | ICD-10-CM

## 2023-07-18 DIAGNOSIS — K21.9 GASTROESOPHAGEAL REFLUX DISEASE, UNSPECIFIED WHETHER ESOPHAGITIS PRESENT: ICD-10-CM

## 2023-07-18 PROCEDURE — 1160F PR REVIEW ALL MEDS BY PRESCRIBER/CLIN PHARMACIST DOCUMENTED: ICD-10-PCS | Mod: S$GLB,,, | Performed by: FAMILY MEDICINE

## 2023-07-18 PROCEDURE — 1159F PR MEDICATION LIST DOCUMENTED IN MEDICAL RECORD: ICD-10-PCS | Mod: S$GLB,,, | Performed by: FAMILY MEDICINE

## 2023-07-18 PROCEDURE — 3008F BODY MASS INDEX DOCD: CPT | Mod: S$GLB,,, | Performed by: FAMILY MEDICINE

## 2023-07-18 PROCEDURE — 3080F PR MOST RECENT DIASTOLIC BLOOD PRESSURE >= 90 MM HG: ICD-10-PCS | Mod: S$GLB,,, | Performed by: FAMILY MEDICINE

## 2023-07-18 PROCEDURE — 99999 PR PBB SHADOW E&M-EST. PATIENT-LVL V: ICD-10-PCS | Mod: PBBFAC,,, | Performed by: FAMILY MEDICINE

## 2023-07-18 PROCEDURE — 99213 PR OFFICE/OUTPT VISIT, EST, LEVL III, 20-29 MIN: ICD-10-PCS | Mod: S$GLB,,, | Performed by: FAMILY MEDICINE

## 2023-07-18 PROCEDURE — 1159F MED LIST DOCD IN RCRD: CPT | Mod: S$GLB,,, | Performed by: FAMILY MEDICINE

## 2023-07-18 PROCEDURE — 99213 OFFICE O/P EST LOW 20 MIN: CPT | Mod: S$GLB,,, | Performed by: FAMILY MEDICINE

## 2023-07-18 PROCEDURE — 3008F PR BODY MASS INDEX (BMI) DOCUMENTED: ICD-10-PCS | Mod: S$GLB,,, | Performed by: FAMILY MEDICINE

## 2023-07-18 PROCEDURE — 99999 PR PBB SHADOW E&M-EST. PATIENT-LVL V: CPT | Mod: PBBFAC,,, | Performed by: FAMILY MEDICINE

## 2023-07-18 PROCEDURE — 3077F SYST BP >= 140 MM HG: CPT | Mod: S$GLB,,, | Performed by: FAMILY MEDICINE

## 2023-07-18 PROCEDURE — 3080F DIAST BP >= 90 MM HG: CPT | Mod: S$GLB,,, | Performed by: FAMILY MEDICINE

## 2023-07-18 PROCEDURE — 1160F RVW MEDS BY RX/DR IN RCRD: CPT | Mod: S$GLB,,, | Performed by: FAMILY MEDICINE

## 2023-07-18 PROCEDURE — 3077F PR MOST RECENT SYSTOLIC BLOOD PRESSURE >= 140 MM HG: ICD-10-PCS | Mod: S$GLB,,, | Performed by: FAMILY MEDICINE

## 2023-07-18 RX ORDER — PANTOPRAZOLE SODIUM 40 MG/1
TABLET, DELAYED RELEASE ORAL
Qty: 90 TABLET | Refills: 3 | Status: SHIPPED | OUTPATIENT
Start: 2023-07-18

## 2023-07-18 RX ORDER — LEVOTHYROXINE SODIUM 112 UG/1
112 TABLET ORAL
Qty: 90 TABLET | Refills: 3 | Status: SHIPPED | OUTPATIENT
Start: 2023-07-18 | End: 2024-07-17

## 2023-07-18 NOTE — PROGRESS NOTES
" Patient ID: Mariana Mora is a 42 y.o. female.    Chief Complaint: Follow-up (Weight) and Obesity      Reviewed family, medical, surgical, and social history.    No cp/sob  No change in mental status  No fever  No asymmetrical limb swelling    Objective:      BP (!) 142/95 (BP Location: Left arm, Patient Position: Sitting, BP Method: Large (Automatic))   Pulse 95   Resp 16   Ht 5' 6" (1.676 m)   Wt (!) 138.3 kg (305 lb)   SpO2 98%   BMI 49.23 kg/m²   RRR, CTAB, s/nt/nd, no c/c/e, non-toxic appearing, no focal weakness  Assessment:       1. Hypothyroidism, unspecified type    2. Gastroesophageal reflux disease, unspecified whether esophagitis present        Plan:       Hypothyroidism, unspecified type  -     SYNTHROID 112 mcg tablet; Take 1 tablet (112 mcg total) by mouth before breakfast.  Dispense: 90 tablet; Refill: 3    Gastroesophageal reflux disease, unspecified whether esophagitis present  -     pantoprazole (PROTONIX) 40 MG tablet; Take one po daily  Dispense: 90 tablet; Refill: 3              Continue current medicines, any changes noted above  As shown    Risks, benefits, and side effects were discussed with the patient. All questions were answered to the fullest satisfaction of the patient, and pt verbalized understanding and agreement to treatment plan. Pt was to call with any new or worsening symptoms, or present to the ER.    "

## 2023-07-29 DIAGNOSIS — E78.49 OTHER HYPERLIPIDEMIA: ICD-10-CM

## 2023-07-29 RX ORDER — ATORVASTATIN CALCIUM 10 MG/1
TABLET, FILM COATED ORAL
Qty: 90 TABLET | Refills: 1 | Status: SHIPPED | OUTPATIENT
Start: 2023-07-29 | End: 2024-02-01 | Stop reason: SDUPTHER

## 2023-07-29 NOTE — TELEPHONE ENCOUNTER
Care Due:                  Date            Visit Type   Department     Provider  --------------------------------------------------------------------------------    Last Visit: None Found      None         None Found  Next Visit: None Scheduled  None         None Found                                                            Last  Test          Frequency    Reason                     Performed    Due Date  --------------------------------------------------------------------------------    Office Visit  12 months..  atorvastatin.............  Not Found    Overdue    [employer] Embedded Care Due Messages. Reference number: [id number].   7/29/2023 4:37:49 AM CDT

## 2023-07-30 DIAGNOSIS — G47.00 INSOMNIA, UNSPECIFIED TYPE: ICD-10-CM

## 2023-07-30 DIAGNOSIS — F41.1 GAD (GENERALIZED ANXIETY DISORDER): ICD-10-CM

## 2023-07-30 NOTE — TELEPHONE ENCOUNTER
Refill Decision Note   Mariana Mora  is requesting a refill authorization.  Brief Assessment and Rationale for Refill:  Approve     Medication Therapy Plan:         Comments:     Note composed:8:42 PM 07/29/2023

## 2023-07-31 DIAGNOSIS — G47.00 INSOMNIA, UNSPECIFIED TYPE: ICD-10-CM

## 2023-07-31 DIAGNOSIS — G43.809 OTHER MIGRAINE WITHOUT STATUS MIGRAINOSUS, NOT INTRACTABLE: ICD-10-CM

## 2023-07-31 DIAGNOSIS — F41.1 GAD (GENERALIZED ANXIETY DISORDER): ICD-10-CM

## 2023-07-31 RX ORDER — SUVOREXANT 10 MG/1
10 TABLET, FILM COATED ORAL NIGHTLY
Qty: 30 TABLET | Refills: 1 | OUTPATIENT
Start: 2023-07-31

## 2023-07-31 RX ORDER — SUVOREXANT 10 MG/1
10 TABLET, FILM COATED ORAL NIGHTLY
Qty: 30 TABLET | Refills: 0 | Status: SHIPPED | OUTPATIENT
Start: 2023-07-31 | End: 2023-08-02 | Stop reason: SDUPTHER

## 2023-07-31 RX ORDER — BUSPIRONE HYDROCHLORIDE 10 MG/1
10 TABLET ORAL 2 TIMES DAILY
Qty: 60 TABLET | Refills: 1 | Status: SHIPPED | OUTPATIENT
Start: 2023-07-31 | End: 2023-09-11 | Stop reason: SDUPTHER

## 2023-07-31 RX ORDER — BUSPIRONE HYDROCHLORIDE 10 MG/1
10 TABLET ORAL 2 TIMES DAILY
Qty: 60 TABLET | Refills: 1 | Status: SHIPPED | OUTPATIENT
Start: 2023-07-31 | End: 2023-07-31 | Stop reason: SDUPTHER

## 2023-07-31 RX ORDER — RIZATRIPTAN BENZOATE 10 MG/1
10 TABLET, ORALLY DISINTEGRATING ORAL
Qty: 27 TABLET | Refills: 3 | Status: SHIPPED | OUTPATIENT
Start: 2023-07-31

## 2023-07-31 NOTE — TELEPHONE ENCOUNTER
Medication requested-belsomra/buspar   Last RX-6-27/5-25     Last office visit-7-17  Next office visit-9-11

## 2023-07-31 NOTE — TELEPHONE ENCOUNTER
Pt is requesting medication refill on suvorexant (BELSOMRA) 10 mg Tab  Last refill: 06/27/2023  Last visit: 07/17/2023  Follow Up: 09/11/2023  PT REQUEST THIS MED BE SENT TO [facility]      Pt is requesting medication refill on busPIRone (BUSPAR) 10 MG tablet  Last refill: 05/25/2023  Last visit: 07/17/2023  Follow Up: 09/11/2023  [facility] [place]

## 2023-08-02 ENCOUNTER — PATIENT MESSAGE (OUTPATIENT)
Dept: PSYCHIATRY | Facility: CLINIC | Age: 43
End: 2023-08-02
Payer: COMMERCIAL

## 2023-08-02 DIAGNOSIS — G47.00 INSOMNIA, UNSPECIFIED TYPE: ICD-10-CM

## 2023-08-02 RX ORDER — SUVOREXANT 10 MG/1
10 TABLET, FILM COATED ORAL NIGHTLY
Qty: 30 TABLET | Refills: 0 | Status: SHIPPED | OUTPATIENT
Start: 2023-08-02 | End: 2023-09-11 | Stop reason: SDUPTHER

## 2023-08-10 ENCOUNTER — HOSPITAL ENCOUNTER (OUTPATIENT)
Facility: HOSPITAL | Age: 43
Discharge: HOME | End: 2023-08-10
Attending: INTERNAL MEDICINE | Admitting: INTERNAL MEDICINE
Payer: COMMERCIAL

## 2023-08-10 ENCOUNTER — ANESTHESIA (OUTPATIENT)
Dept: ENDOSCOPY | Facility: HOSPITAL | Age: 43
End: 2023-08-10
Payer: COMMERCIAL

## 2023-08-10 ENCOUNTER — ANESTHESIA EVENT (OUTPATIENT)
Dept: ENDOSCOPY | Facility: HOSPITAL | Age: 43
End: 2023-08-10
Payer: COMMERCIAL

## 2023-08-10 DIAGNOSIS — R19.7 DIARRHEA: ICD-10-CM

## 2023-08-10 PROCEDURE — 45380 COLONOSCOPY AND BIOPSY: CPT | Performed by: INTERNAL MEDICINE

## 2023-08-10 PROCEDURE — D9220A PRA ANESTHESIA: ICD-10-PCS | Mod: ANES,,, | Performed by: ANESTHESIOLOGY

## 2023-08-10 PROCEDURE — D9220A PRA ANESTHESIA: ICD-10-PCS | Mod: CRNA,,, | Performed by: NURSE ANESTHETIST, CERTIFIED REGISTERED

## 2023-08-10 PROCEDURE — D9220A PRA ANESTHESIA: Mod: ANES,,, | Performed by: ANESTHESIOLOGY

## 2023-08-10 PROCEDURE — 45380 PR COLONOSCOPY,BIOPSY: ICD-10-PCS | Mod: ,,, | Performed by: INTERNAL MEDICINE

## 2023-08-10 PROCEDURE — 37000009 HC ANESTHESIA EA ADD 15 MINS: Performed by: INTERNAL MEDICINE

## 2023-08-10 PROCEDURE — 45380 COLONOSCOPY AND BIOPSY: CPT | Mod: ,,, | Performed by: INTERNAL MEDICINE

## 2023-08-10 PROCEDURE — 63600175 PHARM REV CODE 636 W HCPCS: Performed by: NURSE ANESTHETIST, CERTIFIED REGISTERED

## 2023-08-10 PROCEDURE — D9220A PRA ANESTHESIA: Mod: CRNA,,, | Performed by: NURSE ANESTHETIST, CERTIFIED REGISTERED

## 2023-08-10 PROCEDURE — 37000008 HC ANESTHESIA 1ST 15 MINUTES: Performed by: INTERNAL MEDICINE

## 2023-08-10 PROCEDURE — 27201012 HC FORCEPS, HOT/COLD, DISP: Performed by: INTERNAL MEDICINE

## 2023-08-10 PROCEDURE — 25000003 PHARM REV CODE 250: Performed by: NURSE ANESTHETIST, CERTIFIED REGISTERED

## 2023-08-10 PROCEDURE — 25000003 PHARM REV CODE 250: Performed by: INTERNAL MEDICINE

## 2023-08-10 RX ORDER — SODIUM CHLORIDE 9 MG/ML
INJECTION, SOLUTION INTRAVENOUS CONTINUOUS
Status: DISCONTINUED | OUTPATIENT
Start: 2023-08-10 | End: 2023-08-10 | Stop reason: HOSPADM

## 2023-08-10 RX ORDER — PROPOFOL 10 MG/ML
VIAL (ML) INTRAVENOUS
Status: DISCONTINUED | OUTPATIENT
Start: 2023-08-10 | End: 2023-08-10

## 2023-08-10 RX ORDER — LIDOCAINE HYDROCHLORIDE 20 MG/ML
INJECTION INTRAVENOUS
Status: DISCONTINUED | OUTPATIENT
Start: 2023-08-10 | End: 2023-08-10

## 2023-08-10 RX ADMIN — LIDOCAINE HYDROCHLORIDE 100 MG: 20 INJECTION, SOLUTION INTRAVENOUS at 12:08

## 2023-08-10 RX ADMIN — SODIUM CHLORIDE: 9 INJECTION, SOLUTION INTRAVENOUS at 12:08

## 2023-08-10 RX ADMIN — PROPOFOL 50 MG: 10 INJECTION, EMULSION INTRAVENOUS at 12:08

## 2023-08-10 RX ADMIN — PROPOFOL 100 MG: 10 INJECTION, EMULSION INTRAVENOUS at 12:08

## 2023-08-10 NOTE — ANESTHESIA PREPROCEDURE EVALUATION
08/10/2023  Mariana Mora is a 42 y.o., female.      Pre-op Assessment    I have reviewed the Patient Summary Reports.     I have reviewed the Nursing Notes. I have reviewed the NPO Status.   I have reviewed the Medications.     Review of Systems  Anesthesia Hx:  Denies Family Hx of Anesthesia complications.   Denies Personal Hx of Anesthesia complications.   Cardiovascular:   Hypertension    Pulmonary:   Sleep Apnea, CPAP    Hepatic/GI:   Bowel Prep. Hiatal Hernia, GERD Liver Disease,    Neurological:   Headaches    Endocrine:   Hypothyroidism  Morbid Obesity / BMI > 40  Psych:   Psychiatric History          Physical Exam  General: Cooperative, Alert and Oriented    Airway:  Mallampati: III / II  Neck: Girth Increased        Anesthesia Plan  Type of Anesthesia, risks & benefits discussed:    Anesthesia Type: Gen Natural Airway  Intra-op Monitoring Plan: Standard ASA Monitors  Induction:  IV  Informed Consent: Informed consent signed with the Patient and all parties understand the risks and agree with anesthesia plan.  All questions answered.   ASA Score: 3    Ready For Surgery From Anesthesia Perspective.     .

## 2023-08-10 NOTE — TRANSFER OF CARE
"Anesthesia Transfer of Care Note    Patient: Mariana Mora    Procedure(s) Performed: Procedure(s) (LRB):  COLONOSCOPY (N/A)    Patient location: PACU    Anesthesia Type: general    Transport from OR: Transported from OR on room air with adequate spontaneous ventilation    Post pain: adequate analgesia    Post assessment: tolerated procedure well and no apparent anesthetic complications    Post vital signs: stable    Level of consciousness: awake, alert and oriented    Nausea/Vomiting: no nausea/vomiting    Complications: none    Transfer of care protocol was followed      Last vitals:   Visit Vitals  /82 (BP Location: Left arm, Patient Position: Lying)   Pulse 87   Temp 36.5 °C (97.7 °F) (Skin)   Resp 18   Ht 5' 6" (1.676 m)   Wt 136.1 kg (300 lb)   SpO2 97%   Breastfeeding No   BMI 48.42 kg/m²     "

## 2023-08-10 NOTE — H&P
[facility] Note    CC: Diarrhea    HPI 42 y.o. female presents for evaluation of diarrhea    Past Medical History:   Diagnosis Date    ADHD, predominantly inattentive type     Anxiety     Arthritis     Autism     Autism spectrum disorder     Depression     Hypertension     Hypothyroidism     Migraine headache     OCD (obsessive compulsive disorder)     Psoriasis     TMJ (temporomandibular joint syndrome)        Allergies and Medications reviewed     Review of Systems  General ROS: negative for - chills, fever or weight loss  Cardiovascular ROS: no chest pain or dyspnea on exertion  Gastrointestinal ROS: + diarrhea    Physical Examination  There were no vitals taken for this visit.  General appearance: alert, cooperative, no distress  HENT: Normocephalic, atraumatic, neck symmetrical, no nasal discharge, sclera anicteric   Lungs: clear to auscultation bilaterally, symmetric chest wall expansion bilaterally  Heart: regular rate and rhythm without rub; no displacement of the PMI   Abdomen: soft  Extremities: extremities symmetric; no clubbing, cyanosis, or edema        Labs:  Lab Results   Component Value Date    WBC 6.9 05/02/2023    HGB 14.6 05/02/2023    HCT 44.6 05/02/2023    MCV 92.1 05/02/2023     05/02/2023             Assessment:   42 y.o. female presents for colonoscopy    Plan:  -proceed to colonoscopy     Jeanne Whitt MD  [facility]  [address], [address]  [place] [address]  Office: [phone]  Fax: [phone]

## 2023-08-10 NOTE — PLAN OF CARE
Vss, lenore po fluids, denies pain, ambulates easily. IV removed, catheter intact. Discharge instructions provided and states understanding. States ready to go home.  Discharged from facility with family per wheelchair.

## 2023-08-10 NOTE — [employer] PATIENT INSTRUCTIONS
Discharge Summary/Instructions after an Endoscopic Procedure  Patient Name: Mariana Mora  Patient MRN: [id number]  Patient YOB: 1980  Thursday, August 10, 2023  Jeanne hWitt MD  Dear patient,  As a result of recent federal legislation (The Federal Cures Act), you may   receive lab or pathology results from your procedure in your [facility]   account before your physician is able to contact you. Your physician or   their representative will relay the results to you with their   recommendations at their soonest availability.  Thank you,  RESTRICTIONS:  During your procedure today, you received medications for sedation.  These   medications may affect your judgment, balance and coordination.  Therefore,   for 24 hours, you have the following restrictions:   - DO NOT drive a car, operate machinery, make legal/financial decisions,   sign important papers or drink alcohol.    ACTIVITY:  Today: no heavy lifting, straining or running due to procedural   sedation/anesthesia.  The following day: return to full activity including work.  DIET:  Eat and drink normally unless instructed otherwise.     TREATMENT FOR COMMON SIDE EFFECTS:  - Mild abdominal pain, nausea, belching, bloating or excessive gas:  rest,   eat lightly and use a heating pad.  - Sore Throat: treat with throat lozenges and/or gargle with warm salt   water.  - Because air was used during the procedure, expelling large amounts of air   from your rectum or belching is normal.  - If a bowel prep was taken, you may not have a bowel movement for 1-3 days.    This is normal.  SYMPTOMS TO WATCH FOR AND REPORT TO YOUR PHYSICIAN:  1. Abdominal pain or bloating, other than gas cramps.  2. Chest pain.  3. Back pain.  4. Signs of infection such as: chills or fever occurring within 24 hours   after the procedure.  5. Rectal bleeding, which would show as bright red, maroon, or black stools.   (A tablespoon of blood from the rectum is not  serious, especially if   hemorrhoids are present.)  6. Vomiting.  7. Weakness or dizziness.  GO DIRECTLY TO THE NEAREST EMERGENCY ROOM IF YOU HAVE ANY OF THE FOLLOWING:      Difficulty breathing              Chills and/or fever over 101 F   Persistent vomiting and/or vomiting blood   Severe abdominal pain   Severe chest pain   Black, tarry stools   Bleeding- more than one tablespoon   Any other symptom or condition that you feel may need urgent attention  Your doctor recommends these additional instructions:  If any biopsies were taken, your doctors clinic will contact you in 1 to 2   weeks with any results.  - Discharge patient to home (with escort).   - Patient has a contact number available for emergencies.  The signs and   symptoms of potential delayed complications were discussed with the   patient.  Return to normal activities tomorrow.  Written discharge   instructions were provided to the patient.   - Resume previous diet.   - Continue present medications.   - Await pathology results.   - Repeat colonoscopy in 5-10 years for surveillance based on pathology   results.   -Trial of bile acid sequestrant  -Refer to pelvic floor physical therapy   - Return to my office PRN.  For questions, problems or results please call your physician - Jeanne Whitt MD at Work:  [phone].  [facility], EMERGENCY ROOM PHONE NUMBER: [phone]  IF A COMPLICATION OR EMERGENCY SITUATION ARISES AND YOU ARE UNABLE TO REACH   YOUR PHYSICIAN - GO DIRECTLY TO THE EMERGENCY ROOM.  Jeanne Whitt MD  8/10/2023 12:28:24 PM  This report has been verified and signed electronically.  Dear patient,  As a result of recent federal legislation (The Federal Cures Act), you may   receive lab or pathology results from your procedure in your [facility]   account before your physician is able to contact you. Your physician or   their representative will relay the results to you with their   recommendations  at their soonest availability.  Thank you,  [employer]

## 2023-08-10 NOTE — ANESTHESIA POSTPROCEDURE EVALUATION
Anesthesia Post Evaluation    Patient: Mariana Mora    Procedure(s) Performed: Procedure(s) (LRB):  COLONOSCOPY (N/A)    Final Anesthesia Type: general      Patient location during evaluation: PACU  Patient participation: Yes- Able to Participate  Level of consciousness: awake and alert and oriented  Post-procedure vital signs: reviewed and stable  Pain management: adequate  Airway patency: patent    PONV status at discharge: No PONV  Anesthetic complications: no      Cardiovascular status: blood pressure returned to baseline  Respiratory status: unassisted, spontaneous ventilation and room air  Hydration status: euvolemic  Follow-up not needed.          Vitals Value Taken Time   /72 08/10/23 1245   Temp  08/10/23 1419   Pulse 74 08/10/23 1245   Resp 17 08/10/23 1245   SpO2 97 % 08/10/23 1245         Event Time   Out of Recovery 12:57:59         Pain/Timoteo Score: Timoteo Score: 10 (8/10/2023 12:40 PM)

## 2023-08-11 VITALS
OXYGEN SATURATION: 97 % | HEART RATE: 74 BPM | SYSTOLIC BLOOD PRESSURE: 128 MMHG | BODY MASS INDEX: 47.09 KG/M2 | WEIGHT: 293 LBS | DIASTOLIC BLOOD PRESSURE: 72 MMHG | RESPIRATION RATE: 17 BRPM | HEIGHT: 66 IN | TEMPERATURE: 98 F

## 2023-08-15 ENCOUNTER — PATIENT MESSAGE (OUTPATIENT)
Dept: GASTROENTEROLOGY | Facility: CLINIC | Age: 43
End: 2023-08-15
Payer: COMMERCIAL

## 2023-08-24 ENCOUNTER — LAB VISIT (OUTPATIENT)
Dept: LAB | Facility: HOSPITAL | Age: 43
End: 2023-08-24
Attending: FAMILY MEDICINE
Payer: COMMERCIAL

## 2023-08-24 ENCOUNTER — OFFICE VISIT (OUTPATIENT)
Dept: FAMILY MEDICINE | Facility: CLINIC | Age: 43
End: 2023-08-24
Payer: COMMERCIAL

## 2023-08-24 ENCOUNTER — PATIENT MESSAGE (OUTPATIENT)
Dept: PSYCHIATRY | Facility: CLINIC | Age: 43
End: 2023-08-24
Payer: COMMERCIAL

## 2023-08-24 VITALS
RESPIRATION RATE: 20 BRPM | WEIGHT: 293 LBS | HEIGHT: 66 IN | BODY MASS INDEX: 47.09 KG/M2 | OXYGEN SATURATION: 95 % | TEMPERATURE: 98 F | DIASTOLIC BLOOD PRESSURE: 88 MMHG | HEART RATE: 99 BPM | SYSTOLIC BLOOD PRESSURE: 130 MMHG

## 2023-08-24 DIAGNOSIS — R53.83 FATIGUE, UNSPECIFIED TYPE: ICD-10-CM

## 2023-08-24 DIAGNOSIS — M54.2 CERVICALGIA: Primary | ICD-10-CM

## 2023-08-24 LAB
ALBUMIN SERPL BCP-MCNC: 3.4 G/DL (ref 3.5–5.2)
ALP SERPL-CCNC: 52 U/L (ref 55–135)
ALT SERPL W/O P-5'-P-CCNC: 33 U/L (ref 10–44)
ANION GAP SERPL CALC-SCNC: 7 MMOL/L (ref 8–16)
AST SERPL-CCNC: 27 U/L (ref 10–40)
BASOPHILS # BLD AUTO: 0.07 K/UL (ref 0–0.2)
BASOPHILS NFR BLD: 1.1 % (ref 0–1.9)
BILIRUB SERPL-MCNC: 0.4 MG/DL (ref 0.1–1)
BUN SERPL-MCNC: 9 MG/DL (ref 6–20)
CALCIUM SERPL-MCNC: 9.2 MG/DL (ref 8.7–10.5)
CHLORIDE SERPL-SCNC: 110 MMOL/L (ref 95–110)
CO2 SERPL-SCNC: 24 MMOL/L (ref 23–29)
CREAT SERPL-MCNC: 0.8 MG/DL (ref 0.5–1.4)
DIFFERENTIAL METHOD: ABNORMAL
EOSINOPHIL # BLD AUTO: 0.2 K/UL (ref 0–0.5)
EOSINOPHIL NFR BLD: 3.2 % (ref 0–8)
ERYTHROCYTE [DISTWIDTH] IN BLOOD BY AUTOMATED COUNT: 13.4 % (ref 11.5–14.5)
EST. GFR  (NO RACE VARIABLE): >60 ML/MIN/1.73 M^2
GLUCOSE SERPL-MCNC: 94 MG/DL (ref 70–110)
HCT VFR BLD AUTO: 40.5 % (ref 37–48.5)
HGB BLD-MCNC: 13.4 G/DL (ref 12–16)
IMM GRANULOCYTES # BLD AUTO: 0.01 K/UL (ref 0–0.04)
IMM GRANULOCYTES NFR BLD AUTO: 0.2 % (ref 0–0.5)
LYMPHOCYTES # BLD AUTO: 1.6 K/UL (ref 1–4.8)
LYMPHOCYTES NFR BLD: 24.6 % (ref 18–48)
MCH RBC QN AUTO: 29.8 PG (ref 27–31)
MCHC RBC AUTO-ENTMCNC: 33.1 G/DL (ref 32–36)
MCV RBC AUTO: 90 FL (ref 82–98)
MONOCYTES # BLD AUTO: 0.5 K/UL (ref 0.3–1)
MONOCYTES NFR BLD: 7.8 % (ref 4–15)
NEUTROPHILS # BLD AUTO: 4.2 K/UL (ref 1.8–7.7)
NEUTROPHILS NFR BLD: 63.1 % (ref 38–73)
NRBC BLD-RTO: 0 /100 WBC
PLATELET # BLD AUTO: 310 K/UL (ref 150–450)
PMV BLD AUTO: 9.1 FL (ref 9.2–12.9)
POTASSIUM SERPL-SCNC: 4.8 MMOL/L (ref 3.5–5.1)
PROT SERPL-MCNC: 6.8 G/DL (ref 6–8.4)
RBC # BLD AUTO: 4.5 M/UL (ref 4–5.4)
SODIUM SERPL-SCNC: 141 MMOL/L (ref 136–145)
TSH SERPL DL<=0.005 MIU/L-ACNC: 2.11 UIU/ML (ref 0.4–4)
WBC # BLD AUTO: 6.58 K/UL (ref 3.9–12.7)

## 2023-08-24 PROCEDURE — 36415 COLL VENOUS BLD VENIPUNCTURE: CPT | Performed by: FAMILY MEDICINE

## 2023-08-24 PROCEDURE — 99999 PR PBB SHADOW E&M-EST. PATIENT-LVL V: ICD-10-PCS | Mod: PBBFAC,,, | Performed by: FAMILY MEDICINE

## 2023-08-24 PROCEDURE — 3079F PR MOST RECENT DIASTOLIC BLOOD PRESSURE 80-89 MM HG: ICD-10-PCS | Mod: S$GLB,ICN,, | Performed by: FAMILY MEDICINE

## 2023-08-24 PROCEDURE — 3075F SYST BP GE 130 - 139MM HG: CPT | Mod: S$GLB,ICN,, | Performed by: FAMILY MEDICINE

## 2023-08-24 PROCEDURE — 99214 OFFICE O/P EST MOD 30 MIN: CPT | Mod: 25,S$GLB,ICN, | Performed by: FAMILY MEDICINE

## 2023-08-24 PROCEDURE — 84443 ASSAY THYROID STIM HORMONE: CPT | Performed by: FAMILY MEDICINE

## 2023-08-24 PROCEDURE — 1160F PR REVIEW ALL MEDS BY PRESCRIBER/CLIN PHARMACIST DOCUMENTED: ICD-10-PCS | Mod: S$GLB,ICN,, | Performed by: FAMILY MEDICINE

## 2023-08-24 PROCEDURE — 99214 PR OFFICE/OUTPT VISIT, EST, LEVL IV, 30-39 MIN: ICD-10-PCS | Mod: 25,S$GLB,ICN, | Performed by: FAMILY MEDICINE

## 2023-08-24 PROCEDURE — 3079F DIAST BP 80-89 MM HG: CPT | Mod: S$GLB,ICN,, | Performed by: FAMILY MEDICINE

## 2023-08-24 PROCEDURE — 99999 PR PBB SHADOW E&M-EST. PATIENT-LVL V: CPT | Mod: PBBFAC,,, | Performed by: FAMILY MEDICINE

## 2023-08-24 PROCEDURE — 1159F MED LIST DOCD IN RCRD: CPT | Mod: S$GLB,ICN,, | Performed by: FAMILY MEDICINE

## 2023-08-24 PROCEDURE — 96372 PR INJECTION,THERAP/PROPH/DIAG2ST, IM OR SUBCUT: ICD-10-PCS | Mod: S$GLB,ICN,, | Performed by: FAMILY MEDICINE

## 2023-08-24 PROCEDURE — 80053 COMPREHEN METABOLIC PANEL: CPT | Performed by: FAMILY MEDICINE

## 2023-08-24 PROCEDURE — 85025 COMPLETE CBC W/AUTO DIFF WBC: CPT | Performed by: FAMILY MEDICINE

## 2023-08-24 PROCEDURE — 96372 THER/PROPH/DIAG INJ SC/IM: CPT | Mod: S$GLB,ICN,, | Performed by: FAMILY MEDICINE

## 2023-08-24 PROCEDURE — 1160F RVW MEDS BY RX/DR IN RCRD: CPT | Mod: S$GLB,ICN,, | Performed by: FAMILY MEDICINE

## 2023-08-24 PROCEDURE — 1159F PR MEDICATION LIST DOCUMENTED IN MEDICAL RECORD: ICD-10-PCS | Mod: S$GLB,ICN,, | Performed by: FAMILY MEDICINE

## 2023-08-24 PROCEDURE — 3008F PR BODY MASS INDEX (BMI) DOCUMENTED: ICD-10-PCS | Mod: S$GLB,ICN,, | Performed by: FAMILY MEDICINE

## 2023-08-24 PROCEDURE — 3008F BODY MASS INDEX DOCD: CPT | Mod: S$GLB,ICN,, | Performed by: FAMILY MEDICINE

## 2023-08-24 PROCEDURE — 3075F PR MOST RECENT SYSTOLIC BLOOD PRESS GE 130-139MM HG: ICD-10-PCS | Mod: S$GLB,ICN,, | Performed by: FAMILY MEDICINE

## 2023-08-24 RX ORDER — KETOROLAC TROMETHAMINE 30 MG/ML
30 INJECTION, SOLUTION INTRAMUSCULAR; INTRAVENOUS ONCE
Status: COMPLETED | OUTPATIENT
Start: 2023-08-24 | End: 2023-08-24

## 2023-08-24 RX ADMIN — KETOROLAC TROMETHAMINE 30 MG: 30 INJECTION, SOLUTION INTRAMUSCULAR; INTRAVENOUS at 11:08

## 2023-08-24 NOTE — PROGRESS NOTES
Subjective:       Patient ID: Mariana Mora is a 42 y.o. female.    Chief Complaint: Establish Care, Hypertension, and Headache    Ms. Mora presents today presents today with 2 months of recurrent headaches. Started about the same time as she started belsomra for sleep.   She is reporting near daily headaches.   Nothing seems to help the headaches. No relief with motrin.   Feels like it starts at the base of her neck and spreads around to eyes. She has tried her robaxin and it does seem to help.     Blood pressure elevated today but she is hurting today. Blood pressure at home is running between 130-140.    + Light sensitivity and sound sensitivity.     She also reports some trace swelling in lower extremity that is worse and pitting by the end of the day.     Headache       Review of Systems   Neurological:  Positive for headaches.         Objective:      Physical Exam  Vitals and nursing note reviewed.   Constitutional:       General: She is not in acute distress.     Appearance: She is not ill-appearing.      Comments: Appears to be uncomfortable. Headache and light sensitive.    Cardiovascular:      Rate and Rhythm: Normal rate and regular rhythm.      Heart sounds: No murmur heard.  Pulmonary:      Effort: Pulmonary effort is normal.      Breath sounds: Normal breath sounds. No wheezing.   Skin:     General: Skin is warm and dry.      Findings: No rash.   Neurological:      Mental Status: She is alert.   Psychiatric:         Mood and Affect: Mood normal.         Behavior: Behavior normal.         Assessment:       1. Cervicalgia    2. Fatigue, unspecified type        Plan:       Problem List Items Addressed This Visit    None  Visit Diagnoses       Cervicalgia    -  Primary    Relevant Orders    Ambulatory referral/consult to Physical/Occupational Therapy    Fatigue, unspecified type        Relevant Orders    Comprehensive Metabolic Panel    CBC Auto Differential    TSH            PT for neck.    Consider trial of stopping belsomra to see if headaches improve.   Labs for fatigue and swelling.   If renal function okay consider low dose HCTZ for BP and edema.   BP check in 2 weeks.

## 2023-08-24 NOTE — PROGRESS NOTES
Patient verified by name and . Patient received  Ketorolac 30 mg IM in right ventrogluteal. Patient tolerated injection well. Patient advised to wait in clinic for 15 minutes in case of adverse reactions. Patient demostrated understanding.

## 2023-08-25 ENCOUNTER — PATIENT MESSAGE (OUTPATIENT)
Dept: FAMILY MEDICINE | Facility: CLINIC | Age: 43
End: 2023-08-25
Payer: COMMERCIAL

## 2023-08-25 DIAGNOSIS — I10 ESSENTIAL HYPERTENSION: Primary | ICD-10-CM

## 2023-08-25 RX ORDER — HYDROCHLOROTHIAZIDE 12.5 MG/1
12.5 CAPSULE ORAL DAILY
Qty: 30 CAPSULE | Refills: 11 | Status: SHIPPED | OUTPATIENT
Start: 2023-08-25 | End: 2024-08-24

## 2023-08-28 DIAGNOSIS — F42.9 OBSESSIVE-COMPULSIVE DISORDER, UNSPECIFIED TYPE: ICD-10-CM

## 2023-08-28 RX ORDER — SERTRALINE HYDROCHLORIDE 100 MG/1
200 TABLET, FILM COATED ORAL DAILY
Qty: 60 TABLET | Refills: 1 | Status: SHIPPED | OUTPATIENT
Start: 2023-08-28 | End: 2023-09-11 | Stop reason: SDUPTHER

## 2023-08-28 NOTE — TELEPHONE ENCOUNTER
Rec'd fax from [facility] requesting refill on sertraline 100 mg  Last refill: 5/26  Last visit: 7/17  Follow up: 9/11

## 2023-08-31 ENCOUNTER — ANESTHESIA EVENT (OUTPATIENT)
Dept: ENDOSCOPY | Facility: HOSPITAL | Age: 43
End: 2023-08-31
Payer: COMMERCIAL

## 2023-08-31 ENCOUNTER — ANESTHESIA (OUTPATIENT)
Dept: ENDOSCOPY | Facility: HOSPITAL | Age: 43
End: 2023-08-31
Payer: COMMERCIAL

## 2023-08-31 PROCEDURE — D9220A PRA ANESTHESIA: ICD-10-PCS | Mod: ANES,,, | Performed by: ANESTHESIOLOGY

## 2023-08-31 PROCEDURE — D9220A PRA ANESTHESIA: Mod: ANES,,, | Performed by: ANESTHESIOLOGY

## 2023-08-31 PROCEDURE — D9220A PRA ANESTHESIA: ICD-10-PCS | Mod: CRNA,,, | Performed by: NURSE ANESTHETIST, CERTIFIED REGISTERED

## 2023-08-31 PROCEDURE — 63600175 PHARM REV CODE 636 W HCPCS: Performed by: NURSE ANESTHETIST, CERTIFIED REGISTERED

## 2023-08-31 PROCEDURE — 25000003 PHARM REV CODE 250: Performed by: NURSE ANESTHETIST, CERTIFIED REGISTERED

## 2023-08-31 PROCEDURE — D9220A PRA ANESTHESIA: Mod: CRNA,,, | Performed by: NURSE ANESTHETIST, CERTIFIED REGISTERED

## 2023-08-31 RX ORDER — PROPOFOL 10 MG/ML
VIAL (ML) INTRAVENOUS
Status: DISCONTINUED | OUTPATIENT
Start: 2023-08-31 | End: 2023-08-31

## 2023-08-31 RX ORDER — LIDOCAINE HYDROCHLORIDE 20 MG/ML
INJECTION INTRAVENOUS
Status: DISCONTINUED | OUTPATIENT
Start: 2023-08-31 | End: 2023-08-31

## 2023-08-31 RX ADMIN — PROPOFOL 100 MG: 10 INJECTION, EMULSION INTRAVENOUS at 10:08

## 2023-08-31 RX ADMIN — PROPOFOL 50 MG: 10 INJECTION, EMULSION INTRAVENOUS at 10:08

## 2023-08-31 RX ADMIN — LIDOCAINE HYDROCHLORIDE 100 MG: 20 INJECTION, SOLUTION INTRAVENOUS at 10:08

## 2023-08-31 NOTE — ANESTHESIA PREPROCEDURE EVALUATION
08/31/2023  Mariana Mora is a 42 y.o., female.      Pre-op Assessment    I have reviewed the Patient Summary Reports.     I have reviewed the Nursing Notes. I have reviewed the NPO Status.   I have reviewed the Medications.     Review of Systems  Anesthesia Hx:  No problems with previous Anesthesia    Social:  Non-Smoker    EENT/Dental:   TMJ     Cardiovascular:   Hypertension hyperlipidemia RASHID    Pulmonary:  Pulmonary Normal    Renal/:  Renal/ Normal     Hepatic/GI:   Hiatal Hernia, GERD Liver Disease,    Musculoskeletal:   Arthritis     Neurological:   Neuromuscular Disease, Headaches Seizures, well controlled    Endocrine:   Hypothyroidism  Obesity / BMI > 30, Morbid Obesity / BMI > 40  Psych:   Psychiatric History (Autism, ADHD, OCD) anxiety depression          Physical Exam  General: Well nourished, Cooperative, Alert and Oriented    Airway:  Mallampati: II   Mouth Opening: Normal  TM Distance: Normal  Neck ROM: Normal ROM        Anesthesia Plan  Type of Anesthesia, risks & benefits discussed:    Anesthesia Type: Gen ETT, Gen Supraglottic Airway, Gen Natural Airway, MAC  Intra-op Monitoring Plan: Standard ASA Monitors  Post Op Pain Control Plan: multimodal analgesia  Induction:  IV  Airway Plan: Direct, Video and Fiberoptic, Post-Induction  Informed Consent: Informed consent signed with the Patient and all parties understand the risks and agree with anesthesia plan.  All questions answered.   ASA Score: 3    Ready For Surgery From Anesthesia Perspective.     .

## 2023-08-31 NOTE — ANESTHESIA POSTPROCEDURE EVALUATION
Anesthesia Post Evaluation    Patient: Mariana Mora    Procedure(s) Performed: Procedure(s) (LRB):  EGD (ESOPHAGOGASTRODUODENOSCOPY) (N/A)    Final Anesthesia Type: general      Patient location during evaluation: PACU  Patient participation: Yes- Able to Participate  Level of consciousness: awake and alert and oriented  Post-procedure vital signs: reviewed and stable  Pain management: adequate  Airway patency: patent    PONV status at discharge: No PONV  Anesthetic complications: no      Cardiovascular status: blood pressure returned to baseline and stable  Respiratory status: unassisted and spontaneous ventilation  Hydration status: euvolemic  Follow-up not needed.          Vitals Value Taken Time   /59 08/31/23 1114   Temp 36.6 °C (97.9 °F) 08/31/23 1102   Pulse 82 08/31/23 1116   Resp 18 08/31/23 1112   SpO2 95 % 08/31/23 1116   Vitals shown include unvalidated device data.      Event Time   Out of Recovery 11:28:31         Pain/Timoteo Score: Timoteo Score: 10 (8/31/2023 11:12 AM)

## 2023-08-31 NOTE — TRANSFER OF CARE
Anesthesia Transfer of Care Note    Patient: Mariana Mora    Procedure(s) Performed: Procedure(s) (LRB):  EGD (ESOPHAGOGASTRODUODENOSCOPY) (N/A)    Patient location: GI    Anesthesia Type: general    Transport from OR: Transported from OR on room air with adequate spontaneous ventilation    Post pain: adequate analgesia    Post assessment: no apparent anesthetic complications    Post vital signs: stable    Level of consciousness: sedated    Nausea/Vomiting: no nausea/vomiting    Complications: none    Transfer of care protocol was followed      Last vitals:   Visit Vitals  BP (!) 149/85 (BP Location: Left arm, Patient Position: Lying)   Pulse 96   Temp 36.4 °C (97.5 °F) (Skin)   Resp 18   Wt 131.5 kg (290 lb)   SpO2 97%   BMI 46.83 kg/m²

## 2023-09-08 ENCOUNTER — OFFICE VISIT (OUTPATIENT)
Dept: PULMONOLOGY | Facility: CLINIC | Age: 43
End: 2023-09-08
Payer: COMMERCIAL

## 2023-09-08 VITALS
DIASTOLIC BLOOD PRESSURE: 84 MMHG | OXYGEN SATURATION: 96 % | SYSTOLIC BLOOD PRESSURE: 125 MMHG | WEIGHT: 293 LBS | BODY MASS INDEX: 48.82 KG/M2 | HEART RATE: 81 BPM | HEIGHT: 65 IN

## 2023-09-08 DIAGNOSIS — R13.10 DYSPHAGIA, UNSPECIFIED TYPE: ICD-10-CM

## 2023-09-08 DIAGNOSIS — G47.33 OSA ON CPAP: ICD-10-CM

## 2023-09-08 DIAGNOSIS — G47.33 OSA (OBSTRUCTIVE SLEEP APNEA): Primary | ICD-10-CM

## 2023-09-08 DIAGNOSIS — E66.01 OBESITY, CLASS III, BMI 40-49.9 (MORBID OBESITY): ICD-10-CM

## 2023-09-08 PROCEDURE — 3074F SYST BP LT 130 MM HG: CPT | Mod: CPTII,S$GLB,, | Performed by: NURSE PRACTITIONER

## 2023-09-08 PROCEDURE — 1159F MED LIST DOCD IN RCRD: CPT | Mod: CPTII,S$GLB,, | Performed by: NURSE PRACTITIONER

## 2023-09-08 PROCEDURE — 3008F BODY MASS INDEX DOCD: CPT | Mod: CPTII,S$GLB,, | Performed by: NURSE PRACTITIONER

## 2023-09-08 PROCEDURE — 99213 PR OFFICE/OUTPT VISIT, EST, LEVL III, 20-29 MIN: ICD-10-PCS | Mod: S$GLB,,, | Performed by: NURSE PRACTITIONER

## 2023-09-08 PROCEDURE — 1159F PR MEDICATION LIST DOCUMENTED IN MEDICAL RECORD: ICD-10-PCS | Mod: CPTII,S$GLB,, | Performed by: NURSE PRACTITIONER

## 2023-09-08 PROCEDURE — 3079F DIAST BP 80-89 MM HG: CPT | Mod: CPTII,S$GLB,, | Performed by: NURSE PRACTITIONER

## 2023-09-08 PROCEDURE — 3008F PR BODY MASS INDEX (BMI) DOCUMENTED: ICD-10-PCS | Mod: CPTII,S$GLB,, | Performed by: NURSE PRACTITIONER

## 2023-09-08 PROCEDURE — 99999 PR PBB SHADOW E&M-EST. PATIENT-LVL V: ICD-10-PCS | Mod: PBBFAC,,, | Performed by: NURSE PRACTITIONER

## 2023-09-08 PROCEDURE — 3074F PR MOST RECENT SYSTOLIC BLOOD PRESSURE < 130 MM HG: ICD-10-PCS | Mod: CPTII,S$GLB,, | Performed by: NURSE PRACTITIONER

## 2023-09-08 PROCEDURE — 3079F PR MOST RECENT DIASTOLIC BLOOD PRESSURE 80-89 MM HG: ICD-10-PCS | Mod: CPTII,S$GLB,, | Performed by: NURSE PRACTITIONER

## 2023-09-08 PROCEDURE — 99999 PR PBB SHADOW E&M-EST. PATIENT-LVL V: CPT | Mod: PBBFAC,,, | Performed by: NURSE PRACTITIONER

## 2023-09-08 PROCEDURE — 99213 OFFICE O/P EST LOW 20 MIN: CPT | Mod: S$GLB,,, | Performed by: NURSE PRACTITIONER

## 2023-09-08 NOTE — PROGRESS NOTES
9/8/2023    Mariana Mora  In office visit     Chief Complaint   Patient presents with    Follow-up     6 month f/u       HPI:  09/08/2023:  Using CPAP nightly - states never did get machine registered for recall, is using nightly with filter. No issues reported with CPAP use. Using Full face mask with benefit.    has had a bad year so far - death of uncle.  Cough has overall subsided however every few days will cough up a small amount of mucous - light green in color. States will notice it happens more often if allergies are flaring.  Still on Xolair per Dr. Dai.  Denies frequent steroid or abx use.       9/20/2022: Hx: SUNIL, asthma a child  Diagnosed with SUNIL years ago - was following per Dr. Borges. Underwent PSG in 2019 revealing AHI of 21.6 followed by titration study November 18, 2019 revealing need or CPAP with pressure set at 12-20 cm h20. Patient has CPAP machine, however it is under recall. Hasn't yet registered for recall but plans to.   Currently using CPAP nightly with in line filter - no issues reported. Using Full Face mask without issue.  Endorses daytime fatigue which could be secondary to medication. Takes medication for insomnia, reports nocturnal arousals. Denies morning headaches. Depression is treated.   On Xolair per Dr. Dia, Allergist - for chronic hives.  Cough: Onset two months ago, persistent - worse in the morning, productive with green, flat, hard mucous. Mucous is dime sized.  Hx septum repair - does endorse chronic allergies, PND. On Zyrtec daily.   [facility] for psoriatic arthritis - currently on Tremfya with great reported benefit. Was on MTX in the past.   Denies frequent steroid or abx use.   Patient Instructions   [place] for CPAP recall. Call [phone]  Compliance report reviewed from 8/21/22-9/19/22  Average AHI 0.5  Average 90% CPAP - 15.1 cm H20  Average usage 8 h 23 minutes  Until then, can continue CPAP Nightly  with use of filter.  Chest xray now.  If cough persists for longer than six months, can consider CT Chest.  Continue current medication regiment. Keep follow up appointment as scheduled. Please call the office if you have any questions or concerns.       Social Hx: Lives with  - four dogs in the home. Disabled, former middle school . No Asbestosis exposure, Smoking Hx: Never smoker  Family Hx: No Lung Cancer, No COPD, Father with Asthma  Medical Hx: No previous pneumonia ; No previous shoulder/chest surgery      The chief compliant problem varies with instability at times.  PFSH:  Past Medical History:   Diagnosis Date    ADHD, predominantly inattentive type     Anxiety     Arthritis     Autism     Autism spectrum disorder     Depression     Hypertension     Hypothyroidism     Migraine headache     OCD (obsessive compulsive disorder)     Psoriasis     TMJ (temporomandibular joint syndrome)          Past Surgical History:   Procedure Laterality Date    ADENOIDECTOMY      CHOLECYSTECTOMY  6-3-2021    COLONOSCOPY      at the age of 7 for rectal bleeding    COLONOSCOPY N/A 8/10/2023    Procedure: COLONOSCOPY;  Surgeon: Jeanne Whitt MD;  Location: [facility];  Service: Endoscopy;  Laterality: N/A;    ESOPHAGEAL MANOMETRY WITH MEASUREMENT OF IMPEDANCE N/A 02/10/2020    Procedure: MANOMETRY, ESOPHAGUS, WITH IMPEDANCE MEASUREMENT;  Surgeon: Fitz Murray MD;  Location: [address]);  Service: Endoscopy;  Laterality: N/A;  2/3 - pt confirmed    ESOPHAGOGASTRODUODENOSCOPY N/A 12/17/2019    Procedure: EGD (ESOPHAGOGASTRODUODENOSCOPY);  Surgeon: David Stahl MD;  Location: [facility];  Service: Endoscopy;  Laterality: N/A;    ESOPHAGOGASTRODUODENOSCOPY N/A 8/31/2023    Procedure: EGD (ESOPHAGOGASTRODUODENOSCOPY);  Surgeon: Jeanne Whitt MD;  Location: [facility];  Service: Endoscopy;  Laterality: N/A;    INNER EAR SURGERY Right     LAPAROSCOPIC CHOLECYSTECTOMY N/A 06/03/2021     Procedure: CHOLECYSTECTOMY-LAPAROSCOPIC;  Surgeon: Farrukh Lorenzo MD;  Location: [facility] OR;  Service: General;  Laterality: N/A;    NASAL SEPTUM SURGERY      TYMPANOPLASTY      bilateral    TYMPANOSTOMY TUBE PLACEMENT       Social History     Tobacco Use    Smoking status: Never    Smokeless tobacco: Never   Substance Use Topics    Alcohol use: No    Drug use: Never     Family History   Problem Relation Age of Onset    Arthritis Father     Asthma Father     Heart disease Father     Hyperlipidemia Father     Colon cancer Paternal Uncle         dx in 50s    Cancer Paternal Uncle     Colon cancer Paternal Grandmother     Diabetes Paternal Grandmother     Heart failure Paternal Grandmother     Breast cancer Paternal Grandmother     Arthritis Paternal Grandmother     Cancer Paternal Grandmother     Heart disease Paternal Grandmother     Diabetes Paternal Grandfather     Heart failure Paternal Grandfather     Cancer Paternal Grandfather     Heart disease Paternal Grandfather     Stroke Paternal Grandfather     Hemochromatosis Other         Father's side    Ovarian cancer Neg Hx     Colon polyps Neg Hx     Esophageal cancer Neg Hx     Stomach cancer Neg Hx     Ulcerative colitis Neg Hx     Crohn's disease Neg Hx      Review of patient's allergies indicates:   Allergen Reactions    Penicillins     Norco [hydrocodone-acetaminophen] Itching    Prednisone Other (See Comments) and Rash     PSORIASIS  PSORIASIS     I have reviewed past medical, family, and social history. I have reviewed previous nurse notes.    Performance Status:The patient's activity level is functions out of house.      Review of Systems:  a review of eleven systems covering constitutional, Eye, HEENT, Psych, Respiratory, Cardiac, GI, , Musculoskeletal, Endocrine, Dermatologic was negative except for pertinent findings as listed ABOVE and below: pertinent positive as above, rest is good       Exam:Comprehensive exam done. /84 (BP Location: Left  "arm, Patient Position: Sitting, BP Method: Large (Automatic))   Pulse 81   Ht 5' 5" (1.651 m)   Wt 134.8 kg (297 lb 4.6 oz)   SpO2 96% Comment: on room air at rest  BMI 49.47 kg/m²   Exam included Vitals as listed  Constitutional: She is oriented to person, place, and time. She appears well-developed. No distress.   Nose: Nose normal.   Mouth/Throat: Uvula is midline, oropharynx is clear and moist and mucous membranes are normal. No dental caries. No oropharyngeal exudate, posterior oropharyngeal edema, posterior oropharyngeal erythema or tonsillar abscesses.    Eyes: Pupils are equal, round, and reactive to light.   Neck: No JVD present. No thyromegaly present.   Cardiovascular: Normal rate, regular rhythm and normal heart sounds. Exam reveals no gallop and no friction rub.   No murmur heard.  Pulmonary/Chest: Effort normal and breath sounds normal. No accessory muscle usage or stridor. No apnea and no tachypnea. No respiratory distress, decreased breath sounds, wheezes, rhonchi, rales, or tenderness. Clear breath sounds throughout, on room air, in no acute distress.   Abdominal: Soft. She exhibits no mass. There is no tenderness. No hepatosplenomegaly, hernias and normoactive bowel sounds  Musculoskeletal: Normal range of motion. exhibits no edema.   Neurological:  alert and oriented to person, place, and time. not disoriented.   Skin: Skin is warm and dry. Capillary refill takes less 2 sec. No cyanosis or erythema. No pallor. Nails show no clubbing.   Psychiatric: normal mood and affect. behavior is normal. Judgment and thought content normal.       Radiographs (ct chest and cxr) reviewed: view by direct vision   CXR  9/23/2022 - Normal  Chest Xray 11/3/2011 - Normal      Patient's labs were reviewed including CBC and CMP  Lab Results   Component Value Date    WBC 6.58 08/24/2023    RBC 4.50 08/24/2023    HGB 13.4 08/24/2023    HCT 40.5 08/24/2023    MCV 90 08/24/2023    MCH 29.8 08/24/2023    MCHC 33.1 " 08/24/2023    RDW 13.4 08/24/2023     08/24/2023    MPV 9.1 (L) 08/24/2023    GRAN 4.2 08/24/2023    GRAN 63.1 08/24/2023    LYMPH 1.6 08/24/2023    LYMPH 24.6 08/24/2023    MONO 0.5 08/24/2023    MONO 7.8 08/24/2023    EOS 0.2 08/24/2023    BASO 0.07 08/24/2023    EOSINOPHIL 3.2 08/24/2023    BASOPHIL 1.1 08/24/2023   CMP  Sodium   Date Value Ref Range Status   08/24/2023 141 136 - 145 mmol/L Final     Potassium   Date Value Ref Range Status   08/24/2023 4.8 3.5 - 5.1 mmol/L Final     Chloride   Date Value Ref Range Status   08/24/2023 110 95 - 110 mmol/L Final     CO2   Date Value Ref Range Status   08/24/2023 24 23 - 29 mmol/L Final     Glucose   Date Value Ref Range Status   08/24/2023 94 70 - 110 mg/dL Final     BUN   Date Value Ref Range Status   08/24/2023 9 6 - 20 mg/dL Final     Creatinine   Date Value Ref Range Status   08/24/2023 0.8 0.5 - 1.4 mg/dL Final     Calcium   Date Value Ref Range Status   08/24/2023 9.2 8.7 - 10.5 mg/dL Final     Total Protein   Date Value Ref Range Status   08/24/2023 6.8 6.0 - 8.4 g/dL Final     Albumin   Date Value Ref Range Status   08/24/2023 3.4 (L) 3.5 - 5.2 g/dL Final     Total Bilirubin   Date Value Ref Range Status   08/24/2023 0.4 0.1 - 1.0 mg/dL Final     Comment:     For infants and newborns, interpretation of results should be based  on gestational age, weight and in agreement with clinical  observations.    Premature Infant recommended reference ranges:  Up to 24 hours.............<8.0 mg/dL  Up to 48 hours............<12.0 mg/dL  3-5 days..................<15.0 mg/dL  6-29 days.................<15.0 mg/dL       Alkaline Phosphatase   Date Value Ref Range Status   08/24/2023 52 (L) 55 - 135 U/L Final     AST   Date Value Ref Range Status   08/24/2023 27 10 - 40 U/L Final     ALT   Date Value Ref Range Status   08/24/2023 33 10 - 44 U/L Final     Anion Gap   Date Value Ref Range Status   08/24/2023 7 (L) 8 - 16 mmol/L Final     eGFR   Date Value Ref Range  Status   08/24/2023 >60.0 >60 mL/min/1.73 m^2 Final         PFT was not done  Pulmonary Functions Testing Results:        Plan:  Clinical impression is apparently straight forward and impression with management as below.    Mariana was seen today for follow-up.    Diagnoses and all orders for this visit:    SUNIL (obstructive sleep apnea)  -     CPAP/BIPAP SUPPLIES    Obesity, Class III, BMI 40-49.9 (morbid obesity)    SUNIL on CPAP, 100% use    Dysphagia, unspecified type  -     X-Ray Chest PA And Lateral; Future      Body mass index is 49.47 kg/m². Morbid obesity complicates all aspects of disease management from diagnostic modalities to treatment. Weight loss encouraged and health benefits explained to patient. Nutritional counseling and physical activity encouraged.       Follow up in about 1 year (around 9/8/2024), or if symptoms worsen or fail to improve.    Discussed with patient above for education the following:      Patient Instructions   Continue CPAP nightly    Will ask [facility] when you are eligible for a new CPAP machine.    CPAP supply order sent.    Chest Xray ordered to be taken as needed     Continue current prescription medication regiment. Keep follow up appointment as scheduled. Please call the office if you have any questions or concerns.

## 2023-09-08 NOTE — PATIENT INSTRUCTIONS
Continue CPAP nightly    Will ask [facility] when you are eligible for a new CPAP machine.    CPAP supply order sent.    Chest Xray ordered to be taken as needed     Continue current prescription medication regiment. Keep follow up appointment as scheduled. Please call the office if you have any questions or concerns.

## 2023-09-11 ENCOUNTER — OFFICE VISIT (OUTPATIENT)
Dept: PSYCHIATRY | Facility: CLINIC | Age: 43
End: 2023-09-11
Payer: COMMERCIAL

## 2023-09-11 VITALS
SYSTOLIC BLOOD PRESSURE: 138 MMHG | HEART RATE: 90 BPM | WEIGHT: 293 LBS | HEIGHT: 65 IN | DIASTOLIC BLOOD PRESSURE: 87 MMHG | BODY MASS INDEX: 48.82 KG/M2

## 2023-09-11 DIAGNOSIS — F33.41 RECURRENT MAJOR DEPRESSIVE DISORDER, IN PARTIAL REMISSION: Primary | ICD-10-CM

## 2023-09-11 DIAGNOSIS — R51.9 INTRACTABLE EPISODIC HEADACHE, UNSPECIFIED HEADACHE TYPE: ICD-10-CM

## 2023-09-11 DIAGNOSIS — F42.9 OBSESSIVE-COMPULSIVE DISORDER, UNSPECIFIED TYPE: ICD-10-CM

## 2023-09-11 DIAGNOSIS — G47.00 INSOMNIA, UNSPECIFIED TYPE: ICD-10-CM

## 2023-09-11 DIAGNOSIS — F41.1 GAD (GENERALIZED ANXIETY DISORDER): ICD-10-CM

## 2023-09-11 PROCEDURE — 1160F RVW MEDS BY RX/DR IN RCRD: CPT | Mod: CPTII,S$GLB,, | Performed by: PHYSICIAN ASSISTANT

## 2023-09-11 PROCEDURE — 99999 PR PBB SHADOW E&M-EST. PATIENT-LVL V: CPT | Mod: PBBFAC,,, | Performed by: PHYSICIAN ASSISTANT

## 2023-09-11 PROCEDURE — 3008F PR BODY MASS INDEX (BMI) DOCUMENTED: ICD-10-PCS | Mod: CPTII,S$GLB,, | Performed by: PHYSICIAN ASSISTANT

## 2023-09-11 PROCEDURE — 1159F MED LIST DOCD IN RCRD: CPT | Mod: CPTII,S$GLB,, | Performed by: PHYSICIAN ASSISTANT

## 2023-09-11 PROCEDURE — 3079F DIAST BP 80-89 MM HG: CPT | Mod: CPTII,S$GLB,, | Performed by: PHYSICIAN ASSISTANT

## 2023-09-11 PROCEDURE — 99999 PR PBB SHADOW E&M-EST. PATIENT-LVL V: ICD-10-PCS | Mod: PBBFAC,,, | Performed by: PHYSICIAN ASSISTANT

## 2023-09-11 PROCEDURE — 3075F SYST BP GE 130 - 139MM HG: CPT | Mod: CPTII,S$GLB,, | Performed by: PHYSICIAN ASSISTANT

## 2023-09-11 PROCEDURE — 1160F PR REVIEW ALL MEDS BY PRESCRIBER/CLIN PHARMACIST DOCUMENTED: ICD-10-PCS | Mod: CPTII,S$GLB,, | Performed by: PHYSICIAN ASSISTANT

## 2023-09-11 PROCEDURE — 3075F PR MOST RECENT SYSTOLIC BLOOD PRESS GE 130-139MM HG: ICD-10-PCS | Mod: CPTII,S$GLB,, | Performed by: PHYSICIAN ASSISTANT

## 2023-09-11 PROCEDURE — 3079F PR MOST RECENT DIASTOLIC BLOOD PRESSURE 80-89 MM HG: ICD-10-PCS | Mod: CPTII,S$GLB,, | Performed by: PHYSICIAN ASSISTANT

## 2023-09-11 PROCEDURE — 99214 PR OFFICE/OUTPT VISIT, EST, LEVL IV, 30-39 MIN: ICD-10-PCS | Mod: S$GLB,,, | Performed by: PHYSICIAN ASSISTANT

## 2023-09-11 PROCEDURE — 1159F PR MEDICATION LIST DOCUMENTED IN MEDICAL RECORD: ICD-10-PCS | Mod: CPTII,S$GLB,, | Performed by: PHYSICIAN ASSISTANT

## 2023-09-11 PROCEDURE — 3008F BODY MASS INDEX DOCD: CPT | Mod: CPTII,S$GLB,, | Performed by: PHYSICIAN ASSISTANT

## 2023-09-11 PROCEDURE — 99214 OFFICE O/P EST MOD 30 MIN: CPT | Mod: S$GLB,,, | Performed by: PHYSICIAN ASSISTANT

## 2023-09-11 RX ORDER — SERTRALINE HYDROCHLORIDE 100 MG/1
150 TABLET, FILM COATED ORAL DAILY
Qty: 45 TABLET | Refills: 1 | Status: SHIPPED | OUTPATIENT
Start: 2023-09-11 | End: 2023-10-18 | Stop reason: SDUPTHER

## 2023-09-11 RX ORDER — BUSPIRONE HYDROCHLORIDE 10 MG/1
10 TABLET ORAL 2 TIMES DAILY
Qty: 60 TABLET | Refills: 1 | Status: SHIPPED | OUTPATIENT
Start: 2023-09-11 | End: 2023-10-30 | Stop reason: SDUPTHER

## 2023-09-11 RX ORDER — PRAZOSIN HYDROCHLORIDE 5 MG/1
10 CAPSULE ORAL NIGHTLY
Qty: 180 CAPSULE | Refills: 1 | Status: SHIPPED | OUTPATIENT
Start: 2023-09-11 | End: 2023-10-30 | Stop reason: SDUPTHER

## 2023-09-11 RX ORDER — SUVOREXANT 10 MG/1
10 TABLET, FILM COATED ORAL NIGHTLY
Qty: 30 TABLET | Refills: 0 | Status: SHIPPED | OUTPATIENT
Start: 2023-09-11 | End: 2023-10-18 | Stop reason: SDUPTHER

## 2023-09-11 NOTE — PATIENT INSTRUCTIONS
Reduce zoloft for perspective    Consider viibryd in the future (SSRI plus buspar combo)    Please go to emergency department if feeling as though you are a harm to yourself or others or if you are in crisis. Please call the clinic to report any worsening of symptoms or problems associated with medication.

## 2023-09-11 NOTE — PROGRESS NOTES
Outpatient Psychiatry Follow-Up Visit (MD/NP)    9/11/2023    Clinical Status of Patient:  Outpatient (Ambulatory)    Chief Complaint:  Mariana Mora is a 42 y.o. female who presents today for follow-up of depression and anxiety.  Met with patient.      Interval History and Content of Current Session:  Interim Events/Subjective Report/Content of Current Session:  Mariana is seen today for medication follow-up.  She reports that she is having a bad headache today.  She has trialed discontinuation of Belsomra and has a headache with or without taking Belsomra and she would like to continue with Belsomra for sleep.  She is interested in seeing headache specialist here at [facility] and we will place a referral after coordination with primary care provider.  She does have upcoming physical therapy for her neck.  Depression and anxiety scores are higher than they have been historically and she states she can not quite put her finger on the reason as to why but endorses family drama stuff.  Having difficulty with inattention/focus.  We discussed somewhat expectations of inattention/focus with a largely sedating medication regimen due to anxiety and insomnia.  She is understanding that there will be limitations to medication management and likely we will not be able to improve all aspects.  She denies suicidal or homicidal ideation.  No other complaints today.    FROM PREVIOUS HPI  Mariana is seen today for medication follow-up.  She reports that she is doing okay.  Has quite a bit going on.  Unfortunately her step grandmother has stage IV cancer and then her step mom found a cancerous lump on her breast.  She does feel that she is sleeping better with Belsomra.  She is seeing her primary care provider tomorrow.  Feels that her blood sugars are running low.  She is going to have to reapply for disability which is stressful.  She denies suicidal or homicidal ideation.  No other complaints today.        9/11/2023     10:31 AM 7/16/2023     7:09 PM 5/25/2023    10:55 AM   GAD7   1. Feeling nervous, anxious, or on edge? 3 2 1   2. Not being able to stop or control worrying? 2 1 1   3. Worrying too much about different things? 3 1 2   4. Trouble relaxing? 3 3 3   5. Being so restless that it is hard to sit still? 2 2 2   6. Becoming easily annoyed or irritable? 2 1 2   7. Feeling afraid as if something awful might happen? 2 2 3   8. If you checked off any problems, how difficult have these problems made it for you to do your work, take care of things at home, or get along with other people? 2 2 2   JOSE ANGEL-7 Score 17 12 14     PHQ9: 21, no SI    Review of Systems   PSYCHIATRIC: Pertinant items are noted in the narrative.  RESPIRATORY: No shortness of breath.  CARDIOVASCULAR: Reports tachycardia, no chest pain.  GASTROINTESTINAL: Reports nausea, vomiting, diarrhea.     Past Medical, Family and Social History: The patient's past medical, family and social history have been reviewed and updated as appropriate within the electronic medical record - see encounter notes.    Compliance: yes    Side effects: None    Risk Parameters:  Patient reports no suicidal ideation  Patient reports no homicidal ideation  Patient reports no self-injurious behavior  Patient reports no violent behavior    Exam (detailed: at least 9 elements; comprehensive: all 15 elements)   Constitutional  Vitals:  Most recent vital signs, dated less than 90 days prior to this appointment, were reviewed.   Vitals:    09/11/23 1045   BP: 138/87   Pulse: 90              General:  unremarkable, age appropriate     Musculoskeletal  Muscle Strength/Tone:  no dyskinesia   Gait & Station:  non-ataxic     Psychiatric  Speech:  no latency; no press   Mood & Affect:  ok  appropriate   Thought Process:  normal and logical   Associations:  intact   Thought Content:  normal, no suicidality, no homicidality, delusions, or paranoia   Insight:  intact   Judgement: behavior is adequate  to circumstances   Orientation:  grossly intact   Memory: intact for content of interview   Language: grossly intact   Attention Span & Concentration:  able to focus   Fund of Knowledge:  intact and appropriate to age and level of education     Assessment and Diagnosis   Status/Progress: Based on the examination today, the patient's problem(s) is/are inadequately controlled.  New problems have not been presented today.   Co-morbidities, Diagnostic uncertainty and Lack of compliance are not complicating management of the primary condition.      General Impression:   Major depressive disorder, mild  Generalized anxiety disorder  OCD by history  ADHD by history  Autism spectrum disorder, by patient report  Nightmares    Intervention/Counseling/Treatment Plan   Medication Management: Continue current medications. The risks and benefits of medication were discussed with the patient.  Counseling provided with patient as follows: importance of compliance with chosen treatment options was emphasized, risks and benefits of treatment options, including medications, were discussed with the patient, risk factor reduction, prognosis     Mariana is seen today for medication follow-up.  Based on assessment today:    Continue Prazosin 10mg nightly for nightmares, discussed mechanism of action and to change positions slowly. This will be titrated to effect.  Continue Belsomra 10mg nightly for sleep.   Reduce sertraline to 150 mg daily for depression/anxiety/OCD symptoms - this will be for perspective and to consider transition to Viibryd in the future.  Continue buspirone to 10 mg twice daily for anxiety at pt request.  Referral to neuro for headaches.    Gene site testing reviewed.    Please go to emergency department if feeling as though you are a harm to yourself or others or if you are in crisis.     Please call the clinic to report any worsening of symptoms or problems associated with medication.    Discussed risks of tardive  dyskinesia, drug induced parkinsonism, metabolic side effects, including weight gain, neuroleptic malignant syndrome       Return to Clinic: 1 month, as needed

## 2023-09-12 ENCOUNTER — TELEPHONE (OUTPATIENT)
Dept: NEUROLOGY | Facility: CLINIC | Age: 43
End: 2023-09-12
Payer: COMMERCIAL

## 2023-09-12 NOTE — TELEPHONE ENCOUNTER
Spoke to the pt, appt scheduled on 9/21/23 at 0900 with Dr. Cárdenas for headaches.  Date, time and location discussed.

## 2023-09-13 ENCOUNTER — CLINICAL SUPPORT (OUTPATIENT)
Dept: REHABILITATION | Facility: HOSPITAL | Age: 43
End: 2023-09-13
Attending: FAMILY MEDICINE
Payer: COMMERCIAL

## 2023-09-13 DIAGNOSIS — Z78.9 IMPAIRED MOBILITY AND ACTIVITIES OF DAILY LIVING: ICD-10-CM

## 2023-09-13 DIAGNOSIS — M40.00 KYPHOSIS (ACQUIRED) (POSTURAL): ICD-10-CM

## 2023-09-13 DIAGNOSIS — M54.2 CERVICALGIA: ICD-10-CM

## 2023-09-13 DIAGNOSIS — Z74.09 IMPAIRED MOBILITY AND ACTIVITIES OF DAILY LIVING: ICD-10-CM

## 2023-09-13 PROCEDURE — 97161 PT EVAL LOW COMPLEX 20 MIN: CPT | Mod: PN

## 2023-09-13 PROCEDURE — 97110 THERAPEUTIC EXERCISES: CPT | Mod: PN

## 2023-09-13 NOTE — PROGRESS NOTES
[facility]  Physical Therapy Initial Evaluation    Name: Mariana Mora  Clinic Number: [id number]    Therapy Diagnosis:   Encounter Diagnoses   Name Primary?    Cervicalgia     Kyphosis (acquired) (postural)     Impaired mobility and activities of daily living      Physician: Emeli Morgan MD    Physician Orders: PT Eval and Treat   Medical Diagnosis from Referral: M54.2 (ICD-10-CM) - Cervicalgia  Evaluation Date: 9/13/2023  Authorization Period Expiration: 12/31/2023  Plan of Care Expiration: 11/22/2023 or 12v post eval  Visit # (episodes)/ Visits authorized: 1/ eval   (POC 0 /12)  2nd FOTO visit 5  3rd FOTO visit 10  Progress Note Due: 10/13/2023    Time In: 300  Time Out: 350  Total Billable Time: 50 minutes    Precautions: ADHD; OCD; Autism spectrum; Anxiety; Depression; TMJ  Insurance: Payor: [employer] / Plan: [place] PPO / Product Type: PPO /     Subjective   Date of onset: years ago; 2 months latest flare-up   History of current condition - Mariana reports: she has had neck pain for years. This last round started 2 months ago. No known incident; gradual onset of pain. The pain Gets really intense. Gets n/t in hands and feet. Has tried muscle relaxers and chiropractic care with little relief. Has had only X-ray imaging at this time. Will see a new neurologist next Thursday; no red flags in the past. No stroke history or cervical surgery.     Medical History:   Past Medical History:   Diagnosis Date    ADHD, predominantly inattentive type     Anxiety     Arthritis     Autism     Autism spectrum disorder     Depression     Hypertension     Hypothyroidism     Migraine headache     OCD (obsessive compulsive disorder)     Psoriasis     TMJ (temporomandibular joint syndrome)        Surgical History:   Mariana Mora  has a past surgical history that includes Tympanoplasty; Tympanostomy tube placement; Nasal septum surgery; Inner ear surgery  (Right); Esophagogastroduodenoscopy (N/A, 12/17/2019); Esophageal manometry with measurement of impedance (N/A, 02/10/2020); Adenoidectomy; Laparoscopic cholecystectomy (N/A, 06/03/2021); Cholecystectomy (6-3-2021); Colonoscopy; Colonoscopy (N/A, 8/10/2023); and Esophagogastroduodenoscopy (N/A, 8/31/2023).    Medications:   Mariana has a current medication list which includes the following prescription(s): atorvastatin, buspirone, cetirizine, ergocalciferol, famotidine, tremfya, hydrochlorothiazide, ketoconazole, l norgest/e.estradiol-e.estrad, leflunomide, methocarbamol, montelukast, nystatin, nystop, omalizumab, pantoprazole, prazosin, rizatriptan, sertraline, belsomra, and synthroid, and the following Facility-Administered Medications: diphenhydramine, lactated ringers, lactated ringers, lidocaine (pf) 10 mg/ml (1%), and morphine.    Allergies:   Review of patient's allergies indicates:   Allergen Reactions    Penicillins     Norco [hydrocodone-acetaminophen] Itching    Prednisone Other (See Comments) and Rash     PSORIASIS  PSORIASIS        Imaging, X-ray    Prior Therapy: yes  Social History: lives with   Current Smoker: no  Cancer Hx: no  Occupation: Disability; Takes care of 5 dogs, cleaning, recliner, Art-drawing  Hand Dominance: Right  Headaches: Daily  Prior Level of Function: independent  Current Level of Function: difficult/pain with cleaning, prolong forward head position and laying back    Pain:  Current 5/10, worst 9/10, best 3/10   Location: Occipital ridge; temporal lobe regions; behind shoulder blades >Left; n/t in Bilateral Upper Extremities into 4th-5th digits, >L  Description: Sharp, throb  Aggravating Factors: cleaning, prolong forward head position and laying back  Easing Factors: muscles relaxer; ice; heat; 45 min daily walks with dogs    Pts goals: have fewer and less painful HA    Objective     Posture: high BMI; Upper Trapezius dominance; Right anterior trunk rotation; severe  Anterior Pelvic Tilt and kyphosis  Palpation: TTP levator scap and mid traps  Sensation: Bilateral Upper Extremities intact to superficial touch    Range of Motion/Strength:      CERVICAL AROM  In degrees Pain/Dysfunction with Movement   Flexion (45 norm) 40    Extension (75 norm) 50    Right side bending  (35 norm) 40    Left side bending  (35 norm) 40    Right rotation  (65 norm) 70    Left rotation  (65 norm) 70      Shoulder AROM screen:  WNL / hyperROM for External Rotation         U/E MMT:  5/5 norm Left Right Pain/Dysfunction with Movement   Shoulder Flexion 4/5 4/5    Shoulder Extension 5/5 5/5    Serratus Anterior  4+/5 4+/5    Shoulder Abduction 4+/5 4+/5    Shoulder IR 5/5 5/5    Shoulder ER  @ 0* Abduction 4/5 4/5    Elbow Flexion  4/5 4/5    Elbow Extension 5/5 5/5    Rhomboids 3-/5 3-/5    Mid Traps 3-/5 3-/5    Low Traps 2/5 2/5       Strength Left Right   Jerry @ #2 position 45# 50#     Special Tests Left Right   Vertebral Artery Insufficiency Mild dizzy Mild frontal lobe pn     Cervical musculature flexibility: (restriction: mild, moderate, severe grading)   Muscle Flexibility       Pectoralis  severe             TREATMENT   Treatment Time In: 330  Treatment Time Out: 350  Total Treatment time separate from Evaluation: 20 minutes    Mariana received therapeutic exercises to develop strength, ROM, and flexibility for 15 minutes including:  NMRE utilized to activate appropriate mm to improve posture, shoulder stabilization, smooth GH and scapula glide: 5 minutes     NOTE: Mild Autism and anxiety considerations    SEATED:  Shoulder retro rolls, 2x10  T/s Extension Stretch, Hands behind head, [id number]  Doorway stretch, W arms, [id number]  Red Theraband Bilateral External Rotation, initiate mid trap activation first, [id number]       Add NEXT:  UBE, Level 1, 3/3  Gentle chin tucks  Red Theraband Loop wall clocks  Red Theraband Shoulder extension  Supine Thoracic Extension stretch with 3# wand  Supine Red  Theraband horizontal abduction  Prone T or W arms with shoulder depression, Towel roll under forehead, 2x10 Bilateral   Standing Scapula depression, hands on table, 2x10   Prone Extension / horiz abd / flexion - if unable prone, then standing  FUTURE:  Cervical traction if still Bilateral Upper Extremities n/t - need medical clearance due to severity of HA  TMJ considerations  Bicep curls, 3#  Cervical isometrics: SB / Rotation    Home Exercises and Patient Education Provided    Education provided:   - daily HEP  - utilizing heat in mornings, 10-20 minute max; ice if flare-up, 10-20 minute max      Written Home Exercises Provided: yes.  Exercises were reviewed and Mariana was able to demonstrate them prior to the end of the session.  Mariana demonstrated good  understanding of the education provided.     See EMR under Patient Instructions for exercises provided 9/13/2023.    Assessment   Mariana is a 42 y.o. female referred to outpatient Physical Therapy with a medical diagnosis of neck pain and HA. Pt presents with palpable pain along upper back today, no radiculopathy provocation, weakness of Bilateral Upper Extremities and periscapular muscles, severe kyphotic posture with dowager's hump and FHP, and impaired daily function.     Pt prognosis is Good.   Pt will benefit from skilled outpatient Physical Therapy to address the deficits stated above and in the chart below, provide pt/family education, and to maximize pt's level of independence.     Plan of care discussed with patient: Yes  Pt's spiritual, cultural and educational needs considered and patient is agreeable to the plan of care and goals as stated below:     Anticipated Barriers for therapy: chronicity      Medical Necessity is demonstrated by the following  History  Co-morbidities and personal factors that may impact the plan of care [x] LOW: no personal factors / co-morbidities  [] MODERATE: 1-2 personal factors / co-morbidities  [] HIGH: 3+ personal  factors / co-morbidities    Moderate / High Support Documentation:   Co-morbidities affecting plan of care: ADHD; OCD; Autism spectrum; Anxiety; Depression; TMJ    Personal Factors:   coping style  lifestyle     Examination  Body Structures and Functions, activity limitations and participation restrictions that may impact the plan of care [x] LOW: addressing 1-2 elements  [] MODERATE: 3+ elements  [] HIGH: 4+ elements (please support below)    Moderate / High Support Documentation: Posture deficits / strength     Clinical Presentation [x] LOW: stable  [] MODERATE: Evolving  [] HIGH: Unstable     Decision Making/ Complexity Score: low          Goals:  Short Term GOALS: 3 weeks. Pt agrees with goals set.  1. Patient demonstrates compliance with HEP.   2. Patient demonstrates independence with Postural Awareness while sitting and standing.   3. Patient demonstrates decreased pain level of 3/10 while performing daily activities.  4. Patient will reduce BUE radiculopathy frequency and intensity.     Long Term GOALS: 6 weeks. Pt agrees with goals set.  1. Patient demonstrates increased pectoralis flexibility to improve posture.  2. Patient demonstrates improved Bilateral Upper Extremities and periscapular muscle strength to improve posture and reduce cervical compression.   3. Patient demonstrates independence with HEP.   4. Patient will improve FOTO function score to </= 50% to show improvement in condition and functional mobility.     Plan   Plan of care Certification: 9/13/2023 to 11/22/2023.    Outpatient Physical Therapy 2 times weekly for 6 weeks to include the following interventions: Cervical/Lumbar Traction, Electrical Stimulation ,, Manual Therapy, Moist Heat/ Ice, Neuromuscular Re-ed, Patient Education, Self Care, Therapeutic Activities, Therapeutic Exercise, and Ultrasound.     Giovanna Morales, PT

## 2023-09-13 NOTE — PLAN OF CARE
[facility]  Physical Therapy Initial Evaluation    Name: Mariana Mora  Clinic Number: [id number]    Therapy Diagnosis:   Encounter Diagnoses   Name Primary?    Cervicalgia     Kyphosis (acquired) (postural)     Impaired mobility and activities of daily living      Physician: Emeli Morgan MD    Physician Orders: PT Eval and Treat   Medical Diagnosis from Referral: M54.2 (ICD-10-CM) - Cervicalgia  Evaluation Date: 9/13/2023  Authorization Period Expiration: 12/31/2023  Plan of Care Expiration: 11/22/2023 or 12v post eval  Visit # (episodes)/ Visits authorized: 1/ eval   (POC 0 /12)  2nd FOTO visit 5  3rd FOTO visit 10  Progress Note Due: 10/13/2023    Time In: 300  Time Out: 350  Total Billable Time: 50 minutes    Precautions: ADHD; OCD; Autism spectrum; Anxiety; Depression; TMJ  Insurance: Payor: [employer] / Plan: [place] PPO / Product Type: PPO /     Subjective   Date of onset: years ago; 2 months latest flare-up   History of current condition - Mariana reports: she has had neck pain for years. This last round started 2 months ago. No known incident; gradual onset of pain. The pain Gets really intense. Gets n/t in hands and feet. Has tried muscle relaxers and chiropractic care with little relief. Has had only X-ray imaging at this time. Will see a new neurologist next Thursday; no red flags in the past. No stroke history or cervical surgery.     Medical History:   Past Medical History:   Diagnosis Date    ADHD, predominantly inattentive type     Anxiety     Arthritis     Autism     Autism spectrum disorder     Depression     Hypertension     Hypothyroidism     Migraine headache     OCD (obsessive compulsive disorder)     Psoriasis     TMJ (temporomandibular joint syndrome)        Surgical History:   Mariana Mora  has a past surgical history that includes Tympanoplasty; Tympanostomy tube placement; Nasal septum surgery; Inner ear surgery  (Right); Esophagogastroduodenoscopy (N/A, 12/17/2019); Esophageal manometry with measurement of impedance (N/A, 02/10/2020); Adenoidectomy; Laparoscopic cholecystectomy (N/A, 06/03/2021); Cholecystectomy (6-3-2021); Colonoscopy; Colonoscopy (N/A, 8/10/2023); and Esophagogastroduodenoscopy (N/A, 8/31/2023).    Medications:   Mariana has a current medication list which includes the following prescription(s): atorvastatin, buspirone, cetirizine, ergocalciferol, famotidine, tremfya, hydrochlorothiazide, ketoconazole, l norgest/e.estradiol-e.estrad, leflunomide, methocarbamol, montelukast, nystatin, nystop, omalizumab, pantoprazole, prazosin, rizatriptan, sertraline, belsomra, and synthroid, and the following Facility-Administered Medications: diphenhydramine, lactated ringers, lactated ringers, lidocaine (pf) 10 mg/ml (1%), and morphine.    Allergies:   Review of patient's allergies indicates:   Allergen Reactions    Penicillins     Norco [hydrocodone-acetaminophen] Itching    Prednisone Other (See Comments) and Rash     PSORIASIS  PSORIASIS        Imaging, X-ray    Prior Therapy: yes  Social History: lives with   Current Smoker: no  Cancer Hx: no  Occupation: Disability; Takes care of 5 dogs, cleaning, recliner, Art-drawing  Hand Dominance: Right  Headaches: Daily  Prior Level of Function: independent  Current Level of Function: difficult/pain with cleaning, prolong forward head position and laying back    Pain:  Current 5/10, worst 9/10, best 3/10   Location: Occipital ridge; temporal lobe regions; behind shoulder blades >Left; n/t in Bilateral Upper Extremities into 4th-5th digits, >L  Description: Sharp, throb  Aggravating Factors: cleaning, prolong forward head position and laying back  Easing Factors: muscles relaxer; ice; heat; 45 min daily walks with dogs    Pts goals: have fewer and less painful HA    Objective     Posture: high BMI; Upper Trapezius dominance; Right anterior trunk rotation; severe  Anterior Pelvic Tilt and kyphosis  Palpation: TTP levator scap and mid traps  Sensation: Bilateral Upper Extremities intact to superficial touch    Range of Motion/Strength:      CERVICAL AROM  In degrees Pain/Dysfunction with Movement   Flexion (45 norm) 40    Extension (75 norm) 50    Right side bending  (35 norm) 40    Left side bending  (35 norm) 40    Right rotation  (65 norm) 70    Left rotation  (65 norm) 70      Shoulder AROM screen:  WNL / hyperROM for External Rotation         U/E MMT:  5/5 norm Left Right Pain/Dysfunction with Movement   Shoulder Flexion 4/5 4/5    Shoulder Extension 5/5 5/5    Serratus Anterior  4+/5 4+/5    Shoulder Abduction 4+/5 4+/5    Shoulder IR 5/5 5/5    Shoulder ER  @ 0* Abduction 4/5 4/5    Elbow Flexion  4/5 4/5    Elbow Extension 5/5 5/5    Rhomboids 3-/5 3-/5    Mid Traps 3-/5 3-/5    Low Traps 2/5 2/5       Strength Left Right   Jerry @ #2 position 45# 50#     Special Tests Left Right   Vertebral Artery Insufficiency Mild dizzy Mild frontal lobe pn     Cervical musculature flexibility: (restriction: mild, moderate, severe grading)   Muscle Flexibility       Pectoralis  severe             TREATMENT   Treatment Time In: 330  Treatment Time Out: 350  Total Treatment time separate from Evaluation: 20 minutes    Mariana received therapeutic exercises to develop strength, ROM, and flexibility for 15 minutes including:  NMRE utilized to activate appropriate mm to improve posture, shoulder stabilization, smooth GH and scapula glide: 5 minutes     NOTE: Mild Autism and anxiety considerations    SEATED:  Shoulder retro rolls, 2x10  T/s Extension Stretch, Hands behind head, [id number]  Doorway stretch, W arms, [id number]  Red Theraband Bilateral External Rotation, initiate mid trap activation first, [id number]       Add NEXT:  UBE, Level 1, 3/3  Gentle chin tucks  Red Theraband Loop wall clocks  Red Theraband Shoulder extension  Supine Thoracic Extension stretch with 3# wand  Supine Red  Theraband horizontal abduction  Prone T or W arms with shoulder depression, Towel roll under forehead, 2x10 Bilateral   Standing Scapula depression, hands on table, 2x10   Prone Extension / horiz abd / flexion - if unable prone, then standing  FUTURE:  Cervical traction if still Bilateral Upper Extremities n/t - need medical clearance due to severity of HA  TMJ considerations  Bicep curls, 3#  Cervical isometrics: SB / Rotation    Home Exercises and Patient Education Provided    Education provided:   - daily HEP  - utilizing heat in mornings, 10-20 minute max; ice if flare-up, 10-20 minute max      Written Home Exercises Provided: yes.  Exercises were reviewed and Mariana was able to demonstrate them prior to the end of the session.  Mariana demonstrated good  understanding of the education provided.     See EMR under Patient Instructions for exercises provided 9/13/2023.    Assessment   Mariana is a 42 y.o. female referred to outpatient Physical Therapy with a medical diagnosis of neck pain and HA. Pt presents with palpable pain along upper back today, no radiculopathy provocation, weakness of Bilateral Upper Extremities and periscapular muscles, severe kyphotic posture with dowager's hump and FHP, and impaired daily function.     Pt prognosis is Good.   Pt will benefit from skilled outpatient Physical Therapy to address the deficits stated above and in the chart below, provide pt/family education, and to maximize pt's level of independence.     Plan of care discussed with patient: Yes  Pt's spiritual, cultural and educational needs considered and patient is agreeable to the plan of care and goals as stated below:     Anticipated Barriers for therapy: chronicity      Medical Necessity is demonstrated by the following  History  Co-morbidities and personal factors that may impact the plan of care [x] LOW: no personal factors / co-morbidities  [] MODERATE: 1-2 personal factors / co-morbidities  [] HIGH: 3+ personal  factors / co-morbidities    Moderate / High Support Documentation:   Co-morbidities affecting plan of care: ADHD; OCD; Autism spectrum; Anxiety; Depression; TMJ    Personal Factors:   coping style  lifestyle     Examination  Body Structures and Functions, activity limitations and participation restrictions that may impact the plan of care [x] LOW: addressing 1-2 elements  [] MODERATE: 3+ elements  [] HIGH: 4+ elements (please support below)    Moderate / High Support Documentation: Posture deficits / strength     Clinical Presentation [x] LOW: stable  [] MODERATE: Evolving  [] HIGH: Unstable     Decision Making/ Complexity Score: low          Goals:  Short Term GOALS: 3 weeks. Pt agrees with goals set.  1. Patient demonstrates compliance with HEP.   2. Patient demonstrates independence with Postural Awareness while sitting and standing.   3. Patient demonstrates decreased pain level of 3/10 while performing daily activities.  4. Patient will reduce BUE radiculopathy frequency and intensity.     Long Term GOALS: 6 weeks. Pt agrees with goals set.  1. Patient demonstrates increased pectoralis flexibility to improve posture.  2. Patient demonstrates improved Bilateral Upper Extremities and periscapular muscle strength to improve posture and reduce cervical compression.   3. Patient demonstrates independence with HEP.   4. Patient will improve FOTO function score to </= 50% to show improvement in condition and functional mobility.     Plan   Plan of care Certification: 9/13/2023 to 11/22/2023.    Outpatient Physical Therapy 2 times weekly for 6 weeks to include the following interventions: Cervical/Lumbar Traction, Electrical Stimulation ,, Manual Therapy, Moist Heat/ Ice, Neuromuscular Re-ed, Patient Education, Self Care, Therapeutic Activities, Therapeutic Exercise, and Ultrasound.     Giovanna Morales, PT

## 2023-09-19 ENCOUNTER — TELEPHONE (OUTPATIENT)
Dept: GASTROENTEROLOGY | Facility: CLINIC | Age: 43
End: 2023-09-19
Payer: COMMERCIAL

## 2023-09-19 ENCOUNTER — DOCUMENTATION ONLY (OUTPATIENT)
Dept: GASTROENTEROLOGY | Facility: HOSPITAL | Age: 43
End: 2023-09-19

## 2023-09-19 NOTE — TELEPHONE ENCOUNTER
Call placed to Ms. Mora. I advised per Dr. Whitt Please let patient know her biopsies are benign and do not show celiac disease or H.pylori. She should schedule esophagram as ordered. She verbalized understanding. No further issues noted.

## 2023-09-19 NOTE — TELEPHONE ENCOUNTER
----- Message from Jeanne Whitt MD sent at 9/19/2023  9:31 AM CDT -----  Sorry somehow I messed up her results review- can you please send below to the patient, thanks:    Please let patient know her biopsies are benign and do not show celiac disease or H.pylori. She should schedule esophagram as ordered

## 2023-09-19 NOTE — PROGRESS NOTES
Please let patient know her biopsies are benign and do not show celiac disease or H.pylori. She should schedule esophagram as ordered

## 2023-09-20 ENCOUNTER — CLINICAL SUPPORT (OUTPATIENT)
Dept: REHABILITATION | Facility: HOSPITAL | Age: 43
End: 2023-09-20
Payer: COMMERCIAL

## 2023-09-20 DIAGNOSIS — M40.00 KYPHOSIS (ACQUIRED) (POSTURAL): Primary | ICD-10-CM

## 2023-09-20 DIAGNOSIS — Z74.09 IMPAIRED MOBILITY AND ACTIVITIES OF DAILY LIVING: ICD-10-CM

## 2023-09-20 DIAGNOSIS — Z78.9 IMPAIRED MOBILITY AND ACTIVITIES OF DAILY LIVING: ICD-10-CM

## 2023-09-20 PROCEDURE — 97112 NEUROMUSCULAR REEDUCATION: CPT | Mod: PN,CQ

## 2023-09-20 PROCEDURE — 97110 THERAPEUTIC EXERCISES: CPT | Mod: PN,CQ

## 2023-09-20 NOTE — PROGRESS NOTES
[facility]   Physical Therapy Treatment Note      Name: Mariana Jara Russell  Clinic Number: [id number]    Therapy Diagnosis:   Encounter Diagnoses   Name Primary?    Kyphosis (acquired) (postural) Yes    Impaired mobility and activities of daily living      Physician: Emeli Morgan MD    Visit Date: 9/20/2023    Physician Orders: PT Eval and Treat   Medical Diagnosis from Referral: M54.2 (ICD-10-CM) - Cervicalgia  Evaluation Date: 9/13/2023  Authorization Period Expiration: 12/31/2023  Plan of Care Expiration: 11/22/2023 or 12v post eval  Visit # (episodes)/ Visits authorized: 2/ eval   (POC 1 /12)  Progress Note Due: 10/13/2023  PTA Visit #: 1/5     FOTO   Eval:  visit 5  visit 10     Time In: 1300  Time Out: 1355  Total Billable Time: 55 minutes     Precautions: ADHD; OCD; Autism spectrum; Anxiety; Depression; TMJ  Insurance: Payor: [employer] / Plan: [employer] [place] PPO / Product Type: PPO /       Subjective     Patient reports: she is having some discomfort under bilateral shoulder blades  She was compliant with home exercise program.  Response to previous treatment: no complaints  Functional change: first visit from eval    Pain: 3/10  Location: bilateral shoulder  blades  Objective      Objective Measures updated at progress report unless specified.     Treatment     Mariana received the treatments listed below:      therapeutic exercises to develop strength, endurance, ROM, flexibility, posture, and core stabilization for 25 minutes including:  Neuromuscular re-education 30 minutes    NOTE: Mild Autism and anxiety considerations    During 40 minutes of therapeutic exercises, Mariana, was supervised by a rehabilitation technician under the direction of the treating therapist.    Upper Body Ergometer, Level 1, 3/3     Seated exercises:  Shoulder retro rolls, 2 x 10 reps   Thoracic Extension Stretch, Hands behind head, x 10 reps x 5 seconds hold (Neuromuscular  re-education)  Red Theraband Bilateral External Rotation, initiate mid trap activation first, [id number]   Gentle chin tucks x 20 reps (Neuromuscular re-education)  Levator stretch 3 x 30 seconds bilateral  Rhomboid stretch 3 x 30 seconds (Neuromuscular re-education)  Posterior capsule stretch 3 x 30 seconds (Neuromuscular re-education)  Scapula depression, hands on table, 2 x 10 reps (Neuromuscular re-education)    Standing exercises:  Doorway stretch, W arms, 3 x 15 seconds   Red Theraband Shoulder extension x 20 reps   Red theraband wall walk ups x 20 reps     Supine exercises:  Thoracic Extension stretch with 3# wand  Red Theraband horizontal abduction x 20 reps     Add NEXT:  Red Theraband Loop wall clocks  Prone T or W arms with shoulder depression, Towel roll under forehead, 2x10 Bilateral   Standing Prone Extension / horiz abd / flexion - if unable prone, then standing  FUTURE:  Cervical traction if still Bilateral Upper Extremities n/t - need medical clearance due to severity of HA  TMJ considerations  Bicep curls, 3#  Cervical isometrics: SB / Rotation    Patient Education and Home Exercises       Education provided:   -apply ice as needed for delayed muscle soreness  -Continue with Home exercise program daily     Written Home Exercises Provided: yes. Exercises were reviewed and Mariana was able to demonstrate them prior to the end of the session.  Mariana demonstrated good  understanding of the education provided. See Electronic Medical Record under Patient Instructions for exercises provided during therapy sessions    Assessment     Mariana provided good effort and participation toward therapeutic interventions today with focus on  cervical range of motion and postural awareness.  Pt did well with the exercises and did not report any adverse effects.    Mariana Is progressing well towards her goals.   Patient prognosis is Good.     Patient will continue to benefit from skilled outpatient physical  therapy to address the deficits listed in the problem list box on initial evaluation, provide pt/family education and to maximize pt's level of independence in the home and community environment.     Patient's spiritual, cultural and educational needs considered and pt agreeable to plan of care and goals.     Anticipated barriers to physical therapy: chronicity    Goals:   Short Term GOALS: 3 weeks. Pt agrees with goals set.  1. Patient demonstrates compliance with HEP.   2. Patient demonstrates independence with Postural Awareness while sitting and standing.   3. Patient demonstrates decreased pain level of 3/10 while performing daily activities.  4. Patient will reduce BUE radiculopathy frequency and intensity.     Long Term GOALS: 6 weeks. Pt agrees with goals set.  1. Patient demonstrates increased pectoralis flexibility to improve posture.  2. Patient demonstrates improved Bilateral Upper Extremities and periscapular muscle strength to improve posture and reduce cervical compression.   3. Patient demonstrates independence with HEP.   4. Patient will improve FOTO function score to </= 50% to show improvement in condition and functional mobility.      Plan   Plan of care Certification: 9/13/2023 to 11/22/2023.     Outpatient Physical Therapy 2 times weekly for 6 weeks to include the following interventions: Cervical/Lumbar Traction, Electrical Stimulation ,, Manual Therapy, Moist Heat/ Ice, Neuromuscular Re-ed, Patient Education, Self Care, Therapeutic Activities, Therapeutic Exercise, and Ultrasound.       Xochitl Muhammad, PTA

## 2023-09-21 ENCOUNTER — OFFICE VISIT (OUTPATIENT)
Dept: NEUROLOGY | Facility: CLINIC | Age: 43
End: 2023-09-21
Payer: COMMERCIAL

## 2023-09-21 VITALS
DIASTOLIC BLOOD PRESSURE: 85 MMHG | HEIGHT: 65 IN | RESPIRATION RATE: 17 BRPM | TEMPERATURE: 97 F | SYSTOLIC BLOOD PRESSURE: 132 MMHG | BODY MASS INDEX: 48.82 KG/M2 | HEART RATE: 97 BPM | WEIGHT: 293 LBS

## 2023-09-21 DIAGNOSIS — K76.0 NAFLD (NONALCOHOLIC FATTY LIVER DISEASE): ICD-10-CM

## 2023-09-21 DIAGNOSIS — Z91.89 CARDIOVASCULAR EVENT RISK: ICD-10-CM

## 2023-09-21 DIAGNOSIS — M40.00 KYPHOSIS (ACQUIRED) (POSTURAL): ICD-10-CM

## 2023-09-21 DIAGNOSIS — G47.33 OSA ON CPAP: ICD-10-CM

## 2023-09-21 DIAGNOSIS — K21.9 GASTROESOPHAGEAL REFLUX DISEASE, UNSPECIFIED WHETHER ESOPHAGITIS PRESENT: ICD-10-CM

## 2023-09-21 DIAGNOSIS — G43.119 INTRACTABLE MIGRAINE WITH AURA WITHOUT STATUS MIGRAINOSUS: ICD-10-CM

## 2023-09-21 DIAGNOSIS — R51.9 INTRACTABLE EPISODIC HEADACHE, UNSPECIFIED HEADACHE TYPE: ICD-10-CM

## 2023-09-21 DIAGNOSIS — D47.3 ESSENTIAL THROMBOCYTOSIS: ICD-10-CM

## 2023-09-21 DIAGNOSIS — G43.019 INTRACTABLE MIGRAINE WITHOUT AURA AND WITHOUT STATUS MIGRAINOSUS: Primary | ICD-10-CM

## 2023-09-21 PROCEDURE — 1159F MED LIST DOCD IN RCRD: CPT | Mod: CPTII,S$GLB,, | Performed by: PSYCHIATRY & NEUROLOGY

## 2023-09-21 PROCEDURE — 3008F PR BODY MASS INDEX (BMI) DOCUMENTED: ICD-10-PCS | Mod: CPTII,S$GLB,, | Performed by: PSYCHIATRY & NEUROLOGY

## 2023-09-21 PROCEDURE — 1159F PR MEDICATION LIST DOCUMENTED IN MEDICAL RECORD: ICD-10-PCS | Mod: CPTII,S$GLB,, | Performed by: PSYCHIATRY & NEUROLOGY

## 2023-09-21 PROCEDURE — 3075F PR MOST RECENT SYSTOLIC BLOOD PRESS GE 130-139MM HG: ICD-10-PCS | Mod: CPTII,S$GLB,, | Performed by: PSYCHIATRY & NEUROLOGY

## 2023-09-21 PROCEDURE — 3008F BODY MASS INDEX DOCD: CPT | Mod: CPTII,S$GLB,, | Performed by: PSYCHIATRY & NEUROLOGY

## 2023-09-21 PROCEDURE — 99205 OFFICE O/P NEW HI 60 MIN: CPT | Mod: S$GLB,,, | Performed by: PSYCHIATRY & NEUROLOGY

## 2023-09-21 PROCEDURE — 3075F SYST BP GE 130 - 139MM HG: CPT | Mod: CPTII,S$GLB,, | Performed by: PSYCHIATRY & NEUROLOGY

## 2023-09-21 PROCEDURE — 3079F PR MOST RECENT DIASTOLIC BLOOD PRESSURE 80-89 MM HG: ICD-10-PCS | Mod: CPTII,S$GLB,, | Performed by: PSYCHIATRY & NEUROLOGY

## 2023-09-21 PROCEDURE — 99999 PR PBB SHADOW E&M-EST. PATIENT-LVL V: ICD-10-PCS | Mod: PBBFAC,,, | Performed by: PSYCHIATRY & NEUROLOGY

## 2023-09-21 PROCEDURE — 99205 PR OFFICE/OUTPT VISIT, NEW, LEVL V, 60-74 MIN: ICD-10-PCS | Mod: S$GLB,,, | Performed by: PSYCHIATRY & NEUROLOGY

## 2023-09-21 PROCEDURE — 99999 PR PBB SHADOW E&M-EST. PATIENT-LVL V: CPT | Mod: PBBFAC,,, | Performed by: PSYCHIATRY & NEUROLOGY

## 2023-09-21 PROCEDURE — 3079F DIAST BP 80-89 MM HG: CPT | Mod: CPTII,S$GLB,, | Performed by: PSYCHIATRY & NEUROLOGY

## 2023-09-21 RX ORDER — PROCHLORPERAZINE MALEATE 10 MG
10 TABLET ORAL EVERY 6 HOURS PRN
Qty: 15 TABLET | Refills: 6 | Status: SHIPPED | OUTPATIENT
Start: 2023-09-21
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
## 2023-09-22 ENCOUNTER — CLINICAL SUPPORT (OUTPATIENT)
Dept: REHABILITATION | Facility: HOSPITAL | Age: 43
End: 2023-09-22
Payer: COMMERCIAL

## 2023-09-22 ENCOUNTER — DOCUMENTATION ONLY (OUTPATIENT)
Dept: REHABILITATION | Facility: HOSPITAL | Age: 43
End: 2023-09-22

## 2023-09-22 DIAGNOSIS — Z74.09 IMPAIRED MOBILITY AND ACTIVITIES OF DAILY LIVING: ICD-10-CM

## 2023-09-22 DIAGNOSIS — Z78.9 IMPAIRED MOBILITY AND ACTIVITIES OF DAILY LIVING: ICD-10-CM

## 2023-09-22 DIAGNOSIS — M40.00 KYPHOSIS (ACQUIRED) (POSTURAL): Primary | ICD-10-CM

## 2023-09-22 PROCEDURE — 97112 NEUROMUSCULAR REEDUCATION: CPT | Mod: PN

## 2023-09-22 PROCEDURE — 97110 THERAPEUTIC EXERCISES: CPT | Mod: PN

## 2023-09-22 NOTE — PROGRESS NOTES
PT/PTA met face to face to discuss pt's treatment plan and progress towards established goals. Pt will be seen by a physical therapist minimally every 6th visit or every 30 days.    Xochitl Muhammad PTA

## 2023-09-22 NOTE — PROGRESS NOTES
[facility]   Physical Therapy Treatment Note      Name: Mariana Jara [place]  Clinic Number: [id number]    Therapy Diagnosis:   Encounter Diagnoses   Name Primary?    Kyphosis (acquired) (postural) Yes    Impaired mobility and activities of daily living      Physician: Emeli Morgan MD    Visit Date: 9/22/2023    Physician Orders: PT Eval and Treat   Medical Diagnosis from Referral: M54.2 (ICD-10-CM) - Cervicalgia  Evaluation Date: 9/13/2023  Authorization Period Expiration: 12/31/2023  Plan of Care Expiration: 11/22/2023 or 12v post eval  Visit # (episodes)/ Visits authorized: 3/ eval + 12  (POC 2 /12)  Progress Note Due: 10/13/2023  PTA Visit #: 1/5     FOTO   Eval:  visit 5  visit 10     Time In: 1000  Time Out: 1055  Total Billable Time: 55 minutes     Precautions: ADHD; OCD; Autism spectrum; Anxiety; Depression; TMJ  Insurance: Payor: Futon / Plan: [employer] [place] PPO / Product Type: PPO /       Subjective     Patient reports: pain is at bottom of neck and b/w shoulder blades.    She was compliant with home exercise program.  Response to previous treatment: no complaints  Functional change: first visit from eval    Pain: 3/10  Location: Occipital ridge; temporal lobe regions; behind shoulder blades >Left; n/t in Bilateral Upper Extremities  Objective      Objective Measures updated at progress report unless specified.     Treatment     NOTE: Mild Autism and anxiety considerations    During 0 minutes of therapeutic exercises, Mariana, was supervised by a rehabilitation technician under the direction of the treating therapist.    Mariana received the treatments listed below:      therapeutic exercises to develop strength, endurance, ROM, flexibility, posture, and core stabilization for 27 minutes including:  NMRE utilized to activate appropriate mm to improve posture and shoulder stabilization: 28 minutes     Upper Body Ergometer, Level 1, 3/3     Seated  exercises:  Shoulder retro rolls, 2 x 10 reps   Thoracic Extension Stretch, Hands behind head, x 10 reps x 5 seconds hold (Neuromuscular re-education)  Levator stretch 3 x 30 seconds bilateral - left side last  Rhomboid stretch 3 x 30 seconds (Neuromuscular re-education)  Gentle chin tucks x 20 reps (Neuromuscular re-education)  Red Theraband Bilateral External Rotation, initiate mid trap activation first, [id number]     Standing exercises:  Doorway stretch, W arms, 3 x 15 seconds   Scapula depression, hands on table, 2 x 10 reps (Neuromuscular re-education)  Red Theraband Shoulder extension x 20 reps - Cable Column #3 instead (pt interested in going to gym)  Cable Column Rows, 3#, 20-30x - or Red Theraband  +Yellow Theraband Loop wall clocks, 10x   Red theraband wall walk ups x 20 reps - NOT PERFORMED       Supine exercises:  Thoracic Extension stretch with 3# wand  Red Theraband horizontal abduction x 20-30 reps       Add NEXT:  Prone T or W arms with shoulder depression, Towel roll under forehead, 2x10 Bilateral   Standing Prone Extension / horiz abd / flexion - if unable prone, then standing  FUTURE:  Cervical traction if still Bilateral Upper Extremities n/t - need medical clearance due to severity of HA  TMJ considerations  Bicep curls, 3#  Cervical isometrics: SB / Rotation    Patient Education and Home Exercises       Education provided:   -apply ice as needed for delayed muscle soreness  -Continue with Home exercise program daily     Written Home Exercises Provided: yes. Exercises were reviewed and Mariana was able to demonstrate them prior to the end of the session.  Mariana demonstrated good  understanding of the education provided. See Electronic Medical Record under Patient Instructions for exercises provided during therapy sessions    Assessment     Mariana demonstrates low to moderate pain levels. Focus on postural and upper back strength to decrease kyphotic posture. Pt did well with the exercises  and did not report any adverse effects.    Mariana Is progressing well towards her goals.   Patient prognosis is Good.     Patient will continue to benefit from skilled outpatient physical therapy to address the deficits listed in the problem list box on initial evaluation, provide pt/family education and to maximize pt's level of independence in the home and community environment.     Patient's spiritual, cultural and educational needs considered and pt agreeable to plan of care and goals.     Anticipated barriers to physical therapy: chronicity    Goals:   Short Term GOALS: 3 weeks. Pt agrees with goals set.  1. Patient demonstrates compliance with HEP.   2. Patient demonstrates independence with Postural Awareness while sitting and standing.   3. Patient demonstrates decreased pain level of 3/10 while performing daily activities.  4. Patient will reduce BUE radiculopathy frequency and intensity.     Long Term GOALS: 6 weeks. Pt agrees with goals set.  1. Patient demonstrates increased pectoralis flexibility to improve posture.  2. Patient demonstrates improved Bilateral Upper Extremities and periscapular muscle strength to improve posture and reduce cervical compression.   3. Patient demonstrates independence with HEP.   4. Patient will improve FOTO function score to </= 50% to show improvement in condition and functional mobility.      Plan     Continue PT as deemed per POC: posture and periscapular muscle strengthening    Plan of care Certification: 9/13/2023 to 11/22/2023.     Outpatient Physical Therapy 2 times weekly for 6 weeks to include the following interventions: Cervical/Lumbar Traction, Electrical Stimulation ,, Manual Therapy, Moist Heat/ Ice, Neuromuscular Re-ed, Patient Education, Self Care, Therapeutic Activities, Therapeutic Exercise, and Ultrasound.       Giovanna Morales, PT

## 2023-09-23 ENCOUNTER — IMMUNIZATION (OUTPATIENT)
Dept: FAMILY MEDICINE | Facility: CLINIC | Age: 43
End: 2023-09-23
Payer: COMMERCIAL

## 2023-09-23 PROCEDURE — 90686 FLU VACCINE (QUAD) GREATER THAN OR EQUAL TO 3YO PRESERVATIVE FREE IM: ICD-10-PCS | Mod: S$GLB,,, | Performed by: FAMILY MEDICINE

## 2023-09-23 PROCEDURE — 90471 FLU VACCINE (QUAD) GREATER THAN OR EQUAL TO 3YO PRESERVATIVE FREE IM: ICD-10-PCS | Mod: S$GLB,,, | Performed by: FAMILY MEDICINE

## 2023-09-23 PROCEDURE — 90471 IMMUNIZATION ADMIN: CPT | Mod: S$GLB,,, | Performed by: FAMILY MEDICINE

## 2023-09-23 PROCEDURE — 90686 IIV4 VACC NO PRSV 0.5 ML IM: CPT | Mod: S$GLB,,, | Performed by: FAMILY MEDICINE

## 2023-09-26 NOTE — PROGRESS NOTES
"[facility] - Telemetry ([facility])  [facility] Medicine  Progress Note    Patient Name: Gene Rothman  MRN: [id number]  Patient Class: IP- Inpatient   Admission Date: 9/20/2023  Length of Stay: 6 days  Attending Physician: Inna Hammond MD  Primary Care Provider: Ami Zarate DO        Subjective:     Principal Problem:Chronic respiratory failure with hypercapnia        HPI:  A 80-year-old gentleman, familiar to several hospital services, with a background of multiple chronic health issues, including COPD, sleep apnea, A fib, pulmonary hypertension, CHF, lymphedema, bed-bound status for two years, and chronic respiratory failure requiring home oxygen, was brought in by his wife on their 61st wedding anniversary due to an unusual and unresponsive behavior, which she believed was out of character for such a significant day. Upon arrival to the ED, he was placed on NIPPV, with subsequent improvement in his behavior, making him appear "more like himself" to his wife. It was later revealed that patient was non-compliant with his NIPPV, evident from a new machine provided during a previous admission. His blood pressure improved following administration of midodrine, but MAP was noticeably still low. Admitted to ICU for close monitoring.    During this admission, on 9/21, he experienced an episode of bradycardia with heart rates in the 30s but remained hemodynamically stable. By 9/22, he was alert and off NIPPV, tolerating nasal cannula oxygen without the need for pressors. Cardiology evaluated him, noting his heart rate was regular when awake and recommended ventilation support, avoidance of negative inotropic medications, and continuation of eliquis. Despite an elevated BNP suggestive of CHF exacerbation, his heart failure medication was temporarily held due to hypotension. He was deemed stable for transfer to a telemetry unit on 9/22/23. Hospital medicine consulted for assumption of care.      Overview/Hospital " Subjective:       Patient ID: Mariana Mora is a 42 y.o. female Body mass index is 48.39 kg/m².    Chief Complaint: Diarrhea    This patient is new to me.  Referring Provider: Dr. Emeli Morgan for diarrhea.  Established patient of Dr. Stahl (retired).     Diarrhea   This is a new problem. The current episode started more than 1 month ago. The problem occurs more than 10 times per day (Reports having up to 12 BMs daily). The problem has been gradually worsening. The stool consistency is described as Mucous (rated stool 5-7 on Hewitt scale). The patient states that diarrhea awakens her from sleep. Associated symptoms include abdominal pain (Started a couple of months ago; denies currently; reports spontaneous RUQ and LUQ pain that is typically worse in the morning; eating crackers helps improve the pain; pain lasts hours and is described as a dull warmth), bloating (typically occurs after eating) and vomiting (Reports 4 episodes of emesis the past month; contents:  Yellow bile; denies hematemesis or coffee-ground emesis; also reports nausea daily (started months ago)). Pertinent negatives include no arthralgias, chills, coughing, fever, headaches, increased  flatus, myalgias, sweats, URI or weight loss. Associated symptoms comments: Also reports intermittent dysphagia if she forgets to take Protonix; occurs after swallowing solid food, liquids, and pills; EGD with dilation provided short-term relief in the past. Nothing aggravates the symptoms. There are no known risk factors. She has tried anti-motility drug and bismuth subsalicylate (Reports taking Imodium p.r.n. that helped improve fecal urgency, but not diarrhea) for the symptoms. There is no history of bowel resection, inflammatory bowel disease, irritable bowel syndrome, malabsorption, a recent abdominal surgery or short gut syndrome.     Review of Systems   Constitutional:  Positive for appetite change. Negative for activity change, chills,  Course:  09/22/2023: no family at bedside at the time of this encounter. Patient resting comfortably, but easily awaken. Still with flat affect and minimal cooperation with exam.   09/23/2023: Family at bedside this morning. Expressed concern regarding patient's CPAP that was given to them at last hospitalization will be taken away soon by insurance(?) Asked if there was a way for them to have the CPAP indefinitely. SW consulted.    09/24/2023: Discussed with SW. Patient's non-compliance with CPAP is likely issue insurance refusing coverage. Discussed case with family this morning. Reports having difficulties with machine at home the first day, but when they called the number they was given, no one answered since it was the weekend. Patient was then readmitted to the hospitals multiple times since that time, and family states that THIS is the reason for patient's non-compliance with CPAP.     09/25: Pt seen and examined with wife at bedside. Wife states that after prior discharge they tried to use CPAP machine but it was not working correctly and when they tried to call number they were given, there was no answer. Unclear if this was user error or machine error. Per nursing, wife took patient off CPAP multiple times overnight and was unable to place him back on it. Pt has no complaints this morning other than nausea. Denies vomiting.     09/26: Per case management, pt was sent home with [facility] on last discharge but was not approved by insurance at the time. Have requested wife to bring in insurance denial letter so that denial can be appealed. NAEON. PT did not wear BIPAP due to nausea. Reports continued nausea this AM, no vomiting. + flatus, no BM. No abd pain, CP, SOB.       Interval History: See Hospital Course     Review of Systems  Objective:     Vital Signs (Most Recent):  Temp: 97.6 °F (36.4 °C) (09/26/23 0713)  Pulse: 61 (09/26/23 0741)  Resp: 19 (09/26/23 0741)  BP: (!) 100/50 (09/26/23 0713)  SpO2:  diaphoresis, fatigue, fever, unexpected weight change and weight loss.   HENT:  Negative for sore throat and trouble swallowing.    Respiratory:  Negative for cough and choking.    Cardiovascular:  Negative for chest pain.   Gastrointestinal:  Positive for abdominal pain (Started a couple of months ago; denies currently; reports spontaneous RUQ and LUQ pain that is typically worse in the morning; eating crackers helps improve the pain; pain lasts hours and is described as a dull warmth), bloating (typically occurs after eating), diarrhea, nausea and vomiting (Reports 4 episodes of emesis the past month; contents:  Yellow bile; denies hematemesis or coffee-ground emesis; also reports nausea daily (started months ago)). Negative for abdominal distention, anal bleeding, blood in stool, constipation, flatus and rectal pain.   Musculoskeletal:  Negative for arthralgias and myalgias.   Neurological:  Negative for headaches.       No LMP recorded. (Menstrual status: Birth Control).  Past Medical History:   Diagnosis Date    ADHD, predominantly inattentive type     Anxiety     Arthritis     Autism     Autism spectrum disorder     Depression     Hypertension     Hypothyroidism     Migraine headache     OCD (obsessive compulsive disorder)     Psoriasis     TMJ (temporomandibular joint syndrome)      Past Surgical History:   Procedure Laterality Date    ADENOIDECTOMY      CHOLECYSTECTOMY  6-3-2021    COLONOSCOPY      at the age of 7 for rectal bleeding    ESOPHAGEAL MANOMETRY WITH MEASUREMENT OF IMPEDANCE N/A 02/10/2020    Procedure: MANOMETRY, ESOPHAGUS, WITH IMPEDANCE MEASUREMENT;  Surgeon: Fitz Murray MD;  Location: [address]);  Service: Endoscopy;  Laterality: N/A;  2/3 - pt confirmed    ESOPHAGOGASTRODUODENOSCOPY N/A 12/17/2019    Procedure: EGD (ESOPHAGOGASTRODUODENOSCOPY);  Surgeon: David Stahl MD;  Location: [facility];  Service: Endoscopy;  Laterality: N/A;    INNER EAR SURGERY Right     LAPAROSCOPIC  95 % (09/26/23 0741) Vital Signs (24h Range):  Temp:  [97.6 °F (36.4 °C)-98.4 °F (36.9 °C)] 97.6 °F (36.4 °C)  Pulse:  [55-63] 61  Resp:  [17-20] 19  SpO2:  [91 %-100 %] 95 %  BP: ()/(41-50) 100/50     Weight: (!) 195 kg (430 lb)  Body mass index is 56.73 kg/m².    Intake/Output Summary (Last 24 hours) at 9/26/2023 1017  Last data filed at 9/25/2023 1130  Gross per 24 hour   Intake 153.37 ml   Output --   Net 153.37 ml         Physical Exam  Vitals and nursing note reviewed.   Constitutional:       General: He is not in acute distress.     Appearance: He is obese. Ill appearance: chronic.      Comments: Hard of hearing   Cardiovascular:      Rate and Rhythm: Normal rate and regular rhythm.      Heart sounds: No murmur heard.     No friction rub. No gallop.   Pulmonary:      Effort: Pulmonary effort is normal.      Breath sounds: Normal breath sounds. No wheezing, rhonchi or rales.      Comments: 2L NC O2   Abdominal:      General: Bowel sounds are normal. There is no distension.      Palpations: Abdomen is soft.      Tenderness: There is no abdominal tenderness. There is no guarding or rebound.   Musculoskeletal:      Right lower leg: Edema present.      Left lower leg: Edema present.   Skin:     Comments: Venous stasis wounds to BLE    Neurological:      Mental Status: He is alert and oriented to person, place, and time. Mental status is at baseline.             Significant Labs: All pertinent labs within the past 24 hours have been reviewed.    Significant Imaging: I have reviewed all pertinent imaging results/findings within the past 24 hours.      Assessment/Plan:      * Chronic respiratory failure with hypercapnia  -- recurrent hospital admissions, likely due to noncompliance with home BIPAP/CPAP   --Pulmonology following: recommends cont NIPPV qhs and prn  - continue home NC O2; maintain sats > 90%   - Procal wnl, stop IV Rocephin as no S/S of resp infection at this time   - Per case management, pt was  CHOLECYSTECTOMY N/A 06/03/2021    Procedure: CHOLECYSTECTOMY-LAPAROSCOPIC;  Surgeon: Farrukh Lorenzo MD;  [facility];  Service: General;  Laterality: N/A;    NASAL SEPTUM SURGERY      TYMPANOPLASTY      bilateral    TYMPANOSTOMY TUBE PLACEMENT       Family History   Problem Relation Age of Onset    Arthritis Father     Asthma Father     Heart disease Father     Hyperlipidemia Father     Colon cancer Paternal Uncle         dx in 50s    Cancer Paternal Uncle     Colon cancer Paternal Grandmother     Diabetes Paternal Grandmother     Heart failure Paternal Grandmother     Breast cancer Paternal Grandmother     Arthritis Paternal Grandmother     Cancer Paternal Grandmother     Heart disease Paternal Grandmother     Diabetes Paternal Grandfather     Heart failure Paternal Grandfather     Cancer Paternal Grandfather     Heart disease Paternal Grandfather     Stroke Paternal Grandfather     Hemochromatosis Other         Father's side    Ovarian cancer Neg Hx     Colon polyps Neg Hx     Esophageal cancer Neg Hx     Stomach cancer Neg Hx     Ulcerative colitis Neg Hx     Crohn's disease Neg Hx      Social History     Tobacco Use    Smoking status: Never    Smokeless tobacco: Never   Substance Use Topics    Alcohol use: No    Drug use: Never     Wt Readings from Last 10 Encounters:   05/31/23 136 kg (299 lb 13.2 oz)   04/14/23 135.9 kg (299 lb 9.6 oz)   03/28/23 (!) 138.8 kg (306 lb)   02/20/23 135.5 kg (298 lb 12.8 oz)   01/03/23 (!) 137 kg (302 lb)   12/29/22 135.9 kg (299 lb 9.6 oz)   12/07/22 132.6 kg (292 lb 6.4 oz)   09/20/22 131.5 kg (289 lb 14.5 oz)   09/15/22 131.1 kg (289 lb)   07/01/22 131.6 kg (290 lb 1.6 oz)     Lab Results   Component Value Date    WBC 6.9 05/02/2023    HGB 14.6 05/02/2023    HCT 44.6 05/02/2023    MCV 92.1 05/02/2023     05/02/2023     CMP  Sodium   Date Value Ref Range Status   04/17/2023 139 136 - 145 mmol/L Final     Potassium   Date Value Ref Range Status   04/17/2023  4.0 3.5 - 5.1 mmol/L Final     Chloride   Date Value Ref Range Status   04/17/2023 109 95 - 110 mmol/L Final     CO2   Date Value Ref Range Status   04/17/2023 20 (L) 23 - 29 mmol/L Final     Glucose   Date Value Ref Range Status   04/17/2023 82 70 - 110 mg/dL Final     BUN   Date Value Ref Range Status   04/17/2023 15 6 - 20 mg/dL Final     Creatinine   Date Value Ref Range Status   04/17/2023 0.7 0.5 - 1.4 mg/dL Final     Calcium   Date Value Ref Range Status   04/17/2023 9.3 8.7 - 10.5 mg/dL Final     Total Protein   Date Value Ref Range Status   04/17/2023 7.2 6.0 - 8.4 g/dL Final     Albumin   Date Value Ref Range Status   04/17/2023 3.6 3.5 - 5.2 g/dL Final     Albumin Level   Date Value Ref Range Status   05/02/2023 4.2 3.2 - 4.8 g/dL Final     Total Bilirubin   Date Value Ref Range Status   04/17/2023 0.4 0.1 - 1.0 mg/dL Final     Comment:     For infants and newborns, interpretation of results should be based  on gestational age, weight and in agreement with clinical  observations.    Premature Infant recommended reference ranges:  Up to 24 hours.............<8.0 mg/dL  Up to 48 hours............<12.0 mg/dL  3-5 days..................<15.0 mg/dL  6-29 days.................<15.0 mg/dL       Alkaline Phosphatase   Date Value Ref Range Status   04/17/2023 55 55 - 135 U/L Final     Alk Phos   Date Value Ref Range Status   05/02/2023 57 46 - 116 IU/L Final     AST   Date Value Ref Range Status   04/17/2023 23 10 - 40 U/L Final     Aspartate Aminotransferase   Date Value Ref Range Status   05/02/2023 36 (H) <34 IU/L Final     ALT   Date Value Ref Range Status   04/17/2023 31 10 - 44 U/L Final     Alanine Aminotransferase   Date Value Ref Range Status   05/02/2023 39 10 - 49 IU/L Final     Anion Gap   Date Value Ref Range Status   04/17/2023 10 8 - 16 mmol/L Final     eGFR if    Date Value Ref Range Status   07/20/2021 >60.0 >60 mL/min/1.73 m^2 Final     eGFR if non    Date Value  provided trilogy prior to last hospital discharge but was not approved by insurance. Pt wife asked to provide denial lettere         Stage III pressure ulcer of sacral region  - POA   - management per wound care recs       Urinary retention  - bowman discontinued 09/25  - monitor UOP       Bradycardia  --cards consulted; defer management   - Cardiology rec no indication for PPM placement at this time, recommend avoidance of neg inotropic meds and continued resp support  - tele monitoring       Hypotension  - pt asymptomatic  - diuretics held   - continue midodrine, fludrocortisone       CHF (congestive heart failure)  --Last ECHO from 9/3/23 confirm EF 55 %  --acute on chronic BLE edema on exam, but no evidence of vascular congestion on CXR.  - Cardiology consulted and following   --holding diuretics due to low BP- defer resumption to Cards  --monitor electrolytes, UOP, fluid restrictions  --strict I&O and daily weights ordered    Venous thromboembolism  --hx of unprovoked  --on Eliquis, continued upon admission      GERD (gastroesophageal reflux disease)  --stable  --continue PPI therapy      Hyperlipidemia  --continue statin        Venous stasis dermatitis of both lower extremities  Diuretics held duet to low blood pressures  Continue wound care per wound care RN recommendations     Lymphedema        Sleep apnea        CKD (chronic kidney disease) stage 3, GFR 30-59 ml/min  - Cr baseline approx 1 but has been closer to 2 over last month   - Renally dose meds; avoid nephrotoxic agents  - Monitor UOP   - bowman discontinued on 09/25    Morbid obesity with BMI of 60.0-69.9, adult  Body mass index is 56.73 kg/m². Morbid obesity complicates all aspects of disease management from diagnostic modalities to treatment. Weight loss encouraged and health benefits explained to patient.         Hypothyroidism (acquired)  - TSH wnl   - continue home synthroid         VTE Risk Mitigation (From admission, onward)         Ordered      Ref Range Status   07/20/2021 >60.0 >60 mL/min/1.73 m^2 Final     Comment:     Calculation used to obtain the estimated glomerular filtration  rate (eGFR) is the CKD-EPI equation.        Lab Results   Component Value Date    TSH 2.158 04/06/2021     Reviewed prior medical records including radiology report of abdominal ultrasound 02/22/2023, abdominal ultrasound 03/03/2022, abdominal ultrasound 12/29/2021 & endoscopy history (see surgical history).    Objective:      Physical Exam  Vitals and nursing note reviewed.   Constitutional:       General: She is not in acute distress.     Appearance: Normal appearance. She is not ill-appearing.   HENT:      Mouth/Throat:      Lips: Pink. No lesions.   Cardiovascular:      Rate and Rhythm: Normal rate.      Pulses: Normal pulses.      Heart sounds: Normal heart sounds.   Pulmonary:      Effort: Pulmonary effort is normal. No respiratory distress.      Breath sounds: Normal breath sounds.   Abdominal:      General: Abdomen is protuberant. Bowel sounds are normal. There is no distension or abdominal bruit. There are no signs of injury.      Palpations: Abdomen is soft. There is no shifting dullness, fluid wave, hepatomegaly, splenomegaly or mass.      Tenderness: There is no abdominal tenderness. There is no guarding or rebound. Negative signs include Wang's sign, Rovsing's sign and McBurney's sign.   Skin:     General: Skin is warm and dry.      Coloration: Skin is not jaundiced or pale.   Neurological:      Mental Status: She is alert and oriented to person, place, and time.   Psychiatric:         Attention and Perception: Attention normal.         Mood and Affect: Mood normal.         Speech: Speech normal.         Behavior: Behavior normal.       Assessment:       1. Diarrhea, unspecified type    2. Change in bowel habits    3. Family history of colon cancer    4. Nausea and vomiting, unspecified vomiting type    5. Upper abdominal pain    6. Dilated bile duct    7.  apixaban tablet 5 mg  2 times daily         09/20/23 1754     IP VTE HIGH RISK PATIENT  Once         09/20/23 1744     Place sequential compression device  Until discontinued         09/20/23 1744                Discharge Planning   JAMES:      Code Status: Full Code   Is the patient medically ready for discharge?: Yes    Reason for patient still in hospital (select all that apply): Pending disposition and Other (specify) pending insurance approval for trilogy/CPAP  Discharge Plan A: Home Health                  Inna Hammond MD  Department of Hospital Medicine   [facility] - Telemetry ([facility])   Dysphagia, unspecified type    8. Gastroesophageal reflux disease, unspecified whether esophagitis present    9. Abdominal bloating    10. Long-term current use of proton pump inhibitor therapy    11. Fatty liver    12. S/P cholecystectomy        Plan:       Diarrhea, unspecified type  - schedule Colonoscopy, discussed procedure with the patient, including risks and benefits, patient verbalized understanding  - recommend OTC probiotic, such as Florastor or Culturelle, taken as directed on packaging  - avoid lactose, alcohol, & caffeine  - avoid known triggers  - Recommended increase fiber in diet, especially soluble fiber since this can help bulk up the stool consistency and may help to slow down how fast the stool goes through the colon and can prevent diarrhea  -     Pancreatic elastase, fecal; Future; Expected date: 05/31/2023  -     WBC, Stool; Future; Expected date: 05/31/2023  -     Rotavirus antigen, stool; Future; Expected date: 05/31/2023  -     Adenovirus Molecular Detection, PCR, Non-Blood Stool; Future; Expected date: 05/31/2023  -     Giardia / Cryptosporidum, EIA; Future; Expected date: 05/31/2023  -     Stool Exam-Ova,Cysts,Parasites; Future; Expected date: 05/31/2023  -     Clostridium difficile EIA; Future; Expected date: 05/31/2023  -     pH, stool; Future; Expected date: 05/31/2023  -     TISSUE TRANSGLUTAMINASE (TTG), IGA; Future; Expected date: 05/31/2023  -     IGA; Future; Expected date: 05/31/2023  -     Case Request Endoscopy: COLONOSCOPY    Change in bowel habits  - schedule Colonoscopy, discussed procedure with the patient, including risks and benefits, patient verbalized understanding    Family history of colon cancer  - schedule Colonoscopy, discussed procedure with the patient, including risks and benefits, patient verbalized understanding  -     Case Request Endoscopy: COLONOSCOPY    Nausea and vomiting, unspecified vomiting type  - schedule EGD, discussed procedure with patient,  including risks and benefits, patient verbalized understanding    Upper abdominal pain  - schedule EGD, discussed procedure with patient, including risks and benefits, patient verbalized understanding    Dilated bile duct   -continue with ordered MRI MRCP    Dysphagia, unspecified type  - schedule EGD, discussed procedure with patient and possible esophageal dilation may be performed during procedure if indicated, patient verbalized understanding  - educated patient to eat smaller more frequent meals and to eat slowly and advised to eat a soft diet.  - possible UGI/esophagram/esophageal manometry if symptoms persist  -     Case Request Endoscopy: EGD (ESOPHAGOGASTRODUODENOSCOPY)    Gastroesophageal reflux disease, unspecified whether esophagitis present  - schedule EGD, discussed procedure with patient, including risks and benefits, patient verbalized understanding  -discussed about the different types of medications used to treat reflux and how to use them, antacids can be used PRN for breakthrough heartburn symptoms by reducing stomach acid that is already produced, H2 blockers work by limiting the amount acid production, & PPI's work to block acid production and are taken daily, patient verbalized understanding.  -Avoid large meals, avoid eating within 2-3 hours of bedtime (avoid late night eating & lying down soon after eating), elevate head of bed if nocturnal symptoms are present, smoking cessation (if current smoker), & weight loss (if overweight).   -Avoid known foods which trigger reflux symptoms & to minimize/avoid high-fat foods, chocolate, caffeine, citrus, alcohol, & tomato products.  -Avoid/limit use of NSAID's, since they can cause GI upset, bleeding, and/or ulcers. If needed, take with food.  -continue Protonix 40 mg once daily  -     Case Request Endoscopy: EGD (ESOPHAGOGASTRODUODENOSCOPY)    Abdominal bloating  - schedule EGD, discussed procedure with patient, including risks and benefits, patient  verbalized understanding  testing for H. Pylori typically performed during EGD (if not can do lab testing)  - recommend OTC simethicone as directed, such as Phazyme or Gas-x  - recommend low gas diet: Reduce or eliminate these foods from your diet: Broccoli, Cauliflower, [place] sprouts, Cabbage, Cooked dried beans, Carbonated beverages (sparkling water, soda, beer, champagne)  Other Causes Of Excess Gas Include:   1) EATING TOO FAST or TALKING WHILE YOU CHEW may cause you to swallow air. This increases the amount of gas in the stomach and may worsen your symptoms.  --> Chew each mouthful completely before swallowing. Take your time.  2) OVEREATING may increase the feeling of being bloated and cause more gas.  --> When you are full, stop eating.  3) CONSTIPATION can increase the amount of normal intestinal gas.  --> Avoid constipation by increasing the amount of fiber in your diet by including whole cereal grains, fresh vegetables (except those in the above list) and fresh fruits. High-fiber foods absorb water and carry it out of the body. When increasing the amount of fiber in your diet, you also need to increase the amount of water that you drink. You should drink at least eight 8-ounce glasses of water (two quarts) per day.    Long-term current use of proton pump inhibitor therapy  - discussed with patient about long term use of reflux medications (preference to use lowest effective dose or discontinuing if possible), the risk and benefits of using these medications long term, the risk of untreated GERD such as burrows's esophagus, and recommend a diet high in calcium and/or taking OTC calcium and vitamin d supplements as directed (such as Citracal +D)  - recommend annual monitoring with blood work to include CMP, CBC, vitamin B12, and magnesium.  -     Vitamin B12; Future; Expected date: 05/31/2023  -     Magnesium; Future; Expected date: 05/31/2023  -     Case Request Endoscopy: EGD  (ESOPHAGOGASTRODUODENOSCOPY)    Fatty liver   -For fatty liver recommend: low fat, low cholesterol diet, maintain good control of blood sugars and cholesterol levels, exercise, weight loss (if overweight), minimize/avoid alcohol and tylenol products, & follow-up with PCP for continued evaluation and management; if specialist is needed, recommend seeing hepatology.    S/P cholecystectomy    Follow up in about 4 weeks (around 6/28/2023), or if symptoms worsen or fail to improve.      If no improvement in symptoms or symptoms worsen, call/follow-up at clinic or go to ER.        45 minutes of total time spent on the encounter, which includes face to face time and non-face to face time preparing to see the patient (eg, review of tests), Obtaining and/or reviewing separately obtained history, Documenting clinical information in the electronic or other health record, Independently interpreting results (not separately reported) and communicating results to the patient/family/caregiver, or Care coordination (not separately reported).     A dictation software program was used for this note. Please expect some simple typographical  errors in this note.

## 2023-09-29 ENCOUNTER — CLINICAL SUPPORT (OUTPATIENT)
Dept: REHABILITATION | Facility: HOSPITAL | Age: 43
End: 2023-09-29
Payer: COMMERCIAL

## 2023-09-29 DIAGNOSIS — M40.00 KYPHOSIS (ACQUIRED) (POSTURAL): Primary | ICD-10-CM

## 2023-09-29 DIAGNOSIS — Z74.09 IMPAIRED MOBILITY AND ACTIVITIES OF DAILY LIVING: ICD-10-CM

## 2023-09-29 DIAGNOSIS — Z78.9 IMPAIRED MOBILITY AND ACTIVITIES OF DAILY LIVING: ICD-10-CM

## 2023-09-29 PROCEDURE — 97112 NEUROMUSCULAR REEDUCATION: CPT | Mod: PN

## 2023-09-29 PROCEDURE — 97110 THERAPEUTIC EXERCISES: CPT | Mod: PN

## 2023-09-29 NOTE — PROGRESS NOTES
[facility]   Physical Therapy Treatment Note      Name: Mariana Jara Addison  Clinic Number: [id number]    Therapy Diagnosis:   Encounter Diagnoses   Name Primary?    Kyphosis (acquired) (postural) Yes    Impaired mobility and activities of daily living        Physician: Emeli Morgan MD    Visit Date: 9/29/2023    Physician Orders: PT Eval and Treat   Medical Diagnosis from Referral: M54.2 (ICD-10-CM) - Cervicalgia  Evaluation Date: 9/13/2023  Authorization Period Expiration: 12/31/2023  Plan of Care Expiration: 11/22/2023 or 12v post eval  Visit # (episodes)/ Visits authorized: 4 / eval + 12  (POC 3 /12)  Progress Note Due: 10/13/2023  PTA Visit #: 1/5     FOTO   Eval:  visit 5  visit 10     Time In: 100  Time Out: 155  Total Billable Time: 55 minutes     Precautions: ADHD; OCD; Autism spectrum; Anxiety; Depression; TMJ  Insurance: Payor: [employer] / Plan: [place] PPO / Product Type: PPO /       Subjective     Patient reports: pain is at bottom of neck and b/w shoulder blades. Feeling better today.    She was compliant with home exercise program.  Response to previous treatment: no complaints  Functional change: first visit from eval    Pain: 2/ 10  Location: Occipital ridge; temporal lobe regions; behind shoulder blades >Left; n/t in Bilateral Upper Extremities  Objective      Objective Measures updated at progress report unless specified.     Treatment     NOTE: Mild Autism and anxiety considerations    During 0 minutes of therapeutic exercises, Mariana, was supervised by a rehabilitation technician under the direction of the treating therapist.    Mariana received the treatments listed below:      therapeutic exercises to develop strength, endurance, ROM, flexibility, posture, and core stabilization for 27 minutes including:  NMRE utilized to activate appropriate mm to improve posture and shoulder stabilization: 28 minutes     Upper Body Ergometer, Level 1,  3/3     SEATED:  Shoulder retro rolls, 2 x 10 reps   Thoracic Extension Stretch, Hands behind head, x 10 reps x 5 seconds hold (Neuromuscular re-education)  Levator stretch 3 x 30 seconds bilateral - left side last  Gentle chin tucks with upright posture x 20 reps (Neuromuscular re-education)  Red Theraband Bilateral External Rotation, initiate mid trap activation first, [id number]     STAND:  Doorway stretch, W arms, 3 x 15 seconds   Scapula depression, hands on table, 2 x 10 reps (Neuromuscular re-education)  Red Theraband Shoulder extension x 20 reps - Cable Column #3 instead (pt interested in going to gym)  Cable Column Rows, 3#, 20-30x - or Red Theraband  Yellow Theraband Loop wall clocks, 10x   Red theraband wall walk ups x 20 reps - NOT PERFORMED   +Serratus Anterior Foam Roller Rolls, 20x  +Bicep curls, 3#, 20-30x  +Red Theraband Tricep Extension, 20-30x        Supine exercises:  Thoracic Extension stretch with 3# wand  Red Theraband horizontal abduction x 20-30 reps       Add NEXT:  Prone T or W arms with shoulder depression, Towel roll under forehead, 2x10 Bilateral   Standing Prone Extension / horiz abd / flexion - if unable prone, then standing  FUTURE:  Cervical traction if still Bilateral Upper Extremities n/t - need medical clearance due to severity of HA  TMJ considerations  Bicep curls, 3#  Cervical isometrics: SB / Rotation    Patient Education and Home Exercises       Education provided:   -apply ice as needed for delayed muscle soreness  -Continue with Home exercise program daily     Written Home Exercises Provided: yes. Exercises were reviewed and Mariana was able to demonstrate them prior to the end of the session.  Mariana demonstrated good  understanding of the education provided. See Electronic Medical Record under Patient Instructions for exercises provided during therapy sessions    Assessment     Mariana demonstrates low pain levels. Focus on postural and upper back strength to decrease  kyphotic posture. Pt able to perform all therex with minimal pn provocation and minimal cueing.    Mariana Is progressing well towards her goals.   Patient prognosis is Good.     Patient will continue to benefit from skilled outpatient physical therapy to address the deficits listed in the problem list box on initial evaluation, provide pt/family education and to maximize pt's level of independence in the home and community environment.     Patient's spiritual, cultural and educational needs considered and pt agreeable to plan of care and goals.     Anticipated barriers to physical therapy: chronicity    Goals:   Short Term GOALS: 3 weeks. Pt agrees with goals set. PROGRESS 9/29/2023  1. Patient demonstrates compliance with HEP.   2. Patient demonstrates independence with Postural Awareness while sitting and standing.   3. Patient demonstrates decreased pain level of 3/10 while performing daily activities.  4. Patient will reduce BUE radiculopathy frequency and intensity.     Long Term GOALS: 6 weeks. Pt agrees with goals set.  1. Patient demonstrates increased pectoralis flexibility to improve posture.  2. Patient demonstrates improved Bilateral Upper Extremities and periscapular muscle strength to improve posture and reduce cervical compression.   3. Patient demonstrates independence with HEP.   4. Patient will improve FOTO function score to </= 50% to show improvement in condition and functional mobility.      Plan     Continue PT as deemed per POC: posture and periscapular muscle strengthening    Plan of care Certification: 9/13/2023 to 11/22/2023.     Outpatient Physical Therapy 2 times weekly for 6 weeks to include the following interventions: Cervical/Lumbar Traction, Electrical Stimulation ,, Manual Therapy, Moist Heat/ Ice, Neuromuscular Re-ed, Patient Education, Self Care, Therapeutic Activities, Therapeutic Exercise, and Ultrasound.       Giovanna Morales, PT

## 2023-10-02 DIAGNOSIS — G43.019 INTRACTABLE MIGRAINE WITHOUT AURA AND WITHOUT STATUS MIGRAINOSUS: Primary | ICD-10-CM

## 2023-10-04 ENCOUNTER — HOSPITAL ENCOUNTER (OUTPATIENT)
Dept: RADIOLOGY | Facility: HOSPITAL | Age: 43
Discharge: HOME | End: 2023-10-04
Attending: INTERNAL MEDICINE
Payer: COMMERCIAL

## 2023-10-04 DIAGNOSIS — K21.9 GASTROESOPHAGEAL REFLUX DISEASE WITHOUT ESOPHAGITIS: ICD-10-CM

## 2023-10-04 PROCEDURE — 74240 FL UPPER GI TO INCLUDE ESOPHAGRAM: ICD-10-PCS | Mod: 26,,, | Performed by: RADIOLOGY

## 2023-10-04 PROCEDURE — A9698 NON-RAD CONTRAST MATERIALNOC: HCPCS | Performed by: INTERNAL MEDICINE

## 2023-10-04 PROCEDURE — 74240 X-RAY XM UPR GI TRC 1CNTRST: CPT | Mod: 26,,, | Performed by: RADIOLOGY

## 2023-10-04 PROCEDURE — 25500020 PHARM REV CODE 255: Performed by: INTERNAL MEDICINE

## 2023-10-04 PROCEDURE — 74240 X-RAY XM UPR GI TRC 1CNTRST: CPT | Mod: TC

## 2023-10-04 RX ADMIN — BARIUM SULFATE 175 ML: 0.6 SUSPENSION ORAL at 09:10

## 2023-10-06 ENCOUNTER — CLINICAL SUPPORT (OUTPATIENT)
Dept: REHABILITATION | Facility: HOSPITAL | Age: 43
End: 2023-10-06
Payer: COMMERCIAL

## 2023-10-06 DIAGNOSIS — Z78.9 IMPAIRED MOBILITY AND ACTIVITIES OF DAILY LIVING: ICD-10-CM

## 2023-10-06 DIAGNOSIS — Z74.09 IMPAIRED MOBILITY AND ACTIVITIES OF DAILY LIVING: ICD-10-CM

## 2023-10-06 DIAGNOSIS — M40.00 KYPHOSIS (ACQUIRED) (POSTURAL): Primary | ICD-10-CM

## 2023-10-06 PROCEDURE — 97110 THERAPEUTIC EXERCISES: CPT | Mod: PO

## 2023-10-06 PROCEDURE — 97112 NEUROMUSCULAR REEDUCATION: CPT | Mod: PO

## 2023-10-06 NOTE — PROGRESS NOTES
[facility]   Physical Therapy Treatment Note      Name: Mariana Jara Champlain  Clinic Number: [id number]    Therapy Diagnosis:   Encounter Diagnoses   Name Primary?    Kyphosis (acquired) (postural) Yes    Impaired mobility and activities of daily living        Physician: Emeli Morgan MD    Visit Date: 10/6/2023    Physician Orders: PT Eval and Treat   Medical Diagnosis from Referral: M54.2 (ICD-10-CM) - Cervicalgia  Evaluation Date: 9/13/2023  Authorization Period Expiration: 12/31/2023  Plan of Care Expiration: 11/22/2023 or 12v post eval  Visit # (episodes)/ Visits authorized: 5 / eval + 12  (POC 4 /12)  Progress Note Due: 10/13/2023  PTA Visit #: 1/5     FOTO   Eval:  visit 5  visit 10     Time In: 1000  Time Out: 1044  Total Billable Time: 44 minutes     Precautions: ADHD; OCD; Autism spectrum; Anxiety; Depression; TMJ  Insurance: Payor: [employer] / Plan: [place] PPO / Product Type: PPO /       Subjective     Patient reports: pain is at bottom of neck and b/w shoulder blades. Same.    She was compliant with home exercise program.  Response to previous treatment: no complaints  Functional change: first visit from eval    Pain: 2/ 10  Location: Occipital ridge; temporal lobe regions; behind shoulder blades >Left; n/t in Bilateral Upper Extremities  Objective      Objective Measures updated at progress report unless specified.     Treatment     NOTE: Mild Autism and anxiety considerations    Mariana received the treatments listed below:      therapeutic exercises to develop strength, endurance, ROM, flexibility for 16 minutes including:  NMRE utilized to activate appropriate mm to improve posture, core and shoulder stabilization: 28 minutes     Upper Body Ergometer, Level 1, 2/2    SEATED:  Shoulder retro rolls, 2 x 10 reps - NOT PERFORMED   Thoracic Extension Stretch, Hands behind head, x 10 reps x 5 seconds hold (Neuromuscular re-education)  Levator stretch 2 x  30 seconds bilateral - left side last  Gentle chin tucks with upright posture x 15 reps (Neuromuscular re-education)  Red Theraband Bilateral External Rotation, initiate mid trap activation first, [id number]     STAND:  Doorway stretch, W arms, 3 x 15 seconds   Scapula depression, hands on table, 2 x 10 reps (Neuromuscular re-education)  Red Theraband Shoulder extension x 30 reps - Cable Column #3 instead (pt interested in going to gym)  Red Theraband rows, x 30  Yellow Theraband Loop wall clocks, 10x   +Standing thoracic rotation, 10x Bilateral   Serratus Anterior Foam Roller Rolls, 20x    NOT PERFORMED   Bicep curls, 3#, 20-30x  Red Theraband Tricep Extension, 20-30x    Supine exercises:  Thoracic Extension stretch with 3# wand, knees extended, [id number]  Red Theraband horizontal abduction x 30 reps       Add NEXT:  Prone T or W arms with shoulder depression, Towel roll under forehead, 2x10 Bilateral   Standing Prone Extension / horiz abd / flexion - if unable prone, then standing  FUTURE:  Cervical traction if still Bilateral Upper Extremities n/t - need medical clearance due to severity of HA  TMJ considerations  Cervical isometrics: SB / Rotation  Supine Pec stretch, towel    Patient Education and Home Exercises       Education provided:   -apply ice as needed for delayed muscle soreness  -Continue with Home exercise program daily     Written Home Exercises Provided: yes. Exercises were reviewed and Mariana was able to demonstrate them prior to the end of the session.  Mariana demonstrated good  understanding of the education provided. See Electronic Medical Record under Patient Instructions for exercises provided during therapy sessions    Assessment     Mariana demonstrates low pain levels. Focus on postural and upper back strength to decrease kyphotic posture. Pt able to perform all therex with minimal pn provocation. Stimulation considerations with new clinic due to pt Autism.    Mariana Is progressing well  towards her goals.   Patient prognosis is Good.     Patient will continue to benefit from skilled outpatient physical therapy to address the deficits listed in the problem list box on initial evaluation, provide pt/family education and to maximize pt's level of independence in the home and community environment.     Patient's spiritual, cultural and educational needs considered and pt agreeable to plan of care and goals.     Anticipated barriers to physical therapy: chronicity    Goals:   Short Term GOALS: 3 weeks. Pt agrees with goals set. PROGRESS 10/6/2023  1. Patient demonstrates compliance with HEP.   2. Patient demonstrates independence with Postural Awareness while sitting and standing.   3. Patient demonstrates decreased pain level of 3/10 while performing daily activities.  4. Patient will reduce BUE radiculopathy frequency and intensity.     Long Term GOALS: 6 weeks. Pt agrees with goals set.  1. Patient demonstrates increased pectoralis flexibility to improve posture.  2. Patient demonstrates improved Bilateral Upper Extremities and periscapular muscle strength to improve posture and reduce cervical compression.   3. Patient demonstrates independence with HEP.   4. Patient will improve FOTO function score to </= 50% to show improvement in condition and functional mobility.      Plan     Continue PT as deemed per POC: posture and periscapular muscle strengthening    Plan of care Certification: 9/13/2023 to 11/22/2023.     Outpatient Physical Therapy 2 times weekly for 6 weeks to include the following interventions: Cervical/Lumbar Traction, Electrical Stimulation ,, Manual Therapy, Moist Heat/ Ice, Neuromuscular Re-ed, Patient Education, Self Care, Therapeutic Activities, Therapeutic Exercise, and Ultrasound.       Giovanna Morales, PT

## 2023-10-11 ENCOUNTER — PATIENT MESSAGE (OUTPATIENT)
Dept: NEUROLOGY | Facility: CLINIC | Age: 43
End: 2023-10-11
Payer: COMMERCIAL

## 2023-10-13 ENCOUNTER — CLINICAL SUPPORT (OUTPATIENT)
Dept: REHABILITATION | Facility: HOSPITAL | Age: 43
End: 2023-10-13
Payer: COMMERCIAL

## 2023-10-13 DIAGNOSIS — Z78.9 IMPAIRED MOBILITY AND ACTIVITIES OF DAILY LIVING: ICD-10-CM

## 2023-10-13 DIAGNOSIS — M40.00 KYPHOSIS (ACQUIRED) (POSTURAL): Primary | ICD-10-CM

## 2023-10-13 DIAGNOSIS — Z74.09 IMPAIRED MOBILITY AND ACTIVITIES OF DAILY LIVING: ICD-10-CM

## 2023-10-13 PROCEDURE — 97110 THERAPEUTIC EXERCISES: CPT | Mod: PO

## 2023-10-13 NOTE — PROGRESS NOTES
[facility]   Physical Therapy Treatment Note      Name: Mariana Jara Rutland  Clinic Number: [id number]    Therapy Diagnosis:   Encounter Diagnoses   Name Primary?    Kyphosis (acquired) (postural) Yes    Impaired mobility and activities of daily living        Physician: Emeli Morgan MD    Visit Date: 10/13/2023    Physician Orders: PT Eval and Treat   Medical Diagnosis from Referral: M54.2 (ICD-10-CM) - Cervicalgia  Evaluation Date: 9/13/2023  Authorization Period Expiration: 12/31/2023  Plan of Care Expiration: 11/22/2023 or 12v post eval  Visit # (episodes)/ Visits authorized: 6 / eval + 12  (POC 5 /12)  Progress Note Due: 10/13/2023  PTA Visit #: 1/5     FOTO   Eval:  visit 5  visit 10     Time In: 1005  Time Out: 1023  Total Billable Time: 18 minutes     Precautions: ADHD; OCD; Autism spectrum; Anxiety; Depression; TMJ  Insurance: Payor: [employer] / Plan: [place] PPO / Product Type: PPO /       Subjective     Patient reports: she is feeling sick with a migraine coming on. Wants to try therapy b/c she had to miss Wednesday due to a migraine.    She was compliant with home exercise program.  Response to previous treatment: no complaints  Functional change: first visit from eval    Pain: 7/ 10  Location: Occipital ridge; temporal lobe regions; behind shoulder blades >Left; n/t in Bilateral Upper Extremities  Objective      Objective Measures updated at progress report unless specified.     Treatment     NOTE: Mild Autism and anxiety considerations    Mariana received the treatments listed below:      therapeutic exercises to develop strength, endurance, ROM, flexibility for 18 minutes including:  NMRE utilized to activate appropriate mm to improve posture, core and shoulder stabilization: 0 minutes     Upper Body Ergometer, Level 1, 2/2 - NOT PERFORMED     SEATED:  +Cold compress around occipital ridge, c/s-t/s junction and Upper Trapezius, instructed in  diaphragmatic breathing to modulate nervous system, 5 min  Shoulder retro rolls, 2 x 10 reps   Thoracic Extension Stretch, Hands behind head, x 10 reps x 5 seconds hold - mid trap hug instead today        The rest of therex NOT PERFORMED due to pt migraine increasing and excessive sweating  Levator stretch 2 x 30 seconds bilateral - left side last  Gentle chin tucks with upright posture x 15 reps (Neuromuscular re-education)  Red Theraband Bilateral External Rotation, initiate mid trap activation first, [id number]     STAND:  Doorway stretch, W arms, 3 x 15 seconds   Scapula depression, hands on table, 2 x 10 reps (Neuromuscular re-education)  Red Theraband Shoulder extension x 30 reps - Cable Column #3 instead (pt interested in going to gym)  Red Theraband rows, x 30  Yellow Theraband Loop wall clocks, 10x   +Standing thoracic rotation, 10x Bilateral   Serratus Anterior Foam Roller Rolls, 20x  Bicep curls, 3#, 20-30x  Red Theraband Tricep Extension, 20-30x    Supine exercises:  Thoracic Extension stretch with 3# wand, knees extended, [id number]  Red Theraband horizontal abduction x 30 reps       Add NEXT:  Prone T or W arms with shoulder depression, Towel roll under forehead, 2x10 Bilateral   Standing Prone Extension / horiz abd / flexion - if unable prone, then standing  FUTURE:  Cervical traction if still Bilateral Upper Extremities n/t - need medical clearance due to severity of HA  TMJ considerations  Cervical isometrics: SB / Rotation  Supine Pec stretch, towel    Patient Education and Home Exercises       Education provided:   -apply ice as needed for delayed muscle soreness  -Continue with Home exercise program daily     Written Home Exercises Provided: yes. Exercises were reviewed and Mariana was able to demonstrate them prior to the end of the session.  Mariana demonstrated good  understanding of the education provided. See Electronic Medical Record under Patient Instructions for exercises provided during  therapy sessions    Assessment     Mariana demonstrates high migraine pain levels. Tried to reduce migraine pain with cold compress, diaphragm breathing and gentle Range of Motion. Pt began to sweat excessively and pain was increasing. Therefore, session was stopped early. Focus on postural and upper back strength to decrease kyphotic posture. Continue stimulation considerations with new clinic due to pt Autism.    Mariana Is progressing well towards her goals.   Patient prognosis is Good.     Patient will continue to benefit from skilled outpatient physical therapy to address the deficits listed in the problem list box on initial evaluation, provide pt/family education and to maximize pt's level of independence in the home and community environment.     Patient's spiritual, cultural and educational needs considered and pt agreeable to plan of care and goals.     Anticipated barriers to physical therapy: chronicity    Goals:   Short Term GOALS: 3 weeks. Pt agrees with goals set. PROGRESS 10/13/2023  1. Patient demonstrates compliance with HEP.   2. Patient demonstrates independence with Postural Awareness while sitting and standing.   3. Patient demonstrates decreased pain level of 3/10 while performing daily activities.  4. Patient will reduce BUE radiculopathy frequency and intensity.     Long Term GOALS: 6 weeks. Pt agrees with goals set.  1. Patient demonstrates increased pectoralis flexibility to improve posture.  2. Patient demonstrates improved Bilateral Upper Extremities and periscapular muscle strength to improve posture and reduce cervical compression.   3. Patient demonstrates independence with HEP.   4. Patient will improve FOTO function score to </= 50% to show improvement in condition and functional mobility.      Plan     Continue PT as deemed per POC: posture and periscapular muscle strengthening    Plan of care Certification: 9/13/2023 to 11/22/2023.     Outpatient Physical Therapy 2 times weekly  for 6 weeks to include the following interventions: Cervical/Lumbar Traction, Electrical Stimulation ,, Manual Therapy, Moist Heat/ Ice, Neuromuscular Re-ed, Patient Education, Self Care, Therapeutic Activities, Therapeutic Exercise, and Ultrasound.       Giovanna Morales, PT

## 2023-10-16 ENCOUNTER — PATIENT MESSAGE (OUTPATIENT)
Dept: PSYCHIATRY | Facility: CLINIC | Age: 43
End: 2023-10-16
Payer: COMMERCIAL

## 2023-10-18 ENCOUNTER — OFFICE VISIT (OUTPATIENT)
Dept: PSYCHIATRY | Facility: CLINIC | Age: 43
End: 2023-10-18
Payer: COMMERCIAL

## 2023-10-18 VITALS
DIASTOLIC BLOOD PRESSURE: 87 MMHG | SYSTOLIC BLOOD PRESSURE: 131 MMHG | WEIGHT: 293 LBS | HEART RATE: 80 BPM | HEIGHT: 65 IN | BODY MASS INDEX: 48.82 KG/M2

## 2023-10-18 DIAGNOSIS — F42.9 OBSESSIVE-COMPULSIVE DISORDER, UNSPECIFIED TYPE: ICD-10-CM

## 2023-10-18 DIAGNOSIS — G47.00 INSOMNIA, UNSPECIFIED TYPE: ICD-10-CM

## 2023-10-18 DIAGNOSIS — F90.2 ATTENTION DEFICIT HYPERACTIVITY DISORDER (ADHD), COMBINED TYPE: ICD-10-CM

## 2023-10-18 DIAGNOSIS — F33.41 RECURRENT MAJOR DEPRESSIVE DISORDER, IN PARTIAL REMISSION: ICD-10-CM

## 2023-10-18 DIAGNOSIS — F41.1 GAD (GENERALIZED ANXIETY DISORDER): Primary | ICD-10-CM

## 2023-10-18 DIAGNOSIS — G47.33 OSA (OBSTRUCTIVE SLEEP APNEA): ICD-10-CM

## 2023-10-18 PROCEDURE — 3008F PR BODY MASS INDEX (BMI) DOCUMENTED: ICD-10-PCS | Mod: CPTII,S$GLB,, | Performed by: PHYSICIAN ASSISTANT

## 2023-10-18 PROCEDURE — 1160F PR REVIEW ALL MEDS BY PRESCRIBER/CLIN PHARMACIST DOCUMENTED: ICD-10-PCS | Mod: CPTII,S$GLB,, | Performed by: PHYSICIAN ASSISTANT

## 2023-10-18 PROCEDURE — 99999 PR PBB SHADOW E&M-EST. PATIENT-LVL V: CPT | Mod: PBBFAC,,, | Performed by: PHYSICIAN ASSISTANT

## 2023-10-18 PROCEDURE — 3079F PR MOST RECENT DIASTOLIC BLOOD PRESSURE 80-89 MM HG: ICD-10-PCS | Mod: CPTII,S$GLB,, | Performed by: PHYSICIAN ASSISTANT

## 2023-10-18 PROCEDURE — 1159F PR MEDICATION LIST DOCUMENTED IN MEDICAL RECORD: ICD-10-PCS | Mod: CPTII,S$GLB,, | Performed by: PHYSICIAN ASSISTANT

## 2023-10-18 PROCEDURE — 99999 PR PBB SHADOW E&M-EST. PATIENT-LVL V: ICD-10-PCS | Mod: PBBFAC,,, | Performed by: PHYSICIAN ASSISTANT

## 2023-10-18 PROCEDURE — 3075F SYST BP GE 130 - 139MM HG: CPT | Mod: CPTII,S$GLB,, | Performed by: PHYSICIAN ASSISTANT

## 2023-10-18 PROCEDURE — 3075F PR MOST RECENT SYSTOLIC BLOOD PRESS GE 130-139MM HG: ICD-10-PCS | Mod: CPTII,S$GLB,, | Performed by: PHYSICIAN ASSISTANT

## 2023-10-18 PROCEDURE — 3008F BODY MASS INDEX DOCD: CPT | Mod: CPTII,S$GLB,, | Performed by: PHYSICIAN ASSISTANT

## 2023-10-18 PROCEDURE — 1159F MED LIST DOCD IN RCRD: CPT | Mod: CPTII,S$GLB,, | Performed by: PHYSICIAN ASSISTANT

## 2023-10-18 PROCEDURE — 1160F RVW MEDS BY RX/DR IN RCRD: CPT | Mod: CPTII,S$GLB,, | Performed by: PHYSICIAN ASSISTANT

## 2023-10-18 PROCEDURE — 99214 PR OFFICE/OUTPT VISIT, EST, LEVL IV, 30-39 MIN: ICD-10-PCS | Mod: S$GLB,,, | Performed by: PHYSICIAN ASSISTANT

## 2023-10-18 PROCEDURE — 3079F DIAST BP 80-89 MM HG: CPT | Mod: CPTII,S$GLB,, | Performed by: PHYSICIAN ASSISTANT

## 2023-10-18 PROCEDURE — 99214 OFFICE O/P EST MOD 30 MIN: CPT | Mod: S$GLB,,, | Performed by: PHYSICIAN ASSISTANT

## 2023-10-18 RX ORDER — SERTRALINE HYDROCHLORIDE 100 MG/1
200 TABLET, FILM COATED ORAL DAILY
Qty: 60 TABLET | Refills: 1 | Status: SHIPPED | OUTPATIENT
Start: 2023-10-18 | End: 2023-12-11 | Stop reason: SDUPTHER

## 2023-10-18 RX ORDER — SUVOREXANT 10 MG/1
10 TABLET, FILM COATED ORAL NIGHTLY
Qty: 30 TABLET | Refills: 0 | Status: SHIPPED | OUTPATIENT
Start: 2023-10-18 | End: 2023-12-11 | Stop reason: SDUPTHER

## 2023-10-18 NOTE — PATIENT INSTRUCTIONS
Increase zoloft to 200mg daily.    Please go to emergency department if feeling as though you are a harm to yourself or others or if you are in crisis. Please call the clinic to report any worsening of symptoms or problems associated with medication.

## 2023-10-18 NOTE — PROGRESS NOTES
Outpatient Psychiatry Follow-Up Visit (MD/NP)    10/18/2023    Clinical Status of Patient:  Outpatient (Ambulatory)    Chief Complaint:  Mariana Mora is a 42 y.o. female who presents today for follow-up of depression and anxiety.  Met with patient.      Interval History and Content of Current Session:  Interim Events/Subjective Report/Content of Current Session:  Mariana is seen today for medication follow-up.  She reports ongoing migraines.  She has Botox injections scheduled for tomorrow.  We did trial reducing sertraline for perspective purposes and she felt that obsessive-compulsive symptoms worsened.  She would like to resume sertraline 200 mg daily.  Reports she is sleeping with current medications.  She denies suicidal or homicidal ideation.  No other complaints today.    FROM PREVIOUS HPI  Mariana is seen today for medication follow-up.  She reports that she is having a bad headache today.  She has trialed discontinuation of Belsomra and has a headache with or without taking Belsomra and she would like to continue with Belsomra for sleep.  She is interested in seeing headache specialist here at [facility] and we will place a referral after coordination with primary care provider.  She does have upcoming physical therapy for her neck.  Depression and anxiety scores are higher than they have been historically and she states she can not quite put her finger on the reason as to why but endorses family drama stuff.  Having difficulty with inattention/focus.  We discussed somewhat expectations of inattention/focus with a largely sedating medication regimen due to anxiety and insomnia.  She is understanding that there will be limitations to medication management and likely we will not be able to improve all aspects.  She denies suicidal or homicidal ideation.  No other complaints today.          10/18/2023     2:21 PM 9/11/2023    10:31 AM 7/16/2023     7:09 PM   GAD7   1. Feeling nervous, anxious, or  on edge? 2 3 2   2. Not being able to stop or control worrying? 2 2 1   3. Worrying too much about different things? 2 3 1   4. Trouble relaxing? 3 3 3   5. Being so restless that it is hard to sit still? 2 2 2   6. Becoming easily annoyed or irritable? 1 2 1   7. Feeling afraid as if something awful might happen? 3 2 2   8. If you checked off any problems, how difficult have these problems made it for you to do your work, take care of things at home, or get along with other people? 2 2 2   JOSE ANGEL-7 Score 15 17 12     PHQ9: 19, denies SI    Review of Systems   PSYCHIATRIC: Pertinant items are noted in the narrative.  RESPIRATORY: No shortness of breath.  CARDIOVASCULAR: Reports tachycardia, no chest pain.  GASTROINTESTINAL: Reports nausea, vomiting, diarrhea.     Past Medical, Family and Social History: The patient's past medical, family and social history have been reviewed and updated as appropriate within the electronic medical record - see encounter notes.    Compliance: yes    Side effects: None    Risk Parameters:  Patient reports no suicidal ideation  Patient reports no homicidal ideation  Patient reports no self-injurious behavior  Patient reports no violent behavior    Exam (detailed: at least 9 elements; comprehensive: all 15 elements)   Constitutional  Vitals:  Most recent vital signs, dated less than 90 days prior to this appointment, were reviewed.   Vitals:    10/18/23 1444   BP: 131/87   Pulse: 80        General:  unremarkable, age appropriate     Musculoskeletal  Muscle Strength/Tone:  no dyskinesia   Gait & Station:  non-ataxic     Psychiatric  Speech:  no latency; no press   Mood & Affect:  ok  appropriate   Thought Process:  normal and logical   Associations:  intact   Thought Content:  normal, no suicidality, no homicidality, delusions, or paranoia   Insight:  intact   Judgement: behavior is adequate to circumstances   Orientation:  grossly intact   Memory: intact for content of interview    Language: grossly intact   Attention Span & Concentration:  able to focus   Fund of Knowledge:  intact and appropriate to age and level of education     Assessment and Diagnosis   Status/Progress: Based on the examination today, the patient's problem(s) is/are inadequately controlled.  New problems have not been presented today.   Co-morbidities, Diagnostic uncertainty and Lack of compliance are not complicating management of the primary condition.      General Impression:   Major depressive disorder, recurrent, moderate  Generalized anxiety disorder  OCD by history  ADHD by history  Autism spectrum disorder, by patient report  Nightmares    Intervention/Counseling/Treatment Plan   Medication Management: Continue current medications. The risks and benefits of medication were discussed with the patient.  Counseling provided with patient as follows: importance of compliance with chosen treatment options was emphasized, risks and benefits of treatment options, including medications, were discussed with the patient, risk factor reduction, prognosis     Mariana is seen today for medication follow-up.  Based on assessment today:    Continue Prazosin 10mg nightly for nightmares, discussed mechanism of action and to change positions slowly. This will be titrated to effect.  Continue Belsomra 10mg nightly for sleep.   Increase sertraline to 200 mg daily for depression/anxiety/OCD symptoms.  Continue buspirone to 10 mg twice daily for anxiety at pt request.    Gene site testing reviewed.    Please go to emergency department if feeling as though you are a harm to yourself or others or if you are in crisis.     Please call the clinic to report any worsening of symptoms or problems associated with medication.    Discussed risks of tardive dyskinesia, drug induced parkinsonism, metabolic side effects, including weight gain, neuroleptic malignant syndrome       Return to Clinic: 12/18/2023 at 3:00pm, as needed

## 2023-10-19 ENCOUNTER — PROCEDURE VISIT (OUTPATIENT)
Dept: NEUROLOGY | Facility: CLINIC | Age: 43
End: 2023-10-19
Payer: COMMERCIAL

## 2023-10-19 VITALS
BODY MASS INDEX: 48.82 KG/M2 | TEMPERATURE: 97 F | HEART RATE: 83 BPM | HEIGHT: 65 IN | WEIGHT: 293 LBS | SYSTOLIC BLOOD PRESSURE: 129 MMHG | DIASTOLIC BLOOD PRESSURE: 86 MMHG | RESPIRATION RATE: 17 BRPM

## 2023-10-19 DIAGNOSIS — G43.019 INTRACTABLE MIGRAINE WITHOUT AURA AND WITHOUT STATUS MIGRAINOSUS: Primary | ICD-10-CM

## 2023-10-19 PROCEDURE — 64615 PR CHEMODENERVATION OF MUSCLE FOR CHRONIC MIGRAINE: ICD-10-PCS | Mod: S$GLB,,, | Performed by: PSYCHIATRY & NEUROLOGY

## 2023-10-19 PROCEDURE — 64615 CHEMODENERV MUSC MIGRAINE: CPT | Mod: S$GLB,,, | Performed by: PSYCHIATRY & NEUROLOGY

## 2023-10-19 NOTE — PROCEDURES
Procedures  BOTOX  The patient has chronic migraines ( G43.719) and suffers from headaches more than 3 months, more than 15 days of headache days per month lasting more than 4 hours with at least 8 attacks that meet criteria for migraine.    Botulinum Toxin Injection Procedure   Pre-operative diagnosis: Chronic migraine   Post-operative diagnosis: Same   Procedure: Chemical neurolysis   After risks and benefits were explained including bleeding, infection, worsening of pain, damage to the areas being injected, weakness of muscles, loss of muscle control, dysphagia if injecting the head or neck, facial droop if injecting the facial area, painful injection, allergic or other reaction to the medications being injected, and the failure of the procedure to help the problem, a signed consent was obtained.   The patient was placed in a comfortable area and the sites to be treated were identified.The area to be treated was prepped three times with alcohol and the alcohol allowed to dry. Next, a 30 gauge needle was used to inject the medication in the area to be treated.   Area(s) injected:   Total Botox used: 155 Units   Botox wastage: 45 Units   Injection sites:    muscle bilaterally ( a total of 10 units divided into 2 sites)   Procerus muscle (5 units)   Frontalis muscle bilaterally (a total of 20 units divided into 4 sites)   Temporalis muscle bilaterally (a total of 40 units divided into 8 sites)   Occipitalis muscle bilaterally (a total of 30 units divided into 6 sites)   Cervical paraspinal muscles (a total of 20 units divided into 4 sites)   Trapezius muscle bilaterally (a total of 30 units divided into 6 sites)   Complications: none   RTC for the next Botox injection: 3 months     Alejandro Cárdenas M.D   of Neurology  ACGME Board Certified, Neurology  [facility], Board certified, headache Medicine  Medical Director, Headache and Facial Pain  [facility]

## 2023-10-30 DIAGNOSIS — G47.00 INSOMNIA, UNSPECIFIED TYPE: ICD-10-CM

## 2023-10-30 DIAGNOSIS — F41.1 GAD (GENERALIZED ANXIETY DISORDER): ICD-10-CM

## 2023-10-31 RX ORDER — BUSPIRONE HYDROCHLORIDE 10 MG/1
10 TABLET ORAL 2 TIMES DAILY
Qty: 60 TABLET | Refills: 1 | Status: SHIPPED | OUTPATIENT
Start: 2023-10-31 | End: 2023-12-18 | Stop reason: SDUPTHER

## 2023-10-31 RX ORDER — PRAZOSIN HYDROCHLORIDE 5 MG/1
10 CAPSULE ORAL NIGHTLY
Qty: 180 CAPSULE | Refills: 1 | Status: SHIPPED | OUTPATIENT
Start: 2023-10-31 | End: 2024-01-25 | Stop reason: SDUPTHER

## 2023-11-24 ENCOUNTER — OFFICE VISIT (OUTPATIENT)
Dept: FAMILY MEDICINE | Facility: CLINIC | Age: 43
End: 2023-11-24
Payer: COMMERCIAL

## 2023-11-24 VITALS
HEART RATE: 78 BPM | BODY MASS INDEX: 48.82 KG/M2 | WEIGHT: 293 LBS | HEIGHT: 65 IN | DIASTOLIC BLOOD PRESSURE: 76 MMHG | SYSTOLIC BLOOD PRESSURE: 134 MMHG | RESPIRATION RATE: 16 BRPM

## 2023-11-24 DIAGNOSIS — M79.672 LEFT FOOT PAIN: ICD-10-CM

## 2023-11-24 DIAGNOSIS — R25.2 MUSCLE CRAMPS: Primary | ICD-10-CM

## 2023-11-24 PROCEDURE — 99214 OFFICE O/P EST MOD 30 MIN: CPT | Mod: S$GLB,,, | Performed by: FAMILY MEDICINE

## 2023-11-24 PROCEDURE — 3078F PR MOST RECENT DIASTOLIC BLOOD PRESSURE < 80 MM HG: ICD-10-PCS | Mod: S$GLB,,, | Performed by: FAMILY MEDICINE

## 2023-11-24 PROCEDURE — 99214 PR OFFICE/OUTPT VISIT, EST, LEVL IV, 30-39 MIN: ICD-10-PCS | Mod: S$GLB,,, | Performed by: FAMILY MEDICINE

## 2023-11-24 PROCEDURE — 3075F SYST BP GE 130 - 139MM HG: CPT | Mod: S$GLB,,, | Performed by: FAMILY MEDICINE

## 2023-11-24 PROCEDURE — 1159F PR MEDICATION LIST DOCUMENTED IN MEDICAL RECORD: ICD-10-PCS | Mod: S$GLB,,, | Performed by: FAMILY MEDICINE

## 2023-11-24 PROCEDURE — 3075F PR MOST RECENT SYSTOLIC BLOOD PRESS GE 130-139MM HG: ICD-10-PCS | Mod: S$GLB,,, | Performed by: FAMILY MEDICINE

## 2023-11-24 PROCEDURE — 3008F BODY MASS INDEX DOCD: CPT | Mod: S$GLB,,, | Performed by: FAMILY MEDICINE

## 2023-11-24 PROCEDURE — 3008F PR BODY MASS INDEX (BMI) DOCUMENTED: ICD-10-PCS | Mod: S$GLB,,, | Performed by: FAMILY MEDICINE

## 2023-11-24 PROCEDURE — 1160F RVW MEDS BY RX/DR IN RCRD: CPT | Mod: S$GLB,,, | Performed by: FAMILY MEDICINE

## 2023-11-24 PROCEDURE — 99999 PR PBB SHADOW E&M-EST. PATIENT-LVL V: ICD-10-PCS | Mod: PBBFAC,,, | Performed by: FAMILY MEDICINE

## 2023-11-24 PROCEDURE — 1159F MED LIST DOCD IN RCRD: CPT | Mod: S$GLB,,, | Performed by: FAMILY MEDICINE

## 2023-11-24 PROCEDURE — 1160F PR REVIEW ALL MEDS BY PRESCRIBER/CLIN PHARMACIST DOCUMENTED: ICD-10-PCS | Mod: S$GLB,,, | Performed by: FAMILY MEDICINE

## 2023-11-24 PROCEDURE — 3078F DIAST BP <80 MM HG: CPT | Mod: S$GLB,,, | Performed by: FAMILY MEDICINE

## 2023-11-24 PROCEDURE — 99999 PR PBB SHADOW E&M-EST. PATIENT-LVL V: CPT | Mod: PBBFAC,,, | Performed by: FAMILY MEDICINE

## 2023-11-24 NOTE — PROGRESS NOTES
Subjective:       Patient ID: Mariana Mora is a 42 y.o. female.    Chief Complaint: Foot Pain (Patient states she's having foot pain, mostly on the tops of her feet and she doesn't know why. She is also feeling unsteady or not balanced and would like to know if the two are related. )    Left foot pain. Top of her foot. Started Tuesday. Does not recall an injury.     She has been having cramping pain in the tops of both of her feet. Getting almost nightly. Feels like a charley horse in her feet.     She also has noticed that her balance has not been great. She last feel about three weeks ago. She has had some close calls a few times a week. She tells me that in the evenings she feels light headed. She sometimes feels woozy and the room is spinning. She reports that this is what makes her feel like she is going to fall. Ongoing since about 2-3 months ago.     Foot Pain  This is a new problem. The current episode started in the past 7 days. The problem occurs constantly. The problem has been unchanged. Associated symptoms include fatigue and headaches (few migraines since the botox but the ones that break through are bad). Pertinent negatives include no abdominal pain, fever or rash.     Review of Systems   Constitutional:  Positive for fatigue. Negative for activity change, appetite change and fever.   Respiratory:  Negative for shortness of breath.    Gastrointestinal:  Negative for abdominal pain.   Integumentary:  Negative for rash.   Neurological:  Positive for dizziness, headaches (few migraines since the botox but the ones that break through are bad) and coordination difficulties.   Psychiatric/Behavioral:  Positive for dysphoric mood.          Objective:      Physical Exam  Vitals and nursing note reviewed.   Constitutional:       General: She is not in acute distress.     Appearance: She is not ill-appearing.   Cardiovascular:      Rate and Rhythm: Normal rate and regular rhythm.      Heart sounds:  No murmur heard.  Pulmonary:      Effort: Pulmonary effort is normal.      Breath sounds: Normal breath sounds. No wheezing.   Skin:     General: Skin is warm and dry.      Findings: No rash.   Neurological:      Mental Status: She is alert.   Psychiatric:         Mood and Affect: Mood normal.         Behavior: Behavior normal.         Assessment:       1. Muscle cramps    2. Left foot pain        Plan:       Problem List Items Addressed This Visit    None  Visit Diagnoses       Muscle cramps    -  Primary    Relevant Orders    Basic Metabolic Panel    Left foot pain        Relevant Orders    X-Ray Foot Complete Left            Left foot pain- will get x-ray to rule out stress fracture.   Metabolic panel to be sure HCTZ is not causing lightheadness /electrolyte abnormalities

## 2023-11-27 ENCOUNTER — HOSPITAL ENCOUNTER (OUTPATIENT)
Dept: RADIOLOGY | Facility: HOSPITAL | Age: 43
Discharge: HOME | End: 2023-11-27
Attending: FAMILY MEDICINE
Payer: COMMERCIAL

## 2023-11-27 DIAGNOSIS — M79.672 LEFT FOOT PAIN: ICD-10-CM

## 2023-11-27 PROCEDURE — 73630 X-RAY EXAM OF FOOT: CPT | Mod: 26,LT,, | Performed by: RADIOLOGY

## 2023-11-27 PROCEDURE — 73630 X-RAY EXAM OF FOOT: CPT | Mod: TC,PN,LT

## 2023-11-27 PROCEDURE — 73630 XR FOOT COMPLETE 3 VIEW LEFT: ICD-10-PCS | Mod: 26,LT,, | Performed by: RADIOLOGY

## 2023-11-28 ENCOUNTER — PATIENT MESSAGE (OUTPATIENT)
Dept: FAMILY MEDICINE | Facility: CLINIC | Age: 43
End: 2023-11-28
Payer: COMMERCIAL

## 2023-11-28 DIAGNOSIS — M79.672 LEFT FOOT PAIN: Primary | ICD-10-CM

## 2023-12-01 ENCOUNTER — OFFICE VISIT (OUTPATIENT)
Dept: PODIATRY | Facility: CLINIC | Age: 43
End: 2023-12-01
Payer: COMMERCIAL

## 2023-12-01 VITALS
BODY MASS INDEX: 48.82 KG/M2 | DIASTOLIC BLOOD PRESSURE: 85 MMHG | HEIGHT: 65 IN | SYSTOLIC BLOOD PRESSURE: 126 MMHG | WEIGHT: 293 LBS | RESPIRATION RATE: 18 BRPM | HEART RATE: 99 BPM

## 2023-12-01 DIAGNOSIS — G57.91 NEURITIS OF RIGHT FOOT: ICD-10-CM

## 2023-12-01 DIAGNOSIS — M79.672 LEFT FOOT PAIN: ICD-10-CM

## 2023-12-01 DIAGNOSIS — G57.92 NEURITIS OF LEFT FOOT: Primary | ICD-10-CM

## 2023-12-01 DIAGNOSIS — M79.671 PAIN IN BOTH FEET: ICD-10-CM

## 2023-12-01 DIAGNOSIS — M79.672 PAIN IN BOTH FEET: ICD-10-CM

## 2023-12-01 DIAGNOSIS — R25.2 FOOT CRAMPS: ICD-10-CM

## 2023-12-01 PROCEDURE — 3074F SYST BP LT 130 MM HG: CPT | Mod: S$GLB,,, | Performed by: PODIATRIST

## 2023-12-01 PROCEDURE — 1159F MED LIST DOCD IN RCRD: CPT | Mod: S$GLB,,, | Performed by: PODIATRIST

## 2023-12-01 PROCEDURE — 3079F DIAST BP 80-89 MM HG: CPT | Mod: S$GLB,,, | Performed by: PODIATRIST

## 2023-12-01 PROCEDURE — 99203 PR OFFICE/OUTPT VISIT, NEW, LEVL III, 30-44 MIN: ICD-10-PCS | Mod: S$GLB,,, | Performed by: PODIATRIST

## 2023-12-01 PROCEDURE — 3074F PR MOST RECENT SYSTOLIC BLOOD PRESSURE < 130 MM HG: ICD-10-PCS | Mod: S$GLB,,, | Performed by: PODIATRIST

## 2023-12-01 PROCEDURE — 3008F BODY MASS INDEX DOCD: CPT | Mod: S$GLB,,, | Performed by: PODIATRIST

## 2023-12-01 PROCEDURE — 99203 OFFICE O/P NEW LOW 30 MIN: CPT | Mod: S$GLB,,, | Performed by: PODIATRIST

## 2023-12-01 PROCEDURE — 99999 PR PBB SHADOW E&M-EST. PATIENT-LVL V: CPT | Mod: PBBFAC,,, | Performed by: PODIATRIST

## 2023-12-01 PROCEDURE — 3008F PR BODY MASS INDEX (BMI) DOCUMENTED: ICD-10-PCS | Mod: S$GLB,,, | Performed by: PODIATRIST

## 2023-12-01 PROCEDURE — 1160F PR REVIEW ALL MEDS BY PRESCRIBER/CLIN PHARMACIST DOCUMENTED: ICD-10-PCS | Mod: S$GLB,,, | Performed by: PODIATRIST

## 2023-12-01 PROCEDURE — 99999 PR PBB SHADOW E&M-EST. PATIENT-LVL V: ICD-10-PCS | Mod: PBBFAC,,, | Performed by: PODIATRIST

## 2023-12-01 PROCEDURE — 1159F PR MEDICATION LIST DOCUMENTED IN MEDICAL RECORD: ICD-10-PCS | Mod: S$GLB,,, | Performed by: PODIATRIST

## 2023-12-01 PROCEDURE — 3079F PR MOST RECENT DIASTOLIC BLOOD PRESSURE 80-89 MM HG: ICD-10-PCS | Mod: S$GLB,,, | Performed by: PODIATRIST

## 2023-12-01 PROCEDURE — 1160F RVW MEDS BY RX/DR IN RCRD: CPT | Mod: S$GLB,,, | Performed by: PODIATRIST

## 2023-12-06 NOTE — PROGRESS NOTES
Subjective:       Patient ID: Mariana Mora is a 43 y.o. female.    Chief Complaint: Foot Pain and Foot Problem  Patient presents via referral her PCP for left foot pain and bilateral foot cramps especially at night.  Patient relates this has started just in the last few weeks with no injury change in shoes or activity.  PCP did order x-ray which she states showed mild arthritis.  Relates pain started on the side of her feet when walking.  She has started wearing new schedulers, stretching and using Voltaren gel once a day.  Relates radiating pain and top of the feet.  No history of diabetes.  Pain level 8/10  Has Band-Aids on the top of both feet, reports healing scratches, no pain    Past Medical History:   Diagnosis Date    ADHD, predominantly inattentive type     Anxiety     Arthritis     Autism     Autism spectrum disorder     Depression     Hypertension     Hypothyroidism     Migraine headache     OCD (obsessive compulsive disorder)     Psoriasis     TMJ (temporomandibular joint syndrome)      Past Surgical History:   Procedure Laterality Date    ADENOIDECTOMY      CHOLECYSTECTOMY  6-3-2021    COLONOSCOPY      at the age of 7 for rectal bleeding    COLONOSCOPY N/A 8/10/2023    Procedure: COLONOSCOPY;  Surgeon: Jeanne Whitt MD;  Location: [facility];  Service: Endoscopy;  Laterality: N/A;    ESOPHAGEAL MANOMETRY WITH MEASUREMENT OF IMPEDANCE N/A 02/10/2020    Procedure: MANOMETRY, ESOPHAGUS, WITH IMPEDANCE MEASUREMENT;  Surgeon: Fitz Murray MD;  Location: [facility] ([address] [place] [address]);  Service: Endoscopy;  Laterality: N/A;  2/3 - pt confirmed    ESOPHAGOGASTRODUODENOSCOPY N/A 12/17/2019    Procedure: EGD (ESOPHAGOGASTRODUODENOSCOPY);  Surgeon: David Stahl MD;  Location: [facility];  Service: Endoscopy;  Laterality: N/A;    ESOPHAGOGASTRODUODENOSCOPY N/A 8/31/2023    Procedure: EGD (ESOPHAGOGASTRODUODENOSCOPY);  Surgeon: Jeanne Whitt MD;  Location: [facility];  Service: Endoscopy;   Laterality: N/A;    INNER EAR SURGERY Right     LAPAROSCOPIC CHOLECYSTECTOMY N/A 06/03/2021    Procedure: CHOLECYSTECTOMY-LAPAROSCOPIC;  Surgeon: Farrukh Lorenzo MD;  Location: [facility] OR;  Service: General;  Laterality: N/A;    NASAL SEPTUM SURGERY      TYMPANOPLASTY      bilateral    TYMPANOSTOMY TUBE PLACEMENT         Current Outpatient Medications   Medication Sig Dispense Refill    atorvastatin (LIPITOR) 10 MG tablet TAKE 1 TABLET(10 MG) BY MOUTH EVERY DAY 90 tablet 1    busPIRone (BUSPAR) 10 MG tablet Take 1 tablet (10 mg total) by mouth 2 (two) times daily. 60 tablet 1    cetirizine (ZYRTEC) 10 MG tablet Take 10 mg by mouth once daily.      ergocalciferol (ERGOCALCIFEROL) 50,000 unit Cap TAKE 2 CAPSULES BY MOUTH EVERY 7 DAYS 8 capsule 2    famotidine (PEPCID) 20 MG tablet Take 20 mg by mouth 2 (two) times daily.      guselkumab (TREMFYA) 100 mg/mL AtIn Inject 100 mg into the skin every 8 weeks.      hydroCHLOROthiazide (MICROZIDE) 12.5 mg capsule Take 1 capsule (12.5 mg total) by mouth once daily. 30 capsule 11    ketoconazole (NIZORAL) 2 % shampoo Apply topically twice a week. 120 mL 3    L norgest/e.estradioL-e.estrad (SIMPESSE) 0.15 mg-30 mcg (84)/10 mcg (7) 3MPk Take 1 tablet by mouth once daily. 91 each 3    leflunomide (ARAVA) 20 MG Tab Take 20 mg by mouth.      methocarbamoL (ROBAXIN) 500 MG Tab Take 1 tablet (500 mg total) by mouth 3 (three) times daily as needed (muscle spasm). 40 tablet 0    montelukast (SINGULAIR) 10 mg tablet Take 10 mg by mouth.      nystatin (MYCOSTATIN) ointment APPLY TOPICALLY TO THE AFFECTED AREA TWICE DAILY 15 g 1    NYSTOP powder APPLY TO THE AFFECTED AREA TWICE DAILY 30 g 1    omalizumab (XOLAIR) 150 mg/mL injection Inject 300 mg into the skin every 30 days.      pantoprazole (PROTONIX) 40 MG tablet Take one po daily 90 tablet 3    prazosin (MINIPRESS) 5 MG capsule Take 2 capsules (10 mg total) by mouth nightly. 180 capsule 1    prochlorperazine (COMPAZINE) 10 MG  "tablet Take 1 tablet (10 mg total) by mouth every 6 (six) hours as needed (for headaches in conjunction with Maxalt (Imitrex)). 15 tablet 6    rizatriptan (MAXALT-MLT) 10 MG disintegrating tablet Take 1 tablet (10 mg total) by mouth as needed for Migraine (No more than 2 tablets in 24 hours). 27 tablet 3    sertraline (ZOLOFT) 100 MG tablet Take 2 tablets (200 mg total) by mouth once daily. 60 tablet 1    suvorexant (BELSOMRA) 10 mg Tab Take 1 tablet (10 mg) by mouth every evening. 30 tablet 0    SYNTHROID 112 mcg tablet Take 1 tablet (112 mcg total) by mouth before breakfast. 90 tablet 3     Current Facility-Administered Medications   Medication Dose Route Frequency Provider Last Rate Last Admin    onabotulinumtoxina injection 200 Units  200 Units Intramuscular Q90 Days Ml Cárdenas MD   200 Units at 10/19/23 1138     Facility-Administered Medications Ordered in Other Visits   Medication Dose Route Frequency Provider Last Rate Last Admin    diphenhydrAMINE injection 12.5 mg  12.5 mg Intravenous PRN Steve Shoemaker MD        lactated ringers infusion   Intravenous Continuous Steve Shoemaker MD 10 mL/hr at 06/03/21 1131 New Bag at 06/03/21 1131    lactated ringers infusion  125 mL/hr Intravenous Continuous Steve Shoemaker MD        LIDOcaine (PF) 10 mg/ml (1%) injection 10 mg  1 mL Intradermal Once Steve Shoemaker MD        morphine injection 2 mg  2 mg Intravenous Q5 Min PRN Steve Shoemaker MD   2 mg at 06/03/21 1326     Review of patient's allergies indicates:   Allergen Reactions    Penicillins     Norco [hydrocodone-acetaminophen] Itching    Prednisone Other (See Comments) and Rash     PSORIASIS  PSORIASIS       Review of Systems   Cardiovascular:  Negative for leg swelling.       Objective:      Vitals:    12/01/23 1413   BP: 126/85   Pulse: 99   Resp: 18   Weight: 133.8 kg (295 lb)   Height: 5' 5" (1.651 m)     Physical Exam  Vitals and nursing note reviewed.   Constitutional:       General: " She is not in acute distress.     Appearance: Normal appearance.   Cardiovascular:      Pulses:           Dorsalis pedis pulses are 2+ on the right side and 2+ on the left side.        Posterior tibial pulses are 2+ on the right side and 2+ on the left side.   Pulmonary:      Effort: Pulmonary effort is normal.   Musculoskeletal:         General: Tenderness present. No swelling.      Right foot: Decreased range of motion.      Left foot: Decreased range of motion.   Feet:      Right foot:      Skin integrity: No erythema (Small scratches/abrasions dorsal feet).      Left foot:      Skin integrity: No erythema.   Skin:     Capillary Refill: Capillary refill takes less than 2 seconds.      Findings: Erythema present. No bruising.   Neurological:      General: No focal deficit present.      Mental Status: She is alert.      Comments: Diffuse neuritis forefoot extending the dorsal cutaneous nerves bilateral feet.  There is some involvement of the medial plantar nerve with tight plantar fascia without plantar fasciitis, no pain at the insertion of the calcaneus bilateral   Psychiatric:         Behavior: Behavior normal.         Thought Content: Thought content normal.                               EXAMINATION:  XR FOOT COMPLETE 3 VIEW LEFT     CLINICAL HISTORY:  Left lateral foot pain. Tenderness over mid 5th metatarsal;. Pain in left foot     TECHNIQUE:  AP, lateral, and oblique views of the left foot were performed.     COMPARISON:  None     FINDINGS:  No acute fracture or dislocation.  Mild soft tissue swelling of the dorsal forefoot.     Mild degenerative osteoarthrosis involving the IP joints of the digits.  Tarsal bones intact with normal tarsometatarsal alignment.  Small dorsal and plantar calcaneal spurs.     Impression:     Mild soft tissue swelling without acute fracture.        Electronically signed by: Cholo Noel  Date:                                            11/27/2023     Assessment:       1.  Neuritis of left foot    2. Left foot pain    3. Foot cramps    4. Pain in both feet    5. Neuritis of right foot        Plan:           Reviewed x-rays  Reviewed at length with patient neuritis pain, inflammation of nerve endings on the top of the feet, anatomy in location of her pain causing radiating discomfort left greater than right  We discussed some involvement of nerve on the bottom of the foot and this is mostly present on the left foot causing increased pain on this foot.  Explained this causes the plantar fascia to become very tight and is typically a cause of foot cramps.  Advised origin of pain is not plantar fascial related, no pain at the insertion of this area  Advised however arch supports can be effective to help support the bottom of the foot, reviewed appropriate arch supports, firm, full length, remove existing insole 1st and we discussed how to gradually adjust to wearing comfortably  Advised Voltaren gel can be very effective, it should be applied 4 times daily for 2 weeks, 3 times daily for 2 weeks, twice daily for 2 weeks and then once each evening as a maintenance dose weather painful or not.  We also discussed ice/cool therapy in frequency this should be applied as long as well tolerated and beneficial in combination with Voltaren gel.  If cool/ice therapy is painful would recommend soaking warm water and Epson salt at least each evening  We discussed Aspercreme lidocaine 4% patch applied any time throughout the day when she is unable to apply topical treatments and or overnight when sleeping  Reviewed importance of stretching the plantar fascia, this needs to be done multiple times throughout the day, before bed and each morning  Instructed patient to put sketchers at her bedside, put them on immediately in the morning, should have on at all times of weight-bearing  We discussed cortisone injection depending on the outcome of these topical conservative treatments  Patient was in  understanding and agreement with treatment plan, did want to try conservative treatments 1st  I counseled the patient on their conditions, implications and medical management.  Instructed patient to contact the office with any changes, questions, concerns, worsening of symptoms.   Total face to face time 30 minutes, exam, assessment, treatment, discussion, additional time for review of chart prior to and following appointment and visit documentation, consultation and coordination of care.    Follow up 2 weeks    This note was created using [employer] voice recognition software that occasionally misinterpreted phrases or words.

## 2023-12-11 DIAGNOSIS — F42.9 OBSESSIVE-COMPULSIVE DISORDER, UNSPECIFIED TYPE: ICD-10-CM

## 2023-12-11 DIAGNOSIS — G47.00 INSOMNIA, UNSPECIFIED TYPE: ICD-10-CM

## 2023-12-11 RX ORDER — SUVOREXANT 10 MG/1
10 TABLET, FILM COATED ORAL NIGHTLY
Qty: 30 TABLET | Refills: 0 | Status: SHIPPED | OUTPATIENT
Start: 2023-12-11 | End: 2024-01-02 | Stop reason: SDUPTHER

## 2023-12-11 RX ORDER — SERTRALINE HYDROCHLORIDE 100 MG/1
200 TABLET, FILM COATED ORAL DAILY
Qty: 60 TABLET | Refills: 1 | Status: SHIPPED | OUTPATIENT
Start: 2023-12-11 | End: 2024-02-08 | Stop reason: SDUPTHER

## 2023-12-18 ENCOUNTER — OFFICE VISIT (OUTPATIENT)
Dept: PSYCHIATRY | Facility: CLINIC | Age: 43
End: 2023-12-18
Payer: COMMERCIAL

## 2023-12-18 VITALS
HEART RATE: 72 BPM | SYSTOLIC BLOOD PRESSURE: 123 MMHG | DIASTOLIC BLOOD PRESSURE: 85 MMHG | BODY MASS INDEX: 48.63 KG/M2 | HEIGHT: 65 IN | WEIGHT: 291.88 LBS

## 2023-12-18 DIAGNOSIS — F42.9 OBSESSIVE-COMPULSIVE DISORDER, UNSPECIFIED TYPE: ICD-10-CM

## 2023-12-18 DIAGNOSIS — F33.41 RECURRENT MAJOR DEPRESSIVE DISORDER, IN PARTIAL REMISSION: Primary | ICD-10-CM

## 2023-12-18 DIAGNOSIS — F90.2 ATTENTION DEFICIT HYPERACTIVITY DISORDER (ADHD), COMBINED TYPE: ICD-10-CM

## 2023-12-18 DIAGNOSIS — F41.1 GAD (GENERALIZED ANXIETY DISORDER): ICD-10-CM

## 2023-12-18 DIAGNOSIS — G47.00 INSOMNIA, UNSPECIFIED TYPE: ICD-10-CM

## 2023-12-18 DIAGNOSIS — G47.33 OSA (OBSTRUCTIVE SLEEP APNEA): ICD-10-CM

## 2023-12-18 PROCEDURE — 3074F PR MOST RECENT SYSTOLIC BLOOD PRESSURE < 130 MM HG: ICD-10-PCS | Mod: CPTII,S$GLB,, | Performed by: PHYSICIAN ASSISTANT

## 2023-12-18 PROCEDURE — 99214 PR OFFICE/OUTPT VISIT, EST, LEVL IV, 30-39 MIN: ICD-10-PCS | Mod: S$GLB,,, | Performed by: PHYSICIAN ASSISTANT

## 2023-12-18 PROCEDURE — 3008F PR BODY MASS INDEX (BMI) DOCUMENTED: ICD-10-PCS | Mod: CPTII,S$GLB,, | Performed by: PHYSICIAN ASSISTANT

## 2023-12-18 PROCEDURE — 3079F PR MOST RECENT DIASTOLIC BLOOD PRESSURE 80-89 MM HG: ICD-10-PCS | Mod: CPTII,S$GLB,, | Performed by: PHYSICIAN ASSISTANT

## 2023-12-18 PROCEDURE — 99999 PR PBB SHADOW E&M-EST. PATIENT-LVL V: CPT | Mod: PBBFAC,,, | Performed by: PHYSICIAN ASSISTANT

## 2023-12-18 PROCEDURE — 99999 PR PBB SHADOW E&M-EST. PATIENT-LVL V: ICD-10-PCS | Mod: PBBFAC,,, | Performed by: PHYSICIAN ASSISTANT

## 2023-12-18 PROCEDURE — 1159F PR MEDICATION LIST DOCUMENTED IN MEDICAL RECORD: ICD-10-PCS | Mod: CPTII,S$GLB,, | Performed by: PHYSICIAN ASSISTANT

## 2023-12-18 PROCEDURE — 3079F DIAST BP 80-89 MM HG: CPT | Mod: CPTII,S$GLB,, | Performed by: PHYSICIAN ASSISTANT

## 2023-12-18 PROCEDURE — 1160F RVW MEDS BY RX/DR IN RCRD: CPT | Mod: CPTII,S$GLB,, | Performed by: PHYSICIAN ASSISTANT

## 2023-12-18 PROCEDURE — 3008F BODY MASS INDEX DOCD: CPT | Mod: CPTII,S$GLB,, | Performed by: PHYSICIAN ASSISTANT

## 2023-12-18 PROCEDURE — 99214 OFFICE O/P EST MOD 30 MIN: CPT | Mod: S$GLB,,, | Performed by: PHYSICIAN ASSISTANT

## 2023-12-18 PROCEDURE — 3074F SYST BP LT 130 MM HG: CPT | Mod: CPTII,S$GLB,, | Performed by: PHYSICIAN ASSISTANT

## 2023-12-18 PROCEDURE — 1159F MED LIST DOCD IN RCRD: CPT | Mod: CPTII,S$GLB,, | Performed by: PHYSICIAN ASSISTANT

## 2023-12-18 PROCEDURE — 1160F PR REVIEW ALL MEDS BY PRESCRIBER/CLIN PHARMACIST DOCUMENTED: ICD-10-PCS | Mod: CPTII,S$GLB,, | Performed by: PHYSICIAN ASSISTANT

## 2023-12-18 RX ORDER — BUSPIRONE HYDROCHLORIDE 10 MG/1
10 TABLET ORAL 2 TIMES DAILY
Qty: 60 TABLET | Refills: 1 | Status: SHIPPED | OUTPATIENT
Start: 2023-12-18 | End: 2024-02-24 | Stop reason: SDUPTHER

## 2023-12-18 NOTE — PROGRESS NOTES
Outpatient Psychiatry Follow-Up Visit (MD/NP)    12/18/2023    Clinical Status of Patient:  Outpatient (Ambulatory)    Chief Complaint:  Mariana Mora is a 43 y.o. female who presents today for follow-up of depression and anxiety.  Met with patient.      Interval History and Content of Current Session:  Interim Events/Subjective Report/Content of Current Session:  Mariana is seen today for medication follow-up.  She reports that she has been doing okay.  Has been trying to clean up her house the past couple of months.  She has had some weight loss doing the ketogenic diet.  Looking forward to upcoming holidays although depression did hit pretty hard last week.  Has been continuing to struggle with disability claim.  Hoping that she is able to get approved this application.  She would like to continue with her current medications as prescribed.  She denies suicidal or homicidal ideation.  No other complaints today.    FROM PREVIOUS HPI  Mariana is seen today for medication follow-up.  She reports ongoing migraines.  She has Botox injections scheduled for tomorrow.  We did trial reducing sertraline for perspective purposes and she felt that obsessive-compulsive symptoms worsened.  She would like to resume sertraline 200 mg daily.  Reports she is sleeping with current medications.  She denies suicidal or homicidal ideation.  No other complaints today.          12/18/2023     2:40 PM 10/18/2023     2:21 PM 9/11/2023    10:31 AM   GAD7   1. Feeling nervous, anxious, or on edge? 2 2 3   2. Not being able to stop or control worrying? 2 2 2   3. Worrying too much about different things? 2 2 3   4. Trouble relaxing? 3 3 3   5. Being so restless that it is hard to sit still? 3 2 2   6. Becoming easily annoyed or irritable? 2 1 2   7. Feeling afraid as if something awful might happen? 3 3 2   8. If you checked off any problems, how difficult have these problems made it for you to do your work, take care of things  at home, or get along with other people? 2 2 2   JOSE ANGEL-7 Score 17 15 17     PHQ9: 19, denies SI    Review of Systems   PSYCHIATRIC: Pertinant items are noted in the narrative.  RESPIRATORY: No shortness of breath.  CARDIOVASCULAR: Reports tachycardia, no chest pain.  GASTROINTESTINAL: Reports nausea, vomiting, diarrhea.     Past Medical, Family and Social History: The patient's past medical, family and social history have been reviewed and updated as appropriate within the electronic medical record - see encounter notes.    Compliance: yes    Side effects: None    Risk Parameters:  Patient reports no suicidal ideation  Patient reports no homicidal ideation  Patient reports no self-injurious behavior  Patient reports no violent behavior    Exam (detailed: at least 9 elements; comprehensive: all 15 elements)   Constitutional  Vitals:  Most recent vital signs, dated less than 90 days prior to this appointment, were reviewed.   Vitals:    12/18/23 1446   BP: 123/85   Pulse: 72        General:  unremarkable, age appropriate     Musculoskeletal  Muscle Strength/Tone:  no dyskinesia   Gait & Station:  non-ataxic     Psychiatric  Speech:  no latency; no press   Mood & Affect:  ok  appropriate   Thought Process:  normal and logical   Associations:  intact   Thought Content:  normal, no suicidality, no homicidality, delusions, or paranoia   Insight:  intact   Judgement: behavior is adequate to circumstances   Orientation:  grossly intact   Memory: intact for content of interview   Language: grossly intact   Attention Span & Concentration:  able to focus   Fund of Knowledge:  intact and appropriate to age and level of education     Assessment and Diagnosis   Status/Progress: Based on the examination today, the patient's problem(s) is/are inadequately controlled.  New problems have not been presented today.   Co-morbidities, Diagnostic uncertainty and Lack of compliance are not complicating management of the primary condition.       General Impression:   Major depressive disorder, recurrent, moderate  Generalized anxiety disorder  OCD by history  ADHD by history  Autism spectrum disorder, by patient report  Nightmares    Intervention/Counseling/Treatment Plan   Medication Management: Continue current medications. The risks and benefits of medication were discussed with the patient.  Counseling provided with patient as follows: importance of compliance with chosen treatment options was emphasized, risks and benefits of treatment options, including medications, were discussed with the patient, risk factor reduction, prognosis     Mariana is seen today for medication follow-up.  Based on assessment today:    Continue Prazosin 10mg nightly for nightmares, discussed mechanism of action and to change positions slowly. This will be titrated to effect.  Continue Belsomra 10mg nightly for sleep.   Continue sertraline 200 mg daily for depression/anxiety/OCD symptoms.  Continue buspirone 10 mg twice daily for anxiety at pt request.    Gene site testing reviewed.    Please go to emergency department if feeling as though you are a harm to yourself or others or if you are in crisis.     Please call the clinic to report any worsening of symptoms or problems associated with medication.    Discussed risks of tardive dyskinesia, drug induced parkinsonism, metabolic side effects, including weight gain, neuroleptic malignant syndrome       Return to Clinic: as scheduled, as needed

## 2023-12-18 NOTE — PATIENT INSTRUCTIONS
With upcoming holidays, I am sending along a quick reminder that our clinic is closed Claire and New Year's Day. I will be out of the clinic December 26-29. My colleague, Austin Charles, will be handling refill requests in my absence.  In the case of a medical or mental health emergency, we advise visiting your nearest emergency department or dialing 911 for emergency services.     Please go to emergency department if feeling as though you are a harm to yourself or others or if you are in crisis. Please call the clinic to report any worsening of symptoms or problems associated with medication.

## 2024-01-02 ENCOUNTER — TELEPHONE (OUTPATIENT)
Dept: PSYCHIATRY | Facility: CLINIC | Age: 44
End: 2024-01-02
Payer: COMMERCIAL

## 2024-01-02 DIAGNOSIS — G47.00 INSOMNIA, UNSPECIFIED TYPE: ICD-10-CM

## 2024-01-02 RX ORDER — SUVOREXANT 10 MG/1
10 TABLET, FILM COATED ORAL NIGHTLY
Qty: 30 TABLET | Refills: 0 | Status: SHIPPED | OUTPATIENT
Start: 2024-01-02 | End: 2024-02-01

## 2024-01-02 NOTE — TELEPHONE ENCOUNTER
----- Message from Kiara Mariee PA-C sent at 12/18/2023  3:06 PM CST -----  Look into 30 day prescriptions of Belsomra

## 2024-01-03 ENCOUNTER — CLINICAL SUPPORT (OUTPATIENT)
Dept: FAMILY MEDICINE | Facility: CLINIC | Age: 44
End: 2024-01-03
Payer: COMMERCIAL

## 2024-01-03 ENCOUNTER — OFFICE VISIT (OUTPATIENT)
Dept: FAMILY MEDICINE | Facility: CLINIC | Age: 44
End: 2024-01-03
Payer: COMMERCIAL

## 2024-01-03 ENCOUNTER — PATIENT MESSAGE (OUTPATIENT)
Dept: PSYCHIATRY | Facility: CLINIC | Age: 44
End: 2024-01-03
Payer: COMMERCIAL

## 2024-01-03 DIAGNOSIS — R05.1 ACUTE COUGH: ICD-10-CM

## 2024-01-03 DIAGNOSIS — R52 BODY ACHES: Primary | ICD-10-CM

## 2024-01-03 DIAGNOSIS — R68.89 FLU-LIKE SYMPTOMS: Primary | ICD-10-CM

## 2024-01-03 DIAGNOSIS — J01.40 ACUTE NON-RECURRENT PANSINUSITIS: ICD-10-CM

## 2024-01-03 LAB
CTP QC/QA: YES
CTP QC/QA: YES
POC MOLECULAR INFLUENZA A AGN: NEGATIVE
POC MOLECULAR INFLUENZA B AGN: NEGATIVE
SARS-COV-2 RDRP RESP QL NAA+PROBE: NEGATIVE

## 2024-01-03 PROCEDURE — 1160F RVW MEDS BY RX/DR IN RCRD: CPT | Mod: 95,,,

## 2024-01-03 PROCEDURE — 99213 OFFICE O/P EST LOW 20 MIN: CPT | Mod: 95,,,

## 2024-01-03 PROCEDURE — 87502 INFLUENZA DNA AMP PROBE: CPT | Mod: QW,S$GLB,,

## 2024-01-03 PROCEDURE — 87635 SARS-COV-2 COVID-19 AMP PRB: CPT | Mod: QW,S$GLB,,

## 2024-01-03 PROCEDURE — 1159F MED LIST DOCD IN RCRD: CPT | Mod: 95,,,

## 2024-01-03 RX ORDER — PROMETHAZINE HYDROCHLORIDE AND DEXTROMETHORPHAN HYDROBROMIDE 6.25; 15 MG/5ML; MG/5ML
5 SYRUP ORAL EVERY 4 HOURS PRN
Qty: 118 ML | Refills: 0 | Status: SHIPPED | OUTPATIENT
Start: 2024-01-03 | End: 2024-01-13

## 2024-01-03 RX ORDER — AZITHROMYCIN 250 MG/1
TABLET, FILM COATED ORAL
Qty: 6 TABLET | Refills: 0 | Status: SHIPPED | OUTPATIENT
Start: 2024-01-03 | End: 2024-01-25 | Stop reason: ALTCHOICE

## 2024-01-03 NOTE — PROGRESS NOTES
Subjective:       Patient ID: Mariana Mora is a 43 y.o. female.  The patient location is: [place], [place]  The chief complaint leading to consultation is: Cough    Visit type: audiovisual    Face to Face time with patient: 6  15 minutes of total time spent on the encounter, which includes face to face time and non-face to face time preparing to see the patient (eg, review of tests), Obtaining and/or reviewing separately obtained history, Documenting clinical information in the electronic or other health record, Independently interpreting results (not separately reported) and communicating results to the patient/family/caregiver, or Care coordination (not separately reported).         Each patient to whom he or she provides medical services by telemedicine is:  (1) informed of the relationship between the physician and patient and the respective role of any other health care provider with respect to management of the patient; and (2) notified that he or she may decline to receive medical services by telemedicine and may withdraw from such care at any time.      Chief Complaint: Cough    Patient presents virtually to the clinic with complaint of cough.     States symptoms started on Sunday with congestion, fatigue, cough, sinus headache, and body aches.   Has been taking Dayquil and Nyquil with mild relief.     Patient educated on plan of care, verbalized understanding.      Cough  This is a new problem. The current episode started in the past 7 days. The problem has been waxing and waning. The problem occurs every few minutes. The cough is Productive of sputum. Associated symptoms include headaches, myalgias, nasal congestion, postnasal drip and rhinorrhea. Pertinent negatives include no chest pain, chills, ear congestion, ear pain, fever, heartburn, hemoptysis, rash, sore throat, shortness of breath, sweats, weight loss or wheezing. Nothing aggravates the symptoms. Risk factors for lung disease include  animal exposure. She has tried OTC cough suppressant, cool air and rest for the symptoms. The treatment provided mild relief. Her past medical history is significant for asthma, bronchitis and environmental allergies. There is no history of bronchiectasis, COPD, emphysema or pneumonia.     Review of Systems   Constitutional:  Negative for chills, fever and weight loss.   HENT:  Positive for postnasal drip and rhinorrhea. Negative for ear pain and sore throat.    Respiratory:  Positive for cough. Negative for hemoptysis, shortness of breath and wheezing.    Cardiovascular:  Negative for chest pain.   Gastrointestinal:  Negative for heartburn.   Musculoskeletal:  Positive for myalgias.   Skin:  Negative for rash.   Allergic/Immunologic: Positive for environmental allergies.   Neurological:  Positive for headaches.       Patient Active Problem List   Diagnosis    Migraine with aura, with intractable migraine, so stated, without mention of status migrainosus    Intermittent confusion    Vitamin B12 deficiency    EMU    Vitamin B2 deficiency    Vitamin B6 deficiency    Anxiety    Gastroesophageal reflux disease    Psoriatic arthritis    Dysphagia    Gastroesophageal reflux disease with esophagitis    Abdominal pain    Preop examination    Hiatal hernia    Obesity, Class III, BMI 40-49.9 (morbid obesity)    GERD (gastroesophageal reflux disease)    Diarrhea    History of esophageal stricture    Nausea    Vitamin D deficiency    Primary insomnia    Functional diarrhea    Symptomatic cholelithiasis    Facial flushing    Dyslipidemia (high LDL; low HDL), baseline LDL-C 181    Cotton wool spots, OS    NAFLD (nonalcoholic fatty liver disease)    Essential thrombocytosis    Cardiovascular event risk, ASCVD 10-years risk 2.4%, 2021    RASHID (dyspnea on exertion), onset 2016    SUNIL on CPAP, 100% use    Tachycardia, with standing    Kyphosis (acquired) (postural)    Impaired mobility and activities of daily living       Objective:       Physical Exam  Constitutional:       General: She is not in acute distress.     Appearance: Normal appearance. She is not ill-appearing.   HENT:      Head: Normocephalic.   Eyes:      Conjunctiva/sclera: Conjunctivae normal.      Pupils: Pupils are equal, round, and reactive to light.      Comments: As seen on virtual visit   Pulmonary:      Effort: Pulmonary effort is normal. No respiratory distress.   Musculoskeletal:      Cervical back: Normal range of motion and neck supple.   Skin:     General: Skin is warm and dry.   Neurological:      General: No focal deficit present.      Mental Status: She is alert and oriented to person, place, and time.   Psychiatric:         Mood and Affect: Mood normal.         Behavior: Behavior normal.         Lab Results   Component Value Date    WBC 7.7 11/09/2023    HGB 13.6 11/09/2023    HCT 40.6 11/09/2023     11/09/2023    CHOL 157 12/13/2022    TRIG 149 12/13/2022    HDL 40 12/13/2022    ALT 33 08/24/2023    AST 27 08/24/2023     11/27/2023    K 3.7 11/27/2023     11/27/2023    CREATININE 0.8 11/27/2023    BUN 13 11/27/2023    CO2 22 (L) 11/27/2023    TSH 2.110 08/24/2023    HGBA1C 5.2 12/13/2022     The 10-year ASCVD risk score (Mainor WILSON, et al., 2019) is: 0.7%    Values used to calculate the score:      Age: 43 years      Sex: Female      Is Non- : No      Diabetic: No      Tobacco smoker: No      Systolic Blood Pressure: 123 mmHg      Is BP treated: No      HDL Cholesterol: 40 mg/dL      Total Cholesterol: 157 mg/dL    Assessment:       1. Body aches    2. Acute cough    3. Acute non-recurrent pansinusitis        Plan:       1. Body aches/Acute cough/Acute non-recurrent pansinusitis  -     POCT Influenza A/B Molecular  -     POCT COVID-19 Rapid Screening  -     azithromycin (Z-MILLICENT) 250 MG tablet; Take 2 tablets by mouth on day 1; Take 1 tablet by mouth on days 2-5  Dispense: 6 tablet; Refill: 0  -      promethazine-dextromethorphan (PROMETHAZINE-DM) 6.25-15 mg/5 mL Syrp; Take 5 mLs by mouth every 4 (four) hours as needed (cough).  Dispense: 118 mL; Refill: 0   - The diagnosis, treatment plan, instructions for follow-up and reevaluation as well as ED precautions were discussed and understanding was verbalized. All questions or concerns have been addressed.        No follow-ups on file.      Future Appointments       Date Provider Specialty Appt Notes    1/11/2024 Ml Cárdenas MD Neurology BOTOX #2 (Consent 10/19/23 [facility])    1/12/2024 Emeli Morgan MD Family Medicine Food and water getting stuck    1/18/2024 Ml Cárdenas MD Neurology 6 wk botox follow up    1/30/2024 Loraine Cruz MD Dermatology Red face, under chin, not rosacea.    3/14/2024 Kiara Mariee, [facility] Psychiatry follow up

## 2024-01-03 NOTE — PATIENT INSTRUCTIONS
- The diagnosis, treatment plan, instructions for follow-up and reevaluation as well as ED precautions were discussed and understanding was verbalized. All questions or concerns have been addressed.

## 2024-01-09 ENCOUNTER — TELEPHONE (OUTPATIENT)
Dept: PSYCHIATRY | Facility: CLINIC | Age: 44
End: 2024-01-09
Payer: COMMERCIAL

## 2024-01-09 NOTE — TELEPHONE ENCOUNTER
----- Message from Kiara Mariee PA-C sent at 1/2/2024  9:52 AM CST -----  Check about [facility]

## 2024-01-11 ENCOUNTER — PROCEDURE VISIT (OUTPATIENT)
Dept: NEUROLOGY | Facility: CLINIC | Age: 44
End: 2024-01-11
Payer: COMMERCIAL

## 2024-01-11 VITALS
HEIGHT: 65 IN | DIASTOLIC BLOOD PRESSURE: 81 MMHG | HEART RATE: 93 BPM | RESPIRATION RATE: 17 BRPM | TEMPERATURE: 98 F | WEIGHT: 291.88 LBS | SYSTOLIC BLOOD PRESSURE: 119 MMHG | BODY MASS INDEX: 48.63 KG/M2

## 2024-01-11 DIAGNOSIS — G43.019 INTRACTABLE MIGRAINE WITHOUT AURA AND WITHOUT STATUS MIGRAINOSUS: Primary | ICD-10-CM

## 2024-01-11 PROCEDURE — 64615 CHEMODENERV MUSC MIGRAINE: CPT | Mod: S$GLB,,, | Performed by: PSYCHIATRY & NEUROLOGY

## 2024-01-11 NOTE — PROCEDURES
Procedures  BOTOX  The patient has chronic migraines ( G43.719) and suffers from headaches more than 3 months, more than 15 days of headache days per month lasting more than 4 hours with at least 8 attacks that meet criteria for migraine.    Botulinum Toxin Injection Procedure   Pre-operative diagnosis: Chronic migraine   Post-operative diagnosis: Same   Procedure: Chemical neurolysis   After risks and benefits were explained including bleeding, infection, worsening of pain, damage to the areas being injected, weakness of muscles, loss of muscle control, dysphagia if injecting the head or neck, facial droop if injecting the facial area, painful injection, allergic or other reaction to the medications being injected, and the failure of the procedure to help the problem, a signed consent was obtained.   The patient was placed in a comfortable area and the sites to be treated were identified.The area to be treated was prepped three times with alcohol and the alcohol allowed to dry. Next, a 30 gauge needle was used to inject the medication in the area to be treated.   Area(s) injected:   Total Botox used: 155 Units   Botox wastage: 45 Units   Injection sites:    muscle bilaterally ( a total of 10 units divided into 2 sites)   Procerus muscle (5 units)   Frontalis muscle bilaterally (a total of 20 units divided into 4 sites)   Temporalis muscle bilaterally (a total of 40 units divided into 8 sites)   Occipitalis muscle bilaterally (a total of 30 units divided into 6 sites)   Cervical paraspinal muscles (a total of 20 units divided into 4 sites)   Trapezius muscle bilaterally (a total of 30 units divided into 6 sites)   Complications: none   RTC for the next Botox injection: 3 months     Alejandro Cárdenas M.D   of Neurology  ACGME Board Certified, Neurology  [facility], Board certified, headache Medicine  Medical Director, Headache and Facial Pain  [facility]

## 2024-01-12 ENCOUNTER — OFFICE VISIT (OUTPATIENT)
Dept: FAMILY MEDICINE | Facility: CLINIC | Age: 44
End: 2024-01-12
Payer: COMMERCIAL

## 2024-01-12 ENCOUNTER — TELEPHONE (OUTPATIENT)
Dept: PSYCHIATRY | Facility: CLINIC | Age: 44
End: 2024-01-12
Payer: COMMERCIAL

## 2024-01-12 VITALS
DIASTOLIC BLOOD PRESSURE: 78 MMHG | WEIGHT: 292 LBS | BODY MASS INDEX: 48.65 KG/M2 | HEIGHT: 65 IN | SYSTOLIC BLOOD PRESSURE: 128 MMHG

## 2024-01-12 DIAGNOSIS — E78.5 DYSLIPIDEMIA (HIGH LDL; LOW HDL): Primary | ICD-10-CM

## 2024-01-12 DIAGNOSIS — F33.41 RECURRENT MAJOR DEPRESSIVE DISORDER, IN PARTIAL REMISSION: Primary | ICD-10-CM

## 2024-01-12 DIAGNOSIS — F41.9 ANXIETY: ICD-10-CM

## 2024-01-12 PROCEDURE — 3078F DIAST BP <80 MM HG: CPT | Mod: S$GLB,,, | Performed by: FAMILY MEDICINE

## 2024-01-12 PROCEDURE — 99215 OFFICE O/P EST HI 40 MIN: CPT | Mod: S$GLB,,, | Performed by: FAMILY MEDICINE

## 2024-01-12 PROCEDURE — 3008F BODY MASS INDEX DOCD: CPT | Mod: S$GLB,,, | Performed by: FAMILY MEDICINE

## 2024-01-12 PROCEDURE — 3074F SYST BP LT 130 MM HG: CPT | Mod: S$GLB,,, | Performed by: FAMILY MEDICINE

## 2024-01-12 PROCEDURE — 99999 PR PBB SHADOW E&M-EST. PATIENT-LVL IV: CPT | Mod: PBBFAC,,, | Performed by: FAMILY MEDICINE

## 2024-01-12 RX ORDER — CARIPRAZINE 1.5 MG/1
1.5 CAPSULE, GELATIN COATED ORAL DAILY
Qty: 30 CAPSULE | Refills: 1 | Status: SHIPPED | OUTPATIENT
Start: 2024-01-12

## 2024-01-12 NOTE — PROGRESS NOTES
"Subjective:       Patient ID: Mariana Mora is a 43 y.o. female.    Chief Complaint: Fatigue, Depression, and Anxiety    Depression exacerbation.Has had depression for a long time.   She is here today with concern that she is having some increased suicidal thoughts. No plans.   Her father called her yesterday to tell her that her brother got engaged and  in [place] yesterday but she has no relationship with him so it made her sad.    She describes the core of her issues as "Mom drama trauma"  and feels like it is lurking under the surface    She has noticed when she gets really upset like that she just can't talk and feels like she has selective mutism.She feels closed off to the world and cannot understand what someone is saying or process what they are saying. She can physically hear.     Anxiety is flared. On the medications and the medications help and she is still not well controlled. One of her good friends got  for the first time this weekekdn at age 52. It was a really big deal to go to her friends wedding. She fell like it took everything she had to go watch the wedding. The reception was too many people, too much noise and she had to leave. She was unable to see them there. She got back to the truck and just fell asleep.     She likes to paint and she tried to go to some paint nights with her friends and couldn't go.     Won't check the mail during the day for fear of the neighbor wanting to stop and chat.     She tells me that this has been going on for a while but this is her baseline.     She is following with Kiara Taylor and her psychologist Dr. Burgess.     Having some joint pains as well- this is chronic for her.     When she is just home with the dogs she feels like she is functioning like a regular human, but she feels set off by leaving the house or a phone call- like she is living in a mine field.     DepressionPatient presents with the following symptoms: " "nervousness/anxiety and suicidal ideas (passive thoughts of a "do over"; denies plans).      Anxiety  Symptoms include nervous/anxious behavior and suicidal ideas (passive thoughts of a "do over"; denies plans).         Review of Systems   Constitutional:  Positive for fatigue.   Psychiatric/Behavioral:  Positive for dysphoric mood and suicidal ideas (passive thoughts of a "do over"; denies plans). The patient is nervous/anxious.          Objective:      Physical Exam  Vitals and nursing note reviewed.   Constitutional:       General: She is not in acute distress.     Appearance: She is not ill-appearing.   Cardiovascular:      Rate and Rhythm: Normal rate and regular rhythm.      Heart sounds: No murmur heard.  Pulmonary:      Effort: Pulmonary effort is normal.      Breath sounds: Normal breath sounds. No wheezing.   Skin:     General: Skin is warm and dry.      Findings: No rash.   Neurological:      Mental Status: She is alert.   Psychiatric:         Mood and Affect: Mood is depressed. Affect is tearful.         Behavior: Behavior normal.         Thought Content: Thought content does not include homicidal or suicidal ideation. Thought content does not include homicidal or suicidal plan.         Assessment:       1. Dyslipidemia (high LDL; low HDL), baseline LDL-C 181    2. Anxiety        Plan:       Problem List Items Addressed This Visit          Psychiatric    Anxiety       Cardiac/Vascular    Dyslipidemia (high LDL; low HDL), baseline LDL-C 181 - Primary       After long discussion reached out to psychiatry to coordinate medication management in this complex patient. Discussed next options with her psychiatric provider and she has follow up next week coordinated. Greater than 40 minutes was spent on this encounter directly with the patient and in communication with Kiara BETHEA with psychiatry.            "

## 2024-01-18 ENCOUNTER — PATIENT MESSAGE (OUTPATIENT)
Dept: PSYCHIATRY | Facility: CLINIC | Age: 44
End: 2024-01-18
Payer: COMMERCIAL

## 2024-01-18 ENCOUNTER — OFFICE VISIT (OUTPATIENT)
Dept: NEUROLOGY | Facility: CLINIC | Age: 44
End: 2024-01-18
Payer: COMMERCIAL

## 2024-01-18 VITALS
HEIGHT: 65 IN | WEIGHT: 293 LBS | RESPIRATION RATE: 17 BRPM | DIASTOLIC BLOOD PRESSURE: 87 MMHG | TEMPERATURE: 98 F | SYSTOLIC BLOOD PRESSURE: 137 MMHG | BODY MASS INDEX: 48.82 KG/M2 | HEART RATE: 92 BPM

## 2024-01-18 DIAGNOSIS — K21.9 GASTROESOPHAGEAL REFLUX DISEASE, UNSPECIFIED WHETHER ESOPHAGITIS PRESENT: ICD-10-CM

## 2024-01-18 DIAGNOSIS — G47.33 OSA ON CPAP: ICD-10-CM

## 2024-01-18 DIAGNOSIS — M40.00 KYPHOSIS (ACQUIRED) (POSTURAL): ICD-10-CM

## 2024-01-18 DIAGNOSIS — G43.019 INTRACTABLE MIGRAINE WITHOUT AURA AND WITHOUT STATUS MIGRAINOSUS: Primary | ICD-10-CM

## 2024-01-18 DIAGNOSIS — K76.0 NAFLD (NONALCOHOLIC FATTY LIVER DISEASE): ICD-10-CM

## 2024-01-18 DIAGNOSIS — Z91.89 CARDIOVASCULAR EVENT RISK: ICD-10-CM

## 2024-01-18 PROCEDURE — 3075F SYST BP GE 130 - 139MM HG: CPT | Mod: CPTII,S$GLB,, | Performed by: PSYCHIATRY & NEUROLOGY

## 2024-01-18 PROCEDURE — 99999 PR PBB SHADOW E&M-EST. PATIENT-LVL V: CPT | Mod: PBBFAC,,, | Performed by: PSYCHIATRY & NEUROLOGY

## 2024-01-18 PROCEDURE — 3008F BODY MASS INDEX DOCD: CPT | Mod: CPTII,S$GLB,, | Performed by: PSYCHIATRY & NEUROLOGY

## 2024-01-18 PROCEDURE — 3079F DIAST BP 80-89 MM HG: CPT | Mod: CPTII,S$GLB,, | Performed by: PSYCHIATRY & NEUROLOGY

## 2024-01-18 PROCEDURE — 1159F MED LIST DOCD IN RCRD: CPT | Mod: CPTII,S$GLB,, | Performed by: PSYCHIATRY & NEUROLOGY

## 2024-01-18 PROCEDURE — 99024 POSTOP FOLLOW-UP VISIT: CPT | Mod: S$GLB,,, | Performed by: PSYCHIATRY & NEUROLOGY
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
## 2024-01-25 ENCOUNTER — OFFICE VISIT (OUTPATIENT)
Dept: PSYCHIATRY | Facility: CLINIC | Age: 44
End: 2024-01-25
Payer: COMMERCIAL

## 2024-01-25 VITALS
HEIGHT: 65 IN | DIASTOLIC BLOOD PRESSURE: 77 MMHG | BODY MASS INDEX: 48.82 KG/M2 | WEIGHT: 293 LBS | HEART RATE: 86 BPM | SYSTOLIC BLOOD PRESSURE: 120 MMHG

## 2024-01-25 DIAGNOSIS — G47.33 OSA (OBSTRUCTIVE SLEEP APNEA): ICD-10-CM

## 2024-01-25 DIAGNOSIS — F43.9 TRAUMA AND STRESSOR-RELATED DISORDER: ICD-10-CM

## 2024-01-25 DIAGNOSIS — F41.1 GAD (GENERALIZED ANXIETY DISORDER): ICD-10-CM

## 2024-01-25 DIAGNOSIS — G47.00 INSOMNIA, UNSPECIFIED TYPE: ICD-10-CM

## 2024-01-25 DIAGNOSIS — F33.2 SEVERE EPISODE OF RECURRENT MAJOR DEPRESSIVE DISORDER, WITHOUT PSYCHOTIC FEATURES: Primary | ICD-10-CM

## 2024-01-25 DIAGNOSIS — F42.9 OBSESSIVE-COMPULSIVE DISORDER, UNSPECIFIED TYPE: ICD-10-CM

## 2024-01-25 PROCEDURE — 1159F MED LIST DOCD IN RCRD: CPT | Mod: CPTII,S$GLB,, | Performed by: PHYSICIAN ASSISTANT

## 2024-01-25 PROCEDURE — 3074F SYST BP LT 130 MM HG: CPT | Mod: CPTII,S$GLB,, | Performed by: PHYSICIAN ASSISTANT

## 2024-01-25 PROCEDURE — 99215 OFFICE O/P EST HI 40 MIN: CPT | Mod: S$GLB,,, | Performed by: PHYSICIAN ASSISTANT

## 2024-01-25 PROCEDURE — 99999 PR PBB SHADOW E&M-EST. PATIENT-LVL V: CPT | Mod: PBBFAC,,, | Performed by: PHYSICIAN ASSISTANT

## 2024-01-25 PROCEDURE — 3078F DIAST BP <80 MM HG: CPT | Mod: CPTII,S$GLB,, | Performed by: PHYSICIAN ASSISTANT

## 2024-01-25 PROCEDURE — 1160F RVW MEDS BY RX/DR IN RCRD: CPT | Mod: CPTII,S$GLB,, | Performed by: PHYSICIAN ASSISTANT

## 2024-01-25 PROCEDURE — 3008F BODY MASS INDEX DOCD: CPT | Mod: CPTII,S$GLB,, | Performed by: PHYSICIAN ASSISTANT

## 2024-01-25 RX ORDER — PRAZOSIN HYDROCHLORIDE 5 MG/1
10 CAPSULE ORAL NIGHTLY
Qty: 180 CAPSULE | Refills: 1 | Status: SHIPPED | OUTPATIENT
Start: 2024-01-25 | End: 2024-04-26 | Stop reason: SDUPTHER

## 2024-01-25 RX ORDER — SUVOREXANT 10 MG/1
10 TABLET, FILM COATED ORAL NIGHTLY
Qty: 30 TABLET | Refills: 0 | Status: SHIPPED | OUTPATIENT
Start: 2024-02-02 | End: 2024-03-06 | Stop reason: SDUPTHER
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
## 2024-01-28 DIAGNOSIS — M54.2 NECK PAIN: ICD-10-CM

## 2024-01-28 NOTE — TELEPHONE ENCOUNTER
No care due was identified.  Health [facility] Embedded Care Due Messages. Reference number: [id number].   1/28/2024 10:34:52 AM CST

## 2024-01-29 RX ORDER — METHOCARBAMOL 500 MG/1
500 TABLET, FILM COATED ORAL 3 TIMES DAILY PRN
Qty: 40 TABLET | Refills: 0 | Status: SHIPPED | OUTPATIENT
Start: 2024-01-29

## 2024-01-29 NOTE — TELEPHONE ENCOUNTER
Spoke with Mariana on the phone - patient relayed to PCP that she had been having more severe depressive sx. Considered Rexulti but in significant gene drug interaction - will re-trial Vraylar. She is in agreement with plan.   
Statement Selected

## 2024-01-31 ENCOUNTER — PATIENT MESSAGE (OUTPATIENT)
Dept: FAMILY MEDICINE | Facility: CLINIC | Age: 44
End: 2024-01-31
Payer: COMMERCIAL

## 2024-02-01 DIAGNOSIS — E78.49 OTHER HYPERLIPIDEMIA: ICD-10-CM

## 2024-02-02 RX ORDER — ATORVASTATIN CALCIUM 10 MG/1
10 TABLET, FILM COATED ORAL DAILY
Qty: 90 TABLET | Refills: 0 | Status: SHIPPED | OUTPATIENT
Start: 2024-02-02 | End: 2024-05-03

## 2024-02-02 NOTE — TELEPHONE ENCOUNTER
Refill Routing Note   Medication(s) are not appropriate for processing by [facility] for the following reason(s):        Required labs outdated    [facility] action(s):  Defer               Appointments  past 12m or future 3m with PCP    Date Provider   Last Visit   1/12/2024 Emeli Morgan MD   Next Visit   4/17/2024 Emeli Morgan MD   ED visits in past 90 days: 0        Note composed:10:20 AM 02/02/2024

## 2024-02-02 NOTE — TELEPHONE ENCOUNTER
No care due was identified.  Health [facility] Embedded Care Due Messages. Reference number: [id number].   2/02/2024 9:21:01 AM CST

## 2024-02-04 ENCOUNTER — PATIENT MESSAGE (OUTPATIENT)
Dept: PSYCHIATRY | Facility: CLINIC | Age: 44
End: 2024-02-04
Payer: COMMERCIAL

## 2024-02-08 DIAGNOSIS — F42.9 OBSESSIVE-COMPULSIVE DISORDER, UNSPECIFIED TYPE: ICD-10-CM

## 2024-02-09 RX ORDER — SERTRALINE HYDROCHLORIDE 100 MG/1
200 TABLET, FILM COATED ORAL DAILY
Qty: 60 TABLET | Refills: 1 | Status: SHIPPED | OUTPATIENT
Start: 2024-02-09 | End: 2024-04-19 | Stop reason: SDUPTHER

## 2024-02-23 ENCOUNTER — PATIENT MESSAGE (OUTPATIENT)
Dept: PSYCHIATRY | Facility: CLINIC | Age: 44
End: 2024-02-23
Payer: COMMERCIAL

## 2024-02-24 ENCOUNTER — PATIENT MESSAGE (OUTPATIENT)
Dept: FAMILY MEDICINE | Facility: CLINIC | Age: 44
End: 2024-02-24
Payer: COMMERCIAL

## 2024-02-24 DIAGNOSIS — E53.8 VITAMIN B12 DEFICIENCY: ICD-10-CM

## 2024-02-24 DIAGNOSIS — R53.83 FATIGUE, UNSPECIFIED TYPE: ICD-10-CM

## 2024-02-24 DIAGNOSIS — D47.3 ESSENTIAL THROMBOCYTOSIS: ICD-10-CM

## 2024-02-24 DIAGNOSIS — L90.6 STRIAE: ICD-10-CM

## 2024-02-24 DIAGNOSIS — E53.0 VITAMIN B2 DEFICIENCY: ICD-10-CM

## 2024-02-24 DIAGNOSIS — F41.1 GAD (GENERALIZED ANXIETY DISORDER): ICD-10-CM

## 2024-02-24 DIAGNOSIS — K76.0 NAFLD (NONALCOHOLIC FATTY LIVER DISEASE): ICD-10-CM

## 2024-02-24 DIAGNOSIS — K90.9 INTESTINAL MALABSORPTION, UNSPECIFIED TYPE: ICD-10-CM

## 2024-02-24 DIAGNOSIS — E53.1 VITAMIN B6 DEFICIENCY: ICD-10-CM

## 2024-02-24 DIAGNOSIS — N92.6 MENSTRUAL IRREGULARITY: ICD-10-CM

## 2024-02-24 DIAGNOSIS — E66.9 OBESITY, UNSPECIFIED CLASSIFICATION, UNSPECIFIED OBESITY TYPE, UNSPECIFIED WHETHER SERIOUS COMORBIDITY PRESENT: Primary | ICD-10-CM

## 2024-02-24 DIAGNOSIS — R73.9 HYPERGLYCEMIA: ICD-10-CM

## 2024-02-26 RX ORDER — BUSPIRONE HYDROCHLORIDE 10 MG/1
10 TABLET ORAL 2 TIMES DAILY
Qty: 60 TABLET | Refills: 1 | Status: SHIPPED | OUTPATIENT
Start: 2024-02-26 | End: 2024-04-26 | Stop reason: SDUPTHER

## 2024-03-01 ENCOUNTER — TELEPHONE (OUTPATIENT)
Dept: FAMILY MEDICINE | Facility: CLINIC | Age: 44
End: 2024-03-01
Payer: COMMERCIAL

## 2024-03-01 ENCOUNTER — LAB VISIT (OUTPATIENT)
Dept: LAB | Facility: HOSPITAL | Age: 44
End: 2024-03-01
Attending: FAMILY MEDICINE
Payer: COMMERCIAL

## 2024-03-01 DIAGNOSIS — D47.3 ESSENTIAL THROMBOCYTOSIS: ICD-10-CM

## 2024-03-01 DIAGNOSIS — N92.6 MENSTRUAL IRREGULARITY: ICD-10-CM

## 2024-03-01 DIAGNOSIS — R53.83 FATIGUE, UNSPECIFIED TYPE: ICD-10-CM

## 2024-03-01 DIAGNOSIS — K90.9 INTESTINAL MALABSORPTION, UNSPECIFIED TYPE: ICD-10-CM

## 2024-03-01 DIAGNOSIS — R73.9 HYPERGLYCEMIA: ICD-10-CM

## 2024-03-01 DIAGNOSIS — E66.9 OBESITY, UNSPECIFIED CLASSIFICATION, UNSPECIFIED OBESITY TYPE, UNSPECIFIED WHETHER SERIOUS COMORBIDITY PRESENT: ICD-10-CM

## 2024-03-01 DIAGNOSIS — K76.0 NAFLD (NONALCOHOLIC FATTY LIVER DISEASE): ICD-10-CM

## 2024-03-01 DIAGNOSIS — L90.6 STRIAE: ICD-10-CM

## 2024-03-01 LAB
ALBUMIN SERPL BCP-MCNC: 3 G/DL (ref 3.5–5.2)
ALP SERPL-CCNC: 54 U/L (ref 55–135)
ALT SERPL W/O P-5'-P-CCNC: 19 U/L (ref 10–44)
ANION GAP SERPL CALC-SCNC: 10 MMOL/L (ref 8–16)
AST SERPL-CCNC: 14 U/L (ref 10–40)
BASOPHILS # BLD AUTO: 0.07 K/UL (ref 0–0.2)
BASOPHILS NFR BLD: 0.9 % (ref 0–1.9)
BILIRUB SERPL-MCNC: 0.4 MG/DL (ref 0.1–1)
BUN SERPL-MCNC: 12 MG/DL (ref 6–20)
CALCIUM SERPL-MCNC: 8.2 MG/DL (ref 8.7–10.5)
CHLORIDE SERPL-SCNC: 107 MMOL/L (ref 95–110)
CO2 SERPL-SCNC: 20 MMOL/L (ref 23–29)
CREAT SERPL-MCNC: 0.7 MG/DL (ref 0.5–1.4)
DIFFERENTIAL METHOD BLD: ABNORMAL
EOSINOPHIL # BLD AUTO: 0.1 K/UL (ref 0–0.5)
EOSINOPHIL NFR BLD: 1.8 % (ref 0–8)
ERYTHROCYTE [DISTWIDTH] IN BLOOD BY AUTOMATED COUNT: 13.3 % (ref 11.5–14.5)
EST. GFR  (NO RACE VARIABLE): >60 ML/MIN/1.73 M^2
ESTIMATED AVG GLUCOSE: 108 MG/DL (ref 68–131)
ESTRADIOL SERPL-MCNC: 23 PG/ML
FOLATE SERPL-MCNC: 4.5 NG/ML (ref 4–24)
FSH SERPL-ACNC: 0.11 MIU/ML
GLUCOSE SERPL-MCNC: 94 MG/DL (ref 70–110)
HBA1C MFR BLD: 5.4 % (ref 4–5.6)
HCT VFR BLD AUTO: 38 % (ref 37–48.5)
HGB BLD-MCNC: 12.7 G/DL (ref 12–16)
IMM GRANULOCYTES # BLD AUTO: 0.03 K/UL (ref 0–0.04)
IMM GRANULOCYTES NFR BLD AUTO: 0.4 % (ref 0–0.5)
LYMPHOCYTES # BLD AUTO: 1.7 K/UL (ref 1–4.8)
LYMPHOCYTES NFR BLD: 22.5 % (ref 18–48)
MCH RBC QN AUTO: 29.5 PG (ref 27–31)
MCHC RBC AUTO-ENTMCNC: 33.4 G/DL (ref 32–36)
MCV RBC AUTO: 88 FL (ref 82–98)
MONOCYTES # BLD AUTO: 0.5 K/UL (ref 0.3–1)
MONOCYTES NFR BLD: 5.9 % (ref 4–15)
NEUTROPHILS # BLD AUTO: 5.2 K/UL (ref 1.8–7.7)
NEUTROPHILS NFR BLD: 68.5 % (ref 38–73)
NRBC BLD-RTO: 0 /100 WBC
PLATELET # BLD AUTO: 306 K/UL (ref 150–450)
PMV BLD AUTO: 8.7 FL (ref 9.2–12.9)
POTASSIUM SERPL-SCNC: 3.9 MMOL/L (ref 3.5–5.1)
PROLACTIN SERPL IA-MCNC: 11.3 NG/ML (ref 5.2–26.5)
PROT SERPL-MCNC: 6.4 G/DL (ref 6–8.4)
RBC # BLD AUTO: 4.3 M/UL (ref 4–5.4)
SODIUM SERPL-SCNC: 137 MMOL/L (ref 136–145)
TSH SERPL DL<=0.005 MIU/L-ACNC: 2.96 UIU/ML (ref 0.4–4)
WBC # BLD AUTO: 7.59 K/UL (ref 3.9–12.7)

## 2024-03-01 PROCEDURE — 82746 ASSAY OF FOLIC ACID SERUM: CPT | Performed by: FAMILY MEDICINE

## 2024-03-01 PROCEDURE — 82607 VITAMIN B-12: CPT | Performed by: FAMILY MEDICINE

## 2024-03-01 PROCEDURE — 36415 COLL VENOUS BLD VENIPUNCTURE: CPT | Performed by: FAMILY MEDICINE

## 2024-03-01 PROCEDURE — 83001 ASSAY OF GONADOTROPIN (FSH): CPT | Performed by: FAMILY MEDICINE

## 2024-03-01 PROCEDURE — 83036 HEMOGLOBIN GLYCOSYLATED A1C: CPT | Performed by: FAMILY MEDICINE

## 2024-03-01 PROCEDURE — 83921 ORGANIC ACID SINGLE QUANT: CPT | Performed by: FAMILY MEDICINE

## 2024-03-01 PROCEDURE — 85025 COMPLETE CBC W/AUTO DIFF WBC: CPT | Performed by: FAMILY MEDICINE

## 2024-03-01 PROCEDURE — 82670 ASSAY OF TOTAL ESTRADIOL: CPT | Performed by: FAMILY MEDICINE

## 2024-03-01 PROCEDURE — 84443 ASSAY THYROID STIM HORMONE: CPT | Performed by: FAMILY MEDICINE

## 2024-03-01 PROCEDURE — 80053 COMPREHEN METABOLIC PANEL: CPT | Performed by: FAMILY MEDICINE

## 2024-03-01 PROCEDURE — 84146 ASSAY OF PROLACTIN: CPT | Performed by: FAMILY MEDICINE

## 2024-03-04 LAB — VIT B12 SERPL-MCNC: 246 NG/L (ref 180–914)

## 2024-03-05 ENCOUNTER — PATIENT MESSAGE (OUTPATIENT)
Dept: FAMILY MEDICINE | Facility: CLINIC | Age: 44
End: 2024-03-05
Payer: COMMERCIAL

## 2024-03-05 LAB — METHYLMALONATE SERPL-SCNC: 0.08 NMOL/ML

## 2024-03-06 ENCOUNTER — LAB VISIT (OUTPATIENT)
Dept: LAB | Facility: HOSPITAL | Age: 44
End: 2024-03-06
Attending: FAMILY MEDICINE
Payer: COMMERCIAL

## 2024-03-06 DIAGNOSIS — E66.9 OBESITY, UNSPECIFIED CLASSIFICATION, UNSPECIFIED OBESITY TYPE, UNSPECIFIED WHETHER SERIOUS COMORBIDITY PRESENT: ICD-10-CM

## 2024-03-06 DIAGNOSIS — E53.8 VITAMIN B12 DEFICIENCY: Primary | ICD-10-CM

## 2024-03-06 DIAGNOSIS — L90.6 STRIAE: ICD-10-CM

## 2024-03-06 DIAGNOSIS — G47.00 INSOMNIA, UNSPECIFIED TYPE: ICD-10-CM

## 2024-03-06 DIAGNOSIS — R73.9 HYPERGLYCEMIA: ICD-10-CM

## 2024-03-06 DIAGNOSIS — R53.83 FATIGUE, UNSPECIFIED TYPE: ICD-10-CM

## 2024-03-06 DIAGNOSIS — K90.9 INTESTINAL MALABSORPTION, UNSPECIFIED TYPE: ICD-10-CM

## 2024-03-06 DIAGNOSIS — D47.3 ESSENTIAL THROMBOCYTOSIS: ICD-10-CM

## 2024-03-06 DIAGNOSIS — N92.6 MENSTRUAL IRREGULARITY: ICD-10-CM

## 2024-03-06 DIAGNOSIS — K76.0 NAFLD (NONALCOHOLIC FATTY LIVER DISEASE): ICD-10-CM

## 2024-03-06 PROCEDURE — 82530 CORTISOL FREE: CPT | Performed by: FAMILY MEDICINE

## 2024-03-06 RX ORDER — SUVOREXANT 10 MG/1
10 TABLET, FILM COATED ORAL NIGHTLY
Qty: 30 TABLET | Refills: 0 | Status: SHIPPED | OUTPATIENT
Start: 2024-03-06 | End: 2024-03-23 | Stop reason: SDUPTHER

## 2024-03-06 RX ORDER — LANOLIN ALCOHOL/MO/W.PET/CERES
1000 CREAM (GRAM) TOPICAL DAILY
Qty: 90 TABLET | Refills: 3 | Status: SHIPPED | OUTPATIENT
Start: 2024-03-06

## 2024-03-07 ENCOUNTER — OFFICE VISIT (OUTPATIENT)
Dept: PSYCHIATRY | Facility: CLINIC | Age: 44
End: 2024-03-07
Payer: COMMERCIAL

## 2024-03-07 VITALS
HEIGHT: 65 IN | HEART RATE: 78 BPM | SYSTOLIC BLOOD PRESSURE: 129 MMHG | BODY MASS INDEX: 48.82 KG/M2 | DIASTOLIC BLOOD PRESSURE: 82 MMHG | WEIGHT: 293 LBS

## 2024-03-07 DIAGNOSIS — F41.1 GAD (GENERALIZED ANXIETY DISORDER): ICD-10-CM

## 2024-03-07 DIAGNOSIS — F42.9 OBSESSIVE-COMPULSIVE DISORDER, UNSPECIFIED TYPE: ICD-10-CM

## 2024-03-07 DIAGNOSIS — F43.9 TRAUMA AND STRESSOR-RELATED DISORDER: ICD-10-CM

## 2024-03-07 DIAGNOSIS — G47.00 INSOMNIA, UNSPECIFIED TYPE: ICD-10-CM

## 2024-03-07 DIAGNOSIS — F33.2 SEVERE EPISODE OF RECURRENT MAJOR DEPRESSIVE DISORDER, WITHOUT PSYCHOTIC FEATURES: Primary | ICD-10-CM

## 2024-03-07 PROCEDURE — 3008F BODY MASS INDEX DOCD: CPT | Mod: CPTII,S$GLB,, | Performed by: PHYSICIAN ASSISTANT

## 2024-03-07 PROCEDURE — G2211 COMPLEX E/M VISIT ADD ON: HCPCS | Mod: S$GLB,,, | Performed by: PHYSICIAN ASSISTANT

## 2024-03-07 PROCEDURE — 1160F RVW MEDS BY RX/DR IN RCRD: CPT | Mod: CPTII,S$GLB,, | Performed by: PHYSICIAN ASSISTANT

## 2024-03-07 PROCEDURE — 1159F MED LIST DOCD IN RCRD: CPT | Mod: CPTII,S$GLB,, | Performed by: PHYSICIAN ASSISTANT

## 2024-03-07 PROCEDURE — 3044F HG A1C LEVEL LT 7.0%: CPT | Mod: CPTII,S$GLB,, | Performed by: PHYSICIAN ASSISTANT

## 2024-03-07 PROCEDURE — 99999 PR PBB SHADOW E&M-EST. PATIENT-LVL V: CPT | Mod: PBBFAC,,, | Performed by: PHYSICIAN ASSISTANT

## 2024-03-07 PROCEDURE — 3079F DIAST BP 80-89 MM HG: CPT | Mod: CPTII,S$GLB,, | Performed by: PHYSICIAN ASSISTANT

## 2024-03-07 PROCEDURE — 99215 OFFICE O/P EST HI 40 MIN: CPT | Mod: S$GLB,,, | Performed by: PHYSICIAN ASSISTANT

## 2024-03-07 PROCEDURE — 3074F SYST BP LT 130 MM HG: CPT | Mod: CPTII,S$GLB,, | Performed by: PHYSICIAN ASSISTANT
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
## 2024-03-08 ENCOUNTER — TELEPHONE (OUTPATIENT)
Dept: PSYCHIATRY | Facility: CLINIC | Age: 44
End: 2024-03-08
Payer: COMMERCIAL

## 2024-03-08 ENCOUNTER — TELEPHONE (OUTPATIENT)
Dept: FAMILY MEDICINE | Facility: CLINIC | Age: 44
End: 2024-03-08
Payer: COMMERCIAL

## 2024-03-08 NOTE — TELEPHONE ENCOUNTER
Spoke to patient and scheduled a new patient appointment with Dr. Wilkinson for03/26/2025 @ 1:30 . Appointment was scheduled around patient's IOP , not able to come in sooner.

## 2024-03-08 NOTE — TELEPHONE ENCOUNTER
----- Message from Miroslava Lerma MD sent at 3/8/2024  8:22 AM CST -----  Please tell Ms Morgan I am covering for Dr. Morgan while she is out  Her urine cortisol is within the normal range.  Please keep your followup appointments for further discussion.  Dr. Lerma

## 2024-03-11 LAB
COLLECT DURATION TIME UR: 24 H
CORTIS 24H UR-MRATE: 21 MCG/24 H (ref 3.5–45)
SPECIMEN VOL ?TM UR: 1325 ML

## 2024-03-14 ENCOUNTER — TELEPHONE (OUTPATIENT)
Dept: PSYCHIATRY | Facility: CLINIC | Age: 44
End: 2024-03-14
Payer: COMMERCIAL

## 2024-03-14 NOTE — TELEPHONE ENCOUNTER
URGENT CARE    CHIEF COMPLAINT:   Chief Complaint   Patient presents with   • Chest Pain     Patient's mother states the patient has been C/O chest throat and head pain as well as 1 episode of blurry vision that started yesterday as well as irregular heart rate.       SUBJECTIVE:  Mann Seo is a 10 year old male who presents to urgent care with mother with complaints of sore throat x 2 days. Reports yesterday while at school felt chest discomfort, lightheaded and blurred vision while in school.  Per mother child was preparing for the [employer] winter program while in school.  Child reports, \"I raise my hand and told my teacher and she told me to sit down and rest\". Child reports that symptoms lasted a few seconds and subsided spontaneously after resting.  Mother states last night child developed a fever up to 103 Fahrenheit.  Also states that son did not finish his breakfast due to feeling slightly nauseous after eating 1-1/2 pancakes.  Reports that son usually has a great appetite.  Mother also states that after Thanksgiving child \"he was sick with a sore throat and developed a fever up to 101\" and reports that symptoms resolved with supportive treatment. Did not receive any antibiotics. Symptoms returned yesterday.    Review of systems:   Review of Systems   Constitutional: Positive for appetite change and fever. Negative for chills and fatigue.   HENT: Positive for sore throat. Negative for congestion, ear discharge, ear pain, hearing loss, nosebleeds, postnasal drip, rhinorrhea, sinus pain, sneezing, tinnitus, trouble swallowing and voice change.    Eyes: Positive for visual disturbance (lasted a few seconds). Negative for pain, discharge, redness and itching.   Respiratory: Negative for apnea, cough, shortness of breath, wheezing and stridor.    Cardiovascular: Positive for chest pain. Negative for palpitations and leg swelling.   Gastrointestinal: Negative for abdominal distention, abdominal pain,  ----- Message from Kiara Mariee PA-C sent at 3/12/2024  7:38 AM CDT -----  Fax information      diarrhea, nausea and vomiting.   Endocrine: Negative for cold intolerance and polyuria.   Genitourinary: Negative for difficulty urinating, dysuria, frequency and hematuria.   Musculoskeletal: Negative for back pain and myalgias.   Skin: Negative for color change, pallor and rash.   Allergic/Immunologic: Negative for environmental allergies, food allergies and immunocompromised state.   Neurological: Positive for light-headedness (lasted a few seconds). Negative for dizziness, syncope and numbness.   Hematological: Negative for adenopathy.   Psychiatric/Behavioral: Negative for agitation, confusion and decreased concentration.         I have reviewed the past medical history, family history, social history, medications and allergies listed in the medical record as obtained by my nursing staff and support staff and agree with their documentation.    OBJECTIVE:  VITALS:  Visit Vitals  BP 99/68 (BP Location: LUE - Left upper extremity, Patient Position: Sitting)   Pulse 99   Temp 99.6 °F (37.6 °C) (Tympanic)   Resp 20   Wt 33.4 kg (73 lb 11.2 oz)   SpO2 97%         PHYSICAL EXAM:   Physical Exam  Vitals reviewed.   Constitutional:       Appearance: Normal appearance.   HENT:      Head: Normocephalic.      Right Ear: Hearing, tympanic membrane and external ear normal.      Left Ear: Hearing, tympanic membrane and external ear normal.      Ears:      Comments: Bilateral narrow canals, scant cerumen present     Nose: Nose normal.      Mouth/Throat:      Lips: Pink.      Mouth: Mucous membranes are moist.      Pharynx: Oropharynx is clear. Uvula midline. Posterior oropharyngeal erythema present.      Tonsils: No tonsillar exudate.   Eyes:      General: Lids are normal. Lids are everted, no foreign bodies appreciated.      Extraocular Movements: Extraocular movements intact.      Conjunctiva/sclera: Conjunctivae normal.      Pupils: Pupils are equal, round, and reactive to light.   Cardiovascular:      Rate and Rhythm:  Normal rate and regular rhythm.      Pulses: Normal pulses.      Heart sounds: Normal heart sounds, S1 normal and S2 normal.      Comments: EKG Sinus Rhythm, Possible right ventricular conduction, borderline ecg  Pulmonary:      Effort: Pulmonary effort is normal.      Breath sounds: Normal breath sounds.   Abdominal:      Palpations: Abdomen is soft.   Musculoskeletal:         General: Normal range of motion.      Cervical back: Normal range of motion and neck supple.      Right lower leg: No edema.      Left lower leg: No edema.   Skin:     General: Skin is warm.      Capillary Refill: Capillary refill takes less than 2 seconds.   Neurological:      General: No focal deficit present.      Mental Status: He is alert.   Psychiatric:         Mood and Affect: Mood normal.          ASSESSMENT:   1. Pharyngitis, streptococcal, acute    2. Fever, unspecified fever cause        PLAN:   Orders Placed This Encounter   • Electrocardiogram 12-Lead   • POCT Rapid strep A   • POCT COVID/Flu/RSV Panel via [facility]   • azithromycin (ZITHROMAX) 200 MG/5ML suspension     Streptococcal pharyngitis, fever: Advised mother that there was a faint line which revealed that the test for strep was positive.  We will treat for strep using azithromycin given patient's allergies. Encourage mother to schedule appointment with PCP for follow-up in the next couple of weeks or sooner if any worsening of symptoms or if symptoms do not improve.  Discussed importance of seeking treatment if any issues with sore throat, fever, or acute illness.  Patient mother advised that child should be evaluated and tested to rule out any bacterial infection.  Verbalized understanding of plan of care and agrees    Return in about 2 weeks (around 12/17/2022) for Follow-up with PCP.  Schedule sooner appointment if any worsening in symptoms..

## 2024-03-14 NOTE — TELEPHONE ENCOUNTER
Kiara asked me to fax paperwork she filled out for patient to go to [facility]   Fax # [phone]  office [phone]  This paper work was faxed and put into scanning to go in patients chart.

## 2024-03-23 DIAGNOSIS — G47.00 INSOMNIA, UNSPECIFIED TYPE: ICD-10-CM

## 2024-03-25 RX ORDER — SUVOREXANT 10 MG/1
10 TABLET, FILM COATED ORAL NIGHTLY
Qty: 30 TABLET | Refills: 0 | Status: SHIPPED | OUTPATIENT
Start: 2024-03-25 | End: 2024-04-26

## 2024-04-01 ENCOUNTER — OFFICE VISIT (OUTPATIENT)
Dept: FAMILY MEDICINE | Facility: CLINIC | Age: 44
End: 2024-04-01
Payer: COMMERCIAL

## 2024-04-01 VITALS
SYSTOLIC BLOOD PRESSURE: 132 MMHG | HEART RATE: 105 BPM | DIASTOLIC BLOOD PRESSURE: 86 MMHG | BODY MASS INDEX: 48.82 KG/M2 | OXYGEN SATURATION: 96 % | WEIGHT: 293 LBS | RESPIRATION RATE: 18 BRPM | HEIGHT: 65 IN

## 2024-04-01 DIAGNOSIS — E55.9 VITAMIN D DEFICIENCY: Primary | ICD-10-CM

## 2024-04-01 PROCEDURE — 3008F BODY MASS INDEX DOCD: CPT | Mod: S$GLB,,, | Performed by: FAMILY MEDICINE

## 2024-04-01 PROCEDURE — 3044F HG A1C LEVEL LT 7.0%: CPT | Mod: S$GLB,,, | Performed by: FAMILY MEDICINE

## 2024-04-01 PROCEDURE — 99999 PR PBB SHADOW E&M-EST. PATIENT-LVL V: CPT | Mod: PBBFAC,,, | Performed by: FAMILY MEDICINE

## 2024-04-01 PROCEDURE — 1159F MED LIST DOCD IN RCRD: CPT | Mod: S$GLB,,, | Performed by: FAMILY MEDICINE

## 2024-04-01 PROCEDURE — 1160F RVW MEDS BY RX/DR IN RCRD: CPT | Mod: S$GLB,,, | Performed by: FAMILY MEDICINE

## 2024-04-01 PROCEDURE — 3075F SYST BP GE 130 - 139MM HG: CPT | Mod: S$GLB,,, | Performed by: FAMILY MEDICINE

## 2024-04-01 PROCEDURE — 99213 OFFICE O/P EST LOW 20 MIN: CPT | Mod: S$GLB,,, | Performed by: FAMILY MEDICINE

## 2024-04-01 PROCEDURE — 3079F DIAST BP 80-89 MM HG: CPT | Mod: S$GLB,,, | Performed by: FAMILY MEDICINE

## 2024-04-01 NOTE — PROGRESS NOTES
Subjective:       Patient ID: Mariana Mora is a 43 y.o. female.    Chief Complaint: Follow-up (Discuss vit. D )    Presents today for follow up.     Following with [facility] which is helping some.     Recent negative lab work up for [facility].     She would like to check on Vitamin D again. Not currently on supplement.       Review of Systems   Constitutional:  Positive for fatigue. Negative for activity change, appetite change and fever.   Respiratory:  Negative for shortness of breath.    Gastrointestinal:  Negative for abdominal pain.   Integumentary:  Negative for rash.         Objective:      Physical Exam  Vitals and nursing note reviewed.   Constitutional:       General: She is not in acute distress.     Appearance: She is not ill-appearing.   Cardiovascular:      Rate and Rhythm: Normal rate and regular rhythm.      Heart sounds: No murmur heard.  Pulmonary:      Effort: Pulmonary effort is normal.      Breath sounds: Normal breath sounds. No wheezing.   Skin:     General: Skin is warm and dry.      Findings: No rash.   Neurological:      Mental Status: She is alert.   Psychiatric:         Mood and Affect: Mood normal.         Behavior: Behavior normal.         Assessment:       1. Vitamin D deficiency        Plan:       Problem List Items Addressed This Visit          Endocrine    Vitamin D deficiency - Primary    Relevant Orders    Vitamin D

## 2024-04-15 ENCOUNTER — PATIENT MESSAGE (OUTPATIENT)
Dept: PSYCHIATRY | Facility: CLINIC | Age: 44
End: 2024-04-15
Payer: COMMERCIAL

## 2024-04-15 ENCOUNTER — PROCEDURE VISIT (OUTPATIENT)
Dept: NEUROLOGY | Facility: CLINIC | Age: 44
End: 2024-04-15
Payer: COMMERCIAL

## 2024-04-15 VITALS
SYSTOLIC BLOOD PRESSURE: 117 MMHG | RESPIRATION RATE: 17 BRPM | HEART RATE: 105 BPM | WEIGHT: 293 LBS | DIASTOLIC BLOOD PRESSURE: 78 MMHG | BODY MASS INDEX: 48.82 KG/M2 | TEMPERATURE: 98 F | HEIGHT: 65 IN

## 2024-04-15 DIAGNOSIS — G43.019 INTRACTABLE MIGRAINE WITHOUT AURA AND WITHOUT STATUS MIGRAINOSUS: Primary | ICD-10-CM

## 2024-04-15 PROCEDURE — 64615 CHEMODENERV MUSC MIGRAINE: CPT | Mod: S$GLB,,, | Performed by: PSYCHIATRY & NEUROLOGY

## 2024-04-15 NOTE — PROCEDURES
Procedures  BOTOX  The patient has chronic migraines ( G43.719) and suffers from headaches more than 3 months, more than 15 days of headache days per month lasting more than 4 hours with at least 8 attacks that meet criteria for migraine.    Botulinum Toxin Injection Procedure   Pre-operative diagnosis: Chronic migraine   Post-operative diagnosis: Same   Procedure: Chemical neurolysis     The Botox injections have achieved well over 50%  improvement in the patient's symptoms. Migraines have been reduced at least 7 days per month and the number of cumulative hours suffering with headaches has been reduced at least 100 total hours per month. Today she does have a headache indicating that the Botox has worn off. Frequency of treatment is every 3 months unless no response to the treatments, at which time we will discontinue the injections.    After risks and benefits were explained including bleeding, infection, worsening of pain, damage to the areas being injected, weakness of muscles, loss of muscle control, dysphagia if injecting the head or neck, facial droop if injecting the facial area, painful injection, allergic or other reaction to the medications being injected, and the failure of the procedure to help the problem, a signed consent was obtained.   The patient was placed in a comfortable area and the sites to be treated were identified.The area to be treated was prepped three times with alcohol and the alcohol allowed to dry. Next, a 30 gauge needle was used to inject the medication in the area to be treated.   Area(s) injected:   Total Botox used: 175 Units   Botox wastage: 25 Units   Injection sites:    muscle bilaterally ( a total of 10 units divided into 2 sites)   Procerus muscle (5 units)   Frontalis muscle bilaterally (a total of 20 units divided into 4 sites)   Temporalis muscle bilaterally (a total of 40 units divided into 8 sites)   Masseters muscles bilaterally (a total of 20 units at 4  sites)  Occipitalis muscle bilaterally (a total of 30 units divided into 6 sites)   Cervical paraspinal muscles (a total of 20 units divided into 4 sites)   Trapezius muscle bilaterally (a total of 30 units divided into 6 sites)   Complications: none   RTC for the next Botox injection: 3 months     Alejandro Cárdenas M.D   of Neurology  ACGME Board Certified, Neurology  [facility], Board certified, headache Medicine  Medical Director, Headache and Facial Pain  [facility]

## 2024-04-18 ENCOUNTER — PATIENT MESSAGE (OUTPATIENT)
Dept: NEUROLOGY | Facility: CLINIC | Age: 44
End: 2024-04-18
Payer: COMMERCIAL

## 2024-04-18 RX ORDER — RIMEGEPANT SULFATE 75 MG/75MG
TABLET, ORALLY DISINTEGRATING ORAL
Qty: 8 TABLET | Refills: 11 | Status: SHIPPED | OUTPATIENT
Start: 2024-04-18

## 2024-04-19 DIAGNOSIS — F42.9 OBSESSIVE-COMPULSIVE DISORDER, UNSPECIFIED TYPE: ICD-10-CM

## 2024-04-19 RX ORDER — SERTRALINE HYDROCHLORIDE 100 MG/1
200 TABLET, FILM COATED ORAL DAILY
Qty: 60 TABLET | Refills: 0 | Status: SHIPPED | OUTPATIENT
Start: 2024-04-19

## 2024-04-26 ENCOUNTER — OFFICE VISIT (OUTPATIENT)
Dept: PSYCHIATRY | Facility: CLINIC | Age: 44
End: 2024-04-26
Payer: COMMERCIAL

## 2024-04-26 ENCOUNTER — LAB VISIT (OUTPATIENT)
Dept: LAB | Facility: HOSPITAL | Age: 44
End: 2024-04-26
Attending: PHYSICIAN ASSISTANT
Payer: COMMERCIAL

## 2024-04-26 VITALS
BODY MASS INDEX: 48.82 KG/M2 | WEIGHT: 293 LBS | DIASTOLIC BLOOD PRESSURE: 85 MMHG | SYSTOLIC BLOOD PRESSURE: 122 MMHG | HEART RATE: 81 BPM | HEIGHT: 65 IN

## 2024-04-26 DIAGNOSIS — F90.2 ATTENTION DEFICIT HYPERACTIVITY DISORDER (ADHD), COMBINED TYPE: ICD-10-CM

## 2024-04-26 DIAGNOSIS — F90.2 ATTENTION DEFICIT HYPERACTIVITY DISORDER (ADHD), COMBINED TYPE: Primary | ICD-10-CM

## 2024-04-26 DIAGNOSIS — F41.1 GAD (GENERALIZED ANXIETY DISORDER): ICD-10-CM

## 2024-04-26 DIAGNOSIS — G47.00 INSOMNIA, UNSPECIFIED TYPE: ICD-10-CM

## 2024-04-26 DIAGNOSIS — F42.9 OBSESSIVE-COMPULSIVE DISORDER, UNSPECIFIED TYPE: ICD-10-CM

## 2024-04-26 LAB
AMPHET+METHAMPHET UR QL: NEGATIVE
BARBITURATES UR QL SCN>200 NG/ML: NEGATIVE
BENZODIAZ UR QL SCN>200 NG/ML: NEGATIVE
BZE UR QL SCN: NEGATIVE
CANNABINOIDS UR QL SCN: NEGATIVE
CREAT UR-MCNC: 78.6 MG/DL (ref 15–325)
OPIATES UR QL SCN: NEGATIVE
PCP UR QL SCN>25 NG/ML: NEGATIVE
TOXICOLOGY INFORMATION: NORMAL

## 2024-04-26 PROCEDURE — 3008F BODY MASS INDEX DOCD: CPT | Mod: CPTII,S$GLB,, | Performed by: PHYSICIAN ASSISTANT

## 2024-04-26 PROCEDURE — 3044F HG A1C LEVEL LT 7.0%: CPT | Mod: CPTII,S$GLB,, | Performed by: PHYSICIAN ASSISTANT

## 2024-04-26 PROCEDURE — 99999 PR PBB SHADOW E&M-EST. PATIENT-LVL V: CPT | Mod: PBBFAC,,, | Performed by: PHYSICIAN ASSISTANT

## 2024-04-26 PROCEDURE — 80307 DRUG TEST PRSMV CHEM ANLYZR: CPT | Performed by: PHYSICIAN ASSISTANT

## 2024-04-26 PROCEDURE — 99214 OFFICE O/P EST MOD 30 MIN: CPT | Mod: S$GLB,,, | Performed by: PHYSICIAN ASSISTANT

## 2024-04-26 PROCEDURE — 1159F MED LIST DOCD IN RCRD: CPT | Mod: CPTII,S$GLB,, | Performed by: PHYSICIAN ASSISTANT

## 2024-04-26 PROCEDURE — 3074F SYST BP LT 130 MM HG: CPT | Mod: CPTII,S$GLB,, | Performed by: PHYSICIAN ASSISTANT

## 2024-04-26 PROCEDURE — 3079F DIAST BP 80-89 MM HG: CPT | Mod: CPTII,S$GLB,, | Performed by: PHYSICIAN ASSISTANT

## 2024-04-26 PROCEDURE — G2211 COMPLEX E/M VISIT ADD ON: HCPCS | Mod: S$GLB,,, | Performed by: PHYSICIAN ASSISTANT

## 2024-04-26 PROCEDURE — 1160F RVW MEDS BY RX/DR IN RCRD: CPT | Mod: CPTII,S$GLB,, | Performed by: PHYSICIAN ASSISTANT

## 2024-04-26 RX ORDER — PRAZOSIN HYDROCHLORIDE 5 MG/1
10 CAPSULE ORAL NIGHTLY
Qty: 180 CAPSULE | Refills: 1 | Status: SHIPPED | OUTPATIENT
Start: 2024-04-26

## 2024-04-26 RX ORDER — PRAZOSIN HYDROCHLORIDE 2 MG/1
2 CAPSULE ORAL 2 TIMES DAILY
Qty: 30 CAPSULE | Refills: 1 | Status: SHIPPED | OUTPATIENT
Start: 2024-04-26 | End: 2024-06-14

## 2024-04-26 RX ORDER — LISDEXAMFETAMINE DIMESYLATE 30 MG/1
30 CAPSULE ORAL EVERY MORNING
Qty: 30 CAPSULE | Refills: 0 | Status: SHIPPED | OUTPATIENT
Start: 2024-04-26 | End: 2024-05-28 | Stop reason: SDUPTHER

## 2024-04-26 RX ORDER — BUSPIRONE HYDROCHLORIDE 10 MG/1
10 TABLET ORAL 2 TIMES DAILY
Qty: 60 TABLET | Refills: 1 | Status: SHIPPED | OUTPATIENT
Start: 2024-04-26

## 2024-04-26 RX ORDER — LISDEXAMFETAMINE DIMESYLATE 30 MG/1
30 CAPSULE ORAL EVERY MORNING
Start: 2024-04-26 | End: 2024-04-26 | Stop reason: SDUPTHER

## 2024-04-26 RX ORDER — SERTRALINE HYDROCHLORIDE 100 MG/1
200 TABLET, FILM COATED ORAL DAILY
Qty: 60 TABLET | Refills: 1 | Status: SHIPPED | OUTPATIENT
Start: 2024-04-26
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
## 2024-05-02 ENCOUNTER — PATIENT MESSAGE (OUTPATIENT)
Dept: FAMILY MEDICINE | Facility: CLINIC | Age: 44
End: 2024-05-02
Payer: COMMERCIAL

## 2024-05-03 DIAGNOSIS — E78.49 OTHER HYPERLIPIDEMIA: ICD-10-CM

## 2024-05-03 RX ORDER — ATORVASTATIN CALCIUM 10 MG/1
10 TABLET, FILM COATED ORAL
Qty: 90 TABLET | Refills: 0 | Status: SHIPPED | OUTPATIENT
Start: 2024-05-03

## 2024-05-03 NOTE — TELEPHONE ENCOUNTER
Care Due:                  Date            Visit Type   Department     Provider  --------------------------------------------------------------------------------                                MYCHART                              FOLLOWUP/OF  [facility] FAM[facility]  Last Visit: 04-      FICE VISIT   MEDICINE       Emeli Morgan                              EP -                              PRIMARY      [facility]  Next Visit: 08-      CARE ([facility])   MEDICINE       Emeli Morgan                                                            Last  Test          Frequency    Reason                     Performed    Due Date  --------------------------------------------------------------------------------    Lipid Panel.  12 months..  atorvastatin.............  12- 12-    [facility] Embedded Care Due Messages. Reference number: [id number].   5/03/2024 6:43:33 AM CDT

## 2024-05-03 NOTE — TELEPHONE ENCOUNTER
Refill Routing Note   Medication(s) are not appropriate for processing by [facility] for the following reason(s):        Required labs outdated  New or recently adjusted medication    [facility] action(s):  Defer   Requires labs : Yes             Appointments  past 12m or future 3m with PCP    Date Provider   Last Visit   4/1/2024 Emeli Morgan MD   Next Visit   8/5/2024 Emeli Morgan MD   ED visits in past 90 days: 0        Note composed:7:38 AM 05/03/2024

## 2024-05-09 ENCOUNTER — HOSPITAL ENCOUNTER (OUTPATIENT)
Dept: RADIOLOGY | Facility: HOSPITAL | Age: 44
Discharge: HOME | End: 2024-05-09
Attending: NURSE PRACTITIONER
Payer: COMMERCIAL

## 2024-05-09 DIAGNOSIS — R13.10 DYSPHAGIA, UNSPECIFIED TYPE: ICD-10-CM

## 2024-05-09 PROCEDURE — 71046 X-RAY EXAM CHEST 2 VIEWS: CPT | Mod: 26,,, | Performed by: RADIOLOGY

## 2024-05-09 PROCEDURE — 71046 X-RAY EXAM CHEST 2 VIEWS: CPT | Mod: TC,PN

## 2024-05-17 ENCOUNTER — OFFICE VISIT (OUTPATIENT)
Dept: FAMILY MEDICINE | Facility: CLINIC | Age: 44
End: 2024-05-17
Payer: COMMERCIAL

## 2024-05-17 VITALS
WEIGHT: 293 LBS | BODY MASS INDEX: 48.82 KG/M2 | SYSTOLIC BLOOD PRESSURE: 114 MMHG | HEIGHT: 65 IN | OXYGEN SATURATION: 96 % | DIASTOLIC BLOOD PRESSURE: 76 MMHG | HEART RATE: 92 BPM | RESPIRATION RATE: 18 BRPM

## 2024-05-17 DIAGNOSIS — R53.83 FATIGUE, UNSPECIFIED TYPE: ICD-10-CM

## 2024-05-17 DIAGNOSIS — D64.9 ANEMIA, UNSPECIFIED TYPE: Primary | ICD-10-CM

## 2024-05-17 PROCEDURE — 99999 PR PBB SHADOW E&M-EST. PATIENT-LVL V: CPT | Mod: PBBFAC,,, | Performed by: FAMILY MEDICINE

## 2024-05-17 PROCEDURE — 3008F BODY MASS INDEX DOCD: CPT | Mod: S$GLB,,, | Performed by: FAMILY MEDICINE

## 2024-05-17 PROCEDURE — 3074F SYST BP LT 130 MM HG: CPT | Mod: S$GLB,,, | Performed by: FAMILY MEDICINE

## 2024-05-17 PROCEDURE — 99214 OFFICE O/P EST MOD 30 MIN: CPT | Mod: S$GLB,,, | Performed by: FAMILY MEDICINE

## 2024-05-17 PROCEDURE — 3044F HG A1C LEVEL LT 7.0%: CPT | Mod: S$GLB,,, | Performed by: FAMILY MEDICINE

## 2024-05-17 PROCEDURE — 1159F MED LIST DOCD IN RCRD: CPT | Mod: S$GLB,,, | Performed by: FAMILY MEDICINE

## 2024-05-17 PROCEDURE — 1160F RVW MEDS BY RX/DR IN RCRD: CPT | Mod: S$GLB,,, | Performed by: FAMILY MEDICINE

## 2024-05-17 PROCEDURE — 3078F DIAST BP <80 MM HG: CPT | Mod: S$GLB,,, | Performed by: FAMILY MEDICINE

## 2024-05-17 NOTE — PROGRESS NOTES
Subjective:       Patient ID: Mariana Mora is a 43 y.o. female.    Chief Complaint: Depression (Discuss labs for possible anemia, depression and fatigue. )    Still with lots of fatigue.     She is off of belsomra - she is now on vyvanse for adhd    She is now sleeping at night.     Not sure how much of this fatigue/run down feeling is anemia and how much is depression.     Wt Readings from Last 6 Encounters:  05/17/24 : (!) 136.2 kg (300 lb 4.8 oz)  04/26/24 : (!) 137.2 kg (302 lb 9.3 oz)  04/19/24 : 133.4 kg (294 lb)  04/15/24 : 133.4 kg (294 lb 1.5 oz)  04/02/24 : 133.4 kg (294 lb)  04/01/24 : 133.5 kg (294 lb 6.4 oz)    Cough for a couple of weeks.         .  Patient Active Problem List   Diagnosis    Migraine with aura, with intractable migraine, so stated, without mention of status migrainosus    Intermittent confusion    Vitamin B12 deficiency    EMU    Vitamin B2 deficiency    Vitamin B6 deficiency    Anxiety    Gastroesophageal reflux disease    Psoriatic arthritis    Dysphagia    Gastroesophageal reflux disease with esophagitis    Abdominal pain    Preop examination    Hiatal hernia    Obesity, Class III, BMI 40-49.9 (morbid obesity)    GERD (gastroesophageal reflux disease)    Diarrhea    History of esophageal stricture    Nausea    Vitamin D deficiency    Primary insomnia    Functional diarrhea    Symptomatic cholelithiasis    Facial flushing    Dyslipidemia (high LDL; low HDL), baseline LDL-C 181    Cotton wool spots, OS    NAFLD (nonalcoholic fatty liver disease)    Essential thrombocytosis    Cardiovascular event risk, ASCVD 10-years risk 2.4%, 2021    RASHID (dyspnea on exertion), onset 2016    SUNIL on CPAP, 100% use    Tachycardia, with standing    Kyphosis (acquired) (postural)    Impaired mobility and activities of daily living     Mariana has a current medication list which includes the following prescription(s): atorvastatin, buspirone, vraylar, cetirizine, cyanocobalamin, slynd,  ergocalciferol, famotidine, tremfya, hydrochlorothiazide, ketoconazole, lisdexamfetamine, methocarbamol, montelukast, nystatin, nystop, omalizumab, pantoprazole, prazosin, prazosin, prochlorperazine, nurtec, rizatriptan, sertraline, sertraline, synthroid, and leflunomide, and the following Facility-Administered Medications: diphenhydramine, lactated ringers, lactated ringers, lidocaine (pf) 10 mg/ml (1%), morphine, onabotulinumtoxina, onabotulinumtoxina, and onabotulinumtoxina.    Review of Systems   Constitutional:  Negative for activity change, appetite change, fatigue and fever.   Respiratory:  Negative for shortness of breath.    Gastrointestinal:  Negative for abdominal pain.   Integumentary:  Negative for rash.         Health Maintenance Due   Topic Date Due    Pneumococcal Vaccines (Age 0-64) (1 of 2 - PCV) Never done    HIV Screening  Never done    TETANUS VACCINE  Never done    COVID-19 Vaccine (4 - 2023-24 season) 09/01/2023    Mammogram  05/03/2024      Health Maintenance reviewed and discussed- Mammogram is scheduled.   Objective:      Physical Exam  Vitals and nursing note reviewed.   Constitutional:       General: She is not in acute distress.     Appearance: She is not ill-appearing.   Cardiovascular:      Rate and Rhythm: Normal rate and regular rhythm.      Heart sounds: No murmur heard.  Pulmonary:      Effort: Pulmonary effort is normal.      Breath sounds: Normal breath sounds. No wheezing.   Skin:     General: Skin is warm and dry.      Findings: No rash.   Neurological:      Mental Status: She is alert.   Psychiatric:         Mood and Affect: Mood normal.         Behavior: Behavior normal.         Assessment:       1. Anemia, unspecified type    2. Fatigue, unspecified type        Plan:       1. Anemia, unspecified type  -     CBC Auto Differential; Future; Expected date: 05/17/2024    2. Fatigue, unspecified type  -     CBC Auto Differential; Future; Expected date: 05/17/2024  -     TSH;  Future; Expected date: 05/17/2024

## 2024-05-20 ENCOUNTER — LAB VISIT (OUTPATIENT)
Dept: LAB | Facility: HOSPITAL | Age: 44
End: 2024-05-20
Attending: FAMILY MEDICINE
Payer: COMMERCIAL

## 2024-05-20 DIAGNOSIS — E55.9 VITAMIN D DEFICIENCY: ICD-10-CM

## 2024-05-20 DIAGNOSIS — R53.83 FATIGUE, UNSPECIFIED TYPE: ICD-10-CM

## 2024-05-20 DIAGNOSIS — D64.9 ANEMIA, UNSPECIFIED TYPE: ICD-10-CM

## 2024-05-20 LAB
25(OH)D3+25(OH)D2 SERPL-MCNC: 24 NG/ML (ref 30–96)
BASOPHILS # BLD AUTO: 0.06 K/UL (ref 0–0.2)
BASOPHILS NFR BLD: 0.9 % (ref 0–1.9)
DIFFERENTIAL METHOD BLD: ABNORMAL
EOSINOPHIL # BLD AUTO: 0.2 K/UL (ref 0–0.5)
EOSINOPHIL NFR BLD: 2.9 % (ref 0–8)
ERYTHROCYTE [DISTWIDTH] IN BLOOD BY AUTOMATED COUNT: 13.1 % (ref 11.5–14.5)
FERRITIN SERPL-MCNC: 87 NG/ML (ref 20–300)
HCT VFR BLD AUTO: 41.7 % (ref 37–48.5)
HGB BLD-MCNC: 14 G/DL (ref 12–16)
IMM GRANULOCYTES # BLD AUTO: 0.03 K/UL (ref 0–0.04)
IMM GRANULOCYTES NFR BLD AUTO: 0.5 % (ref 0–0.5)
IRON SERPL-MCNC: 101 UG/DL (ref 30–160)
LYMPHOCYTES # BLD AUTO: 2.1 K/UL (ref 1–4.8)
LYMPHOCYTES NFR BLD: 31.7 % (ref 18–48)
MCH RBC QN AUTO: 29.3 PG (ref 27–31)
MCHC RBC AUTO-ENTMCNC: 33.6 G/DL (ref 32–36)
MCV RBC AUTO: 87 FL (ref 82–98)
MONOCYTES # BLD AUTO: 0.5 K/UL (ref 0.3–1)
MONOCYTES NFR BLD: 7.4 % (ref 4–15)
NEUTROPHILS # BLD AUTO: 3.8 K/UL (ref 1.8–7.7)
NEUTROPHILS NFR BLD: 56.6 % (ref 38–73)
NRBC BLD-RTO: 0 /100 WBC
PLATELET # BLD AUTO: 337 K/UL (ref 150–450)
PMV BLD AUTO: 9.1 FL (ref 9.2–12.9)
RBC # BLD AUTO: 4.78 M/UL (ref 4–5.4)
SATURATED IRON: 22 % (ref 20–50)
TOTAL IRON BINDING CAPACITY: 460 UG/DL (ref 250–450)
TRANSFERRIN SERPL-MCNC: 311 MG/DL (ref 200–375)
TSH SERPL DL<=0.005 MIU/L-ACNC: 2.75 UIU/ML (ref 0.4–4)
WBC # BLD AUTO: 6.66 K/UL (ref 3.9–12.7)

## 2024-05-20 PROCEDURE — 82728 ASSAY OF FERRITIN: CPT | Performed by: FAMILY MEDICINE

## 2024-05-20 PROCEDURE — 85025 COMPLETE CBC W/AUTO DIFF WBC: CPT | Performed by: FAMILY MEDICINE

## 2024-05-20 PROCEDURE — 83540 ASSAY OF IRON: CPT | Performed by: FAMILY MEDICINE

## 2024-05-20 PROCEDURE — 36415 COLL VENOUS BLD VENIPUNCTURE: CPT | Performed by: FAMILY MEDICINE

## 2024-05-20 PROCEDURE — 84443 ASSAY THYROID STIM HORMONE: CPT | Performed by: FAMILY MEDICINE

## 2024-05-20 PROCEDURE — 82306 VITAMIN D 25 HYDROXY: CPT | Performed by: FAMILY MEDICINE

## 2024-05-24 ENCOUNTER — HOSPITAL ENCOUNTER (OUTPATIENT)
Dept: RADIOLOGY | Facility: HOSPITAL | Age: 44
Discharge: HOME | End: 2024-05-24
Attending: FAMILY MEDICINE
Payer: COMMERCIAL

## 2024-05-24 DIAGNOSIS — Z12.31 ENCOUNTER FOR SCREENING MAMMOGRAM FOR BREAST CANCER: ICD-10-CM

## 2024-05-24 PROCEDURE — 77067 SCR MAMMO BI INCL CAD: CPT | Mod: TC

## 2024-05-24 PROCEDURE — 77067 SCR MAMMO BI INCL CAD: CPT | Mod: 26,,, | Performed by: RADIOLOGY

## 2024-05-24 PROCEDURE — 77063 BREAST TOMOSYNTHESIS BI: CPT | Mod: 26,,, | Performed by: RADIOLOGY

## 2024-05-27 ENCOUNTER — PATIENT MESSAGE (OUTPATIENT)
Dept: FAMILY MEDICINE | Facility: CLINIC | Age: 44
End: 2024-05-27
Payer: COMMERCIAL

## 2024-05-28 DIAGNOSIS — F33.41 RECURRENT MAJOR DEPRESSIVE DISORDER, IN PARTIAL REMISSION: ICD-10-CM

## 2024-05-28 DIAGNOSIS — F90.2 ATTENTION DEFICIT HYPERACTIVITY DISORDER (ADHD), COMBINED TYPE: ICD-10-CM

## 2024-05-29 RX ORDER — CARIPRAZINE 1.5 MG/1
1.5 CAPSULE, GELATIN COATED ORAL DAILY
Qty: 30 CAPSULE | Refills: 1 | Status: SHIPPED | OUTPATIENT
Start: 2024-05-29

## 2024-05-29 RX ORDER — LISDEXAMFETAMINE DIMESYLATE 30 MG/1
30 CAPSULE ORAL EVERY MORNING
Qty: 30 CAPSULE | Refills: 0 | Status: SHIPPED | OUTPATIENT
Start: 2024-05-29

## 2024-05-31 ENCOUNTER — HOSPITAL ENCOUNTER (OUTPATIENT)
Dept: RADIOLOGY | Facility: HOSPITAL | Age: 44
Discharge: HOME | End: 2024-05-31
Attending: FAMILY MEDICINE
Payer: COMMERCIAL

## 2024-05-31 DIAGNOSIS — N63.20 MASS OF LEFT BREAST ON MAMMOGRAM: ICD-10-CM

## 2024-05-31 DIAGNOSIS — N63.10 MASS OF RIGHT BREAST ON MAMMOGRAM: ICD-10-CM

## 2024-05-31 PROCEDURE — 77062 BREAST TOMOSYNTHESIS BI: CPT | Mod: 26,,, | Performed by: RADIOLOGY

## 2024-05-31 PROCEDURE — 76642 ULTRASOUND BREAST LIMITED: CPT | Mod: 26,RT,, | Performed by: RADIOLOGY

## 2024-05-31 PROCEDURE — 76642 ULTRASOUND BREAST LIMITED: CPT | Mod: TC,RT

## 2024-05-31 PROCEDURE — 77066 DX MAMMO INCL CAD BI: CPT | Mod: 26,,, | Performed by: RADIOLOGY

## 2024-05-31 PROCEDURE — 77062 BREAST TOMOSYNTHESIS BI: CPT | Mod: TC

## 2024-06-10 ENCOUNTER — PATIENT MESSAGE (OUTPATIENT)
Dept: FAMILY MEDICINE | Facility: CLINIC | Age: 44
End: 2024-06-10
Payer: COMMERCIAL

## 2024-06-10 DIAGNOSIS — E66.01 CLASS 3 SEVERE OBESITY DUE TO EXCESS CALORIES WITH SERIOUS COMORBIDITY AND BODY MASS INDEX (BMI) OF 45.0 TO 49.9 IN ADULT: Primary | ICD-10-CM

## 2024-06-11 ENCOUNTER — PATIENT MESSAGE (OUTPATIENT)
Dept: PSYCHIATRY | Facility: CLINIC | Age: 44
End: 2024-06-11
Payer: COMMERCIAL

## 2024-06-11 RX ORDER — SEMAGLUTIDE 0.25 MG/.5ML
0.25 INJECTION, SOLUTION SUBCUTANEOUS
Qty: 2 ML | Refills: 0 | Status: SHIPPED | OUTPATIENT
Start: 2024-06-11

## 2024-06-24 ENCOUNTER — TELEPHONE (OUTPATIENT)
Dept: NEUROLOGY | Facility: CLINIC | Age: 44
End: 2024-06-24
Payer: COMMERCIAL

## 2024-06-24 NOTE — TELEPHONE ENCOUNTER
----- Message from Hipolito Alba, Patient Care Assistant sent at 6/24/2024  8:56 AM CDT -----  Contact: Pt  Type: Needs Medical Advice  Who Called: Pt  Best Call Back Number: [phone]  Inquiry/Question: Pt would like to have her Follow Up appt and Botox scheduled on the same say due to she has to drive far to get to appt. Pt is scheduled 07/10/24 and 07/11/24. Please advise Thank you~

## 2024-06-24 NOTE — TELEPHONE ENCOUNTER
Called patient and notified that we can not combine the 2 appointments due to coverage reasons. Verbalized understanding.

## 2024-06-26 ENCOUNTER — PATIENT MESSAGE (OUTPATIENT)
Dept: FAMILY MEDICINE | Facility: CLINIC | Age: 44
End: 2024-06-26
Payer: COMMERCIAL

## 2024-06-26 DIAGNOSIS — E03.9 HYPOTHYROIDISM, UNSPECIFIED TYPE: ICD-10-CM

## 2024-06-26 RX ORDER — LEVOTHYROXINE SODIUM 112 UG/1
112 TABLET ORAL
Qty: 90 TABLET | Refills: 3 | Status: SHIPPED | OUTPATIENT
Start: 2024-06-26 | End: 2025-06-26

## 2024-06-26 NOTE — TELEPHONE ENCOUNTER
Refill Routing Note   Medication(s) are not appropriate for processing by [facility] for the following reason(s):        No active prescription written by provider    [facility] action(s):  Defer   Requires labs : Yes             Appointments  past 12m or future 3m with PCP    Date Provider   Last Visit   5/17/2024 Emeli Morgan MD   Next Visit   8/5/2024 Emeli Morgan MD   ED visits in past 90 days: 0        Note composed:1:56 PM 06/26/2024

## 2024-06-26 NOTE — TELEPHONE ENCOUNTER
Care Due:                  Date            Visit Type   Department     Provider  --------------------------------------------------------------------------------                                EP -                              PRIMARY      [facility]  Last Visit: 05-      CARE ([facility])   MEDICINE       Emeli Morgan  Next Visit: None Scheduled  None         None Found                                                            Last  Test          Frequency    Reason                     Performed    Due Date  --------------------------------------------------------------------------------    HBA1C.......  6 months...  semaglutide,.............  03- 08-    [facility] Embedded Care Due Messages. Reference number: [id number].   6/26/2024 1:50:36 PM CDT

## 2024-07-08 ENCOUNTER — PATIENT MESSAGE (OUTPATIENT)
Dept: FAMILY MEDICINE | Facility: CLINIC | Age: 44
End: 2024-07-08
Payer: COMMERCIAL

## 2024-07-08 DIAGNOSIS — F41.1 GAD (GENERALIZED ANXIETY DISORDER): ICD-10-CM

## 2024-07-08 RX ORDER — BUSPIRONE HYDROCHLORIDE 10 MG/1
10 TABLET ORAL 2 TIMES DAILY
Qty: 60 TABLET | Refills: 1 | Status: SHIPPED | OUTPATIENT
Start: 2024-07-08

## 2024-07-10 ENCOUNTER — OFFICE VISIT (OUTPATIENT)
Dept: NEUROLOGY | Facility: CLINIC | Age: 44
End: 2024-07-10
Payer: COMMERCIAL

## 2024-07-10 ENCOUNTER — TELEPHONE (OUTPATIENT)
Dept: PSYCHIATRY | Facility: CLINIC | Age: 44
End: 2024-07-10
Payer: COMMERCIAL

## 2024-07-10 VITALS
SYSTOLIC BLOOD PRESSURE: 115 MMHG | BODY MASS INDEX: 48.82 KG/M2 | DIASTOLIC BLOOD PRESSURE: 80 MMHG | RESPIRATION RATE: 18 BRPM | HEART RATE: 77 BPM | HEIGHT: 65 IN | WEIGHT: 293 LBS

## 2024-07-10 DIAGNOSIS — K21.9 GASTROESOPHAGEAL REFLUX DISEASE, UNSPECIFIED WHETHER ESOPHAGITIS PRESENT: ICD-10-CM

## 2024-07-10 DIAGNOSIS — G43.019 INTRACTABLE MIGRAINE WITHOUT AURA AND WITHOUT STATUS MIGRAINOSUS: Primary | ICD-10-CM

## 2024-07-10 DIAGNOSIS — K76.0 NAFLD (NONALCOHOLIC FATTY LIVER DISEASE): ICD-10-CM

## 2024-07-10 DIAGNOSIS — Z91.89 CARDIOVASCULAR EVENT RISK: ICD-10-CM

## 2024-07-10 DIAGNOSIS — G47.33 OSA ON CPAP: ICD-10-CM

## 2024-07-10 PROCEDURE — 3008F BODY MASS INDEX DOCD: CPT | Mod: CPTII,S$GLB,, | Performed by: PSYCHIATRY & NEUROLOGY

## 2024-07-10 PROCEDURE — 99999 PR PBB SHADOW E&M-EST. PATIENT-LVL III: CPT | Mod: PBBFAC,,, | Performed by: PSYCHIATRY & NEUROLOGY

## 2024-07-10 PROCEDURE — 3079F DIAST BP 80-89 MM HG: CPT | Mod: CPTII,S$GLB,, | Performed by: PSYCHIATRY & NEUROLOGY

## 2024-07-10 PROCEDURE — 1159F MED LIST DOCD IN RCRD: CPT | Mod: CPTII,S$GLB,, | Performed by: PSYCHIATRY & NEUROLOGY

## 2024-07-10 PROCEDURE — 3044F HG A1C LEVEL LT 7.0%: CPT | Mod: CPTII,S$GLB,, | Performed by: PSYCHIATRY & NEUROLOGY

## 2024-07-10 PROCEDURE — 3074F SYST BP LT 130 MM HG: CPT | Mod: CPTII,S$GLB,, | Performed by: PSYCHIATRY & NEUROLOGY

## 2024-07-10 PROCEDURE — 99214 OFFICE O/P EST MOD 30 MIN: CPT | Mod: S$GLB,,, | Performed by: PSYCHIATRY & NEUROLOGY

## 2024-07-11 ENCOUNTER — TELEPHONE (OUTPATIENT)
Dept: NEUROLOGY | Facility: CLINIC | Age: 44
End: 2024-07-11

## 2024-07-11 ENCOUNTER — PROCEDURE VISIT (OUTPATIENT)
Dept: NEUROLOGY | Facility: CLINIC | Age: 44
End: 2024-07-11
Payer: COMMERCIAL

## 2024-07-11 VITALS
DIASTOLIC BLOOD PRESSURE: 82 MMHG | SYSTOLIC BLOOD PRESSURE: 120 MMHG | RESPIRATION RATE: 18 BRPM | HEART RATE: 88 BPM | TEMPERATURE: 98 F

## 2024-07-11 DIAGNOSIS — G43.019 INTRACTABLE MIGRAINE WITHOUT AURA AND WITHOUT STATUS MIGRAINOSUS: Primary | ICD-10-CM

## 2024-07-11 DIAGNOSIS — F33.41 RECURRENT MAJOR DEPRESSIVE DISORDER, IN PARTIAL REMISSION: ICD-10-CM

## 2024-07-11 DIAGNOSIS — F90.2 ATTENTION DEFICIT HYPERACTIVITY DISORDER (ADHD), COMBINED TYPE: ICD-10-CM

## 2024-07-11 NOTE — PROCEDURES
Procedures  BOTOX  The patient has chronic migraines ( G43.719) and suffers from headaches more than 3 months, more than 15 days of headache days per month lasting more than 4 hours with at least 8 attacks that meet criteria for migraine.    Botulinum Toxin Injection Procedure   Pre-operative diagnosis: Chronic migraine   Post-operative diagnosis: Same   Procedure: Chemical neurolysis     The Botox injections have achieved well over 50%  improvement in the patient's symptoms. Migraines have been reduced at least 7 days per month and the number of cumulative hours suffering with headaches has been reduced at least 100 total hours per month. Today she does have a headache indicating that the Botox has worn off. Frequency of treatment is every 3 months unless no response to the treatments, at which time we will discontinue the injections.    After risks and benefits were explained including bleeding, infection, worsening of pain, damage to the areas being injected, weakness of muscles, loss of muscle control, dysphagia if injecting the head or neck, facial droop if injecting the facial area, painful injection, allergic or other reaction to the medications being injected, and the failure of the procedure to help the problem, a signed consent was obtained.   The patient was placed in a comfortable area and the sites to be treated were identified.The area to be treated was prepped three times with alcohol and the alcohol allowed to dry. Next, a 30 gauge needle was used to inject the medication in the area to be treated.   Area(s) injected:   Total Botox used: 175 Units   Botox wastage: 25 Units   Injection sites:    muscle bilaterally ( a total of 10 units divided into 2 sites)   Procerus muscle (5 units)   Frontalis muscle bilaterally (a total of 20 units divided into 4 sites)   Temporalis muscle bilaterally (a total of 40 units divided into 8 sites)   Masseters muscles bilaterally (a total of 20 units at 4  sites)  Occipitalis muscle bilaterally (a total of 30 units divided into 6 sites)   Cervical paraspinal muscles (a total of 20 units divided into 4 sites)   Trapezius muscle bilaterally (a total of 30 units divided into 6 sites)   Complications: none   RTC for the next Botox injection: 3 months     Alejandro Cárdenas M.D   of Neurology  ACGME Board Certified, Neurology  [facility], Board certified, headache Medicine  Medical Director, Headache and Facial Pain  [facility]

## 2024-07-11 NOTE — PROGRESS NOTES
"[facility] - HEADACHE  [facility], [place]    Date: 1/18/24  Patient Name: Mariana Mora   MRN: [id number]   PCP: Emeli Morgan  Referring Provider: No ref. provider found    Assessment:   Mariana Mora is a 43 y.o. female presenting with headaches. Headaches started as a child (4-5 years old). PMHx includes TMJ issues, anxiety, psoriatic arthritis, SUNIL on CPAP, NAFLD, h/o non epileptic seizures, insomnia, and reportedly unknown autoimmune disease.    INTERVAL HISTORY 08/10/2024  Ms. Peña presents for follow up. She is stable. The Botox injections have achieved well over 50%  improvement in the patient's symptoms. Migraines have been reduced at least 7 days per month and the number of cumulative hours suffering with headaches has been reduced at least 100 total hours per month. She does experience "wearing off" effect from Botox, during that period, she uses Nurtec QOD and as needed. Robacxin on board for muscle spasm.       7/10/2024    10:56 AM 1/17/2024     2:16 PM 1/8/2024     8:12 PM 10/18/2023     4:35 PM 9/20/2023     8:21 PM   Headache Questionnaire Answers   In the past 30 days, how many days did you suffer from a headache? 5 12 9 29 25   In the past 30 days, how many days did you have headache that caused you to stay in bed, miss a social gathering and/or disappoint a close family member/friend? 5 12 7 29 20   In the past 30 days, how many times did you miss work/school and/or seek care at an urgent care or emergency department for the headache? 0 12 7 29 1       INTERVAL 01/18/2024   Patient received botox treatment 1 week ago, which was her second round following one in October. She reports some improvement 1 week after her first treatment, with headache frequency decreasing from daily down to 2 per week. It began to increase again up to 3 per week prior to her most recent botox treatment. Currently, she has 2 migraines per week. She denies any " complications. She also began VRAYLAR (cariprazine) yesterday, with slightly fatigue. She also takes Maxalt PRN with compazine (2 per week), and robaxin PRN (twice a week), dual action advil (when headache unresponsive to maxalt). Headache intensity is 9/10 at worst and 2/10 at best. She has tried a light ketogenic diet, which reportedly helped with joint pain.  She also reports bilateral distal numbness and weakness in the ulnar distribution.    AED Neuromodulators  MAOIs  Ergot Alkaloids    Acetazolamide (Diamox) [] Phenelzine (Nardil) [] Dihydroergotamine (Migranal) []   Carbamazepine (Tegretol) [] Tranylcypromine (Parnate) [] Ergotamine (Ergomar) []   Gabapentin (Neurontin) [] Antihistamine/Serotonergic  Triptans    Lacosamide (Vimpat) [] Cyproheptadine (Periactin) [] Almotriptan (Axert) []   Lamotrigine (Lamictal)  Used for psych symptoms but discontinued for side effects (hard to swallow) X Antihypertensives  Eletriptan (Relpax) []   Levatiracetam (Keppra) [] Atenolol (Tenormin) [] Frovatriptan (Frova) []   Oxcarbazepine(Trileptal)  Used for psych symptoms but discontinued for lack of efficacy  [x] Bisoprostol (Zebeta) [] Naratriptan (Amerge) []   Phenobarbital [] Candesartan (Atacand) [] Rizatriptan (Maxalt) [x]     Nebivolol (Bystolic)  Sumatriptan (Imitrex) [x]   Levetiracetam (Keppra)  Cardeilol (Coreg) [] Zolmitriptan (Zomig) []   Phenytoin (Dilantin) [] Diltiazem (Cardizem) []     Pregabalin (Lyrica) [] Lisinopril (Prinivil, Zestril) [x] Combo Abortives    Primidone (Mysoline) [] Metoprolol (Toprol) [x] BC Powder []   Tiagabine (Gabatril) [] Nadolol (Corgard) [] Butalbital and Acetaminophen (Bupap) []   Topiramate (Topamax)  (Trokendi)  Used for psych symptoms but discontinued for lack of efficacy  [x] Nicardipine (Cardene) []     Vigabatrin (Sabril) [] Nimodipine (Nimotop) [] Butalbital, Acetaminophen, and caffeine (Fioricet) []   Valproic Acid (Depakote) (Divalproex Sodium) [] Propranolol (Inderal) []      Zonisamide (Zonegran) [] Telmisartan (Micardis) [] Butalbital, Aspirin, and caffeine (Fiorinal) []   Benzodiazepines  Timolol (Blocadren) []     Alprazolam (Xanax) [x] Verapamil (Calan, Verelan) [] Butalbital, Caffeine, Acetaminophen, and Codeine (Fioricet with Codeine) []   Diazepam (Valium) [] NSAIDs      Lorazepam (Ativan) [] Acetaminophen (Tylenol) [x]     Clonazepam (Klonopin)  Used for psych symptoms but discontinued for side effects [x] Acetylsalicylic Acid (Aspirin) [x] Butalbital, Caffeine, Aspirin, and Codeine  (Fiorinal with Codeine) []   Antidepressants  Diclofenac (Cambia) []     Amitriptyline (Elavil) [] Ibuprofen (Motrin) []     Desipramine (Norpramin) [] Indomethacin (Indocin) [] Aspirin, Caffeine, and Acetaminophen (Excedrin) (Goodys) [x]   Doxepin (Sinequan) [] Ketoprofen (Orudis) []     Fluoxetine (Prozac) [] Ketorolac (Toradol) [] Acetaminophen, Dichloralphenazone, and Isometheptene (Midrin) []   Imipramine (Tofranil) [] Naproxen (Anaprox) (Aleve) [x]     Nortriptyline (Pamelor) [] Meclofenamic Acid (Meclomen) []     Venlafaxine (Effexor) [] Meloxicam (Mobic) [] Procedures    Desvenlafazine (Pristiq) [] Monoclonals  Greater occipital nerve block []   Duloxetine (Cymbalta) [] Eptinezumab [] Cervical, Thoracic, Lumbar radiofrequency ablation []   Trazadone [] Erenumab-aooe (Aimovig) [] Spenopalatine ganglion block []   Wellbutrin [x] Galcanezumab (Emgality) [] Occipital neuro stimulation []   Citalopram (Celexa)  Used for psych symptoms but discontinued for lack of efficacy [x] Fremanazumab-vfrm (Ajovy)  Cervical, Thoracic, Lumbar, Caudal Epidural steroid injection []   Escitalopram (Lexapro)  Used for psych symptoms but discontinued for side effects [x] Other [] Sacroiliac joint steroid injection []   Celexa [] Memantine (Namenda) [] Transforaminal epidural steroid injection []     Botox [] Facet joint injections []     Baclofen (Lioresal) [] Cervical, Thoracic, Lumbar medial branch blocks []        Cefaly []       Gamma Core []       Iovera []       Transcranial Magnetic stimulation []                        Headache questionnaire    1. When did your Headaches start?    Life long, this time-2 months      2. Where are your headaches located?   Behind eyes around head      3. Your headache's characteristics:   Excruciating, Pressure, Throbbing, Pounding, Stabbing, Like a tight band, Sharp      4. How long does the headache last?   hours      5. How often does the headache occur?   daily      6. Are your headaches preceded or accompanied by other symptoms? yes   If yes, please describe.  Light sensitivity, nausea, ears ringing, fatigue      7. Does the headache awaken you at night? no   If so, how often? N/A        8. Please santhosh the word that best describes your headache's intensity:    severe      9. Using a scale of 1 through 10, with 0 = no pain and 10 = the worst pain:   What score is your headache now? 3   What score is your headache at its worst? 10   What score is your headache at its best? 2        10. Possible associated headache symptoms:  [x]  Sensitivity to light  [x] Dizziness  [] Nasal or sinus pressure/ pain   [x] Sensitivity to noise  [] Vertigo  [x] Problems with concentration  [x] Sensitivity to smells  [x] Ringing in ears  [x] Problems with memory    [x] Blurred vision  [x] Irritability  [x] Problems with task completion   [x] Double vision  [] Anger  [x]  Problems with relaxation  [] Loss of appetite  [] Anxiety  [x] Neck tightness, Neck pain  [x] Nausea   [] Nasal congestion  [x] Vomiting         11. Headache improving factors:  [x] Sleep  [] Heat  [x] Darkness  [x] Ice  [] Local pressure [] Menses (period)  [x] Massage   [x] Medications:  Robaxin      12. Headache worsening factors:   [x] Fatigue [] Sneezing  [] Changes in Weather  [x] Light [] Bending Over [x] Stress  [x] Noise [] Ovulation  [] Multiple Sclerosis Flare-Up  [x] Smells  [] Menses  [] Food   [] Coughing []  Alcohol      13. Number of caffeinated drinks per day: 0      14. Number of diet drinks per day:  0      15. Have you seen any other [facility]ists within the last 3 years?  No  16. 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     Family history: There is a history of headaches in the family in dad      Subjective:     Patient seen in consultation at the request of No ref. provider found for the evaluation of headaches.       HPI:   Ms. Mariana Mora is a 43 y.o. female presenting with headaches. Please see above for HPI.    PAST MEDICAL HISTORY:  Past Medical History:   Diagnosis Date    ADHD, predominantly inattentive type     Anxiety     Arthritis     Autism     Autism spectrum disorder     Depression     Hypertension     Hypothyroidism     Migraine headache     OCD (obsessive compulsive disorder)     Psoriasis     TMJ (temporomandibular joint syndrome)        PAST SURGICAL HISTORY:  Past Surgical History:   Procedure Laterality Date    ADENOIDECTOMY      CHOLECYSTECTOMY  6-3-2021    COLONOSCOPY      at the age of 7 for rectal bleeding    COLONOSCOPY N/A 8/10/2023    Procedure: COLONOSCOPY;  Surgeon: Jeanne Whitt MD;  Location: [facility];  Service: Endoscopy;  Laterality: N/A;    ESOPHAGEAL MANOMETRY WITH MEASUREMENT OF IMPEDANCE N/A 02/10/2020    Procedure: MANOMETRY, ESOPHAGUS, WITH IMPEDANCE MEASUREMENT;  Surgeon: Fitz Murray MD;  Location: [address]);  Service: Endoscopy;  Laterality: N/A;  2/3 - pt confirmed    ESOPHAGOGASTRODUODENOSCOPY N/A 2019    Procedure: EGD  (ESOPHAGOGASTRODUODENOSCOPY);  Surgeon: David Stahl MD;  Location: [facility] ENDO;  Service: Endoscopy;  Laterality: N/A;    ESOPHAGOGASTRODUODENOSCOPY N/A 8/31/2023    Procedure: EGD (ESOPHAGOGASTRODUODENOSCOPY);  Surgeon: Jeanne Whitt MD;  Location: [facility] ENDO;  Service: Endoscopy;  Laterality: N/A;    INNER EAR SURGERY Right     LAPAROSCOPIC CHOLECYSTECTOMY N/A 06/03/2021    Procedure: CHOLECYSTECTOMY-LAPAROSCOPIC;  Surgeon: Farrukh Lorenzo MD;  Location: [facility] OR;  Service: General;  Laterality: N/A;    NASAL SEPTUM SURGERY      TYMPANOPLASTY      bilateral    TYMPANOSTOMY TUBE PLACEMENT         CURRENT MEDS:  Current Outpatient Medications   Medication Sig Dispense Refill    atorvastatin (LIPITOR) 10 MG tablet TAKE 1 TABLET(10 MG) BY MOUTH EVERY DAY 90 tablet 1    busPIRone (BUSPAR) 10 MG tablet Take 1 tablet (10 mg total) by mouth 2 (two) times daily. 60 tablet 1    cariprazine (VRAYLAR) 1.5 mg Cap Take 1 capsule (1.5 mg total) by mouth once daily. 30 capsule 1    cetirizine (ZYRTEC) 10 MG tablet Take 10 mg by mouth once daily.      ergocalciferol (ERGOCALCIFEROL) 50,000 unit Cap TAKE 2 CAPSULES BY MOUTH EVERY 7 DAYS 8 capsule 2    famotidine (PEPCID) 20 MG tablet Take 20 mg by mouth 2 (two) times daily.      guselkumab (TREMFYA) 100 mg/mL AtIn Inject 100 mg into the skin every 8 weeks.      hydroCHLOROthiazide (MICROZIDE) 12.5 mg capsule Take 1 capsule (12.5 mg total) by mouth once daily. 30 capsule 11    ketoconazole (NIZORAL) 2 % shampoo Apply topically twice a week. 120 mL 3    L norgest/e.estradioL-e.estrad (SIMPESSE) 0.15 mg-30 mcg (84)/10 mcg (7) 3MPk Take 1 tablet by mouth once daily. 91 each 3    leflunomide (ARAVA) 20 MG Tab Take 20 mg by mouth.      methocarbamoL (ROBAXIN) 500 MG Tab Take 1 tablet (500 mg total) by mouth 3 (three) times daily as needed (muscle spasm). 40 tablet 0    montelukast (SINGULAIR) 10 mg tablet Take 10 mg by mouth.      nystatin (MYCOSTATIN) ointment APPLY  TOPICALLY TO THE AFFECTED AREA TWICE DAILY 15 g 1    NYSTOP powder APPLY TO THE AFFECTED AREA TWICE DAILY 30 g 1    omalizumab (XOLAIR) 150 mg/mL injection Inject 300 mg into the skin every 30 days.      pantoprazole (PROTONIX) 40 MG tablet Take one po daily 90 tablet 3    prazosin (MINIPRESS) 5 MG capsule Take 2 capsules (10 mg total) by mouth nightly. 180 capsule 1    prochlorperazine (COMPAZINE) 10 MG tablet Take 1 tablet (10 mg total) by mouth every 6 (six) hours as needed (for headaches in conjunction with Maxalt (Imitrex)). 15 tablet 6    rizatriptan (MAXALT-MLT) 10 MG disintegrating tablet Take 1 tablet (10 mg total) by mouth as needed for Migraine (No more than 2 tablets in 24 hours). 27 tablet 3    sertraline (ZOLOFT) 100 MG tablet Take 2 tablets (200 mg total) by mouth once daily. 60 tablet 1    suvorexant (BELSOMRA) 10 mg Tab Take 1 tablet (10 mg) by mouth every evening. 30 tablet 0    SYNTHROID 112 mcg tablet Take 1 tablet (112 mcg total) by mouth before breakfast. 90 tablet 3    azithromycin (Z-MILLICENT) 250 MG tablet Take 2 tablets by mouth on day 1; Take 1 tablet by mouth on days 2-5 (Patient not taking: Reported on 1/18/2024) 6 tablet 0     Current Facility-Administered Medications   Medication Dose Route Frequency Provider Last Rate Last Admin    onabotulinumtoxina injection 200 Units  200 Units Intramuscular Q90 Days Ml Cárdenas MD   200 Units at 10/19/23 1138    onabotulinumtoxina injection 200 Units  200 Units Intramuscular Q90 Days Ml Cárdenas MD   200 Units at 01/11/24 1047     Facility-Administered Medications Ordered in Other Visits   Medication Dose Route Frequency Provider Last Rate Last Admin    diphenhydrAMINE injection 12.5 mg  12.5 mg Intravenous PRN Steve Shoemaker MD        lactated ringers infusion   Intravenous Continuous Steve Shoemaker MD 10 mL/hr at 06/03/21 1131 New Bag at 06/03/21 1131    lactated ringers infusion  125 mL/hr Intravenous Continuous [place]  Steve COSTA MD        LIDOcaine (PF) 10 mg/ml (1%) injection 10 mg  1 mL Intradermal Once Steve Shoemaker MD        morphine injection 2 mg  2 mg Intravenous Q5 Min PRN Steve Shoemaker MD   2 mg at 06/03/21 1326       ALLERGIES:  Review of patient's allergies indicates:   Allergen Reactions    Penicillins     Norco [hydrocodone-acetaminophen] Itching    Prednisone Other (See Comments) and Rash     PSORIASIS  PSORIASIS       FAMILY HISTORY:  Family History   Problem Relation Age of Onset    Arthritis Father     Asthma Father     Heart disease Father     Hyperlipidemia Father     Colon cancer Paternal Uncle         dx in 50s    Cancer Paternal Uncle     Colon cancer Paternal Grandmother     Diabetes Paternal Grandmother     Heart failure Paternal Grandmother     Breast cancer Paternal Grandmother     Arthritis Paternal Grandmother     Cancer Paternal Grandmother     Heart disease Paternal Grandmother     Diabetes Paternal Grandfather     Heart failure Paternal Grandfather     Cancer Paternal Grandfather     Heart disease Paternal Grandfather     Stroke Paternal Grandfather     Hemochromatosis Other         Father's side    Ovarian cancer Neg Hx     Colon polyps Neg Hx     Esophageal cancer Neg Hx     Stomach cancer Neg Hx     Ulcerative colitis Neg Hx     Crohn's disease Neg Hx        SOCIAL HISTORY:  Social History     Tobacco Use    Smoking status: Never    Smokeless tobacco: Never   Substance Use Topics    Alcohol use: No    Drug use: Never       Review of Systems:  12 system review of systems is negative except for the symptoms mentioned in HPI.     ROS  General: decreased appetite for the past several months  Eyes: blurry vision  ENT: tinnitus  Cardiovascular: palpitations  Genitourinary: incontinence  Hematologic/lymphatic: Night sweats (a couple of years)  Neuro: weakness in hands (baseline throughout life)  Endocrine: Baseline fatigue (worse with headache), heat intolerance (a few years)  Allergy: Feels  feverish in her cheeks but not actually febriles  Muscloskeletal: Muscle pain, joint pain, rashes in cheeks  Psychiatry: Depression       Objective:     Vitals:    07/10/24 1108   BP: 115/80   Pulse: 77   Resp: 18     Body mass index is 49.16 kg/m².        General: NAD, well nourished   Eyes: no tearing, discharge, no erythema , proptosis of the eyes  ENT: moist mucous membranes of the oral cavity, nares patent , masseter hypertrophy  Neck: Supple, full range of motion  Cardiovascular: Warm and well perfused, pulses equal and symmetrical  Lungs: Normal work of breathing, normal chest wall excursions  Skin: No rash, lesions, or breakdown on exposed skin  Psychiatry: Mood and affect are appropriate   Abdomen: soft, non tender, non distended  Extremeties: No cyanosis, clubbing or edema.      Assessment:   Mariana Mora is a 43 y.o. female presenting with headaches. Headaches started as a child (4-5 years old). PMHx includes TMJ issues, anxiety, psoriatic arthritis, SUNIL on CPAP, NAFLD, h/o non epileptic seizures, insomnia, and reportedly unknown autoimmune disease.    Headache impact test (HIT-6): 68 / 78    She has tried and failed these medications in the past: oxcarbazepine, zonisamide, metoprolol, lisinopril, sinequan, zoloft, wellbutrin. Patient has > 15 headache days lasting > 4 hours, without relief with medications. The Botox injections have achieved well over 50%  improvement in the patient's symptoms. Migraines have been reduced at least 7 days per month and the number of cumulative hours suffering with headaches has been reduced at least 100 total hours per month.     Ulnar numbness likely secondary to mild ulnar neuropathy, advised to use elbow pads and limit amount of time putting pressure on arms.   Plan:      Plan:   Continue Botox. The Botox injections have achieved well over 50%  improvement in the patient's symptoms. Migraines have been reduced at least 7 days per month and the number of  cumulative hours suffering with headaches has been reduced at least 100 total hours per month. Use Nurtec during period of wearing off effect  Continue Nurtec 75 mg ODT    Acute abortive treatment:  -- continue compazine PRN in conjunction with maxalt  -- continue rizatriptan PRN  Prevention  -- low carb diet; ketogenic diet  -- Botox injections + include masseters      Follow up  -- RTC in 3 months  -- Keep headache diary (provided to the patient along with educational information and resources)               Alejandro Cárdenas M.D   of Neurology  ACGME Board Certified, Neurology  [facility], Board certified, headache Medicine  Medical Director, Headache and Facial Pain  [facility]

## 2024-07-12 RX ORDER — LISDEXAMFETAMINE DIMESYLATE 30 MG/1
30 CAPSULE ORAL EVERY MORNING
Qty: 30 CAPSULE | Refills: 0 | Status: SHIPPED | OUTPATIENT
Start: 2024-07-12

## 2024-07-12 RX ORDER — CARIPRAZINE 1.5 MG/1
1.5 CAPSULE, GELATIN COATED ORAL DAILY
Qty: 30 CAPSULE | Refills: 1 | Status: SHIPPED | OUTPATIENT
Start: 2024-07-12

## 2024-07-17 ENCOUNTER — TELEPHONE (OUTPATIENT)
Dept: FAMILY MEDICINE | Facility: CLINIC | Age: 44
End: 2024-07-17
Payer: COMMERCIAL

## 2024-07-18 ENCOUNTER — LAB VISIT (OUTPATIENT)
Dept: LAB | Facility: HOSPITAL | Age: 44
End: 2024-07-18
Attending: FAMILY MEDICINE
Payer: COMMERCIAL

## 2024-07-18 DIAGNOSIS — R53.83 FATIGUE, UNSPECIFIED TYPE: ICD-10-CM

## 2024-07-18 DIAGNOSIS — E03.9 HYPOTHYROIDISM, UNSPECIFIED TYPE: ICD-10-CM

## 2024-07-18 LAB
CORTIS SERPL-MCNC: 22 UG/DL (ref 4.3–22.4)
ERYTHROCYTE [SEDIMENTATION RATE] IN BLOOD BY WESTERGREN METHOD: 25 MM/HR (ref 0–20)
T3 SERPL-MCNC: 82 NG/DL (ref 60–180)
T4 FREE SERPL-MCNC: 0.78 NG/DL (ref 0.71–1.51)
THYROPEROXIDASE IGG SERPL-ACNC: <6 IU/ML

## 2024-07-18 PROCEDURE — 85651 RBC SED RATE NONAUTOMATED: CPT | Performed by: FAMILY MEDICINE

## 2024-07-18 PROCEDURE — 36415 COLL VENOUS BLD VENIPUNCTURE: CPT | Performed by: FAMILY MEDICINE

## 2024-07-18 PROCEDURE — 86376 MICROSOMAL ANTIBODY EACH: CPT | Performed by: FAMILY MEDICINE

## 2024-07-18 PROCEDURE — 84439 ASSAY OF FREE THYROXINE: CPT | Performed by: FAMILY MEDICINE

## 2024-07-18 PROCEDURE — 82533 TOTAL CORTISOL: CPT | Performed by: FAMILY MEDICINE

## 2024-07-18 PROCEDURE — 84480 ASSAY TRIIODOTHYRONINE (T3): CPT | Performed by: FAMILY MEDICINE

## 2024-07-22 ENCOUNTER — OFFICE VISIT (OUTPATIENT)
Dept: PSYCHIATRY | Facility: CLINIC | Age: 44
End: 2024-07-22
Payer: COMMERCIAL

## 2024-07-22 VITALS
SYSTOLIC BLOOD PRESSURE: 120 MMHG | DIASTOLIC BLOOD PRESSURE: 81 MMHG | HEIGHT: 65 IN | HEART RATE: 83 BPM | WEIGHT: 293 LBS | BODY MASS INDEX: 48.82 KG/M2

## 2024-07-22 DIAGNOSIS — F42.9 OBSESSIVE-COMPULSIVE DISORDER, UNSPECIFIED TYPE: ICD-10-CM

## 2024-07-22 DIAGNOSIS — F33.2 SEVERE EPISODE OF RECURRENT MAJOR DEPRESSIVE DISORDER, WITHOUT PSYCHOTIC FEATURES: Primary | ICD-10-CM

## 2024-07-22 DIAGNOSIS — G47.00 INSOMNIA, UNSPECIFIED TYPE: ICD-10-CM

## 2024-07-22 DIAGNOSIS — F90.2 ATTENTION DEFICIT HYPERACTIVITY DISORDER (ADHD), COMBINED TYPE: ICD-10-CM

## 2024-07-22 DIAGNOSIS — F41.1 GAD (GENERALIZED ANXIETY DISORDER): ICD-10-CM

## 2024-07-22 PROCEDURE — G2211 COMPLEX E/M VISIT ADD ON: HCPCS | Mod: S$GLB,,, | Performed by: PHYSICIAN ASSISTANT

## 2024-07-22 PROCEDURE — 99214 OFFICE O/P EST MOD 30 MIN: CPT | Mod: S$GLB,,, | Performed by: PHYSICIAN ASSISTANT

## 2024-07-22 PROCEDURE — 3044F HG A1C LEVEL LT 7.0%: CPT | Mod: CPTII,S$GLB,, | Performed by: PHYSICIAN ASSISTANT

## 2024-07-22 PROCEDURE — 3079F DIAST BP 80-89 MM HG: CPT | Mod: CPTII,S$GLB,, | Performed by: PHYSICIAN ASSISTANT

## 2024-07-22 PROCEDURE — 99999 PR PBB SHADOW E&M-EST. PATIENT-LVL V: CPT | Mod: PBBFAC,,, | Performed by: PHYSICIAN ASSISTANT

## 2024-07-22 PROCEDURE — 1160F RVW MEDS BY RX/DR IN RCRD: CPT | Mod: CPTII,S$GLB,, | Performed by: PHYSICIAN ASSISTANT

## 2024-07-22 PROCEDURE — 3074F SYST BP LT 130 MM HG: CPT | Mod: CPTII,S$GLB,, | Performed by: PHYSICIAN ASSISTANT

## 2024-07-22 PROCEDURE — 3008F BODY MASS INDEX DOCD: CPT | Mod: CPTII,S$GLB,, | Performed by: PHYSICIAN ASSISTANT

## 2024-07-22 PROCEDURE — 1159F MED LIST DOCD IN RCRD: CPT | Mod: CPTII,S$GLB,, | Performed by: PHYSICIAN ASSISTANT

## 2024-07-22 RX ORDER — LISDEXAMFETAMINE DIMESYLATE 50 MG/1
50 CAPSULE ORAL EVERY MORNING
Qty: 30 CAPSULE | Refills: 0 | Status: SHIPPED | OUTPATIENT
Start: 2024-08-14
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
## 2024-07-24 DIAGNOSIS — F42.9 OBSESSIVE-COMPULSIVE DISORDER, UNSPECIFIED TYPE: ICD-10-CM

## 2024-07-24 RX ORDER — SERTRALINE HYDROCHLORIDE 100 MG/1
200 TABLET, FILM COATED ORAL DAILY
Qty: 60 TABLET | Refills: 1 | Status: SHIPPED | OUTPATIENT
Start: 2024-07-24

## 2024-08-07 ENCOUNTER — NURSE TRIAGE (OUTPATIENT)
Dept: ADMINISTRATIVE | Facility: CLINIC | Age: 44
End: 2024-08-07
Payer: COMMERCIAL

## 2024-08-08 ENCOUNTER — OFFICE VISIT (OUTPATIENT)
Dept: FAMILY MEDICINE | Facility: CLINIC | Age: 44
End: 2024-08-08
Payer: COMMERCIAL

## 2024-08-08 ENCOUNTER — NURSE TRIAGE (OUTPATIENT)
Dept: ADMINISTRATIVE | Facility: CLINIC | Age: 44
End: 2024-08-08
Payer: COMMERCIAL

## 2024-08-08 DIAGNOSIS — J34.89 NASAL CONGESTION WITH RHINORRHEA: ICD-10-CM

## 2024-08-08 DIAGNOSIS — R05.1 ACUTE COUGH: ICD-10-CM

## 2024-08-08 DIAGNOSIS — U07.1 POSITIVE SELF-ADMINISTERED ANTIGEN TEST FOR COVID-19: Primary | ICD-10-CM

## 2024-08-08 DIAGNOSIS — R09.81 NASAL CONGESTION WITH RHINORRHEA: ICD-10-CM

## 2024-08-08 RX ORDER — BENZONATATE 200 MG/1
200 CAPSULE ORAL 3 TIMES DAILY PRN
Qty: 30 CAPSULE | Refills: 0 | Status: SHIPPED | OUTPATIENT
Start: 2024-08-08 | End: 2024-08-18

## 2024-08-08 RX ORDER — GUAIFENESIN, PSEUDOEPHEDRINE HYDROCHLORIDE 600; 60 MG/1; MG/1
1 TABLET, EXTENDED RELEASE ORAL 2 TIMES DAILY
Qty: 20 TABLET | Refills: 0 | Status: SHIPPED | OUTPATIENT
Start: 2024-08-08 | End: 2024-08-18

## 2024-09-03 NOTE — TELEPHONE ENCOUNTER
No care due was identified.  [facility] Embedded Care Due Messages. Reference number: [id number].   7/31/2023 10:59:39 AM CDT  
Refill Routing Note   Medication(s) are not appropriate for processing by [facility] for the following reason(s):      Due for refill >6 months ago    [facility] action(s):  Defer Care Due:  None identified          Appointments  past 12m or future 3m with PCP    Date Provider   Last Visit   7/18/2023 John Stahl MD   Next Visit   Visit date not found John Stahl MD   ED visits in past 90 days: 0        Note composed:12:49 PM 07/31/2023          
See refill request  
No

## 2024-09-11 ENCOUNTER — NURSE TRIAGE (OUTPATIENT)
Dept: ADMINISTRATIVE | Facility: CLINIC | Age: 44
End: 2024-09-11
Payer: COMMERCIAL

## 2024-09-11 ENCOUNTER — HOSPITAL ENCOUNTER (EMERGENCY)
Facility: HOSPITAL | Age: 44
Discharge: HOME | End: 2024-09-12
Attending: EMERGENCY MEDICINE
Payer: COMMERCIAL

## 2024-09-11 DIAGNOSIS — N39.0 ACUTE UTI: ICD-10-CM

## 2024-09-11 DIAGNOSIS — F41.1 GAD (GENERALIZED ANXIETY DISORDER): ICD-10-CM

## 2024-09-11 DIAGNOSIS — R07.81 RIB PAIN ON LEFT SIDE: ICD-10-CM

## 2024-09-11 DIAGNOSIS — L40.50 PSORIATIC ARTHRITIS OF MULTIPLE JOINTS: Primary | ICD-10-CM

## 2024-09-11 PROCEDURE — 87389 HIV-1 AG W/HIV-1&-2 AB AG IA: CPT | Performed by: EMERGENCY MEDICINE

## 2024-09-11 PROCEDURE — 85379 FIBRIN DEGRADATION QUANT: CPT | Performed by: EMERGENCY MEDICINE

## 2024-09-11 PROCEDURE — 71045 X-RAY EXAM CHEST 1 VIEW: CPT | Mod: 26,,, | Performed by: RADIOLOGY

## 2024-09-11 PROCEDURE — 80053 COMPREHEN METABOLIC PANEL: CPT | Performed by: EMERGENCY MEDICINE

## 2024-09-11 PROCEDURE — 84484 ASSAY OF TROPONIN QUANT: CPT | Performed by: EMERGENCY MEDICINE

## 2024-09-11 PROCEDURE — 99284 EMERGENCY DEPT VISIT MOD MDM: CPT | Mod: 25

## 2024-09-11 PROCEDURE — 71045 X-RAY EXAM CHEST 1 VIEW: CPT | Mod: TC

## 2024-09-11 PROCEDURE — 83880 ASSAY OF NATRIURETIC PEPTIDE: CPT | Performed by: EMERGENCY MEDICINE

## 2024-09-11 PROCEDURE — 86803 HEPATITIS C AB TEST: CPT | Performed by: EMERGENCY MEDICINE

## 2024-09-11 PROCEDURE — 86140 C-REACTIVE PROTEIN: CPT | Performed by: EMERGENCY MEDICINE

## 2024-09-11 PROCEDURE — 84443 ASSAY THYROID STIM HORMONE: CPT | Performed by: EMERGENCY MEDICINE

## 2024-09-11 RX ORDER — HYDROMORPHONE HYDROCHLORIDE 1 MG/ML
1 INJECTION, SOLUTION INTRAMUSCULAR; INTRAVENOUS; SUBCUTANEOUS
Status: COMPLETED | OUTPATIENT
Start: 2024-09-12 | End: 2024-09-12

## 2024-09-11 RX ORDER — ONDANSETRON HYDROCHLORIDE 2 MG/ML
8 INJECTION, SOLUTION INTRAVENOUS
Status: COMPLETED | OUTPATIENT
Start: 2024-09-12 | End: 2024-09-12

## 2024-09-11 RX ORDER — ACETAMINOPHEN 500 MG
1000 TABLET ORAL
Status: COMPLETED | OUTPATIENT
Start: 2024-09-12 | End: 2024-09-12

## 2024-09-12 VITALS
TEMPERATURE: 99 F | SYSTOLIC BLOOD PRESSURE: 127 MMHG | DIASTOLIC BLOOD PRESSURE: 73 MMHG | HEIGHT: 65 IN | RESPIRATION RATE: 20 BRPM | WEIGHT: 293 LBS | BODY MASS INDEX: 48.82 KG/M2 | OXYGEN SATURATION: 96 % | HEART RATE: 83 BPM

## 2024-09-12 LAB
ALBUMIN SERPL BCP-MCNC: 3.3 G/DL (ref 3.5–5.2)
ALP SERPL-CCNC: 69 U/L (ref 55–135)
ALT SERPL W/O P-5'-P-CCNC: 52 U/L (ref 10–44)
ANION GAP SERPL CALC-SCNC: 13 MMOL/L (ref 8–16)
AST SERPL-CCNC: 27 U/L (ref 10–40)
BACTERIA #/AREA URNS HPF: ABNORMAL /HPF
BASOPHILS # BLD AUTO: 0.08 K/UL (ref 0–0.2)
BASOPHILS NFR BLD: 0.9 % (ref 0–1.9)
BILIRUB SERPL-MCNC: 0.4 MG/DL (ref 0.1–1)
BILIRUB UR QL STRIP: NEGATIVE
BNP SERPL-MCNC: <10 PG/ML (ref 0–99)
BUN SERPL-MCNC: 10 MG/DL (ref 6–20)
CALCIUM SERPL-MCNC: 9.2 MG/DL (ref 8.7–10.5)
CHLORIDE SERPL-SCNC: 110 MMOL/L (ref 95–110)
CLARITY UR: ABNORMAL
CO2 SERPL-SCNC: 19 MMOL/L (ref 23–29)
COLOR UR: YELLOW
CREAT SERPL-MCNC: 0.8 MG/DL (ref 0.5–1.4)
CRP SERPL-MCNC: 5.2 MG/L (ref 0–8.2)
D DIMER PPP IA.FEU-MCNC: 0.25 MG/L FEU
DIFFERENTIAL METHOD BLD: ABNORMAL
EOSINOPHIL # BLD AUTO: 0.3 K/UL (ref 0–0.5)
EOSINOPHIL NFR BLD: 3 % (ref 0–8)
ERYTHROCYTE [DISTWIDTH] IN BLOOD BY AUTOMATED COUNT: 14 % (ref 11.5–14.5)
EST. GFR  (NO RACE VARIABLE): >60 ML/MIN/1.73 M^2
GLUCOSE SERPL-MCNC: 102 MG/DL (ref 70–110)
GLUCOSE UR QL STRIP: NEGATIVE
HCT VFR BLD AUTO: 39 % (ref 37–48.5)
HCV AB SERPL QL IA: NORMAL
HGB BLD-MCNC: 13 G/DL (ref 12–16)
HGB UR QL STRIP: NEGATIVE
HIV 1+2 AB+HIV1 P24 AG SERPL QL IA: NORMAL
IMM GRANULOCYTES # BLD AUTO: 0.05 K/UL (ref 0–0.04)
IMM GRANULOCYTES NFR BLD AUTO: 0.5 % (ref 0–0.5)
KETONES UR QL STRIP: NEGATIVE
LEUKOCYTE ESTERASE UR QL STRIP: ABNORMAL
LYMPHOCYTES # BLD AUTO: 2.4 K/UL (ref 1–4.8)
LYMPHOCYTES NFR BLD: 26.1 % (ref 18–48)
MCH RBC QN AUTO: 28.9 PG (ref 27–31)
MCHC RBC AUTO-ENTMCNC: 33.3 G/DL (ref 32–36)
MCV RBC AUTO: 87 FL (ref 82–98)
MICROSCOPIC COMMENT: ABNORMAL
MONOCYTES # BLD AUTO: 0.7 K/UL (ref 0.3–1)
MONOCYTES NFR BLD: 7.4 % (ref 4–15)
NEUTROPHILS # BLD AUTO: 5.7 K/UL (ref 1.8–7.7)
NEUTROPHILS NFR BLD: 62.1 % (ref 38–73)
NITRITE UR QL STRIP: NEGATIVE
NRBC BLD-RTO: 0 /100 WBC
OHS QRS DURATION: 80 MS
OHS QTC CALCULATION: 451 MS
PH UR STRIP: 6 [PH] (ref 5–8)
PLATELET # BLD AUTO: 305 K/UL (ref 150–450)
PMV BLD AUTO: 8.4 FL (ref 9.2–12.9)
POTASSIUM SERPL-SCNC: 3.7 MMOL/L (ref 3.5–5.1)
PROT SERPL-MCNC: 6.3 G/DL (ref 6–8.4)
PROT UR QL STRIP: NEGATIVE
RBC # BLD AUTO: 4.5 M/UL (ref 4–5.4)
RBC #/AREA URNS HPF: 3 /HPF (ref 0–4)
SODIUM SERPL-SCNC: 142 MMOL/L (ref 136–145)
SP GR UR STRIP: >=1.03 (ref 1–1.03)
SQUAMOUS #/AREA URNS HPF: 12 /HPF
TROPONIN I SERPL DL<=0.01 NG/ML-MCNC: <0.006 NG/ML (ref 0–0.03)
TSH SERPL DL<=0.005 MIU/L-ACNC: 3.44 UIU/ML (ref 0.4–4)
URN SPEC COLLECT METH UR: ABNORMAL
UROBILINOGEN UR STRIP-ACNC: NEGATIVE EU/DL
WBC # BLD AUTO: 9.23 K/UL (ref 3.9–12.7)
WBC #/AREA URNS HPF: 10 /HPF (ref 0–5)

## 2024-09-12 PROCEDURE — 94760 N-INVAS EAR/PLS OXIMETRY 1: CPT

## 2024-09-12 PROCEDURE — 96374 THER/PROPH/DIAG INJ IV PUSH: CPT

## 2024-09-12 PROCEDURE — 25000003 PHARM REV CODE 250: Performed by: EMERGENCY MEDICINE

## 2024-09-12 PROCEDURE — 81000 URINALYSIS NONAUTO W/SCOPE: CPT | Performed by: EMERGENCY MEDICINE

## 2024-09-12 PROCEDURE — 63600175 PHARM REV CODE 636 W HCPCS: Performed by: EMERGENCY MEDICINE

## 2024-09-12 PROCEDURE — 85025 COMPLETE CBC W/AUTO DIFF WBC: CPT | Performed by: EMERGENCY MEDICINE

## 2024-09-12 PROCEDURE — 96375 TX/PRO/DX INJ NEW DRUG ADDON: CPT

## 2024-09-12 RX ORDER — MELOXICAM 15 MG/1
15 TABLET ORAL DAILY PRN
Qty: 14 TABLET | Refills: 0 | Status: SHIPPED | OUTPATIENT
Start: 2024-09-12 | End: 2024-09-26

## 2024-09-12 RX ORDER — CEFDINIR 300 MG/1
300 CAPSULE ORAL 2 TIMES DAILY
Qty: 14 CAPSULE | Refills: 0 | Status: SHIPPED | OUTPATIENT
Start: 2024-09-12 | End: 2024-09-19

## 2024-09-12 RX ORDER — BUSPIRONE HYDROCHLORIDE 10 MG/1
10 TABLET ORAL 2 TIMES DAILY
Qty: 60 TABLET | Refills: 1 | Status: SHIPPED | OUTPATIENT
Start: 2024-09-12

## 2024-09-12 RX ADMIN — CEFTRIAXONE SODIUM 1 G: 1 INJECTION, POWDER, FOR SOLUTION INTRAMUSCULAR; INTRAVENOUS at 01:09

## 2024-09-12 RX ADMIN — HYDROMORPHONE HYDROCHLORIDE 1 MG: 1 INJECTION, SOLUTION INTRAMUSCULAR; INTRAVENOUS; SUBCUTANEOUS at 12:09

## 2024-09-12 RX ADMIN — ACETAMINOPHEN 1000 MG: 500 TABLET ORAL at 12:09

## 2024-09-12 RX ADMIN — ONDANSETRON 8 MG: 2 INJECTION INTRAMUSCULAR; INTRAVENOUS at 12:09

## 2024-09-12 NOTE — ED PROVIDER NOTES
History     Chief Complaint   Patient presents with    multiple complaints     Patient with c/o back pain, neck pain, fatigue, nausea and left sided rib cage pain that has been ongoing since yesterday     HPI:  Mariana Mora is a 43 y.o. female with PMH as below including psoriatic arthritis on Tremfya with rheumatology who presents to the [facility] emergency department for evaluation of 2 days of gradual onset, moderate to severe, atraumatic midline low back pain, midline neck pain, left sided rib pain worse with deep breathing, and nausea.     PCP: Emeli Morgan MD    Review of patient's allergies indicates:   Allergen Reactions    Penicillins     Norco [hydrocodone-acetaminophen] Itching    Prednisone Other (See Comments) and Rash     PSORIASIS  PSORIASIS      Past Medical History:   Diagnosis Date    ADHD, predominantly inattentive type     Anxiety     Arthritis     Autism     Autism spectrum disorder     Depression     Hypertension     Hypothyroidism     Migraine headache     OCD (obsessive compulsive disorder)     Psoriasis     TMJ (temporomandibular joint syndrome)      Past Surgical History:   Procedure Laterality Date    ADENOIDECTOMY      CHOLECYSTECTOMY  6-3-2021    COLONOSCOPY      at the age of 7 for rectal bleeding    COLONOSCOPY N/A 8/10/2023    Procedure: COLONOSCOPY;  Surgeon: Jeanne Whitt MD;  Location: [facility];  Service: Endoscopy;  Laterality: N/A;    ESOPHAGEAL MANOMETRY WITH MEASUREMENT OF IMPEDANCE N/A 02/10/2020    Procedure: MANOMETRY, ESOPHAGUS, WITH IMPEDANCE MEASUREMENT;  Surgeon: Fitz Murray MD;  Location: [address]);  Service: Endoscopy;  Laterality: N/A;  2/3 - pt confirmed    ESOPHAGOGASTRODUODENOSCOPY N/A 12/17/2019    Procedure: EGD (ESOPHAGOGASTRODUODENOSCOPY);  Surgeon: David Stahl MD;  Location: [facility];  Service: Endoscopy;  Laterality: N/A;    ESOPHAGOGASTRODUODENOSCOPY N/A 8/31/2023    Procedure: EGD  (ESOPHAGOGASTRODUODENOSCOPY);  Surgeon: Jeanne Whitt MD;  Location: [facility];  Service: Endoscopy;  Laterality: N/A;    INNER EAR SURGERY Right     LAPAROSCOPIC CHOLECYSTECTOMY N/A 06/03/2021    Procedure: CHOLECYSTECTOMY-LAPAROSCOPIC;  Surgeon: Farrukh Lorenzo MD;  Location: [facility] OR;  Service: General;  Laterality: N/A;    NASAL SEPTUM SURGERY      TYMPANOPLASTY      bilateral    TYMPANOSTOMY TUBE PLACEMENT         Family History   Problem Relation Name Age of Onset    Arthritis Father Billy Pair     Asthma Father Billy Pair     Heart disease Father Billy Pair     Hyperlipidemia Father Billy Pair     Colon cancer Paternal Uncle Roland Pair         dx in 50s    Cancer Paternal Uncle Roland Pair     Colon cancer Paternal Grandmother Sherron Britton     Diabetes Paternal Grandmother Sherron Britton     Heart failure Paternal Grandmother Sherron Britton     Breast cancer Paternal Grandmother Sherron Britton     Arthritis Paternal Grandmother Sherron Britton     Cancer Paternal Grandmother Sherron Britton     Heart disease Paternal Grandmother Sherron Britton     Diabetes Paternal Grandfather TF     Heart failure Paternal Grandfather TF     Cancer Paternal Grandfather TF     Heart disease Paternal Grandfather TF     Stroke Paternal Grandfather TF     Hemochromatosis Other          Father's side    Ovarian cancer Neg Hx      Colon polyps Neg Hx      Esophageal cancer Neg Hx      Stomach cancer Neg Hx      Ulcerative colitis Neg Hx      Crohn's disease Neg Hx       Social History     Tobacco Use    Smoking status: Never    Smokeless tobacco: Never   Substance and Sexual Activity    Alcohol use: No    Drug use: Never    Sexual activity: Not Currently     Partners: Male     Birth control/protection: OCP     Comment: Pill      Review of Systems     Review of Systems   Constitutional:  Positive for diaphoresis and fatigue. Negative for fever.   HENT: Negative.     Eyes: Negative.    Respiratory: Negative.     Cardiovascular:  Negative for chest pain.  "  Gastrointestinal:  Positive for nausea.   Endocrine: Negative.    Genitourinary: Negative.  Negative for dysuria.   Musculoskeletal: Negative.    Skin: Negative.    Allergic/Immunologic: Negative.    Neurological: Negative.    Hematological: Negative.    Psychiatric/Behavioral: Negative.     All other systems reviewed and are negative.       Physical Exam     Initial Vitals [09/11/24 2244]   BP Pulse Resp Temp SpO2   (!) 181/94 85 16 98.7 °F (37.1 °C) 96 %      MAP       --          Nursing notes and vital signs reviewed.  Constitutional: Patient is in moderate distress.   Head: Normocephalic. Atraumatic.   Eyes:  Conjunctivae are not pale. No scleral icterus.   ENT: Mucous membranes moist.   Neck: Supple. Tender at midline and bilat paracervical musculature.   Cardiovascular: Regular rate. Regular rhythm. No murmurs, rubs, or gallops   Pulmonary: No respiratory distress. Clear to auscultation bilaterally   Abdominal: Soft, nondistended, nontender.    Musculoskeletal: Moves all extremities. No obvious deformities. Midline lumbar tenderness.   Skin: Warm. Diaphoretic.   Neurological:  Alert, awake, and appropriate. Normal speech. No acute lateralizing neurologic deficits appreciated.   Psychiatric: Normal affect.       ED Course   Procedures  Vitals:    09/11/24 2244 09/11/24 2300 09/11/24 2314 09/11/24 2347   BP: (!) 181/94 (!) 160/81 (!) 124/55 134/64   Pulse: 85 78 84 81   Resp: 16 (!) 26  (!) 25   Temp: 98.7 °F (37.1 °C)      TempSrc: Oral      SpO2: 96% 97% 97% 96%   Weight: (!) 138.3 kg (304 lb 14.3 oz)      Height: 5' 5" (1.651 m)       09/12/24 0001 09/12/24 0014 09/12/24 0019 09/12/24 0034   BP:  122/62 (!) 117/57 138/72   Pulse:  79 88 79   Resp: (!) 22 (!) 21 (!) 26 (!) 21   Temp:       TempSrc:       SpO2:  95% 96% 96%   Weight:       Height:        09/12/24 0104 09/12/24 0113   BP: 127/73    Pulse: 80 83   Resp: 20 20   Temp:     TempSrc:     SpO2: 95% 96%   Weight:     Height:       Lab Results " Interpreted as Abnormal:  Labs Reviewed   CBC W/ AUTO DIFFERENTIAL - Abnormal       Result Value    WBC 9.23      RBC 4.50      Hemoglobin 13.0      Hematocrit 39.0      MCV 87      MCH 28.9      MCHC 33.3      RDW 14.0      Platelets 305      MPV 8.4 (*)     Immature Granulocytes 0.5      Gran # (ANC) 5.7      Immature Grans (Abs) 0.05 (*)     Lymph # 2.4      Mono # 0.7      Eos # 0.3      Baso # 0.08      nRBC 0      Gran % 62.1      Lymph % 26.1      Mono % 7.4      Eosinophil % 3.0      Basophil % 0.9      Differential Method Automated      Narrative:     Release to patient->Immediate  Recoll. [id number] by [facility] at 09/12/2024 00:25, reason: Specimen   clotted   COMPREHENSIVE METABOLIC PANEL - Abnormal    Sodium 142      Potassium 3.7      Chloride 110      CO2 19 (*)     Glucose 102      BUN 10      Creatinine 0.8      Calcium 9.2      Total Protein 6.3      Albumin 3.3 (*)     Total Bilirubin 0.4      Alkaline Phosphatase 69      AST 27      ALT 52 (*)     eGFR >60.0      Anion Gap 13      Narrative:     Release to patient->Immediate  Recoll. [id number] by [facility] at 09/11/2024 23:44, reason: Specimen   hemolyzed   URINALYSIS, REFLEX TO URINE CULTURE - Abnormal    Specimen UA Urine, Clean Catch      Color, UA Yellow      Appearance, UA Hazy (*)     pH, UA 6.0      Specific Gravity, UA >=1.030 (*)     Protein, UA Negative      Glucose, UA Negative      Ketones, UA Negative      Bilirubin (UA) Negative      Occult Blood UA Negative      Nitrite, UA Negative      Urobilinogen, UA Negative      Leukocytes, UA 1+ (*)     Narrative:     Preferred Collection Type->Urine, Clean Catch  Specimen Source->Urine   URINALYSIS MICROSCOPIC - Abnormal    RBC, UA 3      WBC, UA 10 (*)     Bacteria Moderate (*)     Squam Epithel, UA 12      Microscopic Comment SEE COMMENT      Narrative:     Preferred Collection Type->Urine, Clean Catch  Specimen Source->Urine   TROPONIN I    Troponin I <0.006      Narrative:     Release to  patient->Immediate  Recoll. [id number] by [facility] at 09/11/2024 23:44, reason: Specimen   hemolyzed   B-TYPE NATRIURETIC PEPTIDE    BNP <10      Narrative:     Release to patient->Immediate  Recoll. [id number] by [facility] at 09/11/2024 23:52, reason: Specimen   hemolyzed   C-REACTIVE PROTEIN    CRP 5.2      Narrative:     Release to patient->Immediate  Recoll. [id number] by [facility] at 09/11/2024 23:44, reason: Specimen   hemolyzed   TSH    TSH 3.439     D DIMER, QUANTITATIVE    D-Dimer 0.25      Narrative:     Recoll. [id number] by [facility] at 09/11/2024 23:44, reason: Specimen   hemolyzed   HIV 1 / 2 ANTIBODY   HEPATITIS C ANTIBODY      All Lab Results:  Results for orders placed or performed during the hospital encounter of 09/11/24   CBC auto differential   Result Value Ref Range    WBC 9.23 3.90 - 12.70 K/uL    RBC 4.50 4.00 - 5.40 M/uL    Hemoglobin 13.0 12.0 - 16.0 g/dL    Hematocrit 39.0 37.0 - 48.5 %    MCV 87 82 - 98 fL    MCH 28.9 27.0 - 31.0 pg    MCHC 33.3 32.0 - 36.0 g/dL    RDW 14.0 11.5 - 14.5 %    Platelets 305 150 - 450 K/uL    MPV 8.4 (L) 9.2 - 12.9 fL    Immature Granulocytes 0.5 0.0 - 0.5 %    Gran # (ANC) 5.7 1.8 - 7.7 K/uL    Immature Grans (Abs) 0.05 (H) 0.00 - 0.04 K/uL    Lymph # 2.4 1.0 - 4.8 K/uL    Mono # 0.7 0.3 - 1.0 K/uL    Eos # 0.3 0.0 - 0.5 K/uL    Baso # 0.08 0.00 - 0.20 K/uL    nRBC 0 0 /100 WBC    Gran % 62.1 38.0 - 73.0 %    Lymph % 26.1 18.0 - 48.0 %    Mono % 7.4 4.0 - 15.0 %    Eosinophil % 3.0 0.0 - 8.0 %    Basophil % 0.9 0.0 - 1.9 %    Differential Method Automated    Comprehensive metabolic panel   Result Value Ref Range    Sodium 142 136 - 145 mmol/L    Potassium 3.7 3.5 - 5.1 mmol/L    Chloride 110 95 - 110 mmol/L    CO2 19 (L) 23 - 29 mmol/L    Glucose 102 70 - 110 mg/dL    BUN 10 6 - 20 mg/dL    Creatinine 0.8 0.5 - 1.4 mg/dL    Calcium 9.2 8.7 - 10.5 mg/dL    Total Protein 6.3 6.0 - 8.4 g/dL    Albumin 3.3 (L) 3.5 - 5.2 g/dL    Total Bilirubin 0.4 0.1 - 1.0 mg/dL    Alkaline  Phosphatase 69 55 - 135 U/L    AST 27 10 - 40 U/L    ALT 52 (H) 10 - 44 U/L    eGFR >60.0 >60 mL/min/1.73 m^2    Anion Gap 13 8 - 16 mmol/L   Troponin I   Result Value Ref Range    Troponin I <0.006 0.000 - 0.026 ng/mL   Brain natriuretic peptide   Result Value Ref Range    BNP <10 0 - 99 pg/mL   C-reactive protein   Result Value Ref Range    CRP 5.2 0.0 - 8.2 mg/L   Urinalysis, Reflex to Urine Culture Urine, Clean Catch    Specimen: Urine   Result Value Ref Range    Specimen UA Urine, Clean Catch     Color, UA Yellow Yellow, Straw, Prabha    Appearance, UA Hazy (A) Clear    pH, UA 6.0 5.0 - 8.0    Specific Gravity, UA >=1.030 (A) 1.005 - 1.030    Protein, UA Negative Negative    Glucose, UA Negative Negative    Ketones, UA Negative Negative    Bilirubin (UA) Negative Negative    Occult Blood UA Negative Negative    Nitrite, UA Negative Negative    Urobilinogen, UA Negative Negative EU/dL    Leukocytes, UA 1+ (A) Negative   TSH   Result Value Ref Range    TSH 3.439 0.400 - 4.000 uIU/mL   D dimer, quantitative   Result Value Ref Range    D-Dimer 0.25 <0.50 mg/L FEU   Urinalysis Microscopic   Result Value Ref Range    RBC, UA 3 0 - 4 /hpf    WBC, UA 10 (H) 0 - 5 /hpf    Bacteria Moderate (A) None-Occ /hpf    Squam Epithel, UA 12 /hpf    Microscopic Comment SEE COMMENT      Imaging Results              X-Ray Chest AP Portable (Final result)  Result time 09/12/24 00:05:24      Final result by Cristian Hampton Jr., MD (09/12/24 00:05:24)                   Impression:      No acute abnormality.      Electronically signed by: Cristian Hampton MD  Date:    09/12/2024  Time:    00:05               Narrative:    EXAMINATION:  XR CHEST AP PORTABLE    CLINICAL HISTORY:  Pleurodynia    TECHNIQUE:  Single frontal view of the chest was performed.    COMPARISON:  Chest x-ray of May 9, 2024    FINDINGS:  The lungs are clear, with normal appearance of pulmonary vasculature and no pleural effusion or pneumothorax.    The cardiac  silhouette is normal in size. The hilar and mediastinal contours are unremarkable.    Bones are intact.                                     The emergency physician reviewed the vital signs and test results, which are outlined above.     ED Discussion      Patient's evaluation in the ED does not suggest any emergent or life-threatening medical conditions requiring immediate intervention beyond what was provided in the ED, and I believe patient is safe for discharge. Regardless, an unremarkable evaluation in the ED does not preclude the development or presence of a serious or life-threatening condition. As such, patient was given return instructions for any change or worsening of symptoms.       ED Medication(s) Administered:  Medications   cefTRIAXone (Rocephin) 1 g in D5W 100 mL IVPB (MB+) (has no administration in time range)   HYDROmorphone injection 1 mg (1 mg Intravenous Given 9/12/24 0002)   acetaminophen tablet 1,000 mg (1,000 mg Oral Given 9/12/24 0001)   ondansetron injection 8 mg (8 mg Intravenous Given 9/12/24 0002)       Prescription Management: I performed a review of the patient's current Rx medication list as input by nursing staff.    Patient's Medications   New Prescriptions    CEFDINIR (OMNICEF) 300 MG CAPSULE    Take 1 capsule (300 mg total) by mouth 2 (two) times daily. for 7 days    MELOXICAM (MOBIC) 15 MG TABLET    Take 1 tablet (15 mg total) by mouth daily as needed for Pain.   Previous Medications    ATORVASTATIN (LIPITOR) 10 MG TABLET    TAKE 1 TABLET(10 MG) BY MOUTH EVERY DAY    BUSPIRONE (BUSPAR) 10 MG TABLET    Take 1 tablet (10 mg total) by mouth 2 (two) times daily.    CARIPRAZINE (VRAYLAR) 1.5 MG CAP    Take 1 capsule (1.5 mg total) by mouth once daily.    CETIRIZINE (ZYRTEC) 10 MG TABLET    Take 10 mg by mouth once daily.    CYANOCOBALAMIN (VITAMIN B-12) 1000 MCG TABLET    Take 1 tablet (1,000 mcg total) by mouth once daily.    DIAZEPAM (VALIUM) 5 MG TABLET    Take 1 or 2 tablets 30  minutes prior to procedure.    DROSPIRENONE, CONTRACEPTIVE, (SLYND) 4 MG (28) TAB    Take 1 tablet (4 mg total) by mouth once daily.    ERGOCALCIFEROL (ERGOCALCIFEROL) 50,000 UNIT CAP    TAKE 2 CAPSULES BY MOUTH EVERY 7 DAYS    FAMOTIDINE (PEPCID) 20 MG TABLET    Take 20 mg by mouth 2 (two) times daily.    GUSELKUMAB (TREMFYA) 100 MG/ML ATIN    Inject 100 mg into the skin every 8 weeks.    HYDROCHLOROTHIAZIDE (MICROZIDE) 12.5 MG CAPSULE    TAKE 1 CAPSULE(12.5 MG) BY MOUTH EVERY DAY    KETOCONAZOLE (NIZORAL) 2 % SHAMPOO    Apply topically twice a week.    LEFLUNOMIDE (ARAVA) 20 MG TAB    Take 20 mg by mouth.    LISDEXAMFETAMINE (VYVANSE) 50 MG CAPSULE    Take 1 capsule (50 mg total) by mouth every morning.    METHOCARBAMOL (ROBAXIN) 500 MG TAB    Take 1 tablet (500 mg total) by mouth 3 (three) times daily as needed (muscle spasm).    MONTELUKAST (SINGULAIR) 10 MG TABLET    Take 10 mg by mouth.    NYSTATIN (MYCOSTATIN) OINTMENT    APPLY TOPICALLY TO THE AFFECTED AREA TWICE DAILY    NYSTOP POWDER    APPLY TO THE AFFECTED AREA TWICE DAILY    OMALIZUMAB (XOLAIR) 300 MG/2 ML SYRG    300 mg.    PANTOPRAZOLE (PROTONIX) 40 MG TABLET    Take one po daily    PRAZOSIN (MINIPRESS) 5 MG CAPSULE    Take 2 capsules (10 mg total) by mouth nightly.    PROCHLORPERAZINE (COMPAZINE) 10 MG TABLET    Take 1 tablet (10 mg total) by mouth every 6 (six) hours as needed (for headaches in conjunction with Maxalt (Imitrex)).    RIMEGEPANT (NURTEC) 75 MG ODT    One dissolving tablet on the tongue daily as needed for migraine. No more than once per day.    RIZATRIPTAN (MAXALT-MLT) 10 MG DISINTEGRATING TABLET    Take 1 tablet (10 mg total) by mouth as needed for Migraine (No more than 2 tablets in 24 hours).    SERTRALINE (ZOLOFT) 100 MG TABLET    Take 2 tablets (200 mg total) by mouth once daily.    SYNTHROID 112 MCG TABLET    Take 1 tablet (112 mcg total) by mouth before breakfast.   Modified Medications    No medications on file   Discontinued  Medications    No medications on file         Follow-up Information       Schedule an appointment as soon as possible for a visit  with Emeli Morgan MD.    Specialty: Family Medicine  Contact information:  [address]  [address]  [place] [address] [phone]               Schedule an appointment as soon as possible for a visit  with your rheumatologist.               [facility] Emergency Dept.    Specialty: Emergency Medicine  Why: As needed, If symptoms worsen  Contact information:  [address] [facility] [address] [phone]                          Clinical Impression       ICD-10-CM ICD-9-CM   1. Psoriatic arthritis of multiple joints  L40.50 696.0   2. Rib pain on left side  R07.81 786.50   3. Acute UTI  N39.0 599.0      ED Disposition Condition    Discharge Stable             Juan Carlos Preciado MD  09/12/24 0114

## 2024-09-12 NOTE — TELEPHONE ENCOUNTER
"[place] pt:    Pt calls stating that she started with lower back pain yesterday. Took a robaxin. This morning, pt's back and neck hurt. She took another robaxin and it did not help, especially her neck pain. Feels fatigued, has SOB with exertion, heartburn, and left-sided pain. Pt cites a family hx of heart disease.     Care Advice recommends that pt go to ED now. Pt verbalizes understanding and is instructed to call back with any new/worsening sxs, questions, or concerns.   Reason for Disposition   Difficulty breathing or unusual sweating (e.g., sweating without exertion)    Additional Information   Negative: Shock suspected (e.g., cold/pale/clammy skin, too weak to stand, low BP, rapid pulse)   Negative: Difficult to awaken or acting confused (e.g., disoriented, slurred speech)   Negative: [1] Similar pain previously AND [2] it was from "heart attack"   Negative: [1] Similar pain previously AND [2] it was from "angina" AND [3] not relieved by nitroglycerin   Negative: Sounds like a life-threatening emergency to the triager   Negative: Followed a neck injury (e.g., MVA, sports, impact or collision)    Protocols used: Neck Pain or [id number]    "

## 2024-09-20 DIAGNOSIS — F90.2 ATTENTION DEFICIT HYPERACTIVITY DISORDER (ADHD), COMBINED TYPE: ICD-10-CM

## 2024-09-20 RX ORDER — LISDEXAMFETAMINE DIMESYLATE 50 MG/1
50 CAPSULE ORAL EVERY MORNING
Qty: 30 CAPSULE | Refills: 0 | Status: SHIPPED | OUTPATIENT
Start: 2024-09-20

## 2024-09-21 ENCOUNTER — IMMUNIZATION (OUTPATIENT)
Dept: FAMILY MEDICINE | Facility: CLINIC | Age: 44
End: 2024-09-21
Payer: COMMERCIAL

## 2024-09-21 DIAGNOSIS — Z23 NEED FOR VACCINATION: Primary | ICD-10-CM

## 2024-09-21 PROCEDURE — 90471 IMMUNIZATION ADMIN: CPT | Mod: S$GLB,,, | Performed by: STUDENT IN AN ORGANIZED HEALTH CARE EDUCATION/TRAINING PROGRAM

## 2024-09-21 PROCEDURE — 90656 IIV3 VACC NO PRSV 0.5 ML IM: CPT | Mod: S$GLB,,, | Performed by: STUDENT IN AN ORGANIZED HEALTH CARE EDUCATION/TRAINING PROGRAM

## 2024-09-25 DIAGNOSIS — E78.49 OTHER HYPERLIPIDEMIA: ICD-10-CM

## 2024-09-25 NOTE — TELEPHONE ENCOUNTER
No care due was identified.  [facility] Embedded Care Due Messages. Reference number: [id number].   9/25/2024 2:51:47 PM CDT

## 2024-09-26 RX ORDER — ATORVASTATIN CALCIUM 10 MG/1
10 TABLET, FILM COATED ORAL DAILY
Qty: 90 TABLET | Refills: 0 | Status: SHIPPED | OUTPATIENT
Start: 2024-09-26

## 2024-09-26 NOTE — TELEPHONE ENCOUNTER
Refill Routing Note   Medication(s) are not appropriate for processing by [facility] for the following reason(s):        Required labs outdated    [facility] action(s):  Defer             Appointments  past 12m or future 3m with PCP    Date Provider   Last Visit   5/17/2024 Emeli Morgan MD   Next Visit   10/18/2024 Emeli Morgan MD   ED visits in past 90 days: 1        Note composed:11:47 PM 09/25/2024

## 2024-09-28 DIAGNOSIS — F42.9 OBSESSIVE-COMPULSIVE DISORDER, UNSPECIFIED TYPE: ICD-10-CM

## 2024-09-30 RX ORDER — SERTRALINE HYDROCHLORIDE 100 MG/1
200 TABLET, FILM COATED ORAL DAILY
Qty: 60 TABLET | Refills: 1 | Status: SHIPPED | OUTPATIENT
Start: 2024-09-30

## 2024-10-02 ENCOUNTER — PROCEDURE VISIT (OUTPATIENT)
Dept: NEUROLOGY | Facility: CLINIC | Age: 44
End: 2024-10-02
Payer: COMMERCIAL

## 2024-10-02 VITALS
SYSTOLIC BLOOD PRESSURE: 138 MMHG | TEMPERATURE: 97 F | RESPIRATION RATE: 17 BRPM | HEART RATE: 84 BPM | WEIGHT: 293 LBS | DIASTOLIC BLOOD PRESSURE: 81 MMHG | HEIGHT: 65 IN | BODY MASS INDEX: 48.82 KG/M2

## 2024-10-02 DIAGNOSIS — G43.019 INTRACTABLE MIGRAINE WITHOUT AURA AND WITHOUT STATUS MIGRAINOSUS: Primary | ICD-10-CM

## 2024-10-02 PROCEDURE — 64615 CHEMODENERV MUSC MIGRAINE: CPT | Mod: S$GLB,,, | Performed by: PSYCHIATRY & NEUROLOGY

## 2024-10-02 NOTE — PROCEDURES
Procedures  BOTOX  The patient has chronic migraines ( G43.719) and suffers from headaches more than 3 months, more than 15 days of headache days per month lasting more than 4 hours with at least 8 attacks that meet criteria for migraine.    Botulinum Toxin Injection Procedure   Pre-operative diagnosis: Chronic migraine   Post-operative diagnosis: Same   Procedure: Chemical neurolysis     The Botox injections have achieved well over 50%  improvement in the patient's symptoms. Migraines have been reduced at least 7 days per month and the number of cumulative hours suffering with headaches has been reduced at least 100 total hours per month. Today she does have a headache indicating that the Botox has worn off. Frequency of treatment is every 3 months unless no response to the treatments, at which time we will discontinue the injections.    After risks and benefits were explained, a signed consent was obtained.   The patient was placed in a comfortable area and the sites to be treated were identified.The area to be treated was prepped three times with alcohol and the alcohol allowed to dry. Next, a 30 gauge needle was used to inject the medication in the area to be treated.   Area(s) injected:   Total Botox used: 175 Units   Botox wastage: 25 Units   Injection sites:    muscle bilaterally ( a total of 10 units divided into 2 sites)   Procerus muscle (5 units)   Frontalis muscle bilaterally (a total of 20 units divided into 4 sites)   Temporalis muscle bilaterally (a total of 40 units divided into 8 sites)   Masseters muscles bilaterally (a total of 20 units at 4 sites)  Occipitalis muscle bilaterally (a total of 30 units divided into 6 sites)   Cervical paraspinal muscles (a total of 20 units divided into 4 sites)   Trapezius muscle bilaterally (a total of 30 units divided into 6 sites)   Complications: none   RTC for the next Botox injection: 3 months     Alejandro Cárdenas M.D   of  Neurology  ACGME Board Certified, Neurology  UCNS, Board certified, headache Medicine  Medical Director, Headache and Facial Pain  [facility]

## 2024-10-10 ENCOUNTER — OFFICE VISIT (OUTPATIENT)
Dept: PSYCHIATRY | Facility: CLINIC | Age: 44
End: 2024-10-10
Payer: COMMERCIAL

## 2024-10-10 VITALS
SYSTOLIC BLOOD PRESSURE: 139 MMHG | WEIGHT: 293 LBS | DIASTOLIC BLOOD PRESSURE: 88 MMHG | HEART RATE: 99 BPM | HEIGHT: 65 IN | BODY MASS INDEX: 48.82 KG/M2

## 2024-10-10 DIAGNOSIS — G47.33 OSA (OBSTRUCTIVE SLEEP APNEA): ICD-10-CM

## 2024-10-10 DIAGNOSIS — F42.9 OBSESSIVE-COMPULSIVE DISORDER, UNSPECIFIED TYPE: ICD-10-CM

## 2024-10-10 DIAGNOSIS — F33.41 RECURRENT MAJOR DEPRESSIVE DISORDER, IN PARTIAL REMISSION: Primary | ICD-10-CM

## 2024-10-10 DIAGNOSIS — F41.1 GAD (GENERALIZED ANXIETY DISORDER): ICD-10-CM

## 2024-10-10 DIAGNOSIS — G47.00 INSOMNIA, UNSPECIFIED TYPE: ICD-10-CM

## 2024-10-10 DIAGNOSIS — F90.2 ATTENTION DEFICIT HYPERACTIVITY DISORDER (ADHD), COMBINED TYPE: ICD-10-CM

## 2024-10-10 PROCEDURE — 3079F DIAST BP 80-89 MM HG: CPT | Mod: CPTII,S$GLB,, | Performed by: PHYSICIAN ASSISTANT

## 2024-10-10 PROCEDURE — G2211 COMPLEX E/M VISIT ADD ON: HCPCS | Mod: S$GLB,,, | Performed by: PHYSICIAN ASSISTANT

## 2024-10-10 PROCEDURE — 99999 PR PBB SHADOW E&M-EST. PATIENT-LVL III: CPT | Mod: PBBFAC,,, | Performed by: PHYSICIAN ASSISTANT

## 2024-10-10 PROCEDURE — 3008F BODY MASS INDEX DOCD: CPT | Mod: CPTII,S$GLB,, | Performed by: PHYSICIAN ASSISTANT

## 2024-10-10 PROCEDURE — 1159F MED LIST DOCD IN RCRD: CPT | Mod: CPTII,S$GLB,, | Performed by: PHYSICIAN ASSISTANT

## 2024-10-10 PROCEDURE — 1160F RVW MEDS BY RX/DR IN RCRD: CPT | Mod: CPTII,S$GLB,, | Performed by: PHYSICIAN ASSISTANT

## 2024-10-10 PROCEDURE — 3044F HG A1C LEVEL LT 7.0%: CPT | Mod: CPTII,S$GLB,, | Performed by: PHYSICIAN ASSISTANT

## 2024-10-10 PROCEDURE — 3075F SYST BP GE 130 - 139MM HG: CPT | Mod: CPTII,S$GLB,, | Performed by: PHYSICIAN ASSISTANT

## 2024-10-10 PROCEDURE — 99214 OFFICE O/P EST MOD 30 MIN: CPT | Mod: S$GLB,,, | Performed by: PHYSICIAN ASSISTANT

## 2024-10-10 RX ORDER — CARIPRAZINE 1.5 MG/1
1.5 CAPSULE, GELATIN COATED ORAL DAILY
Qty: 30 CAPSULE | Refills: 1 | Status: SHIPPED | OUTPATIENT
Start: 2024-10-10

## 2024-10-10 RX ORDER — PRAZOSIN HYDROCHLORIDE 5 MG/1
10 CAPSULE ORAL NIGHTLY
Qty: 180 CAPSULE | Refills: 1 | Status: SHIPPED | OUTPATIENT
Start: 2024-10-10

## 2024-10-10 RX ORDER — LISDEXAMFETAMINE DIMESYLATE 50 MG/1
50 CAPSULE ORAL EVERY MORNING
Qty: 30 CAPSULE | Refills: 0 | Status: SHIPPED | OUTPATIENT
Start: 2024-10-10

## 2024-10-10 RX ORDER — SERTRALINE HYDROCHLORIDE 100 MG/1
200 TABLET, FILM COATED ORAL DAILY
Qty: 60 TABLET | Refills: 1 | Status: SHIPPED | OUTPATIENT
Start: 2024-10-10
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
## 2024-11-13 DIAGNOSIS — F41.1 GAD (GENERALIZED ANXIETY DISORDER): ICD-10-CM

## 2024-11-13 RX ORDER — BUSPIRONE HYDROCHLORIDE 10 MG/1
10 TABLET ORAL 2 TIMES DAILY
Qty: 60 TABLET | Refills: 1 | Status: SHIPPED | OUTPATIENT
Start: 2024-11-13

## 2024-12-11 ENCOUNTER — OFFICE VISIT (OUTPATIENT)
Dept: PSYCHIATRY | Facility: CLINIC | Age: 44
End: 2024-12-11
Payer: COMMERCIAL

## 2024-12-11 VITALS
DIASTOLIC BLOOD PRESSURE: 90 MMHG | HEIGHT: 65 IN | BODY MASS INDEX: 48.82 KG/M2 | WEIGHT: 293 LBS | HEART RATE: 75 BPM | SYSTOLIC BLOOD PRESSURE: 141 MMHG

## 2024-12-11 DIAGNOSIS — F90.2 ATTENTION DEFICIT HYPERACTIVITY DISORDER (ADHD), COMBINED TYPE: ICD-10-CM

## 2024-12-11 DIAGNOSIS — F33.41 RECURRENT MAJOR DEPRESSIVE DISORDER, IN PARTIAL REMISSION: ICD-10-CM

## 2024-12-11 DIAGNOSIS — G47.00 INSOMNIA, UNSPECIFIED TYPE: ICD-10-CM

## 2024-12-11 DIAGNOSIS — F41.1 GAD (GENERALIZED ANXIETY DISORDER): ICD-10-CM

## 2024-12-11 DIAGNOSIS — F42.9 OBSESSIVE-COMPULSIVE DISORDER, UNSPECIFIED TYPE: Primary | ICD-10-CM

## 2024-12-11 PROCEDURE — 99999 PR PBB SHADOW E&M-EST. PATIENT-LVL V: CPT | Mod: PBBFAC,,, | Performed by: PHYSICIAN ASSISTANT

## 2024-12-11 PROCEDURE — 3044F HG A1C LEVEL LT 7.0%: CPT | Mod: CPTII,S$GLB,, | Performed by: PHYSICIAN ASSISTANT

## 2024-12-11 PROCEDURE — G2211 COMPLEX E/M VISIT ADD ON: HCPCS | Mod: S$GLB,,, | Performed by: PHYSICIAN ASSISTANT

## 2024-12-11 PROCEDURE — 1160F RVW MEDS BY RX/DR IN RCRD: CPT | Mod: CPTII,S$GLB,, | Performed by: PHYSICIAN ASSISTANT

## 2024-12-11 PROCEDURE — 3008F BODY MASS INDEX DOCD: CPT | Mod: CPTII,S$GLB,, | Performed by: PHYSICIAN ASSISTANT

## 2024-12-11 PROCEDURE — 3080F DIAST BP >= 90 MM HG: CPT | Mod: CPTII,S$GLB,, | Performed by: PHYSICIAN ASSISTANT

## 2024-12-11 PROCEDURE — 1159F MED LIST DOCD IN RCRD: CPT | Mod: CPTII,S$GLB,, | Performed by: PHYSICIAN ASSISTANT

## 2024-12-11 PROCEDURE — 99214 OFFICE O/P EST MOD 30 MIN: CPT | Mod: S$GLB,,, | Performed by: PHYSICIAN ASSISTANT

## 2024-12-11 PROCEDURE — 3077F SYST BP >= 140 MM HG: CPT | Mod: CPTII,S$GLB,, | Performed by: PHYSICIAN ASSISTANT

## 2024-12-11 RX ORDER — SERTRALINE HYDROCHLORIDE 100 MG/1
200 TABLET, FILM COATED ORAL DAILY
Qty: 60 TABLET | Refills: 1 | Status: SHIPPED | OUTPATIENT
Start: 2024-12-11

## 2024-12-11 RX ORDER — CARIPRAZINE 1.5 MG/1
1.5 CAPSULE, GELATIN COATED ORAL DAILY
Qty: 30 CAPSULE | Refills: 1 | Status: SHIPPED | OUTPATIENT
Start: 2024-12-11
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
## 2024-12-12 ENCOUNTER — OFFICE VISIT (OUTPATIENT)
Dept: FAMILY MEDICINE | Facility: CLINIC | Age: 44
End: 2024-12-12
Payer: COMMERCIAL

## 2024-12-12 ENCOUNTER — TELEPHONE (OUTPATIENT)
Dept: PSYCHIATRY | Facility: CLINIC | Age: 44
End: 2024-12-12
Payer: COMMERCIAL

## 2024-12-12 VITALS
DIASTOLIC BLOOD PRESSURE: 84 MMHG | BODY MASS INDEX: 48.82 KG/M2 | OXYGEN SATURATION: 96 % | WEIGHT: 293 LBS | RESPIRATION RATE: 18 BRPM | SYSTOLIC BLOOD PRESSURE: 120 MMHG | HEIGHT: 65 IN | HEART RATE: 80 BPM

## 2024-12-12 DIAGNOSIS — E78.49 OTHER HYPERLIPIDEMIA: ICD-10-CM

## 2024-12-12 PROCEDURE — 1159F MED LIST DOCD IN RCRD: CPT | Mod: S$GLB,,, | Performed by: FAMILY MEDICINE

## 2024-12-12 PROCEDURE — 99213 OFFICE O/P EST LOW 20 MIN: CPT | Mod: S$GLB,,, | Performed by: FAMILY MEDICINE

## 2024-12-12 PROCEDURE — 3079F DIAST BP 80-89 MM HG: CPT | Mod: S$GLB,,, | Performed by: FAMILY MEDICINE

## 2024-12-12 PROCEDURE — 1160F RVW MEDS BY RX/DR IN RCRD: CPT | Mod: S$GLB,,, | Performed by: FAMILY MEDICINE

## 2024-12-12 PROCEDURE — 3008F BODY MASS INDEX DOCD: CPT | Mod: S$GLB,,, | Performed by: FAMILY MEDICINE

## 2024-12-12 PROCEDURE — 3074F SYST BP LT 130 MM HG: CPT | Mod: S$GLB,,, | Performed by: FAMILY MEDICINE

## 2024-12-12 PROCEDURE — 99999 PR PBB SHADOW E&M-EST. PATIENT-LVL V: CPT | Mod: PBBFAC,,, | Performed by: FAMILY MEDICINE

## 2024-12-12 PROCEDURE — 3044F HG A1C LEVEL LT 7.0%: CPT | Mod: S$GLB,,, | Performed by: FAMILY MEDICINE

## 2024-12-12 RX ORDER — ATORVASTATIN CALCIUM 10 MG/1
10 TABLET, FILM COATED ORAL DAILY
Qty: 90 TABLET | Refills: 0 | Status: SHIPPED | OUTPATIENT
Start: 2024-12-12

## 2024-12-12 RX ORDER — AZITHROMYCIN 250 MG/1
TABLET, FILM COATED ORAL
Qty: 6 TABLET | Refills: 0 | Status: SHIPPED | OUTPATIENT
Start: 2024-12-12 | End: 2024-12-23

## 2024-12-12 NOTE — TELEPHONE ENCOUNTER
----- Message from  Yaima sent at 12/12/2024  1:09 PM CST -----  Shira Craig.  that's fine. I've included Ms. WOOD.     Ms. WOOD, will you reach out to Mariana and see when she would like to come in?    Thanks  Yaima  ----- Message -----  From: Kiara Mariee PA-C  Sent: 12/12/2024  12:47 PM CST  To: Yaima Garcia LCSW    Ms. Peña would like to resume with you for therapy.    Best regards,  Kiara

## 2024-12-12 NOTE — PROGRESS NOTES
Subjective:       Patient ID: Mariana Mora is a 44 y.o. female.    Chief Complaint: Follow-up    History of Present Illness    CHIEF COMPLAINT:  Ms. Mora presents today with sinus pain, pressure, and a runny nose.    UPPER RESPIRATORY SYMPTOMS:  She reports sinus pain and pressure, runny nose, and coughing that began a few days ago. She has received a flu vaccine and declines COVID or flu testing.    MEDICATIONS:  She takes ZepBound and has completed three doses. She reports heartburn as a side effect, which she manages with Prilosec and Tums. She missed her anxiety medication this morning due to a disruption in her usual morning coffee routine.    MEDICAL HISTORY:  She has pre-diabetes, diagnosed by her gynecologist in October or November 2023. She reports anxiety and autism.      ROS:  ENT: +nasal congestion  Respiratory: +cough  Gastrointestinal: +heartburn  Psychiatric: +anxiety         Review of Systems   Constitutional:  Negative for activity change, appetite change, fatigue and fever.   Respiratory:  Negative for shortness of breath.    Gastrointestinal:  Negative for abdominal pain.   Integumentary:  Negative for rash.       Patient Active Problem List   Diagnosis    Migraine with aura, with intractable migraine, so stated, without mention of status migrainosus    Intermittent confusion    Vitamin B12 deficiency    EMU    Vitamin B2 deficiency    Vitamin B6 deficiency    Anxiety    Gastroesophageal reflux disease    Psoriatic arthritis    Dysphagia    Gastroesophageal reflux disease with esophagitis    Abdominal pain    Preop examination    Hiatal hernia    Obesity, Class III, BMI 40-49.9 (morbid obesity)    GERD (gastroesophageal reflux disease)    Diarrhea    History of esophageal stricture    Nausea    Vitamin D deficiency    Primary insomnia    Functional diarrhea    Symptomatic cholelithiasis    Facial flushing    Dyslipidemia (high LDL; low HDL), baseline LDL-C 181    Cotton wool  "spots, OS    NAFLD (nonalcoholic fatty liver disease)    Essential thrombocytosis    Cardiovascular event risk, ASCVD 10-years risk 2.4%, 2021    RASHID (dyspnea on exertion), onset 2016    SUNIL on CPAP, 100% use    Tachycardia, with standing    Kyphosis (acquired) (postural)    Impaired mobility and activities of daily living     Mariana has a current medication list which includes the following prescription(s): buspirone, vraylar, cetirizine, cyanocobalamin, slynd, ergocalciferol, famotidine, famotidine, ketoconazole, leflunomide, methocarbamol, montelukast, montelukast, nystatin, nystop, xolair, pantoprazole, prazosin, prochlorperazine, nurtec, sertraline, synthroid, zepbound, zepbound, atorvastatin, tremfya, hydrochlorothiazide, and rizatriptan, and the following Facility-Administered Medications: diphenhydramine, lactated ringers, lactated ringers, lidocaine (pf) 10 mg/ml (1%), morphine, onabotulinumtoxina, onabotulinumtoxina, onabotulinumtoxina, and onabotulinumtoxina.        Health Maintenance Due   Topic Date Due    TETANUS VACCINE  Never done    Pneumococcal Vaccines (Age 0-49) (1 of 2 - PCV) Never done    COVID-19 Vaccine (4 - 2024-25 season) 09/01/2024      Health Maintenance reviewed and discussed- nothing additional needed.   Objective:      Vitals:    12/12/24 1428   BP: 120/84   Pulse: 80   Resp: 18   SpO2: 96%   Weight: 134.9 kg (297 lb 6.4 oz)   Height: 5' 5" (1.651 m)   PainSc:   5   PainLoc: Back     Body mass index is 49.49 kg/m².  Physical Exam  Vitals and nursing note reviewed.   Constitutional:       General: She is not in acute distress.     Appearance: She is obese. She is not ill-appearing.   Cardiovascular:      Rate and Rhythm: Normal rate and regular rhythm.      Heart sounds: No murmur heard.  Pulmonary:      Effort: Pulmonary effort is normal.      Breath sounds: Normal breath sounds. No wheezing.   Skin:     General: Skin is warm and dry.      Findings: No rash.   Neurological:      " Mental Status: She is alert.   Psychiatric:         Mood and Affect: Mood normal.         Behavior: Behavior normal.               Assessment:       Assessment & Plan    1. Assessed patient's symptoms as likely viral infection based on presentation of sinus pain, pressure, runny nose, and cough  2. Determined patient likely not contagious by next Friday for grandmother visit  3. Considered but ruled out need for COVID or flu testing based on patient's symptoms and vaccination status  4. Noted RSV and COVID prevalence in recent ER follow-ups  5. Evaluated ZepBound efficacy for weight loss and pre-diabetes management           Plan:       1. Other hyperlipidemia  -     atorvastatin (LIPITOR) 10 MG tablet; Take 1 tablet (10 mg total) by mouth once daily.  Dispense: 90 tablet; Refill: 0    Other orders  -     azithromycin (Z-MILLICENT) 250 MG tablet; Take 2 tablets by mouth on day 1; Take 1 tablet by mouth on days 2-5  Dispense: 6 tablet; Refill: 0         This note was generated with the assistance of ambient listening technology. Verbal consent was obtained by the patient and accompanying visitor(s) for the recording of patient appointment to facilitate this note. I attest to having reviewed and edited the generated note for accuracy, though some syntax or spelling errors may persist. Please contact the author of this note for any clarification.

## 2024-12-12 NOTE — TELEPHONE ENCOUNTER
Called and scheduled patient with Yaima for restarting therapy per Yaima and Kiara  Scheduled her for 01/06/2025 @ 1 PM

## 2024-12-12 NOTE — LETTER
[id number] [facility], [address]  [place] [address]  Phone: [phone]  Fax: [phone] December 12, 2024    Mariana Jara Morgan  [address] [place] [address]      To Whom It May Concern:    Due to her medical issues, Ms. Mora is unable to serve on a jury in any capacity.    If you have any questions or concerns, please feel free to call my office.    Sincerely,      Emeli Morgan MD

## 2024-12-13 DIAGNOSIS — I10 ESSENTIAL HYPERTENSION: ICD-10-CM

## 2024-12-13 RX ORDER — HYDROCHLOROTHIAZIDE 12.5 MG/1
12.5 CAPSULE ORAL DAILY
Qty: 90 CAPSULE | Refills: 3 | Status: SHIPPED | OUTPATIENT
Start: 2024-12-13

## 2024-12-13 NOTE — TELEPHONE ENCOUNTER
No care due was identified.  [facility] Embedded Care Due Messages. Reference number: [id number].   12/13/2024 10:17:50 AM CST

## 2024-12-14 NOTE — TELEPHONE ENCOUNTER
Refill Decision Note   [facility]  is requesting a refill authorization.  Brief Assessment and Rationale for Refill:  Approve     Medication Therapy Plan:         Comments:     Note composed:9:39 PM 12/13/2024             Appointments     Last Visit   12/12/2024 Emeli Morgan MD   Next Visit   3/24/2025 Emeli Morgan MD

## 2024-12-27 ENCOUNTER — PROCEDURE VISIT (OUTPATIENT)
Dept: NEUROLOGY | Facility: CLINIC | Age: 44
End: 2024-12-27
Payer: COMMERCIAL

## 2024-12-27 VITALS
TEMPERATURE: 97 F | RESPIRATION RATE: 17 BRPM | HEIGHT: 65 IN | SYSTOLIC BLOOD PRESSURE: 132 MMHG | BODY MASS INDEX: 48.82 KG/M2 | DIASTOLIC BLOOD PRESSURE: 82 MMHG | WEIGHT: 293 LBS | HEART RATE: 108 BPM

## 2024-12-27 DIAGNOSIS — G43.019 INTRACTABLE MIGRAINE WITHOUT AURA AND WITHOUT STATUS MIGRAINOSUS: Primary | ICD-10-CM

## 2024-12-27 NOTE — PROCEDURES
Procedures  BOTOX  The patient has chronic migraines ( G43.719) and suffers from headaches more than 3 months, more than 15 days of headache days per month lasting more than 4 hours with at least 8 attacks that meet criteria for migraine.    Botulinum Toxin Injection Procedure   Pre-operative diagnosis: Chronic migraine   Post-operative diagnosis: Same   Procedure: Chemical neurolysis     The Botox injections have achieved well over 50%  improvement in the patient's symptoms. Migraines have been reduced at least 7 days per month and the number of cumulative hours suffering with headaches has been reduced at least 100 total hours per month. Today she does have a headache indicating that the Botox has worn off. Frequency of treatment is every 3 months unless no response to the treatments, at which time we will discontinue the injections.    After risks and benefits were explained, a signed consent was obtained.   The patient was placed in a comfortable area and the sites to be treated were identified.The area to be treated was prepped three times with alcohol and the alcohol allowed to dry. Next, a 30 gauge needle was used to inject the medication in the area to be treated.   Area(s) injected:   Total Botox used: 175 Units   Botox wastage: 25 Units   Injection sites:    muscle bilaterally ( a total of 10 units divided into 2 sites)   Procerus muscle (5 units)   Frontalis muscle bilaterally (a total of 20 units divided into 4 sites)   Temporalis muscle bilaterally (a total of 40 units divided into 8 sites)   Masseters muscles bilaterally (a total of 20 units at 4 sites)  Occipitalis muscle bilaterally (a total of 30 units divided into 6 sites)   Cervical paraspinal muscles (a total of 20 units divided into 4 sites)   Trapezius muscle bilaterally (a total of 30 units divided into 6 sites)   Complications: none   RTC for the next Botox injection: 3 months     Alejandro Cárdenas M.D   of  Neurology  ACGME Board Certified, Neurology  UCNS, Board certified, headache Medicine  Medical Director, Headache and Facial Pain  [facility]

## 2025-01-14 ENCOUNTER — OFFICE VISIT (OUTPATIENT)
Dept: PSYCHIATRY | Facility: CLINIC | Age: 45
End: 2025-01-14
Payer: COMMERCIAL

## 2025-01-14 DIAGNOSIS — Z63.8 FAMILY DISCORD: ICD-10-CM

## 2025-01-14 DIAGNOSIS — F41.1 GAD (GENERALIZED ANXIETY DISORDER): ICD-10-CM

## 2025-01-14 DIAGNOSIS — F42.9 OBSESSIVE-COMPULSIVE DISORDER, UNSPECIFIED TYPE: Primary | ICD-10-CM

## 2025-01-14 PROCEDURE — 99999 PR PBB SHADOW E&M-EST. PATIENT-LVL II: CPT | Mod: PBBFAC,,, | Performed by: SOCIAL WORKER

## 2025-01-14 PROCEDURE — 90791 PSYCH DIAGNOSTIC EVALUATION: CPT | Mod: S$GLB,,, | Performed by: SOCIAL WORKER

## 2025-01-14 PROCEDURE — 1159F MED LIST DOCD IN RCRD: CPT | Mod: CPTII,S$GLB,, | Performed by: SOCIAL WORKER

## 2025-01-14 SDOH — SOCIAL DETERMINANTS OF HEALTH (SDOH): OTHER SPECIFIED PROBLEMS RELATED TO PRIMARY SUPPORT GROUP: Z63.8
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
## 2025-01-17 ENCOUNTER — OFFICE VISIT (OUTPATIENT)
Dept: FAMILY MEDICINE | Facility: CLINIC | Age: 45
End: 2025-01-17
Payer: COMMERCIAL

## 2025-01-17 DIAGNOSIS — N30.00 ACUTE CYSTITIS WITHOUT HEMATURIA: Primary | ICD-10-CM

## 2025-01-17 PROCEDURE — 98004 SYNCH AUDIO-VIDEO EST SF 10: CPT | Mod: 95,,, | Performed by: FAMILY MEDICINE

## 2025-01-17 PROCEDURE — 1160F RVW MEDS BY RX/DR IN RCRD: CPT | Mod: 95,,, | Performed by: FAMILY MEDICINE

## 2025-01-17 PROCEDURE — 1159F MED LIST DOCD IN RCRD: CPT | Mod: 95,,, | Performed by: FAMILY MEDICINE

## 2025-01-17 RX ORDER — NITROFURANTOIN 25; 75 MG/1; MG/1
100 CAPSULE ORAL 2 TIMES DAILY
Qty: 10 CAPSULE | Refills: 0 | Status: SHIPPED | OUTPATIENT
Start: 2025-01-17 | End: 2025-01-22

## 2025-01-17 NOTE — PROGRESS NOTES
The patient location is: [place]   The chief complaint leading to consultation is: UTI    Visit type: audiovisual    Face to Face time with patient: 5 mins  10 minutes of total time spent on the encounter, which includes face to face time and non-face to face time preparing to see the patient (eg, review of tests), Obtaining and/or reviewing separately obtained history, Documenting clinical information in the electronic or other health record, Independently interpreting results (not separately reported) and communicating results to the patient/family/caregiver, or Care coordination (not separately reported).         Each patient to whom he or she provides medical services by telemedicine is:  (1) informed of the relationship between the physician and patient and the respective role of any other health care provider with respect to management of the patient; and (2) notified that he or she may decline to receive medical services by telemedicine and may withdraw from such care at any time.    Notes: buring with urination and pelvic pain and urgency x3. No fevers and chills. Not taking anything for symptoms.     Answers submitted by the patient for this visit:  Painful Urination Questionnaire (Submitted on 1/16/2025)  Chief Complaint: Dysuria  Chronicity: recurrent  Onset: in the past 7 days  Frequency: intermittently  Progression since onset: gradually worsening  Pain quality: burning  Pain - numeric: 4/10  Fever: no fever  Sexually active?: No  History of pyelonephritis?: No  chills: No  discharge: No  flank pain: No  frequency: Yes  hematuria: No  hesitancy: No  nausea: No  possible pregnancy: No  sweats: No  urgency: Yes  vomiting: No  constipation: No  rash: No  weight loss: No  withholding: Yes  behavior changes: No  Treatments tried: home medications, increased fluids  Improvement on treatment: no relief  Pain severity: moderate  catheterization: No  diabetes insipidus: No  diabetes mellitus:  No  genitourinary reflux: No  hypertension: No  recurrent UTIs: No  single kidney: No  STD: No  urinary stasis: No  urological procedure: No  kidney stones: No  Physical Exam  Constitutional:       General: She is not in acute distress.     Appearance: She is not ill-appearing or toxic-appearing.   Pulmonary:      Effort: No respiratory distress.   Musculoskeletal:         General: Normal range of motion.   Neurological:      General: No focal deficit present.      Mental Status: She is alert. Mental status is at baseline.   Psychiatric:         Mood and Affect: Mood normal.         Behavior: Behavior normal.     Assessment and plan  UTI: We will treat with Macrobid  Risks, benefits, and side effects were discussed with the patient. All questions were answered to the fullest satisfaction of the patient, and pt verbalized understanding and agreement to treatment plan. Pt was to call her PCP with any new or worsening symptoms, or present to the ER.         Marielos Zavala MD  Family Medicine Physician   [facility]     This note was created using [employer] voice recognition software that occasionally may misinterpret phrases or words.

## 2025-01-28 DIAGNOSIS — F41.1 GAD (GENERALIZED ANXIETY DISORDER): ICD-10-CM

## 2025-01-29 ENCOUNTER — PATIENT MESSAGE (OUTPATIENT)
Dept: PSYCHIATRY | Facility: CLINIC | Age: 45
End: 2025-01-29
Payer: COMMERCIAL

## 2025-01-29 ENCOUNTER — TELEPHONE (OUTPATIENT)
Dept: PSYCHIATRY | Facility: CLINIC | Age: 45
End: 2025-01-29
Payer: COMMERCIAL

## 2025-01-29 ENCOUNTER — OFFICE VISIT (OUTPATIENT)
Dept: FAMILY MEDICINE | Facility: CLINIC | Age: 45
End: 2025-01-29
Payer: COMMERCIAL

## 2025-01-29 VITALS
HEIGHT: 65 IN | BODY MASS INDEX: 48.82 KG/M2 | WEIGHT: 293 LBS | DIASTOLIC BLOOD PRESSURE: 64 MMHG | HEART RATE: 77 BPM | OXYGEN SATURATION: 96 % | RESPIRATION RATE: 16 BRPM | SYSTOLIC BLOOD PRESSURE: 102 MMHG

## 2025-01-29 DIAGNOSIS — R35.0 FREQUENCY OF URINATION: ICD-10-CM

## 2025-01-29 DIAGNOSIS — N30.01 ACUTE CYSTITIS WITH HEMATURIA: Primary | ICD-10-CM

## 2025-01-29 DIAGNOSIS — R30.0 DYSURIA: ICD-10-CM

## 2025-01-29 DIAGNOSIS — R82.90 URINE ABNORMALITY: ICD-10-CM

## 2025-01-29 DIAGNOSIS — R31.9 HEMATURIA, UNSPECIFIED TYPE: ICD-10-CM

## 2025-01-29 LAB
BILIRUBIN, UA POC OHS: NEGATIVE
BLOOD, UA POC OHS: ABNORMAL
CLARITY, UA POC OHS: ABNORMAL
COLOR, UA POC OHS: ABNORMAL
GLUCOSE, UA POC OHS: NEGATIVE
KETONES, UA POC OHS: NEGATIVE
LEUKOCYTES, UA POC OHS: ABNORMAL
MICROSCOPIC COMMENT: ABNORMAL
NITRITE, UA POC OHS: NEGATIVE
PH, UA POC OHS: 6.5
PROTEIN, UA POC OHS: 100
SPECIFIC GRAVITY, UA POC OHS: >=1.03
UROBILINOGEN, UA POC OHS: 0.2
WBC #/AREA URNS HPF: >100 /HPF (ref 0–5)

## 2025-01-29 PROCEDURE — 1160F RVW MEDS BY RX/DR IN RCRD: CPT | Mod: S$GLB,,,

## 2025-01-29 PROCEDURE — 3074F SYST BP LT 130 MM HG: CPT | Mod: S$GLB,,,

## 2025-01-29 PROCEDURE — 87186 SC STD MICRODIL/AGAR DIL: CPT

## 2025-01-29 PROCEDURE — 81000 URINALYSIS NONAUTO W/SCOPE: CPT

## 2025-01-29 PROCEDURE — 1159F MED LIST DOCD IN RCRD: CPT | Mod: S$GLB,,,

## 2025-01-29 PROCEDURE — 87086 URINE CULTURE/COLONY COUNT: CPT

## 2025-01-29 PROCEDURE — 99214 OFFICE O/P EST MOD 30 MIN: CPT | Mod: S$GLB,,,

## 2025-01-29 PROCEDURE — 3008F BODY MASS INDEX DOCD: CPT | Mod: S$GLB,,,

## 2025-01-29 PROCEDURE — 81003 URINALYSIS AUTO W/O SCOPE: CPT | Mod: QW,S$GLB,,

## 2025-01-29 PROCEDURE — 99999 PR PBB SHADOW E&M-EST. PATIENT-LVL V: CPT | Mod: PBBFAC,,,

## 2025-01-29 PROCEDURE — 3078F DIAST BP <80 MM HG: CPT | Mod: S$GLB,,,

## 2025-01-29 PROCEDURE — 87088 URINE BACTERIA CULTURE: CPT

## 2025-01-29 RX ORDER — CIPROFLOXACIN 250 MG/1
250 TABLET, FILM COATED ORAL EVERY 12 HOURS
Qty: 6 TABLET | Refills: 0 | Status: SHIPPED | OUTPATIENT
Start: 2025-01-29

## 2025-01-29 RX ORDER — BUSPIRONE HYDROCHLORIDE 10 MG/1
10 TABLET ORAL 2 TIMES DAILY
Qty: 60 TABLET | Refills: 1 | Status: SHIPPED | OUTPATIENT
Start: 2025-01-29

## 2025-01-29 NOTE — TELEPHONE ENCOUNTER
Rec'd VM at 9:10. Pt is requesting to cancel appt with Yaima today She is sick. She will keep upcoming appt as scheduled.

## 2025-01-29 NOTE — PROGRESS NOTES
Subjective:        Chief Complaint: Urinary Tract Infection (Patient started and completed round of antibiotic. UTI symptoms came back)    Mariana Mora is a 44 y.o. female, presents to clinic  for recurrent UTI symptoms including urgency, dysuria, and incontinence x 2 weeks. Was dx with UTI on 1/17/2025 started on Macrobid and completed  dose. She states symptoms were improving while on antibiotics but reoccur after completion of. Denies fever or flank pain.    Urinary Tract Infection   Associated symptoms include frequency and urgency. Pertinent negatives include no flank pain.       Patient Active Problem List   Diagnosis    Migraine with aura, with intractable migraine, so stated, without mention of status migrainosus    Intermittent confusion    Vitamin B12 deficiency    EMU    Vitamin B2 deficiency    Vitamin B6 deficiency    Anxiety    Gastroesophageal reflux disease    Psoriatic arthritis    Dysphagia    Gastroesophageal reflux disease with esophagitis    Abdominal pain    Preop examination    Hiatal hernia    Obesity, Class III, BMI 40-49.9 (morbid obesity)    GERD (gastroesophageal reflux disease)    Diarrhea    History of esophageal stricture    Nausea    Vitamin D deficiency    Primary insomnia    Functional diarrhea    Symptomatic cholelithiasis    Facial flushing    Dyslipidemia (high LDL; low HDL), baseline LDL-C 181    Cotton wool spots, OS    NAFLD (nonalcoholic fatty liver disease)    Essential thrombocytosis    Cardiovascular event risk, ASCVD 10-years risk 2.4%, 2021    RASHID (dyspnea on exertion), onset 2016    SUNIL on CPAP, 100% use    Tachycardia, with standing    Kyphosis (acquired) (postural)    Impaired mobility and activities of daily living    Acute cystitis with hematuria    Hematuria     Mariana has a current medication list which includes the following prescription(s): atorvastatin, buspirone, vraylar, cetirizine, slynd, ergocalciferol, famotidine, hydrochlorothiazide,  ixekizumab, leflunomide, methocarbamol, montelukast, nystatin, nystop, xolair, pantoprazole, prazosin, prochlorperazine, nurtec, rizatriptan, sertraline, synthroid, zepbound, ciprofloxacin hcl, tremfya, ketoconazole, zepbound, and zepbound, and the following Facility-Administered Medications: diphenhydramine, lactated ringers, lactated ringers, lidocaine (pf) 10 mg/ml (1%), morphine, onabotulinumtoxina, onabotulinumtoxina, onabotulinumtoxina, and onabotulinumtoxina.    Review of Systems   Constitutional: Negative.    HENT: Negative.     Gastrointestinal: Negative.    Endocrine: Negative.    Genitourinary:  Positive for bladder incontinence, dysuria, frequency and urgency. Negative for flank pain, nocturia and vaginal bleeding.   Musculoskeletal: Negative.    Integumentary:  Negative.   Neurological: Negative.    Hematological: Negative.    Psychiatric/Behavioral: Negative.           Health Maintenance Due   Topic Date Due    TETANUS VACCINE  Never done    Pneumococcal Vaccines (Age 0-49) (1 of 2 - PCV) Never done    COVID-19 Vaccine (4 - 2024-25 season) 09/01/2024      Health Maintenance reviewed and discussed- reviewed   Objective:      Physical Exam  Vitals reviewed.   Constitutional:       Appearance: Normal appearance. She is normal weight.   HENT:      Head: Normocephalic.      Nose: Nose normal.   Eyes:      Extraocular Movements: Extraocular movements intact.   Cardiovascular:      Rate and Rhythm: Normal rate and regular rhythm.      Heart sounds: Normal heart sounds.   Pulmonary:      Effort: Pulmonary effort is normal.      Breath sounds: Normal breath sounds.   Abdominal:      Palpations: Abdomen is soft.   Musculoskeletal:         General: Normal range of motion.      Cervical back: Normal range of motion.   Skin:     General: Skin is warm and dry.   Neurological:      General: No focal deficit present.      Mental Status: She is alert.   Psychiatric:         Mood and Affect: Mood normal.          Behavior: Behavior normal.         Thought Content: Thought content normal.         Judgment: Judgment normal.         Assessment:       1. Acute cystitis with hematuria    2. Hematuria, unspecified type    3. Urine abnormality    4. Dysuria    5. Frequency of urination        Plan:       1. Acute cystitis with hematuria  Assessment & Plan:  Moderate leukocytes and large amount of blood POCT UA in clinic  Will send urine for further testing  Denies any vaginal bleeding or bleeding in urine   Discuss probiotic for good gut health      Urinating regularly: Urinating every two to three hours can help prevent UTIs.   Drinking enough fluids: Drinking water and other fluids flushes out bacteria and impurities from your urinary system.   Wiping from front to back: Wiping from front to back after using the toilet can help prevent UTIs.   Washing your hands: Washing your hands after using the toilet, before and after preparing food, and before eating food can help prevent UTIs.   Cleaning your genital area: Cleaning your genital and anal areas before and after sexual activity can help prevent UTIs.   Avoiding long baths: Taking showers instead of baths can help prevent UTIs.   Avoiding certain drinks: Avoiding too many fizzy, caffeinated, or alcoholic drinks can help prevent UTI     Orders:  -     POCT Urinalysis(Instrument)  -     Cancel: Urinalysis  -     Urine Culture High Risk  -     ciprofloxacin HCl (CIPRO) 250 MG tablet; Take 1 tablet (250 mg total) by mouth every 12 (twelve) hours.  Dispense: 6 tablet; Refill: 0    2. Hematuria, unspecified type  Assessment & Plan:  POCT UA- today showed large blood     Will send out and review results when resulted   Discuss repeating UA and urology referral if deem necessary   Denies vaginal bleeding or bleeding in urine     Orders:  -     Urinalysis Microscopic    3. Urine abnormality  -     POCT Urinalysis(Instrument)  -     Cancel: Urinalysis  -     Urine Culture High Risk  -      ciprofloxacin HCl (CIPRO) 250 MG tablet; Take 1 tablet (250 mg total) by mouth every 12 (twelve) hours.  Dispense: 6 tablet; Refill: 0    4. Dysuria  -     POCT Urinalysis(Instrument)  -     Cancel: Urinalysis  -     Urine Culture High Risk  -     ciprofloxacin HCl (CIPRO) 250 MG tablet; Take 1 tablet (250 mg total) by mouth every 12 (twelve) hours.  Dispense: 6 tablet; Refill: 0    5. Frequency of urination  -     POCT Urinalysis(Instrument)  -     Cancel: Urinalysis  -     Urine Culture High Risk  -     ciprofloxacin HCl (CIPRO) 250 MG tablet; Take 1 tablet (250 mg total) by mouth every 12 (twelve) hours.  Dispense: 6 tablet; Refill: 0

## 2025-01-29 NOTE — ASSESSMENT & PLAN NOTE
Moderate leukocytes and large amount of blood POCT UA in clinic  Will send urine for further testing  Denies any vaginal bleeding or bleeding in urine   Discuss probiotic for good gut health      Urinating regularly: Urinating every two to three hours can help prevent UTIs.   Drinking enough fluids: Drinking water and other fluids flushes out bacteria and impurities from your urinary system.   Wiping from front to back: Wiping from front to back after using the toilet can help prevent UTIs.   Washing your hands: Washing your hands after using the toilet, before and after preparing food, and before eating food can help prevent UTIs.   Cleaning your genital area: Cleaning your genital and anal areas before and after sexual activity can help prevent UTIs.   Avoiding long baths: Taking showers instead of baths can help prevent UTIs.   Avoiding certain drinks: Avoiding too many fizzy, caffeinated, or alcoholic drinks can help prevent UTI

## 2025-01-29 NOTE — ASSESSMENT & PLAN NOTE
POCT UA- today showed large blood     Will send out and review results when resulted   Discuss repeating UA and urology referral if deem necessary   Denies vaginal bleeding or bleeding in urine

## 2025-02-01 LAB — BACTERIA UR CULT: ABNORMAL

## 2025-02-03 NOTE — PROGRESS NOTES
I have reviewed the notes, assessments, and/or procedures performed by Mariana Gamez NP, I concur with her/his documentation of Mariana Mora.

## 2025-02-06 ENCOUNTER — PATIENT MESSAGE (OUTPATIENT)
Dept: PSYCHIATRY | Facility: CLINIC | Age: 45
End: 2025-02-06
Payer: COMMERCIAL

## 2025-02-06 DIAGNOSIS — F42.9 OBSESSIVE-COMPULSIVE DISORDER, UNSPECIFIED TYPE: ICD-10-CM

## 2025-02-06 RX ORDER — SERTRALINE HYDROCHLORIDE 100 MG/1
200 TABLET, FILM COATED ORAL DAILY
Qty: 60 TABLET | Refills: 1 | Status: SHIPPED | OUTPATIENT
Start: 2025-02-06

## 2025-02-07 ENCOUNTER — PATIENT MESSAGE (OUTPATIENT)
Dept: FAMILY MEDICINE | Facility: CLINIC | Age: 45
End: 2025-02-07
Payer: COMMERCIAL

## 2025-02-07 DIAGNOSIS — N39.0 RECURRENT UTI: Primary | ICD-10-CM

## 2025-02-11 ENCOUNTER — OFFICE VISIT (OUTPATIENT)
Dept: PSYCHIATRY | Facility: CLINIC | Age: 45
End: 2025-02-11
Payer: COMMERCIAL

## 2025-02-11 DIAGNOSIS — F41.1 GAD (GENERALIZED ANXIETY DISORDER): ICD-10-CM

## 2025-02-11 DIAGNOSIS — Z63.8 FAMILY DISCORD: ICD-10-CM

## 2025-02-11 DIAGNOSIS — F42.9 OBSESSIVE-COMPULSIVE DISORDER, UNSPECIFIED TYPE: Primary | ICD-10-CM

## 2025-02-11 PROCEDURE — 90834 PSYTX W PT 45 MINUTES: CPT | Mod: S$GLB,,, | Performed by: SOCIAL WORKER

## 2025-02-11 PROCEDURE — 99999 PR PBB SHADOW E&M-EST. PATIENT-LVL II: CPT | Mod: PBBFAC,,, | Performed by: SOCIAL WORKER

## 2025-02-11 PROCEDURE — 1159F MED LIST DOCD IN RCRD: CPT | Mod: CPTII,S$GLB,, | Performed by: SOCIAL WORKER

## 2025-02-11 SDOH — SOCIAL DETERMINANTS OF HEALTH (SDOH): OTHER SPECIFIED PROBLEMS RELATED TO PRIMARY SUPPORT GROUP: Z63.8

## 2025-02-13 NOTE — PROGRESS NOTES
Individual Psychotherapy (PhD/LCSW)    2/11/2025    Site:  [facility]         Therapeutic Intervention: Met with patient.  Outpatient - Insight oriented psychotherapy 45 min - CPT code 17743, Outpatient - Behavior modifying psychotherapy 45 min - CPT code 60227, and Outpatient - Supportive psychotherapy 45 min - CPT Code 07737    Chief complaint/reason for encounter: depression, anxiety, and OCD thoughts             Interval history and content of current session: Ct was referred to tx by Kiara LANDAVERDE to address depression, anxiety, stress. Ct arrived to session on time and was fully engaged. Ct shared that she had guilty feelings about her spending habits and how it has affected her marriage. Ct shared that she and her  had a discussion about it last week and her  was angry. She reported that she agreed with him. She shared that she calls him before she purchases anything more or less for permission. SW and ct processed how important accountability was but also how important self accountability was too. SW and ct processed the importance of ct recognizing triggers and having a game plan to address them. Ct to continue in 1:1 sessions.        Treatment plan:  Target symptoms: depression, anxiety , OCD thoughts, family stress  Why chosen therapy is appropriate versus another modality: relevant to diagnosis, patient responds to this modality, evidence based practice  Outcome monitoring methods: self-report  Therapeutic intervention type: insight oriented psychotherapy, behavior modifying psychotherapy, supportive psychotherapy    Risk parameters:  Patient reports no suicidal ideation  Patient reports no homicidal ideation  Patient reports no self-injurious behavior  Patient reports no violent behavior    CSSRS was completed:     Mental Status Exam:  General Appearance:  unremarkable, age appropriate   Speech: normal tone, normal rate, normal pitch, normal volume      Level of Cooperation: cooperative       Thought Processes: normal and logical   Mood: steady      Thought Content: normal, no suicidality, no homicidality, delusions, or paranoia   Affect: appropriate   Orientation: Oriented x3   Memory: recent >  intact   Attention Span & Concentration: intact   Fund of General Knowledge: intact and appropriate to age and level of education   Abstract Reasoning: interpretation of similarities was abstract   Judgment & Insight: fair, intact     Language intact       Verbal deficits: None    Patient's response to intervention:  The patient's response to intervention is accepting.    Progress toward goals and other mental status changes:  The patient's progress toward goals is fair .    Diagnosis:     ICD-10-CM ICD-9-CM   1. Obsessive-compulsive disorder, unspecified type  F42.9 300.3   2. JOSE ANGEL (generalized anxiety disorder)  F41.1 300.02   3. Family discord  Z63.8 V61.9       Plan:  individual psychotherapy and ct to follow up with Kiara LANDAVERDE for psych med mgt  Pt to go to ED or call 911 if symptoms worsen or if she has thoughts of harming self and/or others. Pt verbalized understanding.    Return to clinic: as scheduled    Length of Service (minutes): 45      A portion of this note was created using [employer] voice recognition software that occasionally misinterprets phrases or words.    Each patient to whom he or she provides medical services by telemedicine is: (1) informed of the relationship between the physician and patient and the respective role of any other health care provider with respect to management of the patient; and (2) notified that he or she may decline to receive medical services by telemedicine and may withdraw from such care at any time.

## 2025-02-14 ENCOUNTER — HOSPITAL ENCOUNTER (OUTPATIENT)
Dept: RADIOLOGY | Facility: HOSPITAL | Age: 45
Discharge: HOME | End: 2025-02-14
Attending: FAMILY MEDICINE
Payer: COMMERCIAL

## 2025-02-14 ENCOUNTER — OFFICE VISIT (OUTPATIENT)
Dept: FAMILY MEDICINE | Facility: CLINIC | Age: 45
End: 2025-02-14
Payer: COMMERCIAL

## 2025-02-14 ENCOUNTER — PATIENT MESSAGE (OUTPATIENT)
Dept: INTERNAL MEDICINE | Facility: CLINIC | Age: 45
End: 2025-02-14
Payer: COMMERCIAL

## 2025-02-14 VITALS
DIASTOLIC BLOOD PRESSURE: 86 MMHG | RESPIRATION RATE: 18 BRPM | HEART RATE: 94 BPM | SYSTOLIC BLOOD PRESSURE: 118 MMHG | BODY MASS INDEX: 48.04 KG/M2 | HEIGHT: 65 IN | WEIGHT: 288.31 LBS | OXYGEN SATURATION: 97 %

## 2025-02-14 DIAGNOSIS — E55.9 VITAMIN D DEFICIENCY: ICD-10-CM

## 2025-02-14 DIAGNOSIS — E03.9 HYPOTHYROIDISM, UNSPECIFIED TYPE: ICD-10-CM

## 2025-02-14 DIAGNOSIS — E53.8 FOLATE DEFICIENCY: ICD-10-CM

## 2025-02-14 DIAGNOSIS — E78.5 DYSLIPIDEMIA (HIGH LDL; LOW HDL): ICD-10-CM

## 2025-02-14 DIAGNOSIS — R29.898 RIGHT LEG WEAKNESS: ICD-10-CM

## 2025-02-14 DIAGNOSIS — M79.604 RIGHT LEG PAIN: Primary | ICD-10-CM

## 2025-02-14 DIAGNOSIS — M21.371 FOOT DROP, RIGHT: ICD-10-CM

## 2025-02-14 DIAGNOSIS — I10 ESSENTIAL HYPERTENSION: ICD-10-CM

## 2025-02-14 DIAGNOSIS — R73.9 HYPERGLYCEMIA: ICD-10-CM

## 2025-02-14 DIAGNOSIS — E53.8 VITAMIN B12 DEFICIENCY: ICD-10-CM

## 2025-02-14 DIAGNOSIS — D47.3 ESSENTIAL THROMBOCYTOSIS: ICD-10-CM

## 2025-02-14 DIAGNOSIS — M79.604 RIGHT LEG PAIN: ICD-10-CM

## 2025-02-14 PROCEDURE — 72114 X-RAY EXAM L-S SPINE BENDING: CPT | Mod: TC,PN

## 2025-02-14 PROCEDURE — 99999 PR PBB SHADOW E&M-EST. PATIENT-LVL V: CPT | Mod: PBBFAC,,, | Performed by: FAMILY MEDICINE

## 2025-02-14 NOTE — PROGRESS NOTES
Subjective:       Patient ID: Mariana Mora is a 44 y.o. female.    Chief Complaint: Foot weakness (Lasting a few months/Wants to check Vit. D/)    History of Present Illness    CHIEF COMPLAINT:  Ms. Mora presents today for right foot weakness and unsteadiness.    NEUROLOGICAL SYMPTOMS:  She reports right foot weakness and unsteadiness for approximately 2 months. She feels unsteady when getting up, changing direction, or stepping around objects, with stumbling particularly after prolonged sitting. She has difficulty walking on slopes, including her driveway and grassy areas, feeling as though her right foot wants to drag. She must consciously think about lifting her foot high enough on uneven surfaces. While generally stable on flat ground, she remains conscious of her foot, anticipating potential instability. These symptoms consistently affect only the right foot. She denies toe dragging or foot drop while walking. She experiences leg spasms at night extending down to her ankle and reports mild toe tingling, which she describes as different from prior electric-like tingling sensations in her back. She denies numbness or tingling in the affected leg.    BACK PAIN:  She experienced radiating back pain 3-4 weeks ago, which traveled from her hip downward.    GENITOURINARY:  She reports a slowly improving UTI with urinary urgency. She notes sudden onset of urgency upon standing from seated position, with delayed awareness of the need to void until urgency is severe.      ROS:  Genitourinary: +urgency  Musculoskeletal: +muscle weakness, +back pain  Neurological: -numbness, +weakness         Review of Systems    Patient Active Problem List   Diagnosis    Migraine with aura, with intractable migraine, so stated, without mention of status migrainosus    Intermittent confusion    Vitamin B12 deficiency    EMU    Vitamin B2 deficiency    Vitamin B6 deficiency    Anxiety    Gastroesophageal reflux disease     "Psoriatic arthritis    Dysphagia    Gastroesophageal reflux disease with esophagitis    Abdominal pain    Preop examination    Hiatal hernia    Obesity, Class III, BMI 40-49.9 (morbid obesity)    GERD (gastroesophageal reflux disease)    Diarrhea    History of esophageal stricture    Nausea    Vitamin D deficiency    Primary insomnia    Functional diarrhea    Symptomatic cholelithiasis    Facial flushing    Dyslipidemia (high LDL; low HDL), baseline LDL-C 181    Cotton wool spots, OS    NAFLD (nonalcoholic fatty liver disease)    Essential thrombocytosis    Cardiovascular event risk, ASCVD 10-years risk 2.4%, 2021    RASHID (dyspnea on exertion), onset 2016    SUNIL on CPAP, 100% use    Tachycardia, with standing    Kyphosis (acquired) (postural)    Impaired mobility and activities of daily living    Acute cystitis with hematuria    Hematuria     Mariana has a current medication list which includes the following prescription(s): atorvastatin, buspirone, vraylar, cetirizine, slynd, ergocalciferol, famotidine, hydrochlorothiazide, ixekizumab, leflunomide, methocarbamol, montelukast, nystatin, nystop, xolair, pantoprazole, prazosin, prochlorperazine, nurtec, sertraline, synthroid, zepbound, zepbound, zepbound, ciprofloxacin hcl, tremfya, ketoconazole, and rizatriptan, and the following Facility-Administered Medications: diphenhydramine, lactated ringers, lactated ringers, lidocaine (pf) 10 mg/ml (1%), morphine, onabotulinumtoxina, onabotulinumtoxina, onabotulinumtoxina, and onabotulinumtoxina.        There are no preventive care reminders to display for this patient.   Health Maintenance reviewed  Objective:      Vitals:    02/14/25 0912   BP: 118/86   Pulse: 94   Resp: 18   SpO2: 97%   Weight: 130.8 kg (288 lb 4.8 oz)   Height: 5' 5" (1.651 m)   PainSc:   2   PainLoc: Hip     Body mass index is 47.98 kg/m².  Physical Exam  Physical Exam    Constitutional: No acute distress. Well-appearing.  Cardiovascular: Regular rate. " Regular rhythm. No murmurs.  Pulmonary: Normal respiratory effort. No wheezing. Normal breath sounds.  Skin: Warm. Dry. No rash.  Neurological: Alert.  Psychiatric: Normal mood. Normal behavior.         Assessment:       Assessment & Plan    1. Evaluating right leg weakness and unsteadiness, considering potential sciatica or other nerve-related issues  2. Investigating possible vitamin deficiencies, particularly vitamin D  3. Assessing for potential inflammatory conditions  4. Considering physical therapy as a treatment option if lab results are unremarkable           Plan:       1. Right leg pain  -     X-Ray Lumbar Complete Including Flex And Ext; Future; Expected date: 02/14/2025  -     EMG W/ ULTRASOUND AND NERVE CONDUCTION TEST 1 Extremity; Future    2. Right leg weakness  -     Vitamin B12; Future; Expected date: 02/14/2025  -     Sedimentation rate; Future; Expected date: 02/14/2025  -     C-reactive protein; Future; Expected date: 02/14/2025  -     X-Ray Lumbar Complete Including Flex And Ext; Future; Expected date: 02/14/2025  -     EMG W/ ULTRASOUND AND NERVE CONDUCTION TEST 1 Extremity; Future    3. Foot drop, right  -     Sedimentation rate; Future; Expected date: 02/14/2025  -     C-reactive protein; Future; Expected date: 02/14/2025    4. Vitamin B12 deficiency  -     Vitamin B12; Future; Expected date: 02/14/2025    5. Vitamin D deficiency  -     Vitamin D; Future    6. Essential thrombocytosis    7. Folate deficiency  -     Folate; Future; Expected date: 02/14/2025    8. Dyslipidemia (high LDL; low HDL), baseline LDL-C 181  -     Lipid Panel; Future; Expected date: 02/14/2025  -     Comprehensive Metabolic Panel; Future; Expected date: 02/14/2025    9. Essential hypertension  -     Comprehensive Metabolic Panel; Future; Expected date: 02/14/2025  -     TSH; Future; Expected date: 02/14/2025    10. Hypothyroidism, unspecified type  -     TSH; Future; Expected date: 02/14/2025    11. Hyperglycemia  -      Hemoglobin A1C; Future; Expected date: 02/14/2025         This note was generated with the assistance of ambient listening technology. Verbal consent was obtained by the patient and accompanying visitor(s) for the recording of patient appointment to facilitate this note. I attest to having reviewed and edited the generated note for accuracy, though some syntax or spelling errors may persist. Please contact the author of this note for any clarification.

## 2025-02-17 ENCOUNTER — OFFICE VISIT (OUTPATIENT)
Dept: PSYCHIATRY | Facility: CLINIC | Age: 45
End: 2025-02-17
Payer: COMMERCIAL

## 2025-02-17 VITALS
HEART RATE: 90 BPM | SYSTOLIC BLOOD PRESSURE: 131 MMHG | WEIGHT: 287.06 LBS | BODY MASS INDEX: 47.83 KG/M2 | HEIGHT: 65 IN | DIASTOLIC BLOOD PRESSURE: 83 MMHG

## 2025-02-17 DIAGNOSIS — G47.00 INSOMNIA, UNSPECIFIED TYPE: ICD-10-CM

## 2025-02-17 DIAGNOSIS — F42.9 OBSESSIVE-COMPULSIVE DISORDER, UNSPECIFIED TYPE: ICD-10-CM

## 2025-02-17 DIAGNOSIS — F33.41 RECURRENT MAJOR DEPRESSIVE DISORDER, IN PARTIAL REMISSION: Primary | ICD-10-CM

## 2025-02-17 DIAGNOSIS — F43.9 TRAUMA AND STRESSOR-RELATED DISORDER: ICD-10-CM

## 2025-02-17 DIAGNOSIS — F41.1 GAD (GENERALIZED ANXIETY DISORDER): ICD-10-CM

## 2025-02-17 RX ORDER — CARIPRAZINE 1.5 MG/1
1.5 CAPSULE, GELATIN COATED ORAL DAILY
Qty: 30 CAPSULE | Refills: 1 | Status: SHIPPED | OUTPATIENT
Start: 2025-02-17

## 2025-02-17 RX ORDER — PRAZOSIN HYDROCHLORIDE 5 MG/1
10 CAPSULE ORAL NIGHTLY
Qty: 180 CAPSULE | Refills: 1 | Status: SHIPPED | OUTPATIENT
Start: 2025-02-17

## 2025-02-17 RX ORDER — PRAZOSIN HYDROCHLORIDE 2 MG/1
2 CAPSULE ORAL NIGHTLY
Qty: 30 CAPSULE | Refills: 1 | Status: SHIPPED | OUTPATIENT
Start: 2025-02-17 | End: 2025-04-18
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
## 2025-02-17 NOTE — PATIENT INSTRUCTIONS
Increase prazosin to 12 mg nightly.    Please go to emergency department if feeling as though you are a harm to yourself or others or if you are in crisis. Please call the clinic to report any worsening of symptoms or problems associated with medication.

## 2025-02-20 ENCOUNTER — RESULTS FOLLOW-UP (OUTPATIENT)
Dept: FAMILY MEDICINE | Facility: CLINIC | Age: 45
End: 2025-02-20
Payer: COMMERCIAL

## 2025-02-20 ENCOUNTER — PATIENT MESSAGE (OUTPATIENT)
Dept: FAMILY MEDICINE | Facility: CLINIC | Age: 45
End: 2025-02-20
Payer: COMMERCIAL

## 2025-02-20 DIAGNOSIS — E55.9 VITAMIN D DEFICIENCY: ICD-10-CM

## 2025-02-21 RX ORDER — ERGOCALCIFEROL 1.25 MG/1
50000 CAPSULE ORAL
Qty: 8 CAPSULE | Refills: 0 | Status: SHIPPED | OUTPATIENT
Start: 2025-02-21

## 2025-02-28 ENCOUNTER — PATIENT MESSAGE (OUTPATIENT)
Dept: FAMILY MEDICINE | Facility: CLINIC | Age: 45
End: 2025-02-28
Payer: COMMERCIAL

## 2025-03-03 ENCOUNTER — TELEPHONE (OUTPATIENT)
Dept: PHYSICAL MEDICINE AND REHAB | Facility: CLINIC | Age: 45
End: 2025-03-03
Payer: COMMERCIAL

## 2025-03-03 NOTE — TELEPHONE ENCOUNTER
----- Message from Nurse Rios sent at 2025  2:27 PM CST -----  Regardin extremity EMG  Contact: PT    ----- Message -----  From: Sandy Martinez  Sent: 2025   2:14 PM CST  To: [facility] Staff    Type:  Sooner Appointment RequestCaller is requesting a sooner appointment.  Caller declined first available appointment listed below.  Caller will not accept being placed on the waitlist and is requesting a message be sent to doctor.Name of Caller:  PTWhen is the first available appointment?  Symptoms:  Right leg pain, Right leg weaknessWould the patient rather a call back or a response via [facility]? Call [facility] Call Back Number:  [phone] Information:  PT has order in system from Dr. Morgan her pcp

## 2025-03-05 ENCOUNTER — PATIENT MESSAGE (OUTPATIENT)
Dept: INTERNAL MEDICINE | Facility: CLINIC | Age: 45
End: 2025-03-05
Payer: COMMERCIAL

## 2025-03-07 ENCOUNTER — OFFICE VISIT (OUTPATIENT)
Dept: PSYCHIATRY | Facility: CLINIC | Age: 45
End: 2025-03-07
Payer: COMMERCIAL

## 2025-03-07 DIAGNOSIS — Z63.8 FAMILY DISCORD: ICD-10-CM

## 2025-03-07 DIAGNOSIS — F33.9 RECURRENT DEPRESSION: Primary | ICD-10-CM

## 2025-03-07 DIAGNOSIS — F43.9 TRAUMA AND STRESSOR-RELATED DISORDER: ICD-10-CM

## 2025-03-07 PROCEDURE — 1159F MED LIST DOCD IN RCRD: CPT | Mod: CPTII,S$GLB,, | Performed by: SOCIAL WORKER

## 2025-03-07 PROCEDURE — 99999 PR PBB SHADOW E&M-EST. PATIENT-LVL II: CPT | Mod: PBBFAC,,, | Performed by: SOCIAL WORKER

## 2025-03-07 PROCEDURE — 3044F HG A1C LEVEL LT 7.0%: CPT | Mod: CPTII,S$GLB,, | Performed by: SOCIAL WORKER

## 2025-03-07 PROCEDURE — 90834 PSYTX W PT 45 MINUTES: CPT | Mod: S$GLB,,, | Performed by: SOCIAL WORKER

## 2025-03-07 SDOH — SOCIAL DETERMINANTS OF HEALTH (SDOH): OTHER SPECIFIED PROBLEMS RELATED TO PRIMARY SUPPORT GROUP: Z63.8
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
## 2025-03-23 DIAGNOSIS — E78.49 OTHER HYPERLIPIDEMIA: ICD-10-CM

## 2025-03-23 NOTE — TELEPHONE ENCOUNTER
No care due was identified.  [facility] Embedded Care Due Messages. Reference number: [id number].   3/23/2025 10:32:04 AM CDT

## 2025-03-24 ENCOUNTER — OFFICE VISIT (OUTPATIENT)
Dept: FAMILY MEDICINE | Facility: CLINIC | Age: 45
End: 2025-03-24
Payer: COMMERCIAL

## 2025-03-24 VITALS
OXYGEN SATURATION: 95 % | WEIGHT: 288.69 LBS | SYSTOLIC BLOOD PRESSURE: 132 MMHG | HEART RATE: 88 BPM | BODY MASS INDEX: 48.1 KG/M2 | DIASTOLIC BLOOD PRESSURE: 80 MMHG | HEIGHT: 65 IN | RESPIRATION RATE: 18 BRPM

## 2025-03-24 DIAGNOSIS — M54.50 CHRONIC RIGHT-SIDED LOW BACK PAIN WITHOUT SCIATICA: ICD-10-CM

## 2025-03-24 DIAGNOSIS — G89.29 CHRONIC RIGHT-SIDED LOW BACK PAIN WITHOUT SCIATICA: ICD-10-CM

## 2025-03-24 DIAGNOSIS — H93.13 EAR RINGING SOUND, BILATERAL: ICD-10-CM

## 2025-03-24 DIAGNOSIS — R29.898 TRANSIENT RIGHT LEG WEAKNESS: Primary | ICD-10-CM

## 2025-03-24 DIAGNOSIS — Z98.890 HISTORY OF BILATERAL TYMPANOPLASTY: ICD-10-CM

## 2025-03-24 DIAGNOSIS — M54.2 CHRONIC NECK PAIN: ICD-10-CM

## 2025-03-24 DIAGNOSIS — G89.29 CHRONIC NECK PAIN: ICD-10-CM

## 2025-03-24 PROCEDURE — 3079F DIAST BP 80-89 MM HG: CPT | Mod: S$GLB,,, | Performed by: FAMILY MEDICINE

## 2025-03-24 PROCEDURE — 99214 OFFICE O/P EST MOD 30 MIN: CPT | Mod: S$GLB,,, | Performed by: FAMILY MEDICINE

## 2025-03-24 PROCEDURE — 1159F MED LIST DOCD IN RCRD: CPT | Mod: S$GLB,,, | Performed by: FAMILY MEDICINE

## 2025-03-24 PROCEDURE — 3044F HG A1C LEVEL LT 7.0%: CPT | Mod: S$GLB,,, | Performed by: FAMILY MEDICINE

## 2025-03-24 PROCEDURE — 3075F SYST BP GE 130 - 139MM HG: CPT | Mod: S$GLB,,, | Performed by: FAMILY MEDICINE

## 2025-03-24 PROCEDURE — 3008F BODY MASS INDEX DOCD: CPT | Mod: S$GLB,,, | Performed by: FAMILY MEDICINE

## 2025-03-24 PROCEDURE — 99999 PR PBB SHADOW E&M-EST. PATIENT-LVL V: CPT | Mod: PBBFAC,,, | Performed by: FAMILY MEDICINE

## 2025-03-24 PROCEDURE — 1160F RVW MEDS BY RX/DR IN RCRD: CPT | Mod: S$GLB,,, | Performed by: FAMILY MEDICINE

## 2025-03-24 RX ORDER — ATORVASTATIN CALCIUM 10 MG/1
10 TABLET, FILM COATED ORAL DAILY
Qty: 90 TABLET | Refills: 3 | Status: SHIPPED | OUTPATIENT
Start: 2025-03-24

## 2025-03-24 NOTE — PROGRESS NOTES
Subjective:       Patient ID: Mariana Mora is a 44 y.o. female.    Chief Complaint: Follow-up    History of Present Illness    CHIEF COMPLAINT:  Ms. Mora presents today with fatigue and leg weakness.    FATIGUE AND MUSCLE SYMPTOMS:  She reports persistent fatigue, describing feeling tired all the time with muscle soreness throughout her body. Her fatigue remains constant and is not clearly associated with her depressive symptoms. She experiences intermittent leg weakness that fluctuates in severity. She has increased frequency of balance issues. EMG is scheduled for May 7th for further evaluation.    NEUROLOGIC:  She reports constant tinnitus that interferes with her ability to hear television and conversations, requiring increased volume and asking others to speak louder. She has a lifelong history of ear problems and needs a new ENT specialist since Dr. Pleitez's correction. She also reports significant neck pain.    PSYCHIATRIC:  She reports her depression is currently manageable with occasional triggers.    MEDICATIONS:  She takes Vitamin D supplements and has resolved issues with [employer] savings card, allowing for more consistent medication adherence.      ROS:  General: +fatigue  ENT: +tinnitus  Musculoskeletal: +muscle pain, +muscle weakness, +weakness, +body aches, +neck pain  Neurological: +dizziness, +balance issues  Psychiatric: +depression           Problem List[1]  Mariana has a current medication list which includes the following prescription(s): atorvastatin, buspirone, vraylar, cetirizine, ciprofloxacin hcl, slynd, ergocalciferol, famotidine, tremfya, hydrochlorothiazide, ixekizumab, ketoconazole, leflunomide, methocarbamol, montelukast, nystatin, nystop, xolair, pantoprazole, prazosin, prazosin, prochlorperazine, nurtec, rizatriptan, sertraline, synthroid, zepbound, zepbound, and zepbound, and the following Facility-Administered Medications: diphenhydramine, lactated ringers,  "lactated ringers, lidocaine (pf) 10 mg/ml (1%), morphine, onabotulinumtoxina, onabotulinumtoxina, onabotulinumtoxina, and onabotulinumtoxina.        Health Maintenance Due   Topic Date Due    Mammogram  05/31/2025      Health Maintenance reviewed and discussed-   Objective:      Vitals:    03/24/25 1102   BP: 132/80   Pulse: 88   Resp: 18   SpO2: 95%   Weight: 131 kg (288 lb 11.2 oz)   Height: 5' 5" (1.651 m)   PainSc:   4   PainLoc: Generalized     Body mass index is 48.04 kg/m².  Physical Exam  Physical Exam    Constitutional: No acute distress. Well-appearing.  Cardiovascular: Regular rate. Regular rhythm. No murmurs.  Pulmonary: Normal respiratory effort. No wheezing. Normal breath sounds.  Skin: Warm. Dry. No rash.  Neurological: Alert.  Psychiatric: Normal mood. Normal behavior.         Assessment:       Assessment & Plan    1. Considering MRI Back and Spine to evaluate leg weakness, balance issues, and neck pain while awaiting delayed EMG results.  2. Opted for non-surgical evaluation approach for back and neck issues.  3. Noted vitamin D supplementation as only lab abnormality potentially related to fatigue.  4. Considered cervical issues as possible cause of dizziness, but more likely related to ear problems.           Plan:       1. Transient right leg weakness  -     Ambulatory referral/consult to Spine Care; Future; Expected date: 03/31/2025    2. Chronic right-sided low back pain without sciatica  -     Ambulatory referral/consult to Spine Care; Future; Expected date: 03/31/2025    3. Chronic neck pain  -     Ambulatory referral/consult to Spine Care; Future; Expected date: 03/31/2025    4. Ear ringing sound, bilateral  -     Ambulatory referral/consult to ENT; Future; Expected date: 03/31/2025    5. History of bilateral tympanoplasty  -     Ambulatory referral/consult to ENT; Future; Expected date: 03/31/2025         This note was generated with the assistance of ambient listening technology. Verbal " consent was obtained by the patient and accompanying visitor(s) for the recording of patient appointment to facilitate this note. I attest to having reviewed and edited the generated note for accuracy, though some syntax or spelling errors may persist. Please contact the author of this note for any clarification.         [1]   Patient Active Problem List  Diagnosis    Migraine with aura, with intractable migraine, so stated, without mention of status migrainosus    Intermittent confusion    Vitamin B12 deficiency    EMU    Vitamin B2 deficiency    Vitamin B6 deficiency    Anxiety    Gastroesophageal reflux disease    Psoriatic arthritis    Dysphagia    Gastroesophageal reflux disease with esophagitis    Abdominal pain    Preop examination    Hiatal hernia    Obesity, Class III, BMI 40-49.9 (morbid obesity)    GERD (gastroesophageal reflux disease)    Diarrhea    History of esophageal stricture    Nausea    Vitamin D deficiency    Primary insomnia    Functional diarrhea    Symptomatic cholelithiasis    Facial flushing    Dyslipidemia (high LDL; low HDL), baseline LDL-C 181    Cotton wool spots, OS    NAFLD (nonalcoholic fatty liver disease)    Essential thrombocytosis    Cardiovascular event risk, ASCVD 10-years risk 2.4%, 2021    RASHID (dyspnea on exertion), onset 2016    SUNIL on CPAP, 100% use    Tachycardia, with standing    Kyphosis (acquired) (postural)    Impaired mobility and activities of daily living    Acute cystitis with hematuria    Hematuria

## 2025-03-24 NOTE — TELEPHONE ENCOUNTER
Notified patient of satisfactory lab results, she verbalized understanding.    Fanny Webb MA   Refill Decision Note   Mariana Mora  is requesting a refill authorization.  Brief Assessment and Rationale for Refill:  Approve     Medication Therapy Plan:         Comments:     Note composed:2:02 AM 03/24/2025

## 2025-03-25 ENCOUNTER — OFFICE VISIT (OUTPATIENT)
Dept: OTOLARYNGOLOGY | Facility: CLINIC | Age: 45
End: 2025-03-25
Payer: COMMERCIAL

## 2025-03-25 VITALS — HEIGHT: 65 IN | WEIGHT: 286 LBS | BODY MASS INDEX: 47.65 KG/M2

## 2025-03-25 DIAGNOSIS — H93.13 EAR RINGING SOUND, BILATERAL: ICD-10-CM

## 2025-03-25 DIAGNOSIS — Z98.890 HISTORY OF BILATERAL TYMPANOPLASTY: ICD-10-CM

## 2025-03-25 PROCEDURE — 3044F HG A1C LEVEL LT 7.0%: CPT | Mod: S$GLB,,, | Performed by: OTOLARYNGOLOGY

## 2025-03-25 PROCEDURE — 3008F BODY MASS INDEX DOCD: CPT | Mod: S$GLB,,, | Performed by: OTOLARYNGOLOGY

## 2025-03-25 PROCEDURE — 1160F RVW MEDS BY RX/DR IN RCRD: CPT | Mod: S$GLB,,, | Performed by: OTOLARYNGOLOGY

## 2025-03-25 PROCEDURE — 1159F MED LIST DOCD IN RCRD: CPT | Mod: S$GLB,,, | Performed by: OTOLARYNGOLOGY

## 2025-03-25 PROCEDURE — 99999 PR PBB SHADOW E&M-EST. PATIENT-LVL III: CPT | Mod: PBBFAC,,, | Performed by: OTOLARYNGOLOGY

## 2025-03-25 PROCEDURE — 99203 OFFICE O/P NEW LOW 30 MIN: CPT | Mod: S$GLB,,, | Performed by: OTOLARYNGOLOGY

## 2025-03-25 NOTE — PROGRESS NOTES
"Subjective:       Patient ID: Mariana Mora is a 44 y.o. female.    Chief Complaint: Tinnitus (Patient with c/o "ringing in both ears the left is worse." She states its constant and sometimes its low but gets higher to where she can't hear anything else. Has had dizziness and lightheadedness. )      This patient has a history of ear surgery by Dr. Pleitez in [place] tympanoplasties on both sides it sounds like an she had an audiogram by him perhaps a year ago and he mentioned everything was doing pretty well in general.      She is moving to picking unit transferring some of her care in the [facility] system in his here to discuss a flare-up of tinnitus in the left ear in particular.          Objective:      ENT Physical Exam    Her tympanic membranes and ear canals look good there is some evidence of a tympanoplasty but that is no evidence of an ongoing infection or effusion or ear canal abnormality today.        Assessment:       1. Ear ringing sound, bilateral    2. History of bilateral tympanoplasty         Plan:          We discussed the favorable exam given her history of tympanoplasties, we are going to request Dr. Pleitez's office send over the audiograms from her most recent testing and she is going to schedule an audiogram with the [facility] system in the near future she is hoping to transfer her care to [facility] as she has not moved.    She is seeing Dr. Morgan now as her primary care provider and they are evaluating some neurologic issue such as a footdrop and some balance issues and perhaps some spine issues.         "

## 2025-03-26 ENCOUNTER — PATIENT MESSAGE (OUTPATIENT)
Dept: OTOLARYNGOLOGY | Facility: CLINIC | Age: 45
End: 2025-03-26
Payer: COMMERCIAL

## 2025-03-26 ENCOUNTER — PATIENT MESSAGE (OUTPATIENT)
Dept: INTERNAL MEDICINE | Facility: CLINIC | Age: 45
End: 2025-03-26
Payer: COMMERCIAL

## 2025-03-26 ENCOUNTER — CLINICAL SUPPORT (OUTPATIENT)
Dept: AUDIOLOGY | Facility: CLINIC | Age: 45
End: 2025-03-26
Payer: COMMERCIAL

## 2025-03-26 ENCOUNTER — PATIENT MESSAGE (OUTPATIENT)
Dept: AUDIOLOGY | Facility: CLINIC | Age: 45
End: 2025-03-26

## 2025-03-26 DIAGNOSIS — H90.3 SENSORINEURAL HEARING LOSS (SNHL), BILATERAL: Primary | ICD-10-CM

## 2025-03-26 DIAGNOSIS — H93.13 TINNITUS AURIUM, BILATERAL: ICD-10-CM

## 2025-03-26 PROCEDURE — 99999 PR PBB SHADOW E&M-EST. PATIENT-LVL I: CPT | Mod: PBBFAC,,, | Performed by: AUDIOLOGIST

## 2025-03-26 PROCEDURE — 92557 COMPREHENSIVE HEARING TEST: CPT | Mod: S$GLB,,, | Performed by: AUDIOLOGIST

## 2025-03-26 PROCEDURE — 92567 TYMPANOMETRY: CPT | Mod: S$GLB,,, | Performed by: AUDIOLOGIST

## 2025-03-26 NOTE — PROGRESS NOTES
Mariana Mora was seen 03/26/2025 for an audiological evaluation. Pt was seen 03/25/2025 by Dr. Mariee and referred for audiological testing. Pt was alone during today's visit. Pertinent complaints today include tinnitus and hearing loss. Pt had tympanoplasty on right ear four-five years ago and had tympanoplasty on both ears as a child. Pt describes tinnitus as a buzzing/static and states it is louder in the left ear than the right ear. Pt says she has had occasional tinnitus for as long as she can remember but recently it is louder and she feels it is interfering with her ability to hear. Pt reports she is autistic and that an auditory processing disorder is part of her diagnosis. Pt confirms history of loud noise exposure (occupational-) and denies early onset of genetic family history of hearing loss. Otoscopy revealed no cerumen in both ears. The tympanic membrane was visualized AU prior to proceeding with the hearing testing.     Results reveal a mild-to-moderate sensorineural hearing loss for the right ear and  mild-to-moderately severe sensorineural hearing loss for the left ear.    Speech Reception Thresholds were  30 dBHL for the right ear and 30 dBHL for the left ear.    Word recognition scores were excellent for the right ear and excellent for the left ear.   Tympanograms were Type C for the right ear and Type C for the left ear.    Audiogram results were reviewed in detail with patient and all questions were answered. We discussed OTC vs prescription amplification. She wants to research information on hearing aids and check with her insurance. Results will be reviewed by the referring provider at the completion of this note. All complaints were addressed during this visit to the patient's satisfaction.    Recommendations:  Follow up with ENT  Annual audiogram  Hearing protection in noise  Hearing aid consultation when ready

## 2025-03-27 ENCOUNTER — HOSPITAL ENCOUNTER (OUTPATIENT)
Dept: RADIOLOGY | Facility: HOSPITAL | Age: 45
Discharge: HOME | End: 2025-03-27
Attending: PHYSICAL MEDICINE & REHABILITATION
Payer: COMMERCIAL

## 2025-03-27 ENCOUNTER — OFFICE VISIT (OUTPATIENT)
Dept: SPINE | Facility: CLINIC | Age: 45
End: 2025-03-27
Payer: COMMERCIAL

## 2025-03-27 VITALS — BODY MASS INDEX: 47.64 KG/M2 | WEIGHT: 285.94 LBS | HEIGHT: 65 IN

## 2025-03-27 DIAGNOSIS — G89.29 CHRONIC NECK PAIN: ICD-10-CM

## 2025-03-27 DIAGNOSIS — R29.898 TRANSIENT RIGHT LEG WEAKNESS: ICD-10-CM

## 2025-03-27 DIAGNOSIS — M54.2 CHRONIC NECK PAIN: ICD-10-CM

## 2025-03-27 DIAGNOSIS — G89.29 CHRONIC RIGHT-SIDED LOW BACK PAIN WITHOUT SCIATICA: ICD-10-CM

## 2025-03-27 DIAGNOSIS — M54.50 CHRONIC RIGHT-SIDED LOW BACK PAIN WITHOUT SCIATICA: ICD-10-CM

## 2025-03-27 PROCEDURE — 72050 X-RAY EXAM NECK SPINE 4/5VWS: CPT | Mod: TC

## 2025-03-27 PROCEDURE — 72050 X-RAY EXAM NECK SPINE 4/5VWS: CPT | Mod: 26,,, | Performed by: RADIOLOGY

## 2025-03-27 PROCEDURE — 99999 PR PBB SHADOW E&M-EST. PATIENT-LVL V: CPT | Mod: PBBFAC,,, | Performed by: PHYSICAL MEDICINE & REHABILITATION

## 2025-03-27 NOTE — PROGRESS NOTES
SUBJECTIVE:    Patient ID: Mariana Mora is a 44 y.o. female.    Chief Complaint: Leg Pain and Low-back Pain    This is a 44-year-old woman who sees Dr. Morgan for her primary care.  History of hyperlipidemia and psoriatic arthritis otherwise denies any chronic major medical problems.  No cancer history.  Long history pain.  Usually treats with a chiropractor but has not been for several years.  Presents with a primary complaint of intermittent right leg weakness started about 3 or 4 months ago for no apparent reason.  She has a intermittent right foot drop.  She denies any injuries to her knee.  She does not have any radicular right leg symptoms but she has chronic low back pain.  No changes to her bowel or bladder function.  No fever chills sweats or unexpected weight loss.  Current pain level is 4/10 but at times as high as 8/10 and interferes with her quality of life in terms of activities of daily living recreation and social activities.  I personally reviewed x-rays of the lumbar spine done 02/14/2025 which show mild expected degenerative changes.  I reviewed cervical MRI reports from 2019 that describes mild degenerative changes.  I do not have the benefit of the films.          Past Medical History:   Diagnosis Date    ADHD, predominantly inattentive type     Anxiety     Arthritis     Autism     Autism spectrum disorder     Depression     Hypertension     Hypothyroidism     Migraine headache     OCD (obsessive compulsive disorder)     Psoriasis     TMJ (temporomandibular joint syndrome)      Social History[1]  Past Surgical History:   Procedure Laterality Date    ADENOIDECTOMY      CHOLECYSTECTOMY  6-3-2021    COLONOSCOPY      at the age of 7 for rectal bleeding    COLONOSCOPY N/A 8/10/2023    Procedure: COLONOSCOPY;  Surgeon: Jeanne Whitt MD;  Location: [facility];  Service: Endoscopy;  Laterality: N/A;    ESOPHAGEAL MANOMETRY WITH MEASUREMENT OF IMPEDANCE N/A 02/10/2020    Procedure:  "MANOMETRY, ESOPHAGUS, WITH IMPEDANCE MEASUREMENT;  Surgeon: Fitz Murray MD;  [facility] (4TH FLR);  Service: Endoscopy;  Laterality: N/A;  2/3 - pt confirmed    ESOPHAGOGASTRODUODENOSCOPY N/A 12/17/2019    Procedure: EGD (ESOPHAGOGASTRODUODENOSCOPY);  Surgeon: David Stahl MD;  Location: [facility] ENDO;  Service: Endoscopy;  Laterality: N/A;    ESOPHAGOGASTRODUODENOSCOPY N/A 8/31/2023    Procedure: EGD (ESOPHAGOGASTRODUODENOSCOPY);  Surgeon: Jeanne Whitt MD;  Location: [facility] ENDO;  Service: Endoscopy;  Laterality: N/A;    INNER EAR SURGERY Right     LAPAROSCOPIC CHOLECYSTECTOMY N/A 06/03/2021    Procedure: CHOLECYSTECTOMY-LAPAROSCOPIC;  Surgeon: Farrukh Lorenzo MD;  Location: [facility] OR;  Service: General;  Laterality: N/A;    NASAL SEPTUM SURGERY      TYMPANOPLASTY      bilateral    TYMPANOSTOMY TUBE PLACEMENT       Family History   Problem Relation Name Age of Onset    Arthritis Father Billy Pair     Asthma Father Billy Pair     Heart disease Father Billy Pair     Hyperlipidemia Father Billy Pair     Colon cancer Paternal Uncle Roland Pair         dx in 50s    Cancer Paternal Uncle Roland Pair     Colon cancer Paternal Grandmother Sherron Britton     Diabetes Paternal Grandmother Sherron Britton     Heart failure Paternal Grandmother Sherron Britton     Breast cancer Paternal Grandmother Sherron Britton     Arthritis Paternal Grandmother Sherron Britton     Cancer Paternal Grandmother Sherron Britton     Heart disease Paternal Grandmother Sherron Britton     Diabetes Paternal Grandfather TF     Heart failure Paternal Grandfather TF     Cancer Paternal Grandfather TF     Heart disease Paternal Grandfather TF     Stroke Paternal Grandfather TF     Hemochromatosis Other          Father's side    Ovarian cancer Neg Hx      Colon polyps Neg Hx      Esophageal cancer Neg Hx      Stomach cancer Neg Hx      Ulcerative colitis Neg Hx      Crohn's disease Neg Hx       Vitals:    03/27/25 1401   Weight: 129.7 kg (285 lb 15 oz)   Height: 5' 5" (1.651 " m)       Review of Systems   Constitutional:  Negative for chills, diaphoresis, fatigue, fever and unexpected weight change.   HENT:  Negative for trouble swallowing.    Eyes:  Negative for visual disturbance.   Respiratory:  Negative for shortness of breath.    Cardiovascular:  Negative for chest pain.   Gastrointestinal:  Negative for abdominal pain, constipation, nausea and vomiting.   Genitourinary:  Negative for difficulty urinating.   Musculoskeletal:  Negative for arthralgias, back pain, gait problem, joint swelling, myalgias, neck pain and neck stiffness.   Neurological:  Negative for dizziness, speech difficulty, weakness, light-headedness, numbness and headaches.          Objective:      Physical Exam  Neurological:      Mental Status: She is alert and oriented to person, place, and time.      Comments: She is awake and in no acute distress  No point tenderness or palpable masses about the cervical or lumbar spine  Forward flexion of the lumbar spine is normal and painless.  Extension combined with rotation to the right and left causes mild pain at the lumbosacral junction  She can toe walk normally.  Has a mild dorsiflexion weakness on the right when attempting to heel walk  Reflexes- +1-+2 reflexes at the following:   C5-Biceps   C6-Brachioradialis   C7-Triceps   L3/4-Patellar   S1-Achilles   Willie sign is negative bilaterally  Other than a mild right foot drop she has normal strength in both upper and lower extremities             Assessment:       1. Transient right leg weakness    2. Chronic right-sided low back pain without sciatica    3. Chronic neck pain           Plan:     Other than a mild right foot drop she has a nonfocal neurological examination and no historical red flags.  I am recommending an MRI of the lumbar spine looking for any L5 radiculopathy.  She has EMG and nerve conduction studies ordered by primary care.  We will get some x-rays on her neck and start her in physical therapy.   She can follow up with me after the MRI.      Transient right leg weakness  -     Ambulatory referral/consult to Spine Care  -     MRI Lumbar Spine Without Contrast; Future; Expected date: 03/27/2025    Chronic right-sided low back pain without sciatica  -     Ambulatory referral/consult to Spine Care  -     Ambulatory Referral/Consult to Physical Therapy; Future; Expected date: 04/03/2025    Chronic neck pain  -     Ambulatory referral/consult to Spine Care  -     X-Ray Cervical Spine AP Lat with Flex Ex; Future; Expected date: 03/27/2025  -     Ambulatory Referral/Consult to Physical Therapy; Future; Expected date: 04/03/2025               [1]   Social History  Socioeconomic History    Marital status:    Tobacco Use    Smoking status: Never    Smokeless tobacco: Never   Substance and Sexual Activity    Alcohol use: No    Drug use: Never    Sexual activity: Not Currently     Partners: Male     Birth control/protection: OCP     Comment: Pill   Social History Narrative    Not working due to medical condition.             Plans from [facility]    GYN Visit & History 10 Charu9/18/2019     Contraceptive Counseling    Family hx of ovarian cancer        Visit Summary    [facility] results: negative but increase risk of breast cancer    [place] [facility] [place]ed [place] eligible for Breast MRI yearly    SBE and Clinical breast exams recommended.        Prescriptions:    SIG: Seasonique 0.15 mg-30 mcg (84)/10 mcg (7) oral tablets,dose pack,3 month, 84 days, Dispense #84 Tablet, 3 Refills    Directions: Take 1 oral tablet once a day        Return for follow up appointment prn/annual.     GYN Exam (Annual/6Wk PP)10 Charu8/7/2019     Annual    Psoriasis    Mild Yeast Vaginitis    Family hx of ovarian cancer    Obesity        Visit Summary    Pap done    Wet prep: mild yeast only    [facility] Genetic Testing today    Pt has appt with PCP Dr Stahl next week to establish care    [place] [facility] [place]         Prescriptions:    SIG: Diflucan 150 mg oral tablet, 1 days, Dispense #1 Tablet, 1 Refills    Directions: Take 1 oral tablet once a day        Return for follow up appointment in one year or prn.     [id number]     Contraceptive counseling.  PMS.  Psoriatic arthritis.  Hypothyroidism.  Obesity.      Patient is to continue Seasonique with skipping the placebo pills.    Patient given  name for   Vasectomy.    Return to clinic for annual exam in July.     GYN Exam (Annual/6Wk PP)10 [facility]     Annual exam.  Psoriatic arthritis.  Depression.  Contraceptive management.  Hypothyroidism.    We'll change medication to Seasonique.    Discontinue Loestrin.    Return to clinic in 6 months.    PCP: Dr. Kim.    Visit Summary     [facility] Visit & [id number]     PMS.  Menorrhagia.  Anxiety.  Depression.    Return to clinic in 2 months for annual exam.    Discussed cardiovascular exercise.        Visit Summary    Prescriptions:    SIG: Loestrin 1/20 (21) 1-20 mg-mcg oral tablet, 30 days, Dispense #30 Tablet, 11 Refills    Directions: Take 1 oral tablet once a day     Social Drivers of Health     Financial Resource Strain: Patient Declined (1/16/2025)    Overall Financial Resource Strain (CARDIA)     Difficulty of Paying Living Expenses: Patient declined   Food Insecurity: Patient Declined (1/16/2025)    Hunger Vital Sign     Worried About Running Out of Food in the Last Year: Patient declined     Ran Out of Food in the Last Year: Patient declined   Transportation Needs: Patient Declined (1/17/2024)    PRAPARE - Transportation     Lack of Transportation (Medical): Patient declined     Lack of Transportation (Non-Medical): Patient declined   Physical Activity: Insufficiently Active (1/16/2025)    Exercise Vital Sign     Days of Exercise per Week: 3 days     Minutes of Exercise per Session: 20 min   Stress: Patient Declined (1/16/2025)    [employer] -  Occupational Stress Questionnaire     Feeling of Stress : Patient declined   Housing Stability: Patient Declined (1/17/2024)    Housing Stability Vital Sign     Unable to Pay for Housing in the Last Year: Patient declined     Number of Places Lived in the Last Year: 1     Unstable Housing in the Last Year: Patient declined

## 2025-03-28 ENCOUNTER — RESULTS FOLLOW-UP (OUTPATIENT)
Dept: SPINE | Facility: CLINIC | Age: 45
End: 2025-03-28

## 2025-03-28 ENCOUNTER — PATIENT MESSAGE (OUTPATIENT)
Dept: SPINE | Facility: CLINIC | Age: 45
End: 2025-03-28
Payer: COMMERCIAL

## 2025-03-28 ENCOUNTER — OFFICE VISIT (OUTPATIENT)
Dept: PSYCHIATRY | Facility: CLINIC | Age: 45
End: 2025-03-28
Payer: COMMERCIAL

## 2025-03-28 DIAGNOSIS — F33.9 RECURRENT DEPRESSION: Primary | ICD-10-CM

## 2025-03-28 DIAGNOSIS — F42.9 OBSESSIVE-COMPULSIVE DISORDER, UNSPECIFIED TYPE: ICD-10-CM

## 2025-03-28 DIAGNOSIS — F43.9 TRAUMA AND STRESSOR-RELATED DISORDER: ICD-10-CM

## 2025-03-28 PROCEDURE — 3044F HG A1C LEVEL LT 7.0%: CPT | Mod: CPTII,S$GLB,, | Performed by: SOCIAL WORKER

## 2025-03-28 PROCEDURE — 99999 PR PBB SHADOW E&M-EST. PATIENT-LVL II: CPT | Mod: PBBFAC,,, | Performed by: SOCIAL WORKER

## 2025-03-28 PROCEDURE — 1159F MED LIST DOCD IN RCRD: CPT | Mod: CPTII,S$GLB,, | Performed by: SOCIAL WORKER

## 2025-03-28 PROCEDURE — 90837 PSYTX W PT 60 MINUTES: CPT | Mod: S$GLB,,, | Performed by: SOCIAL WORKER

## 2025-03-31 NOTE — PROGRESS NOTES
Individual Psychotherapy (PhD/LCSW)    3/28/2025    Site:  [facility]         Therapeutic Intervention: Met with patient.  Outpatient - Insight oriented psychotherapy 60 min - CPT code 43060, Outpatient - Behavior modifying psychotherapy 60 min - CPT code 27764, and Outpatient - Supportive psychotherapy 60 min - CPT Code 83430    Chief complaint/reason for encounter: depression and anxiety             Interval history and content of current session: Ct was referred to tx by Kiara LANDAVERDE to address depression, anxiety, stress. Ct arrived to session on time and was fully engaged. Ct shared that things have been okay. She reported that she is still checking in with her  before buying things in an effort to help with her spending. SW and ct discussed the importance of ct recognizing her triggers and having a plan for them. SW provided psycho ed on triggers, thoughts, and behaviors. Ct verbalized understanding. SW and ct also discussed the importance of ct not using her  as a crutch not to spend. SW encouraged ct to use her  as a source to help hold her accountable not make all of her spending decisions for her. Ct was receptive. Ct to continue in 1:1 sessions.       Treatment plan:  Target symptoms: depression, anxiety , spending  Why chosen therapy is appropriate versus another modality: relevant to diagnosis, patient responds to this modality, evidence based practice  Outcome monitoring methods: self-report  Therapeutic intervention type: insight oriented psychotherapy, behavior modifying psychotherapy, supportive psychotherapy    Risk parameters:  Patient reports no suicidal ideation  Patient reports no homicidal ideation  Patient reports no self-injurious behavior  Patient reports no violent behavior    CSSRS was completed:     Mental Status Exam:  General Appearance:  unremarkable, age appropriate   Speech: normal tone, normal rate, normal pitch, normal volume      Level of Cooperation: cooperative       Thought Processes: normal and logical   Mood: steady      Thought Content: normal, no suicidality, no homicidality, delusions, or paranoia   Affect: congruent and appropriate   Orientation: Oriented x3   Memory: recent >  intact   Attention Span & Concentration: intact   Fund of General Knowledge: intact and appropriate to age and level of education   Abstract Reasoning: interpretation of similarities was abstract   Judgment & Insight: fair, intact     Language intact       Verbal deficits: None    Patient's response to intervention:  The patient's response to intervention is accepting.    Progress toward goals and other mental status changes:  The patient's progress toward goals is fair .    Diagnosis:     ICD-10-CM ICD-9-CM   1. Recurrent depression  F33.9 296.30   2. Trauma and stressor-related disorder  F43.9 309.81     308.9   3. Obsessive-compulsive disorder, unspecified type  F42.9 300.3       Plan:  individual psychotherapy and ct to follow up with Kiara LANDAVERDE for psych med mgt  Pt to go to ED or call 911 if symptoms worsen or if she has thoughts of harming self and/or others. Pt verbalized understanding.    Return to clinic: as scheduled    Length of Service (minutes): 60      A portion of this note was created using [employer] voice recognition software that occasionally misinterprets phrases or words.    Each patient to whom he or she provides medical services by telemedicine is: (1) informed of the relationship between the physician and patient and the respective role of any other health care provider with respect to management of the patient; and (2) notified that he or she may decline to receive medical services by telemedicine and may withdraw from such care at any time.

## 2025-04-03 ENCOUNTER — HOSPITAL ENCOUNTER (OUTPATIENT)
Dept: RADIOLOGY | Facility: HOSPITAL | Age: 45
Discharge: HOME | End: 2025-04-03
Attending: PHYSICAL MEDICINE & REHABILITATION
Payer: COMMERCIAL

## 2025-04-03 DIAGNOSIS — R29.898 TRANSIENT RIGHT LEG WEAKNESS: ICD-10-CM

## 2025-04-03 DIAGNOSIS — F42.9 OBSESSIVE-COMPULSIVE DISORDER, UNSPECIFIED TYPE: ICD-10-CM

## 2025-04-03 DIAGNOSIS — F41.1 GAD (GENERALIZED ANXIETY DISORDER): ICD-10-CM

## 2025-04-03 PROCEDURE — 72148 MRI LUMBAR SPINE W/O DYE: CPT | Mod: 26,,, | Performed by: RADIOLOGY

## 2025-04-03 PROCEDURE — 72148 MRI LUMBAR SPINE W/O DYE: CPT | Mod: TC

## 2025-04-04 ENCOUNTER — OFFICE VISIT (OUTPATIENT)
Dept: PULMONOLOGY | Facility: CLINIC | Age: 45
End: 2025-04-04
Payer: COMMERCIAL

## 2025-04-04 VITALS
HEIGHT: 65 IN | DIASTOLIC BLOOD PRESSURE: 75 MMHG | OXYGEN SATURATION: 95 % | HEART RATE: 86 BPM | SYSTOLIC BLOOD PRESSURE: 120 MMHG | BODY MASS INDEX: 48.19 KG/M2 | WEIGHT: 289.25 LBS

## 2025-04-04 DIAGNOSIS — G47.33 OSA (OBSTRUCTIVE SLEEP APNEA): Primary | ICD-10-CM

## 2025-04-04 DIAGNOSIS — F51.01 PRIMARY INSOMNIA: ICD-10-CM

## 2025-04-04 DIAGNOSIS — E66.01 OBESITY, CLASS III, BMI 40-49.9 (MORBID OBESITY): ICD-10-CM

## 2025-04-04 PROCEDURE — 99999 PR PBB SHADOW E&M-EST. PATIENT-LVL V: CPT | Mod: PBBFAC,,, | Performed by: NURSE PRACTITIONER

## 2025-04-04 RX ORDER — BUSPIRONE HYDROCHLORIDE 10 MG/1
10 TABLET ORAL 2 TIMES DAILY
Qty: 60 TABLET | Refills: 1 | Status: SHIPPED | OUTPATIENT
Start: 2025-04-04

## 2025-04-04 RX ORDER — AZELASTINE 1 MG/ML
1 SPRAY, METERED NASAL
COMMUNITY
Start: 2024-12-23

## 2025-04-04 RX ORDER — SERTRALINE HYDROCHLORIDE 100 MG/1
200 TABLET, FILM COATED ORAL DAILY
Qty: 60 TABLET | Refills: 1 | Status: SHIPPED | OUTPATIENT
Start: 2025-04-04

## 2025-04-04 NOTE — PATIENT INSTRUCTIONS
CPAP compliance report reviewed.     Recommend CPAP titration study at this time to ensure there is no need for BiPAP machine.    If titration study reveals that you are on the correct machine and correct pressures, will place referral for sleep medicine versus returned to your PCP for further evaluation of persistent fatigue.    Continue current prescription medication regiment. Keep follow up appointment as scheduled. Please call the office if you have any questions or concerns.

## 2025-04-04 NOTE — PROGRESS NOTES
4/4/2025    Mariana Mora  In office visit     Chief Complaint   Patient presents with    Fatigue       HPI:  04/04/2025:  Patient presents to the clinic today with reports of significant daytime fatigue, lack of energy despite the use of CPAP nightly.  Patient also reports frequent nocturnal arousals each night in which she is unsure if this is secondary to sleep apnea versus chronic neck pain.  Patient did undergo titration study in 2019 revealing the need for CPAP therapy.  Patient currently using a full face mask with no reported issue.  Patient does have a diagnosis of insomnia in the past.  Patient currently taking prazosin nightly.  Patient also states that current CPAP machine is making a loud noise and putting out what seems to her as less pressure as before.  CPAP compliance report reviewed from dates 03/05/2025 - 04/03/2025  Usage > = 4 hours 96.7%  APAP 14-20  90% CPAP 15.7  AHI 0.6      09/08/2023:  Using CPAP nightly - states never did get machine registered for recall, is using nightly with filter. No issues reported with CPAP use. Using Full face mask with benefit.   [facility] has had a bad year so far - death of uncle.  Cough has overall subsided however every few days will cough up a small amount of mucous - light green in color. [facility] will notice it happens more often if allergies are flaring.  Still on Xolair per Dr. Dai.  Denies frequent steroid or abx use.   Patient Instructions   Continue CPAP nightly  Will ask [facility] when you are eligible for a new CPAP machine.  CPAP supply order sent.  Chest Xray ordered to be taken as needed   Continue current prescription medication regiment. Keep follow up appointment as scheduled. Please call the office if you have any questions or concerns.       9/20/2022: Hx: SUNIL, asthma a child  Diagnosed with SUNIL years ago - was following per Dr. Borges. Underwent PSG in 2019 revealing AHI of 21.6 followed by titration study November 18, 2019  revealing need or CPAP with pressure set at 12-20 cm h20. Patient has CPAP machine, however it is under recall. Hasn't yet registered for recall but plans to.   Currently using CPAP nightly with in line filter - no issues reported. Using Full Face mask without issue.  Endorses daytime fatigue which could be secondary to medication. Takes medication for insomnia, reports nocturnal arousals. Denies morning headaches. Depression is treated.   On Xolair per Dr. Dai, Allergist - for chronic hives.  Cough: Onset two months ago, persistent - worse in the morning, productive with green, flat, hard mucous. Mucous is dime sized.  Hx septum repair - does endorse chronic allergies, PND. On Zyrtec daily.   Follows with Dr. Lockwood ([facility]) for psoriatic arthritis - currently on Tremfya with great reported benefit. Was on MTX in the past.   Denies frequent steroid or abx use.   Patient Instructions   [employer] for CPAP recall. Call [phone]  Compliance report reviewed from 8/21/22-9/19/22  Average AHI 0.5  Average 90% CPAP - 15.1 cm H20  Average usage 8 h 23 minutes  Until then, can continue CPAP Nightly with use of filter.  Chest xray now.  If cough persists for longer than six months, can consider CT Chest.  Continue current medication regiment. Keep follow up appointment as scheduled. Please call the office if you have any questions or concerns.         Social Hx: Lives with  - four dogs in the home. Disabled, former middle school . No Asbestosis exposure, Smoking Hx: Never smoker  Family Hx: No Lung Cancer, No COPD, Father with Asthma  Medical Hx: No previous pneumonia ; No previous shoulder/chest surgery      The chief compliant problem varies with instability at times.  PFSH:  Past Medical History:   Diagnosis Date    ADHD, predominantly inattentive type     Anxiety     Arthritis     Autism     Autism spectrum disorder     Depression     Hypertension     Hypothyroidism     Migraine  headache     OCD (obsessive compulsive disorder)     Psoriasis     TMJ (temporomandibular joint syndrome)          Past Surgical History:   Procedure Laterality Date    ADENOIDECTOMY      CHOLECYSTECTOMY  6-3-2021    COLONOSCOPY      at the age of 7 for rectal bleeding    COLONOSCOPY N/A 8/10/2023    Procedure: COLONOSCOPY;  Surgeon: Jeanne Whitt MD;  Location: [facility];  Service: Endoscopy;  Laterality: N/A;    ESOPHAGEAL MANOMETRY WITH MEASUREMENT OF IMPEDANCE N/A 02/10/2020    Procedure: MANOMETRY, ESOPHAGUS, WITH IMPEDANCE MEASUREMENT;  Surgeon: Ftiz Murray MD;  Location: [facility] ([facility] FLR);  Service: Endoscopy;  Laterality: N/A;  2/3 - pt confirmed    ESOPHAGOGASTRODUODENOSCOPY N/A 12/17/2019    Procedure: EGD (ESOPHAGOGASTRODUODENOSCOPY);  Surgeon: David Stahl MD;  Location: [facility];  Service: Endoscopy;  Laterality: N/A;    ESOPHAGOGASTRODUODENOSCOPY N/A 8/31/2023    Procedure: EGD (ESOPHAGOGASTRODUODENOSCOPY);  Surgeon: Jeanne Whitt MD;  Location: [facility];  Service: Endoscopy;  Laterality: N/A;    INNER EAR SURGERY Right     LAPAROSCOPIC CHOLECYSTECTOMY N/A 06/03/2021    Procedure: CHOLECYSTECTOMY-LAPAROSCOPIC;  Surgeon: Farrukh Lorenzo MD;  Location: [facility] OR;  Service: General;  Laterality: N/A;    NASAL SEPTUM SURGERY      TYMPANOPLASTY      bilateral    TYMPANOSTOMY TUBE PLACEMENT       Social History     Tobacco Use    Smoking status: Never    Smokeless tobacco: Never   Substance Use Topics    Alcohol use: No    Drug use: Never     Family History   Problem Relation Name Age of Onset    Arthritis Father Billy Pair     Asthma Father Billy Pair     Heart disease Father Billy Pair     Hyperlipidemia Father Billy Pair     Colon cancer Paternal Uncle Roland Pair         dx in 50s    Cancer Paternal Uncle Roland Pair     Colon cancer Paternal Grandmother Sherron Britton     Diabetes Paternal Grandmother Sherron Britton     Heart failure Paternal Grandmother Sherron Britton     Breast cancer Paternal  "Grandmother Sherron Jarquin     Arthritis Paternal Grandmother Sherron Jarquin     Cancer Paternal Grandmother Sherron Jarquin     Heart disease Paternal Grandmother Sherron Jarquin     Diabetes Paternal Grandfather TF     Heart failure Paternal Grandfather TF     Cancer Paternal Grandfather TF     Heart disease Paternal Grandfather TF     Stroke Paternal Grandfather TF     Hemochromatosis Other          Father's side    Ovarian cancer Neg Hx      Colon polyps Neg Hx      Esophageal cancer Neg Hx      Stomach cancer Neg Hx      Ulcerative colitis Neg Hx      Crohn's disease Neg Hx       Review of patient's allergies indicates:   Allergen Reactions    Penicillins     Norco [hydrocodone-acetaminophen] Itching    Prednisone Other (See Comments) and Rash     PSORIASIS  PSORIASIS     I have reviewed past medical, family, and social history. I have reviewed previous nurse notes.    Performance Status:The patient's activity level is functions out of house.      Review of Systems:  a review of eleven systems covering constitutional, Eye, HEENT, Psych, Respiratory, Cardiac, GI, , Musculoskeletal, Endocrine, Dermatologic was negative except for pertinent findings as listed ABOVE and below: pertinent positive as above, rest is good       Exam:Comprehensive exam done. /75 (BP Location: Right arm, Patient Position: Sitting)   Pulse 86   Ht 5' 5" (1.651 m)   Wt 131.2 kg (289 lb 3.9 oz)   SpO2 95% Comment: on room air at rest  BMI 48.13 kg/m²   Exam included Vitals as listed  Constitutional: She is oriented to person, place, and time. She appears well-developed. No distress.   Nose: Nose normal.   Mouth/Throat: Uvula is midline, oropharynx is clear and moist and mucous membranes are normal. No dental caries. No oropharyngeal exudate, posterior oropharyngeal edema, posterior oropharyngeal erythema or tonsillar abscesses.    Eyes: Pupils are equal, round, and reactive to light.   Neck: No JVD present. No thyromegaly present. "   Cardiovascular: Normal rate, regular rhythm and normal heart sounds. Exam reveals no gallop and no friction rub.   No murmur heard.  Pulmonary/Chest: Effort normal and breath sounds normal. No accessory muscle usage or stridor. No apnea and no tachypnea. No respiratory distress, decreased breath sounds, wheezes, rhonchi, rales, or tenderness. Clear breath sounds throughout, on room air, in no acute distress.   Abdominal: Soft. She exhibits no mass. There is no tenderness. No hepatosplenomegaly, hernias and normoactive bowel sounds  Musculoskeletal: Normal range of motion. exhibits no edema.   Neurological:  alert and oriented to person, place, and time. not disoriented.   Skin: Skin is warm and dry. Capillary refill takes less 2 sec. No cyanosis or erythema. No pallor. Nails show no clubbing.   Psychiatric: normal mood and affect. behavior is normal. Judgment and thought content normal.       Radiographs (ct chest and cxr) reviewed: view by direct vision     CXR  9/23/2022 - Normal  Chest Xray 11/3/2011 - Normal      Patient's labs were reviewed including CBC and CMP  Lab Results   Component Value Date    WBC 8.5 12/23/2024    RBC 4.76 12/23/2024    HGB 13.8 12/23/2024    HCT 42.9 12/23/2024    MCV 90.1 12/23/2024    MCH 29 12/23/2024    MCHC 32.2 12/23/2024    RDW 45.3 12/23/2024     12/23/2024    MPV 9.7 12/23/2024    GRAN 5.7 09/12/2024    GRAN 62.1 09/12/2024    LYMPH 2.4 09/12/2024    LYMPH 26.1 09/12/2024    MONO 0.7 09/12/2024    MONO 7.4 09/12/2024    EOS 0.3 09/12/2024    BASO 0.08 09/12/2024    EOSINOPHIL 3.0 09/12/2024    BASOPHIL 0.9 09/12/2024   CMP  Sodium   Date Value Ref Range Status   02/14/2025 139 136 - 145 mmol/L Final     Potassium   Date Value Ref Range Status   02/14/2025 3.8 3.5 - 5.1 mmol/L Final     Chloride   Date Value Ref Range Status   02/14/2025 108 95 - 110 mmol/L Final     CO2   Date Value Ref Range Status   02/14/2025 21 (L) 23 - 29 mmol/L Final     Glucose   Date Value Ref  Range Status   02/14/2025 108 70 - 110 mg/dL Final     BUN   Date Value Ref Range Status   02/14/2025 14 6 - 20 mg/dL Final     Creatinine   Date Value Ref Range Status   02/14/2025 0.7 0.5 - 1.4 mg/dL Final     Calcium   Date Value Ref Range Status   02/14/2025 9.4 8.7 - 10.5 mg/dL Final     Total Protein   Date Value Ref Range Status   02/14/2025 6.9 6.0 - 8.4 g/dL Final     Albumin   Date Value Ref Range Status   02/14/2025 3.7 3.5 - 5.2 g/dL Final     Total Bilirubin   Date Value Ref Range Status   02/14/2025 0.4 0.1 - 1.0 mg/dL Final     Comment:     For infants and newborns, interpretation of results should be based  on gestational age, weight and in agreement with clinical  observations.    Premature Infant recommended reference ranges:  Up to 24 hours.............<8.0 mg/dL  Up to 48 hours............<12.0 mg/dL  3-5 days..................<15.0 mg/dL  6-29 days.................<15.0 mg/dL       Alkaline Phosphatase   Date Value Ref Range Status   02/14/2025 89 40 - 150 U/L Final     AST   Date Value Ref Range Status   02/14/2025 17 10 - 40 U/L Final     ALT   Date Value Ref Range Status   02/14/2025 27 10 - 44 U/L Final     Anion Gap   Date Value Ref Range Status   02/14/2025 10 8 - 16 mmol/L Final     eGFR   Date Value Ref Range Status   02/14/2025 >60.0 >60 mL/min/1.73 m^2 Final         PFT was not done  Pulmonary Functions Testing Results:        Plan:  Clinical impression is apparently straight forward and impression with management as below.    Mariana was seen today for fatigue.    Diagnoses and all orders for this visit:    SUNIL (obstructive sleep apnea)  -     Cancel: BiPAP/CPAP Titration ((Must have dx of SUNIL from previous sleep study); Future  -     BiPAP/CPAP Titration ((Must have dx of SUNIL from previous sleep study); Future    Obesity, Class III, BMI 40-49.9 (morbid obesity)    Primary insomnia        Body mass index is 48.13 kg/m².     Morbid obesity complicates all aspects of disease management  from diagnostic modalities to treatment. Weight loss encouraged and health benefits explained to patient. Nutritional counseling and physical activity encouraged.       Follow up in about 3 months (around 7/4/2025), or if symptoms worsen or fail to improve.    Discussed with patient above for education the following:      Patient Instructions   We will attempt to pull compliance report.    Recommend CPAP titration study at this time to ensure there is no need for BiPAP machine.    If titration study reveals that you are on the correct machine and correct pressures, will place referral for sleep medicine versus returned to your PCP for further evaluation of persistent fatigue.    Continue current prescription medication regiment. Keep follow up appointment as scheduled. Please call the office if you have any questions or concerns.

## 2025-04-07 ENCOUNTER — OFFICE VISIT (OUTPATIENT)
Dept: SPINE | Facility: CLINIC | Age: 45
End: 2025-04-07
Payer: COMMERCIAL

## 2025-04-07 VITALS — HEIGHT: 65 IN | BODY MASS INDEX: 48.19 KG/M2 | WEIGHT: 289.25 LBS

## 2025-04-07 DIAGNOSIS — R29.898 TRANSIENT RIGHT LEG WEAKNESS: Primary | ICD-10-CM

## 2025-04-07 DIAGNOSIS — M54.2 CHRONIC NECK PAIN: ICD-10-CM

## 2025-04-07 DIAGNOSIS — M54.50 CHRONIC RIGHT-SIDED LOW BACK PAIN WITHOUT SCIATICA: ICD-10-CM

## 2025-04-07 DIAGNOSIS — G89.29 CHRONIC RIGHT-SIDED LOW BACK PAIN WITHOUT SCIATICA: ICD-10-CM

## 2025-04-07 DIAGNOSIS — G89.29 CHRONIC NECK PAIN: ICD-10-CM

## 2025-04-07 PROCEDURE — 99213 OFFICE O/P EST LOW 20 MIN: CPT | Mod: S$GLB,,, | Performed by: PHYSICAL MEDICINE & REHABILITATION

## 2025-04-07 PROCEDURE — 3044F HG A1C LEVEL LT 7.0%: CPT | Mod: CPTII,S$GLB,, | Performed by: PHYSICAL MEDICINE & REHABILITATION

## 2025-04-07 PROCEDURE — 99999 PR PBB SHADOW E&M-EST. PATIENT-LVL IV: CPT | Mod: PBBFAC,,, | Performed by: PHYSICAL MEDICINE & REHABILITATION

## 2025-04-07 PROCEDURE — 3008F BODY MASS INDEX DOCD: CPT | Mod: CPTII,S$GLB,, | Performed by: PHYSICAL MEDICINE & REHABILITATION

## 2025-04-07 PROCEDURE — 1159F MED LIST DOCD IN RCRD: CPT | Mod: CPTII,S$GLB,, | Performed by: PHYSICAL MEDICINE & REHABILITATION

## 2025-04-07 PROCEDURE — 1160F RVW MEDS BY RX/DR IN RCRD: CPT | Mod: CPTII,S$GLB,, | Performed by: PHYSICAL MEDICINE & REHABILITATION

## 2025-04-07 NOTE — PROGRESS NOTES
SUBJECTIVE:    Patient ID: Mariana Mora is a 44 y.o. female.    Chief Complaint: Follow-up    She is here to review her lumbar MRI done 04/03/2025 to evaluate her complaint of intermittent right leg weakness and foot drop.  Chronic low back pain.  No radicular symptoms.      The MRI is summarized below:      FINDINGS:  Morphology: Marrow signal is within normal limits.  Incidental scattered small hemangiomas most pronounced at the T12 vertebral body level.  Vertebral body heights are preserved.     Alignment: Within normal limits.     Cord: Normal in contour, caliber, and signal intensity.  Conus positioning is within normal limits.     Disc levels:     T12-L1: Within normal limits.     L1-L2: Within normal limits.     L2-L3: Within normal limits.     L3-L4: Within normal limits.     L4-L5: Mild degenerative disc height loss and desiccation with shallow disc bulging and superimposed right lateralizing broad-based disc protrusion and annular fissure producing mild narrowing of the right central spinal canal and subarticular recess.  No significant foraminal narrowing.     L5-S1: Within normal limits.     Other: Visualized paraspinal soft tissues are within normal limits.     Impression:     1. Mild degenerative changes at L4-L5 including a shallow right lateralizing disc protrusion and associated annular fissure producing mild spinal canal narrowing.  No acute process.        Clinically she is about the same.  She is still complaining of intermittent right leg weakness.  She is also still complaining of posterior neck discomfort.  She has going to be starting physical therapy soon.  Her pain level is 8/10 and interferes with quality of life in terms of activities of daily living recreation and social activities.            Past Medical History:   Diagnosis Date    ADHD, predominantly inattentive type     Anxiety     Arthritis     Autism     Autism spectrum disorder     Depression     Hypertension      Hypothyroidism     Migraine headache     OCD (obsessive compulsive disorder)     Psoriasis     TMJ (temporomandibular joint syndrome)      Social History[1]  Past Surgical History:   Procedure Laterality Date    ADENOIDECTOMY      CHOLECYSTECTOMY  6-3-2021    COLONOSCOPY      at the age of 7 for rectal bleeding    COLONOSCOPY N/A 8/10/2023    Procedure: COLONOSCOPY;  Surgeon: Jeanne Whitt MD;  Location: [facility];  Service: Endoscopy;  Laterality: N/A;    ESOPHAGEAL MANOMETRY WITH MEASUREMENT OF IMPEDANCE N/A 02/10/2020    Procedure: MANOMETRY, ESOPHAGUS, WITH IMPEDANCE MEASUREMENT;  Surgeon: Fitz Murray MD;  Location: [facility] ([facility] FLR);  Service: Endoscopy;  Laterality: N/A;  2/3 - pt confirmed    ESOPHAGOGASTRODUODENOSCOPY N/A 12/17/2019    Procedure: EGD (ESOPHAGOGASTRODUODENOSCOPY);  Surgeon: David Stahl MD;  Location: [facility];  Service: Endoscopy;  Laterality: N/A;    ESOPHAGOGASTRODUODENOSCOPY N/A 8/31/2023    Procedure: EGD (ESOPHAGOGASTRODUODENOSCOPY);  Surgeon: Jeanne Whitt MD;  Location: [facility];  Service: Endoscopy;  Laterality: N/A;    INNER EAR SURGERY Right     LAPAROSCOPIC CHOLECYSTECTOMY N/A 06/03/2021    Procedure: CHOLECYSTECTOMY-LAPAROSCOPIC;  Surgeon: Farrukh Lorenzo MD;  Location: [facility];  Service: General;  Laterality: N/A;    NASAL SEPTUM SURGERY      TYMPANOPLASTY      bilateral    TYMPANOSTOMY TUBE PLACEMENT       Family History   Problem Relation Name Age of Onset    Arthritis Father Billy Pair     Asthma Father Billy Pair     Heart disease Father Billy Pair     Hyperlipidemia Father Billy Pair     Colon cancer Paternal Uncle Roland Pair         dx in 50s    Cancer Paternal Uncle Roland Pair     Colon cancer Paternal Grandmother Sherron Britton     Diabetes Paternal Grandmother Sherron Britton     Heart failure Paternal Grandmother Sherron Britton     Breast cancer Paternal Grandmother Sherron Britton     Arthritis Paternal Grandmother Sherron Britton     Cancer Paternal Grandmother Sherron  "Britton     Heart disease Paternal Grandmother Sherron Jarquin     Diabetes Paternal Grandfather TF     Heart failure Paternal Grandfather TF     Cancer Paternal Grandfather TF     Heart disease Paternal Grandfather TF     Stroke Paternal Grandfather TF     Hemochromatosis Other          Father's side    Ovarian cancer Neg Hx      Colon polyps Neg Hx      Esophageal cancer Neg Hx      Stomach cancer Neg Hx      Ulcerative colitis Neg Hx      Crohn's disease Neg Hx       Vitals:    04/07/25 1506   Weight: 131.2 kg (289 lb 3.9 oz)   Height: 5' 5" (1.651 m)       Review of Systems   Constitutional:  Negative for chills, diaphoresis, fatigue, fever and unexpected weight change.   HENT:  Negative for trouble swallowing.    Eyes:  Negative for visual disturbance.   Respiratory:  Negative for shortness of breath.    Cardiovascular:  Negative for chest pain.   Gastrointestinal:  Negative for abdominal pain, constipation, nausea and vomiting.   Genitourinary:  Negative for difficulty urinating.   Musculoskeletal:  Negative for arthralgias, back pain, gait problem, joint swelling, myalgias, neck pain and neck stiffness.   Neurological:  Negative for dizziness, speech difficulty, weakness, light-headedness, numbness and headaches.          Objective:      Physical Exam  Neurological:      Mental Status: She is alert and oriented to person, place, and time.             Assessment:       1. Transient right leg weakness    2. Chronic right-sided low back pain without sciatica    3. Chronic neck pain           Plan:     I reassured her there are no worrisome findings on her MRI.  I do not see anything that definitely explains her right leg weakness.  I know she has a EMG and nerve conduction studies coming up in May and she should go through with that.  I would like to see her back afterwards.      Transient right leg weakness    Chronic right-sided low back pain without sciatica    Chronic neck pain               [1]   Social " History  Socioeconomic History    Marital status:    Tobacco Use    Smoking status: Never    Smokeless tobacco: Never   Substance and Sexual Activity    Alcohol use: No    Drug use: Never    Sexual activity: Not Currently     Partners: Male     Birth control/protection: OCP     Comment: Pill   Social History Narrative    Not working due to medical condition.             Plans from [facility]    GYN Visit & History 10 Charu9/18/2019     Contraceptive Counseling    Family hx of ovarian cancer        Visit Summary    [facility] results: negative but increase risk of breast cancer    Screening mammo @ [facility]    Discussed is eligible for Breast MRI yearly    SBE and Clinical breast exams recommended.        Prescriptions:    SIG: Seasonique 0.15 mg-30 mcg (84)/10 mcg (7) oral tablets,dose pack,3 month, 84 days, Dispense #84 Tablet, 3 Refills    Directions: Take 1 oral tablet once a day        Return for follow up appointment prn/annual.     GYN Exam (Annual/6Wk PP)10 Charu8/7/2019     Annual    Psoriasis    Mild Yeast Vaginitis    Family hx of ovarian cancer    Obesity        Visit Summary    Pap done    Wet prep: mild yeast only    [facility] Genetic Testing today    Pt has appt with PCP Dr Stahl next week to establish care    Screening Mammo @ [facility]: baseline, screening        Prescriptions:    SIG: Diflucan 150 mg oral tablet, 1 days, Dispense #1 Tablet, 1 Refills    Directions: Take 1 oral tablet once a day        Return for follow up appointment in one year or prn.     GYN Visit & [id number] CHARU1/8/2019     Contraceptive counseling.  PMS.  Psoriatic arthritis.  Hypothyroidism.  Obesity.      Patient is to continue Seasonique with skipping the placebo pills.    Patient given  name for   Vasectomy.    Return to clinic for annual exam in July.     GYN Exam (Annual/6Wk PP)10 Charu7/17/2018     Annual exam.  Psoriatic arthritis.  Depression.  Contraceptive management.  Hypothyroidism.    We'll change medication to  Seasonique.    Discontinue Loestrin.    Return to clinic in 6 months.    PCP: Dr. Kim.    Visit Summary     GYN Visit & [id number] [facility]     PMS.  Menorrhagia.  Anxiety.  Depression.    Return to clinic in 2 months for annual exam.    Discussed cardiovascular exercise.        Visit Summary    Prescriptions:    SIG: Loestrin 1/20 (21) 1-20 mg-mcg oral tablet, 30 days, Dispense #30 Tablet, 11 Refills    Directions: Take 1 oral tablet once a day     Social Drivers of Health     Financial Resource Strain: Patient Declined (1/16/2025)    Overall Financial Resource Strain (CARDIA)     Difficulty of Paying Living Expenses: Patient declined   Food Insecurity: Patient Declined (1/16/2025)    Hunger Vital Sign     Worried About Running Out of Food in the Last Year: Patient declined     Ran Out of Food in the Last Year: Patient declined   Transportation Needs: Patient Declined (1/17/2024)    PRAPARE - Transportation     Lack of Transportation (Medical): Patient declined     Lack of Transportation (Non-Medical): Patient declined   Physical Activity: Insufficiently Active (1/16/2025)    Exercise Vital Sign     Days of Exercise per Week: 3 days     Minutes of Exercise per Session: 20 min   Stress: Patient Declined (1/16/2025)    [employer] - Occupational Stress Questionnaire     Feeling of Stress : Patient declined   Housing Stability: Patient Declined (1/17/2024)    Housing Stability Vital Sign     Unable to Pay for Housing in the Last Year: Patient declined     Number of Places Lived in the Last Year: 1     Unstable Housing in the Last Year: Patient declined

## 2025-04-09 ENCOUNTER — PROCEDURE VISIT (OUTPATIENT)
Dept: NEUROLOGY | Facility: CLINIC | Age: 45
End: 2025-04-09
Payer: COMMERCIAL

## 2025-04-09 VITALS
HEART RATE: 90 BPM | DIASTOLIC BLOOD PRESSURE: 68 MMHG | RESPIRATION RATE: 17 BRPM | BODY MASS INDEX: 48.19 KG/M2 | WEIGHT: 289.25 LBS | SYSTOLIC BLOOD PRESSURE: 124 MMHG | TEMPERATURE: 98 F | HEIGHT: 65 IN

## 2025-04-09 DIAGNOSIS — G43.019 INTRACTABLE MIGRAINE WITHOUT AURA AND WITHOUT STATUS MIGRAINOSUS: Primary | ICD-10-CM

## 2025-04-09 NOTE — PROCEDURES
Procedures  BOTOX  The patient has chronic migraines ( G43.719) and suffers from headaches more than 3 months, more than 15 days of headache days per month lasting more than 4 hours with at least 8 attacks that meet criteria for migraine.    Botulinum Toxin Injection Procedure   Pre-operative diagnosis: Chronic migraine   Post-operative diagnosis: Same   Procedure: Chemical neurolysis     The Botox injections have achieved well over 50%  improvement in the patient's symptoms. Migraines have been reduced at least 7 days per month and the number of cumulative hours suffering with headaches has been reduced at least 100 total hours per month. Today she does have a headache indicating that the Botox has worn off. Frequency of treatment is every 3 months unless no response to the treatments, at which time we will discontinue the injections.    After risks and benefits were explained, a signed consent was obtained.   The patient was placed in a comfortable area and the sites to be treated were identified.The area to be treated was prepped three times with alcohol and the alcohol allowed to dry. Next, a 30 gauge needle was used to inject the medication in the area to be treated.   Area(s) injected:   Total Botox used: 175 Units   Botox wastage: 25 Units   Injection sites:    muscle bilaterally ( a total of 10 units divided into 2 sites)   Procerus muscle (5 units)   Frontalis muscle bilaterally (a total of 20 units divided into 4 sites)   Temporalis muscle bilaterally (a total of 40 units divided into 8 sites)   Masseters muscles bilaterally (a total of 20 units at 4 sites)  Occipitalis muscle bilaterally (a total of 30 units divided into 6 sites)   Cervical paraspinal muscles (a total of 20 units divided into 4 sites)   Trapezius muscle bilaterally (a total of 30 units divided into 6 sites)   Complications: none   RTC for the next Botox injection: 3 months     Alejandro Cárdenas M.D   of  Neurology  ACGME Board Certified, Neurology  UCNS, Board certified, headache Medicine  Medical Director, Headache and Facial Pain  [facility]

## 2025-04-13 ENCOUNTER — PROCEDURE VISIT (OUTPATIENT)
Dept: SLEEP MEDICINE | Facility: HOSPITAL | Age: 45
End: 2025-04-13
Attending: NURSE PRACTITIONER
Payer: COMMERCIAL

## 2025-04-13 DIAGNOSIS — G47.33 OSA (OBSTRUCTIVE SLEEP APNEA): ICD-10-CM

## 2025-04-13 PROCEDURE — 95811 POLYSOM 6/>YRS CPAP 4/> PARM: CPT

## 2025-04-14 ENCOUNTER — CLINICAL SUPPORT (OUTPATIENT)
Dept: REHABILITATION | Facility: HOSPITAL | Age: 45
End: 2025-04-14
Payer: COMMERCIAL

## 2025-04-14 ENCOUNTER — PATIENT MESSAGE (OUTPATIENT)
Dept: INTERNAL MEDICINE | Facility: CLINIC | Age: 45
End: 2025-04-14
Payer: COMMERCIAL

## 2025-04-14 DIAGNOSIS — M54.2 NECK PAIN: Primary | ICD-10-CM

## 2025-04-14 PROCEDURE — 97530 THERAPEUTIC ACTIVITIES: CPT | Mod: PN

## 2025-04-14 PROCEDURE — 97161 PT EVAL LOW COMPLEX 20 MIN: CPT | Mod: PN

## 2025-04-15 ENCOUNTER — PATIENT MESSAGE (OUTPATIENT)
Dept: INTERNAL MEDICINE | Facility: CLINIC | Age: 45
End: 2025-04-15
Payer: COMMERCIAL

## 2025-04-17 ENCOUNTER — RESULTS FOLLOW-UP (OUTPATIENT)
Dept: PULMONOLOGY | Facility: CLINIC | Age: 45
End: 2025-04-17

## 2025-04-17 DIAGNOSIS — G47.33 OSA (OBSTRUCTIVE SLEEP APNEA): Primary | ICD-10-CM

## 2025-04-17 PROBLEM — M54.2 NECK PAIN: Status: ACTIVE | Noted: 2025-04-17

## 2025-04-17 NOTE — PROGRESS NOTES
Outpatient Rehab    Physical Therapy Evaluation    Patient Name: Mariana Mora  MRN: [id number]  YOB: 1980  Encounter Date: 4/14/2025    Therapy Diagnosis:   Encounter Diagnosis   Name Primary?    Neck pain Yes     Physician: Thomas Toledo MD    Physician Orders: Eval and Treat  Medical Diagnosis: Chronic right-sided low back pain without sciatica  Chronic neck pain    Visit # / Visits Authorized:  1 / 1  Insurance Authorization Period: 3/27/2025 to 12/31/2025  Date of Evaluation: 4/14/2025  Plan of Care Certification: 4/14/2025 to 7/14/25     Time In: 1410   Time Out: 1500  Total Time: 50   Total Billable Time:      Intake Outcome Measure for FOTO Survey    Therapist reviewed FOTO scores for Mariana Mora on 4/14/2025.   FOTO report - see Media section or FOTO account episode details.     Intake Score:  %         Subjective   History of Present Illness  Mariana is a 44 y.o. female who reports to physical therapy with a chief concern of neck pain.     The patient reports a medical diagnosis of M54.2,G89.29 (ICD-10-CM) - Chronic neck pain. The patient has experienced this issue since 04/14/25.           Dominant Hand: Right  History of Present Condition/Illness: Pt reports she has been having lots of pain in her neck, reports she has had x rays completed and the spina has an increased curvature. She reports she has had some tingling in her bilateral fingers. Pt reports she has migraines where she gets botox completed every 3 months that assists. Pt has been having this neck pain for a few months.      Activities of Daily Living      General Prior Level of Function Comments: I  General Current Level of Function Comments: I with increased pain       Previously independent with activities of daily living? Yes     Currently independent with activities of daily living? Yes          Previously independent with instrumental activities of daily living? Yes     Currently  independent with instrumental activities of daily living? Yes              Pain     Patient reports a current pain level of 3/10. Pain at best is reported as 3/10. Pain at worst is reported as 8/10.   Location: cervical spine  Clinical Progression (since onset): Worsening  Pain Qualities: Aching, Burning, Other (Comment)  Other Pain Qualities: tingling in B fingers.  Pain-Relieving Factors: Rest  Pain-Aggravating Factors: Computer work, Sitting, Sleeping, Rotation, Head movements, Reaching         Review of Systems  Patient denies: Cancer History, Cardiac History, and Diabetes        Treatment History  Treatments  Previously Received Treatments: No  Currently Receiving Treatments: No    Living Arrangements  Living Situation  Living Arrangements: Spouse/significant other  Support Systems: Family members    Home Setup  Home Access: Level entry  Number of Levels in Home: One level        Employment  Patient does not report that: Does the patient's condition impact their ability to work?  Employment Status: Not applicable          Past Medical History/Physical Systems Review:   Mariana Mora  has a past medical history of ADHD, predominantly inattentive type, Anxiety, Arthritis, Autism, Autism spectrum disorder, Depression, Hypertension, Hypothyroidism, Migraine headache, OCD (obsessive compulsive disorder), Psoriasis, and TMJ (temporomandibular joint syndrome).    Mariana Mora  has a past surgical history that includes Tympanoplasty; Tympanostomy tube placement; Nasal septum surgery; Inner ear surgery (Right); Esophagogastroduodenoscopy (N/A, 12/17/2019); Esophageal manometry with measurement of impedance (N/A, 02/10/2020); Adenoidectomy; Laparoscopic cholecystectomy (N/A, 06/03/2021); Cholecystectomy (6-3-2021); Colonoscopy; Colonoscopy (N/A, 8/10/2023); and Esophagogastroduodenoscopy (N/A, 8/31/2023).    Mariana has a current medication list which includes the following prescription(s):  atorvastatin, azelastine, buspirone, vraylar, cetirizine, ciprofloxacin hcl, slynd, ergocalciferol, famotidine, tremfya, hydrochlorothiazide, ixekizumab, ketoconazole, leflunomide, methocarbamol, montelukast, nystatin, nystop, xolair, pantoprazole, prazosin, prazosin, prochlorperazine, nurtec, rizatriptan, sertraline, synthroid, zepbound, zepbound, and zepbound, and the following Facility-Administered Medications: diphenhydramine, lactated ringers, lactated ringers, lidocaine (pf) 10 mg/ml (1%), morphine, onabotulinumtoxina, onabotulinumtoxina, onabotulinumtoxina, and onabotulinumtoxina.    Review of patient's allergies indicates:   Allergen Reactions    Penicillins     Norco [hydrocodone-acetaminophen] Itching    Prednisone Other (See Comments) and Rash     PSORIASIS  PSORIASIS        Objective   Posture  Patient presents with a Forward and Retracted head position.     Shoulders are Rounded. Bilateral scapulae are: Elevated              Cervical Thoracic Sensation  General Cervical/Thoracic Sensation  Intact: Right and Left  Right Cervical/Thoracic Sensation  Intact: Light Touch, Static Two Point Discrimination, Dynamic Two Point Discrimination, Sharp/Dull Discrimination, Kinesthesia, and Proprioception       Left Cervical/Thoracic Sensation  Intact: Light Touch, Static Two Point Discrimination, Dynamic Two Point Discrimination, Sharp/Dull Discrimination, Kinesthesia, and Proprioception                Spinal Mobility  Normal: Cervical           Subcranial Range of Motion   Active Restricted? Passive Restricted? Pain   Flexion         Protraction         Retraction           Cervical Range of Motion   Active (deg) Passive (deg) Pain   Flexion         Extension 42       Right Lateral Flexion 35       Right Rotation 70       Left Lateral Flexion 32       Left Rotation 80         3 finger tips to chest flexion          Shoulder Strength - Planes of Motion   Right Strength Right Pain Left Strength Left  Pain   Flexion 5    5     Extension           ABduction 5   5     ADduction 5   5     Horizontal ABduction           Horizontal ADduction           Internal Rotation 0°           Internal Rotation 90°           External Rotation 0°           External Rotation 90°                              Cervical Screen  Tests  Negative: Right Distraction and Left Distraction  Cervical Range of Motion           Thoracic Range of Motion             Cervical/Thoracic Special Tests            Cervical Tests  Negative: Right Cervical Rotation and Lateral Flexion, Left Cervical Rotation and Lateral Flexion, Right Cervical Compression, Left Cervical Compression, Right Distraction, and Left Distraction                  Treatment:  Therapeutic Activity  TA 1: education on HEP    Time Entry(in minutes):  PT Evaluation (Low) Time Entry: 30  Therapeutic Activity Time Entry: 15    Assessment & Plan   Assessment  Mariana presents with a condition of Low complexity.   Presentation of Symptoms: Stable       Functional Limitations: Activity tolerance, Carrying objects, Completing work/school activities, Pain when reaching, Pain with ADLs/IADLs, Sitting tolerance, Reaching, Bed mobility  Impairments: Activity intolerance, Abnormal or restricted range of motion, Impaired physical strength, Lack of appropriate home exercise program, Pain with functional activity    Patient Goal for Therapy (PT): Pt would like to be able to not have pain in her neck region  Prognosis: Excellent  Assessment Details: Pt presents to PT with neck pain that has increasing worsened in the past few months. Pt has increased cervical curvature a this time. Pt needs education on upper body mechanics at this time. Pt has weak scapula and UE muscles at this time.     Plan  From a physical therapy perspective, the patient would benefit from: Skilled Rehab Services    Planned therapy interventions include: Therapeutic activities, Therapeutic exercise, Neuromuscular re-education, Manual therapy,  ADLs/IADLs, and Aquatic therapy.    Planned modalities to include: Electrical stimulation - attended, Electrical stimulation - passive/unattended, Ultrasound, Thermotherapy (hot pack), and Low-level laser therapy.        Visit Frequency: 2 times Per Week for 6 Weeks.       This plan was discussed with Patient, Therapy assistant, and Caregiver.   Discussion participants: Agreed Upon Plan of Care             Patient's spiritual, cultural, and educational needs considered and patient agreeable to plan of care and goals.           Goals:   Active       Lifting & carrying objects       Patient will carry grocery bags with no exacerbation of pain .       Start:  04/16/25    Expected End:  05/26/25               Maintaining body position       Patient will maintain sitting upright posture with no cues for body mechanics        Start:  04/16/25    Expected End:  05/12/25               Pain       Patient will report pain of 3/10 demonstrating a reduction of overall pain       Start:  04/16/25    Expected End:  05/26/25               Range of Motion       Patient will achieve full cervical protraction       Start:  04/16/25    Expected End:  05/26/25                Emeli Gamez, PT

## 2025-04-21 ENCOUNTER — TELEPHONE (OUTPATIENT)
Dept: PULMONOLOGY | Facility: CLINIC | Age: 45
End: 2025-04-21
Payer: COMMERCIAL

## 2025-04-21 NOTE — TELEPHONE ENCOUNTER
----- Message from Rebecca sent at 4/21/2025 12:46 PM CDT -----  Contact: self  Type:  Patient Returning CallWho Called:pt Who Left Message for Patient:Does the patient know what this is regarding?:yes Would the patient rather a call back or a response via [facility]? [facility] Call Back Number:[phone] Information:   Please call back to advise. Thanks!

## 2025-04-23 ENCOUNTER — OFFICE VISIT (OUTPATIENT)
Dept: PSYCHIATRY | Facility: CLINIC | Age: 45
End: 2025-04-23
Payer: COMMERCIAL

## 2025-04-23 VITALS
BODY MASS INDEX: 46.61 KG/M2 | HEART RATE: 75 BPM | HEIGHT: 65 IN | SYSTOLIC BLOOD PRESSURE: 110 MMHG | WEIGHT: 279.75 LBS | DIASTOLIC BLOOD PRESSURE: 73 MMHG

## 2025-04-23 DIAGNOSIS — F42.9 OBSESSIVE-COMPULSIVE DISORDER, UNSPECIFIED TYPE: Primary | ICD-10-CM

## 2025-04-23 DIAGNOSIS — F41.1 GAD (GENERALIZED ANXIETY DISORDER): ICD-10-CM

## 2025-04-23 DIAGNOSIS — G47.00 INSOMNIA, UNSPECIFIED TYPE: ICD-10-CM

## 2025-04-23 DIAGNOSIS — F90.2 ATTENTION DEFICIT HYPERACTIVITY DISORDER (ADHD), COMBINED TYPE: ICD-10-CM

## 2025-04-23 DIAGNOSIS — F33.9 RECURRENT DEPRESSION: ICD-10-CM

## 2025-04-23 DIAGNOSIS — F42.9 OBSESSIVE-COMPULSIVE DISORDER, UNSPECIFIED TYPE: ICD-10-CM

## 2025-04-23 DIAGNOSIS — G47.33 OSA (OBSTRUCTIVE SLEEP APNEA): ICD-10-CM

## 2025-04-23 DIAGNOSIS — F33.41 RECURRENT MAJOR DEPRESSIVE DISORDER, IN PARTIAL REMISSION: Primary | ICD-10-CM

## 2025-04-23 PROCEDURE — 3044F HG A1C LEVEL LT 7.0%: CPT | Mod: CPTII,S$GLB,, | Performed by: SOCIAL WORKER

## 2025-04-23 PROCEDURE — 99214 OFFICE O/P EST MOD 30 MIN: CPT | Mod: S$GLB,,, | Performed by: PHYSICIAN ASSISTANT

## 2025-04-23 PROCEDURE — 3074F SYST BP LT 130 MM HG: CPT | Mod: CPTII,S$GLB,, | Performed by: PHYSICIAN ASSISTANT

## 2025-04-23 PROCEDURE — 1159F MED LIST DOCD IN RCRD: CPT | Mod: CPTII,S$GLB,, | Performed by: SOCIAL WORKER

## 2025-04-23 PROCEDURE — 3008F BODY MASS INDEX DOCD: CPT | Mod: CPTII,S$GLB,, | Performed by: PHYSICIAN ASSISTANT

## 2025-04-23 PROCEDURE — 1159F MED LIST DOCD IN RCRD: CPT | Mod: CPTII,S$GLB,, | Performed by: PHYSICIAN ASSISTANT

## 2025-04-23 PROCEDURE — 3044F HG A1C LEVEL LT 7.0%: CPT | Mod: CPTII,S$GLB,, | Performed by: PHYSICIAN ASSISTANT

## 2025-04-23 PROCEDURE — 99999 PR PBB SHADOW E&M-EST. PATIENT-LVL II: CPT | Mod: PBBFAC,,, | Performed by: SOCIAL WORKER

## 2025-04-23 PROCEDURE — G2211 COMPLEX E/M VISIT ADD ON: HCPCS | Mod: S$GLB,,, | Performed by: PHYSICIAN ASSISTANT

## 2025-04-23 PROCEDURE — 3078F DIAST BP <80 MM HG: CPT | Mod: CPTII,S$GLB,, | Performed by: PHYSICIAN ASSISTANT

## 2025-04-23 PROCEDURE — 1160F RVW MEDS BY RX/DR IN RCRD: CPT | Mod: CPTII,S$GLB,, | Performed by: PHYSICIAN ASSISTANT

## 2025-04-23 PROCEDURE — 90834 PSYTX W PT 45 MINUTES: CPT | Mod: S$GLB,,, | Performed by: SOCIAL WORKER

## 2025-04-23 PROCEDURE — 99999 PR PBB SHADOW E&M-EST. PATIENT-LVL V: CPT | Mod: PBBFAC,,, | Performed by: PHYSICIAN ASSISTANT

## 2025-04-23 RX ORDER — PRAZOSIN HYDROCHLORIDE 2 MG/1
2 CAPSULE ORAL NIGHTLY
Qty: 30 CAPSULE | Refills: 1 | Status: SHIPPED | OUTPATIENT
Start: 2025-04-23 | End: 2025-06-22

## 2025-04-23 RX ORDER — CARIPRAZINE 1.5 MG/1
1.5 CAPSULE, GELATIN COATED ORAL DAILY
Qty: 30 CAPSULE | Refills: 1 | Status: SHIPPED | OUTPATIENT
Start: 2025-04-23
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
## 2025-04-23 NOTE — PROGRESS NOTES
Individual Psychotherapy (PhD/LCSW)    4/23/2025    Site:  [facility]         Therapeutic Intervention: Met with patient.  Outpatient - Insight oriented psychotherapy 45 min - CPT code 33942, Outpatient - Behavior modifying psychotherapy 45 min - CPT code 47473, and Outpatient - Supportive psychotherapy 45 min - CPT Code 21514    Chief complaint/reason for encounter: Ct was referred to tx by Kiara LANDAVERDE to address depression, anxiety, stress. Client presents with complaints of unmanageable ADHD symptoms and difficulty in communication with her .     SUBJECTIVE  HPI:  Client arrived on time for the session but reported feeling extremely tired, requesting a shorter session duration. Despite this, she remained fully engaged throughout the encounter. Client reports that her ADHD symptoms have become unmanageable, specifically mentioning an inability to focus and difficulty maintaining linear conversations with her . Client denies any recent changes that might have contributed to increased stress levels or ADHD symptoms. She expresses frustration with her 's communication style, noting that he initiates deep conversations at inopportune times, often when they are in the middle of other activities. This unpredictability in timing of important discussions is a source of stress for the patient. Her  is adamant about being right during disagreements and is not receptive to feedback.    ASSESSMENT  IMPRESSION:  - Assessed reported increase in ADHD symptoms and difficulty managing them.  - Evaluated impact of relationship dynamics with  on stress levels and ADHD symptoms.  - Determined need for continued work on assertive communication skills to address interpersonal challenges.    PLAN  PROBLEMS IN RELATIONSHIP WITH SPOUSE OR PARTNER:  - Explained assertive communication techniques.  - Discussed strategies for managing conversations with spouse, particularly regarding timing and content.  - Client  to practice using provided phrases for assertive communication with spouse.  - Recommend asserting self regarding timing and content of conversations with .      Mental Status Exam:  General Appearance:  unremarkable, age appropriate   Speech: normal tone, normal rate, normal pitch, normal volume      Level of Cooperation: cooperative      Thought Processes: normal and logical   Mood: dysthymic      Thought Content: normal, no suicidality, no homicidality, delusions, or paranoia   Affect: congruent and appropriate   Orientation: Oriented x3   Memory: recent >  intact   Attention Span & Concentration: intact   Fund of General Knowledge: intact and appropriate to age and level of education   Abstract Reasoning: interpretation of similarities was abstract   Judgment & Insight: fair     Language intact     Verbal deficits: None    Patient's response to intervention:  The patient's response to intervention is accepting.    Progress toward goals and other mental status changes:  The patient's progress toward goals is fair .    Diagnosis:     ICD-10-CM ICD-9-CM   1. Obsessive-compulsive disorder, unspecified type  F42.9 300.3   2. Attention deficit hyperactivity disorder (ADHD), combined type  F90.2 314.01   3. Recurrent depression  F33.9 296.30     Portions of this note were generated by [employer].

## 2025-05-01 ENCOUNTER — PATIENT MESSAGE (OUTPATIENT)
Dept: PULMONOLOGY | Facility: CLINIC | Age: 45
End: 2025-05-01
Payer: COMMERCIAL

## 2025-05-04 ENCOUNTER — PATIENT MESSAGE (OUTPATIENT)
Dept: INTERNAL MEDICINE | Facility: CLINIC | Age: 45
End: 2025-05-04
Payer: COMMERCIAL

## 2025-05-06 ENCOUNTER — TELEPHONE (OUTPATIENT)
Dept: PHYSICAL MEDICINE AND REHAB | Facility: CLINIC | Age: 45
End: 2025-05-06
Payer: COMMERCIAL

## 2025-05-06 NOTE — TELEPHONE ENCOUNTER
Called pt to reschedule emg.  ----- Message from Annetta sent at 5/6/2025  1:09 PM CDT -----  Regarding: Appointment Reschedule Request  Contact: patient at [phone]  Type:  Appointment Reschedule RequestName of Caller:  patient at [phone]:  EMGAdditional Information:  patient would like to reschedule to 5/14 or 5/21. Please call and advise. Thank you

## 2025-05-19 ENCOUNTER — PATIENT MESSAGE (OUTPATIENT)
Dept: PULMONOLOGY | Facility: CLINIC | Age: 45
End: 2025-05-19
Payer: COMMERCIAL

## 2025-05-23 ENCOUNTER — OFFICE VISIT (OUTPATIENT)
Dept: PSYCHIATRY | Facility: CLINIC | Age: 45
End: 2025-05-23
Payer: COMMERCIAL

## 2025-05-23 VITALS
WEIGHT: 286.69 LBS | HEART RATE: 86 BPM | DIASTOLIC BLOOD PRESSURE: 83 MMHG | BODY MASS INDEX: 47.76 KG/M2 | HEIGHT: 65 IN | SYSTOLIC BLOOD PRESSURE: 132 MMHG

## 2025-05-23 DIAGNOSIS — F42.9 OBSESSIVE-COMPULSIVE DISORDER, UNSPECIFIED TYPE: ICD-10-CM

## 2025-05-23 DIAGNOSIS — G47.00 INSOMNIA, UNSPECIFIED TYPE: ICD-10-CM

## 2025-05-23 DIAGNOSIS — F41.1 GAD (GENERALIZED ANXIETY DISORDER): ICD-10-CM

## 2025-05-23 DIAGNOSIS — F90.2 ATTENTION DEFICIT HYPERACTIVITY DISORDER (ADHD), COMBINED TYPE: ICD-10-CM

## 2025-05-23 DIAGNOSIS — F41.1 GAD (GENERALIZED ANXIETY DISORDER): Primary | ICD-10-CM

## 2025-05-23 DIAGNOSIS — F33.41 RECURRENT MAJOR DEPRESSIVE DISORDER, IN PARTIAL REMISSION: Primary | ICD-10-CM

## 2025-05-23 PROCEDURE — 3044F HG A1C LEVEL LT 7.0%: CPT | Mod: CPTII,S$GLB,, | Performed by: PHYSICIAN ASSISTANT

## 2025-05-23 PROCEDURE — 1159F MED LIST DOCD IN RCRD: CPT | Mod: CPTII,S$GLB,, | Performed by: PHYSICIAN ASSISTANT

## 2025-05-23 PROCEDURE — 1160F RVW MEDS BY RX/DR IN RCRD: CPT | Mod: CPTII,S$GLB,, | Performed by: PHYSICIAN ASSISTANT

## 2025-05-23 PROCEDURE — 90834 PSYTX W PT 45 MINUTES: CPT | Mod: S$GLB,,, | Performed by: SOCIAL WORKER

## 2025-05-23 PROCEDURE — 99214 OFFICE O/P EST MOD 30 MIN: CPT | Mod: S$GLB,,, | Performed by: PHYSICIAN ASSISTANT

## 2025-05-23 PROCEDURE — 3044F HG A1C LEVEL LT 7.0%: CPT | Mod: CPTII,S$GLB,, | Performed by: SOCIAL WORKER

## 2025-05-23 PROCEDURE — 3008F BODY MASS INDEX DOCD: CPT | Mod: CPTII,S$GLB,, | Performed by: PHYSICIAN ASSISTANT

## 2025-05-23 PROCEDURE — 99999 PR PBB SHADOW E&M-EST. PATIENT-LVL V: CPT | Mod: PBBFAC,,, | Performed by: PHYSICIAN ASSISTANT

## 2025-05-23 PROCEDURE — G2211 COMPLEX E/M VISIT ADD ON: HCPCS | Mod: S$GLB,,, | Performed by: PHYSICIAN ASSISTANT

## 2025-05-23 PROCEDURE — 99999 PR PBB SHADOW E&M-EST. PATIENT-LVL II: CPT | Mod: PBBFAC,,, | Performed by: SOCIAL WORKER

## 2025-05-23 PROCEDURE — 3079F DIAST BP 80-89 MM HG: CPT | Mod: CPTII,S$GLB,, | Performed by: PHYSICIAN ASSISTANT

## 2025-05-23 PROCEDURE — 3075F SYST BP GE 130 - 139MM HG: CPT | Mod: CPTII,S$GLB,, | Performed by: PHYSICIAN ASSISTANT

## 2025-05-23 PROCEDURE — 1159F MED LIST DOCD IN RCRD: CPT | Mod: CPTII,S$GLB,, | Performed by: SOCIAL WORKER
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
## 2025-05-23 NOTE — PATIENT INSTRUCTIONS
Trial viloxazine (Qelbree) for attention/focus.    Please go to emergency department if feeling as though you are a harm to yourself or others or if you are in crisis. Please call the clinic to report any worsening of symptoms or problems associated with medication.

## 2025-05-27 ENCOUNTER — HOSPITAL ENCOUNTER (OUTPATIENT)
Dept: RADIOLOGY | Facility: HOSPITAL | Age: 45
Discharge: HOME | End: 2025-05-27
Payer: COMMERCIAL

## 2025-05-27 DIAGNOSIS — Z12.31 ENCOUNTER FOR SCREENING MAMMOGRAM FOR BREAST CANCER: ICD-10-CM

## 2025-05-27 DIAGNOSIS — Z91.89 INCREASED RISK OF BREAST CANCER: ICD-10-CM

## 2025-05-27 PROCEDURE — 77063 BREAST TOMOSYNTHESIS BI: CPT | Mod: 26,,, | Performed by: RADIOLOGY

## 2025-05-27 PROCEDURE — 77067 SCR MAMMO BI INCL CAD: CPT | Mod: TC

## 2025-05-27 PROCEDURE — 77067 SCR MAMMO BI INCL CAD: CPT | Mod: 26,,, | Performed by: RADIOLOGY

## 2025-05-28 DIAGNOSIS — F42.9 OBSESSIVE-COMPULSIVE DISORDER, UNSPECIFIED TYPE: ICD-10-CM

## 2025-05-28 RX ORDER — SERTRALINE HYDROCHLORIDE 100 MG/1
200 TABLET, FILM COATED ORAL DAILY
Qty: 60 TABLET | Refills: 1 | Status: SHIPPED | OUTPATIENT
Start: 2025-05-28

## 2025-05-28 NOTE — PROGRESS NOTES
Individual Psychotherapy (PhD/LCSW)    5/23/2025    Site:  [facility]         Therapeutic Intervention: Met with patient.  Outpatient - Insight oriented psychotherapy 45 min - CPT code 40651, Outpatient - Behavior modifying psychotherapy 45 min - CPT code 48614, and Outpatient - Supportive psychotherapy 45 min - CPT Code 04334    Chief complaint/reason for encounter:  Client was referred to tx by Kiara LANDAVERDE to address depression, anxiety, stress.  Client presents for ongoing management of ADHD symptoms and to discuss coping strategies.Client continues to report issues managing her ADHD symptoms. She engaged in a discussion with the  about techniques and tips to help her focus, stay on track, and complete tasks. Client was receptive to the feedback provided. Client and her  are anxiously awaiting to move into their new home. However, her parents are currently still residing in that home, which may be causing some stress or delay in the moving process. Client did not report any major issues with mood. She appears to be managing well overall, despite the ongoing ADHD symptoms and the pending housing transition. Client expressed a desire to continue with one-on-one sessions for managing her ADHD. She wishes to continue seeing Kiara Mariee for psychiatric medication management.       IMPRESSION:  - Assessed ongoing ADHD symptom management challenges.  - Evaluated mood and overall well-being.  - Determined continuation of one-on-one sessions beneficial.    PLAN:  - Discussed ADHD management techniques and strategies for improving focus and task completion.  - Client to follow up with Kiara LANDAVERDE for psych med mgt  - Client to follow up with 1:1 psychotherapy sessions.       Ohs Peq C-Ssrs Self-Report-Since Last Contact    Question 5/23/2025 10:43 AM CDT - Filed by Patient   Have you wished you were dead or wished you could go to sleep and not wake up? No   Have you actually had any thoughts of killing yourself?  No   Have you done anything, started to do anything, or prepared to do anything to end your life? No       Mental Status Exam:  General Appearance:  unremarkable, age appropriate   Speech: normal tone, normal rate, normal pitch, normal volume      Level of Cooperation: cooperative      Thought Processes: normal and logical   Mood: steady      Thought Content: normal, no suicidality, no homicidality, delusions, or paranoia   Affect: congruent and appropriate   Orientation: Oriented x3   Memory: recent >  intact   Attention Span & Concentration: intact   Fund of General Knowledge: intact and appropriate to age and level of education   Abstract Reasoning: interpretation of similarities was abstract   Judgment & Insight: fair, intact     Language intact     Verbal deficits: None    Patient's response to intervention:  The patient's response to intervention is accepting.    Progress toward goals and other mental status changes:  The patient's progress toward goals is fair .    Diagnosis:     ICD-10-CM ICD-9-CM   1. JOSE ANGEL (generalized anxiety disorder)  F41.1 300.02   2. Obsessive-compulsive disorder, unspecified type  F42.9 300.3   3. Attention deficit hyperactivity disorder (ADHD), combined type  F90.2 314.01           Portions of this note were generated by [employer] for dictation purposes.

## 2025-05-29 ENCOUNTER — PATIENT MESSAGE (OUTPATIENT)
Dept: PSYCHIATRY | Facility: CLINIC | Age: 45
End: 2025-05-29
Payer: COMMERCIAL

## 2025-06-04 ENCOUNTER — OFFICE VISIT (OUTPATIENT)
Dept: PHYSICAL MEDICINE AND REHAB | Facility: CLINIC | Age: 45
End: 2025-06-04
Payer: COMMERCIAL

## 2025-06-04 DIAGNOSIS — R20.2 PARESTHESIA: Primary | ICD-10-CM

## 2025-06-04 DIAGNOSIS — M79.604 RIGHT LEG PAIN: ICD-10-CM

## 2025-06-04 DIAGNOSIS — R29.898 RIGHT LEG WEAKNESS: ICD-10-CM

## 2025-06-04 PROCEDURE — 95886 MUSC TEST DONE W/N TEST COMP: CPT | Mod: S$GLB,,, | Performed by: STUDENT IN AN ORGANIZED HEALTH CARE EDUCATION/TRAINING PROGRAM

## 2025-06-04 PROCEDURE — 99499 UNLISTED E&M SERVICE: CPT | Mod: S$GLB,,, | Performed by: STUDENT IN AN ORGANIZED HEALTH CARE EDUCATION/TRAINING PROGRAM

## 2025-06-04 PROCEDURE — 95908 NRV CNDJ TST 3-4 STUDIES: CPT | Mod: S$GLB,,, | Performed by: STUDENT IN AN ORGANIZED HEALTH CARE EDUCATION/TRAINING PROGRAM

## 2025-06-05 ENCOUNTER — TELEPHONE (OUTPATIENT)
Dept: NEUROLOGY | Facility: CLINIC | Age: 45
End: 2025-06-05
Payer: COMMERCIAL

## 2025-06-06 ENCOUNTER — OFFICE VISIT (OUTPATIENT)
Dept: SPINE | Facility: CLINIC | Age: 45
End: 2025-06-06
Payer: COMMERCIAL

## 2025-06-06 VITALS — BODY MASS INDEX: 47.75 KG/M2 | WEIGHT: 286.63 LBS | HEIGHT: 65 IN

## 2025-06-06 DIAGNOSIS — R29.898 TRANSIENT RIGHT LEG WEAKNESS: Primary | ICD-10-CM

## 2025-06-06 DIAGNOSIS — M21.371 RIGHT FOOT DROP: ICD-10-CM

## 2025-06-06 PROCEDURE — 99999 PR PBB SHADOW E&M-EST. PATIENT-LVL IV: CPT | Mod: PBBFAC,,, | Performed by: PHYSICAL MEDICINE & REHABILITATION

## 2025-06-08 ENCOUNTER — PATIENT MESSAGE (OUTPATIENT)
Dept: NEUROLOGY | Facility: CLINIC | Age: 45
End: 2025-06-08
Payer: COMMERCIAL

## 2025-06-09 NOTE — TELEPHONE ENCOUNTER
Pt states she has right foot weakness and had EMG done. Dr. Toledo suggest she see neurology. Please contact the pt to scheduled.

## 2025-06-11 ENCOUNTER — OFFICE VISIT (OUTPATIENT)
Dept: PSYCHIATRY | Facility: CLINIC | Age: 45
End: 2025-06-11
Payer: COMMERCIAL

## 2025-06-11 DIAGNOSIS — F90.2 ATTENTION DEFICIT HYPERACTIVITY DISORDER (ADHD), COMBINED TYPE: ICD-10-CM

## 2025-06-11 DIAGNOSIS — F33.9 RECURRENT DEPRESSION: ICD-10-CM

## 2025-06-11 DIAGNOSIS — F41.1 GAD (GENERALIZED ANXIETY DISORDER): Primary | ICD-10-CM

## 2025-06-11 DIAGNOSIS — F43.9 TRAUMA AND STRESSOR-RELATED DISORDER: ICD-10-CM

## 2025-06-11 PROCEDURE — 1159F MED LIST DOCD IN RCRD: CPT | Mod: CPTII,S$GLB,, | Performed by: SOCIAL WORKER

## 2025-06-11 PROCEDURE — 90834 PSYTX W PT 45 MINUTES: CPT | Mod: S$GLB,,, | Performed by: SOCIAL WORKER

## 2025-06-11 PROCEDURE — 3044F HG A1C LEVEL LT 7.0%: CPT | Mod: CPTII,S$GLB,, | Performed by: SOCIAL WORKER

## 2025-06-11 PROCEDURE — 99999 PR PBB SHADOW E&M-EST. PATIENT-LVL II: CPT | Mod: PBBFAC,,, | Performed by: SOCIAL WORKER

## 2025-06-13 NOTE — PROGRESS NOTES
Individual Psychotherapy (PhD/LCSW)    6/11/2025    Site:  [facility]         Therapeutic Intervention: Met with patient.  Outpatient - Insight oriented psychotherapy 45 min - CPT code 84403, Outpatient - Behavior modifying psychotherapy 45 min - CPT code 74246, and Outpatient - Supportive psychotherapy 45 min - CPT Code 40086    Chief complaint/reason for encounter: Client was referred to tx by Kiara LANDAVERDE to address depression, anxiety, stress. Client presents to discuss the emotional stress due to recent test results indicating serious complications with her knees and feet, and to address concerns about possibly having multiple sclerosis (MS) due to experienced symptoms.Client received test results indicating serious complications with her knees and feet, leading to a referral to neurology for further evaluation. She expressed concerns about possibly having multiple sclerosis (MS) due to her experienced symptoms. Client used ChatGPT to develop a list of concerns and questions to prepare for her upcoming neurological appointment, which she shared with the  during the session. The  and patient discussed other factors that could be contributing to her somatic symptoms, validating that she is experiencing actual physical pain. Client will continue with one-on-one sessions and see Kiara Mariee for psychiatric medication management.    IMPRESSION:  - Assessed concerns about potential MS diagnosis and recent test results indicating complications with knees and feet.  - Considered impact of anxiety on somatic symptoms while acknowledging physical pain.  - Recommend focus on controllable factors and coping strategies while awaiting neurology evaluation.  - Explained potential factors contributing to somatic symptoms beyond neurological causes.    PLAN:  - Client to create a gratitude list and practice balancing honest acknowledgment of health challenges with focus on positive aspects of life.   -  Discussed importance of avoiding self-diagnosis through internet searches.   - Client to follow up with Kiara LANDAVERDE for psych med mgt  - Client to continue in 1:1 psychotherapy sessions    Ohs Peq C-Ssrs Self-Report-Since Last Contact    Question 6/11/2025  1:08 PM CDT - Filed by Patient   Have you wished you were dead or wished you could go to sleep and not wake up? No   Have you actually had any thoughts of killing yourself? No   Have you done anything, started to do anything, or prepared to do anything to end your life? No                Mental Status Exam:  General Appearance:  unremarkable, age appropriate   Speech: normal tone, normal rate, normal pitch, normal volume      Level of Cooperation: cooperative      Thought Processes: normal and logical   Mood: dysthymic      Thought Content: normal, no suicidality, no homicidality, delusions, or paranoia   Affect: congruent and appropriate   Orientation: Oriented x3   Memory: recent >  intact   Attention Span & Concentration: intact   Fund of General Knowledge: intact and appropriate to age and level of education   Abstract Reasoning: interpretation of similarities was abstract   Judgment & Insight: fair, intact     Language intact     Verbal deficits: None    Patient's response to intervention:  The patient's response to intervention is accepting.    Progress toward goals and other mental status changes:  The patient's progress toward goals is fair .    Diagnosis:     ICD-10-CM ICD-9-CM   1. JOSE ANGEL (generalized anxiety disorder)  F41.1 300.02   2. Recurrent depression  F33.9 296.30   3. Attention deficit hyperactivity disorder (ADHD), combined type  F90.2 314.01   4. Trauma and stressor-related disorder  F43.9 309.81     308.9     Portions of this note were generated by [email or web address] for dictation only.

## 2025-06-17 DIAGNOSIS — F41.1 GAD (GENERALIZED ANXIETY DISORDER): ICD-10-CM

## 2025-06-17 RX ORDER — BUSPIRONE HYDROCHLORIDE 10 MG/1
10 TABLET ORAL 2 TIMES DAILY
Qty: 60 TABLET | Refills: 1 | Status: SHIPPED | OUTPATIENT
Start: 2025-06-17

## 2025-06-18 ENCOUNTER — OFFICE VISIT (OUTPATIENT)
Dept: FAMILY MEDICINE | Facility: CLINIC | Age: 45
End: 2025-06-18
Payer: COMMERCIAL

## 2025-06-18 ENCOUNTER — LAB VISIT (OUTPATIENT)
Dept: LAB | Facility: HOSPITAL | Age: 45
End: 2025-06-18
Attending: FAMILY MEDICINE
Payer: COMMERCIAL

## 2025-06-18 VITALS
DIASTOLIC BLOOD PRESSURE: 80 MMHG | HEIGHT: 65 IN | OXYGEN SATURATION: 95 % | RESPIRATION RATE: 18 BRPM | SYSTOLIC BLOOD PRESSURE: 114 MMHG | WEIGHT: 280 LBS | BODY MASS INDEX: 46.65 KG/M2 | HEART RATE: 97 BPM

## 2025-06-18 DIAGNOSIS — E55.9 VITAMIN D DEFICIENCY: ICD-10-CM

## 2025-06-18 DIAGNOSIS — G43.119 INTRACTABLE MIGRAINE WITH AURA WITHOUT STATUS MIGRAINOSUS: ICD-10-CM

## 2025-06-18 DIAGNOSIS — Z74.09 IMPAIRED MOBILITY AND ACTIVITIES OF DAILY LIVING: ICD-10-CM

## 2025-06-18 DIAGNOSIS — R41.0 INTERMITTENT CONFUSION: ICD-10-CM

## 2025-06-18 DIAGNOSIS — Z78.9 IMPAIRED MOBILITY AND ACTIVITIES OF DAILY LIVING: ICD-10-CM

## 2025-06-18 DIAGNOSIS — F51.01 PRIMARY INSOMNIA: Primary | ICD-10-CM

## 2025-06-18 PROCEDURE — 82306 VITAMIN D 25 HYDROXY: CPT

## 2025-06-18 PROCEDURE — 3044F HG A1C LEVEL LT 7.0%: CPT | Mod: S$GLB,,, | Performed by: FAMILY MEDICINE

## 2025-06-18 PROCEDURE — 36415 COLL VENOUS BLD VENIPUNCTURE: CPT

## 2025-06-18 PROCEDURE — 99999 PR PBB SHADOW E&M-EST. PATIENT-LVL V: CPT | Mod: PBBFAC,,, | Performed by: FAMILY MEDICINE

## 2025-06-18 PROCEDURE — 1160F RVW MEDS BY RX/DR IN RCRD: CPT | Mod: S$GLB,,, | Performed by: FAMILY MEDICINE

## 2025-06-18 PROCEDURE — 3074F SYST BP LT 130 MM HG: CPT | Mod: S$GLB,,, | Performed by: FAMILY MEDICINE

## 2025-06-18 PROCEDURE — 3079F DIAST BP 80-89 MM HG: CPT | Mod: S$GLB,,, | Performed by: FAMILY MEDICINE

## 2025-06-18 PROCEDURE — 3008F BODY MASS INDEX DOCD: CPT | Mod: S$GLB,,, | Performed by: FAMILY MEDICINE

## 2025-06-18 PROCEDURE — 99214 OFFICE O/P EST MOD 30 MIN: CPT | Mod: S$GLB,,, | Performed by: FAMILY MEDICINE

## 2025-06-18 PROCEDURE — 1159F MED LIST DOCD IN RCRD: CPT | Mod: S$GLB,,, | Performed by: FAMILY MEDICINE

## 2025-06-18 RX ORDER — MONTELUKAST SODIUM 10 MG/1
10 TABLET ORAL DAILY
COMMUNITY

## 2025-06-18 NOTE — PROGRESS NOTES
"  Subjective:       Patient ID: Mariana Mora is a 44 y.o. female.    Chief Complaint: Fatigue    History of Present Illness    CHIEF COMPLAINT:  Ms. Mora presents today for follow up of multiple symptoms including insomnia, memory issues, and neurological concerns.    SLEEP:  She reports severe, persistent insomnia with complete sleeplessness some nights, including recently on Sunday. When sleep occurs, it's typically around 6-7 AM with awakening around 10 AM. Sleep hygiene interventions have not provided improvement. She experiences headaches following sleepless nights. No previously successful interventions for managing insomnia have been identified. Her disrupted sleep-wake cycle impacts her ability to maintain normal daily activities.    NEUROLOGICAL SYMPTOMS:  She reports significant memory issues characterized by immediate task forgetfulness, such as forgetting intended tasks moments after getting up to perform them. She has difficulty recalling recent medical imaging, including X-rays of her neck and an MRI of her back from less than two months ago. She experiences intermittent difficulty walking with variable severity. Walking is described as feeling like "lifting her foot" when symptoms are pronounced. She reports multiple near-falls but no actual falls to date. Her mobility limitations are unpredictable.    VISION:  She reports intermittent blurred vision occurring for years, with increased frequency and severity in the past few months. The blurriness occurs suddenly and resolves spontaneously, not consistently associated with fatigue. She has a history of cotton wool spot previously evaluated by a retina specialist. She denies current eye pain or complete vision loss.    AUTONOMIC SYMPTOMS:  She experiences daily flushing and overheating symptoms, which are managed with Xolair but worsen as the next injection approaches. The flushing is described as intense with a burning sensation, " "accompanied by lesion development on her cheek. She also reports chest tightness 3-4 times weekly lasting a few minutes, affecting the entire chest wall and extending underneath the arms. She denies associated heart racing or sudden onset with these symptoms.    PAST MEDICAL HISTORY:  She has a history of autoimmune conditions including hives and psoriasis. She also has chronic consistently low vitamin D levels.      ROS:  General: +excessive sweating  Eyes: +blurry vision  Cardiovascular: +chest tightness, +chest pressure  Skin: +lesion, +flushing, +urticaria/ hives, +hives  Neurological: +headache, +tingling, +difficulty staying asleep, +sleep disturbances, +difficulty falling asleep, +memory loss, +memory problems, +difficulty walking, +tremors, +weakness  Psychiatric: +sleep difficulty             Problem List[1]  Mariana has a current medication list which includes the following prescription(s): atorvastatin, buspirone, vraylar, cetirizine, slynd, ergocalciferol, hydrochlorothiazide, ixekizumab, ketoconazole, leflunomide, methocarbamol, montelukast, nystatin, nystop, xolair, pantoprazole, prazosin, prazosin, prochlorperazine, nurtec, sertraline, synthroid, zepbound, viloxazine, rizatriptan, and [DISCONTINUED] famotidine, and the following Facility-Administered Medications: diphenhydramine, lactated ringers, lactated ringers, lidocaine (pf) 10 mg/ml (1%), morphine, onabotulinumtoxina, onabotulinumtoxina, onabotulinumtoxina, and onabotulinumtoxina.        There are no preventive care reminders to display for this patient.   Health Maintenance reviewed and discussed.   Objective:      Vitals:    06/18/25 1436   BP: 114/80   Pulse: 97   Resp: 18   SpO2: 95%   Weight: 127 kg (280 lb)   Height: 5' 5" (1.651 m)   PainSc:   4   PainLoc: Back     Body mass index is 46.59 kg/m².  Physical Exam  Vitals and nursing note reviewed.   Constitutional:       General: She is not in acute distress.     Appearance: She is not " ill-appearing.   Cardiovascular:      Rate and Rhythm: Normal rate and regular rhythm.      Heart sounds: No murmur heard.  Pulmonary:      Effort: Pulmonary effort is normal.      Breath sounds: Normal breath sounds. No wheezing.   Skin:     General: Skin is warm and dry.      Findings: No rash.   Neurological:      Mental Status: She is alert.   Psychiatric:         Mood and Affect: Mood normal.         Behavior: Behavior normal.         Assessment:       Assessment & Plan    1. Considered neurology evaluation for complex symptoms including insomnia, memory issues, and difficulty walking.  2. Acknowledged improvement in inflammatory markers since starting medication.  3. Evaluated chest tightness occurring 3-4 times weekly, lasting a few minutes.  4. Considered genetic testing for Moody-Danlos and previously suspected Marfan Syndrome.  5. Recognized need for comprehensive evaluation to rule out other conditions before attributing symptoms to weight or anxiety.           Plan:       1. Primary insomnia    2. Intermittent confusion  -     Ambulatory referral/consult to Neurology; Future; Expected date: 06/25/2025    3. Intractable migraine with aura without status migrainosus  Overview:  Dx updated per 2019 IMO Load    Orders:  -     Ambulatory referral/consult to Neurology; Future; Expected date: 06/25/2025    4. Impaired mobility and activities of daily living  -     Ambulatory referral/consult to Neurology; Future; Expected date: 06/25/2025    5. Vitamin D deficiency  -     Vitamin D; Future; Expected date: 06/18/2025         This note was generated with the assistance of ambient listening technology. Verbal consent was obtained by the patient and accompanying visitor(s) for the recording of patient appointment to facilitate this note. I attest to having reviewed and edited the generated note for accuracy, though some syntax or spelling errors may persist. Please contact the author of this note for any  clarification.         [1]   Patient Active Problem List  Diagnosis    Migraine with aura, with intractable migraine, so stated, without mention of status migrainosus    Intermittent confusion    Vitamin B12 deficiency    EMU    Vitamin B2 deficiency    Vitamin B6 deficiency    Anxiety    Gastroesophageal reflux disease    Psoriatic arthritis    Dysphagia    Gastroesophageal reflux disease with esophagitis    Abdominal pain    Preop examination    Hiatal hernia    Obesity, Class III, BMI 40-49.9 (morbid obesity)    GERD (gastroesophageal reflux disease)    Diarrhea    History of esophageal stricture    Nausea    Vitamin D deficiency    Primary insomnia    Functional diarrhea    Symptomatic cholelithiasis    Facial flushing    Dyslipidemia (high LDL; low HDL), baseline LDL-C 181    Cotton wool spots, OS    NAFLD (nonalcoholic fatty liver disease)    Essential thrombocytosis    Cardiovascular event risk, ASCVD 10-years risk 2.4%, 2021    RASHID (dyspnea on exertion), onset 2016    SUNIL on CPAP, 100% use    Tachycardia, with standing    Kyphosis (acquired) (postural)    Impaired mobility and activities of daily living    Acute cystitis with hematuria    Hematuria    Neck pain

## 2025-06-19 LAB — 25(OH)D3+25(OH)D2 SERPL-MCNC: 19 NG/ML (ref 30–96)

## 2025-06-20 ENCOUNTER — RESULTS FOLLOW-UP (OUTPATIENT)
Dept: FAMILY MEDICINE | Facility: CLINIC | Age: 45
End: 2025-06-20
Payer: COMMERCIAL

## 2025-06-20 DIAGNOSIS — E55.9 VITAMIN D DEFICIENCY: ICD-10-CM

## 2025-06-24 DIAGNOSIS — M54.2 NECK PAIN: ICD-10-CM

## 2025-06-24 DIAGNOSIS — G43.019 INTRACTABLE MIGRAINE WITHOUT AURA AND WITHOUT STATUS MIGRAINOSUS: Primary | ICD-10-CM

## 2025-06-24 RX ORDER — RIMEGEPANT SULFATE 75 MG/75MG
TABLET, ORALLY DISINTEGRATING ORAL
Qty: 8 TABLET | Refills: 11 | Status: SHIPPED | OUTPATIENT
Start: 2025-06-24

## 2025-06-24 RX ORDER — ERGOCALCIFEROL 1.25 MG/1
50000 CAPSULE ORAL
Qty: 12 CAPSULE | Refills: 0 | Status: SHIPPED | OUTPATIENT
Start: 2025-06-24

## 2025-06-24 NOTE — TELEPHONE ENCOUNTER
No care due was identified.  [facility] Embedded Care Due Messages. Reference number: [id number].   6/24/2025 2:52:40 AM CDT

## 2025-06-24 NOTE — TELEPHONE ENCOUNTER
Refill Routing Note   Medication(s) are not appropriate for processing by [facility] for the following reason(s):        Outside of protocol    [facility] action(s):  Route               Appointments  past 12m or future 3m with PCP    Date Provider   Last Visit   6/18/2025 Emeli Morgan MD   Next Visit   7/31/2025 Emeli Morgan MD   ED visits in past 90 days: 0        Note composed:8:54 AM 06/24/2025

## 2025-06-25 RX ORDER — METHOCARBAMOL 500 MG/1
500 TABLET, FILM COATED ORAL 3 TIMES DAILY PRN
Qty: 40 TABLET | Refills: 0 | Status: SHIPPED | OUTPATIENT
Start: 2025-06-25

## 2025-06-26 ENCOUNTER — PROCEDURE VISIT (OUTPATIENT)
Dept: NEUROLOGY | Facility: CLINIC | Age: 45
End: 2025-06-26
Payer: COMMERCIAL

## 2025-06-26 VITALS
HEART RATE: 85 BPM | SYSTOLIC BLOOD PRESSURE: 131 MMHG | BODY MASS INDEX: 46.06 KG/M2 | WEIGHT: 276.44 LBS | RESPIRATION RATE: 17 BRPM | HEIGHT: 65 IN | TEMPERATURE: 98 F | DIASTOLIC BLOOD PRESSURE: 86 MMHG

## 2025-06-26 DIAGNOSIS — G43.019 INTRACTABLE MIGRAINE WITHOUT AURA AND WITHOUT STATUS MIGRAINOSUS: Primary | ICD-10-CM

## 2025-06-26 NOTE — PROCEDURES
Procedures  BOTOX  The patient has chronic migraines ( G43.719) and suffers from headaches more than 3 months, more than 15 days of headache days per month lasting more than 4 hours with at least 8 attacks that meet criteria for migraine.    Botulinum Toxin Injection Procedure   Pre-operative diagnosis: Chronic migraine   Post-operative diagnosis: Same   Procedure: Chemical neurolysis     The Botox injections have achieved well over 50%  improvement in the patient's symptoms. Migraines have been reduced at least 7 days per month and the number of cumulative hours suffering with headaches has been reduced at least 100 total hours per month. Today she does have a headache indicating that the Botox has worn off. Frequency of treatment is every 3 months unless no response to the treatments, at which time we will discontinue the injections.    After risks and benefits were explained, a signed consent was obtained.   The patient was placed in a comfortable area and the sites to be treated were identified.The area to be treated was prepped three times with alcohol and the alcohol allowed to dry. Next, a 30 gauge needle was used to inject the medication in the area to be treated.   Area(s) injected:   Total Botox used: 175 Units   Botox wastage: 25 Units   Injection sites:    muscle bilaterally ( a total of 10 units divided into 2 sites)   Procerus muscle (5 units)   Frontalis muscle bilaterally (a total of 20 units divided into 4 sites)   Temporalis muscle bilaterally (a total of 40 units divided into 8 sites)   Masseters muscles bilaterally (a total of 20 units at 4 sites)  Occipitalis muscle bilaterally (a total of 30 units divided into 6 sites)   Cervical paraspinal muscles (a total of 20 units divided into 4 sites)   Trapezius muscle bilaterally (a total of 30 units divided into 6 sites)   Complications: none   RTC for the next Botox injection: 3 months     Alejandro Cárdenas M.D   of  Neurology  ACGME Board Certified, Neurology  [facility], Board certified, headache Medicine  Medical Director, Headache and Facial Pain  [facility]

## 2025-06-27 ENCOUNTER — PATIENT OUTREACH (OUTPATIENT)
Dept: PSYCHIATRY | Facility: CLINIC | Age: 45
End: 2025-06-27
Payer: COMMERCIAL

## 2025-06-27 ENCOUNTER — TELEPHONE (OUTPATIENT)
Dept: PSYCHIATRY | Facility: CLINIC | Age: 45
End: 2025-06-27
Payer: COMMERCIAL

## 2025-06-27 DIAGNOSIS — G47.00 INSOMNIA, UNSPECIFIED TYPE: Primary | ICD-10-CM

## 2025-06-27 NOTE — TELEPHONE ENCOUNTER
Called pt as she did not come to 11am visit. She states she overslept. She agreed to rescheduling on 7/7 at 1:00pm. Also discussed referral for CBT-insomnia which she accepted.

## 2025-06-30 ENCOUNTER — PATIENT MESSAGE (OUTPATIENT)
Dept: PSYCHIATRY | Facility: CLINIC | Age: 45
End: 2025-06-30
Payer: COMMERCIAL

## 2025-07-01 ENCOUNTER — OFFICE VISIT (OUTPATIENT)
Dept: PSYCHIATRY | Facility: CLINIC | Age: 45
End: 2025-07-01
Payer: COMMERCIAL

## 2025-07-01 DIAGNOSIS — F90.2 ATTENTION DEFICIT HYPERACTIVITY DISORDER (ADHD), COMBINED TYPE: ICD-10-CM

## 2025-07-01 DIAGNOSIS — F41.1 GAD (GENERALIZED ANXIETY DISORDER): Primary | ICD-10-CM

## 2025-07-01 DIAGNOSIS — F33.9 RECURRENT DEPRESSION: ICD-10-CM

## 2025-07-01 PROCEDURE — 90837 PSYTX W PT 60 MINUTES: CPT | Mod: S$GLB,,, | Performed by: SOCIAL WORKER

## 2025-07-01 PROCEDURE — 1159F MED LIST DOCD IN RCRD: CPT | Mod: CPTII,S$GLB,, | Performed by: SOCIAL WORKER

## 2025-07-01 PROCEDURE — 99999 PR PBB SHADOW E&M-EST. PATIENT-LVL II: CPT | Mod: PBBFAC,,, | Performed by: SOCIAL WORKER

## 2025-07-01 PROCEDURE — 3044F HG A1C LEVEL LT 7.0%: CPT | Mod: CPTII,S$GLB,, | Performed by: SOCIAL WORKER

## 2025-07-01 NOTE — PROGRESS NOTES
"Individual Psychotherapy (PhD/LCSW)    7/1/2025    Site:  [facility]         Therapeutic Intervention: Met with patient.  Outpatient - Insight oriented psychotherapy 60 min - CPT code 48930, Outpatient - Behavior modifying psychotherapy 60 min - CPT code 11344, and Outpatient - Supportive psychotherapy 60 min - CPT Code 41137    Chief complaint/reason for encounter: Client was referred to tx by Kiara LANDAVERDE to address depression, anxiety, stress. Client presents for a follow-up session to discuss ongoing insomnia and recent efforts to improve her mental health and daily structure. Client continues to report persistent insomnia, stating that "nothing is working" to alleviate her sleep issues. She is scheduled to start Cognitive Behavioral Therapy for Insomnia (CBT-I) with Dr. Thakkar next week, although she previously completed CBT-I during the COVID pandemic without finding it beneficial. Client reports having a neurology appointment in the near future, expressing hope to get some answers. Client has shown progress in managing her mental health since the COVID pandemic, having done considerable work on herself. This progress may position her to be more receptive to therapeutic interventions like CBT-I. Client is working on engaging in activities of interest and leaving the house more frequently to add structure to her days. She reports taking care of her pets, developing a new interest in puppets with aspirations to potentially read books to children at the public library, and occasionally helping a friend who owns an art studio. Client's efforts to engage in activities outside the home and her interest in potentially reading to children at the library indicate attempts to increase social interaction and community involvement.         IMPRESSION:  - Acknowledged upcoming neurology appointment and ongoing insomnia issues.  - Recognized previous experience with CBT-I during COVID, but encouraged reconsideration given improved " mental health status since then.  - Emphasized importance of engaging in activities of interest to improve structure and reduce isolation.      PLAN:  - Client to continue in 1:1 psychotherapy sessions  - Client to follow up with Kiara LANDAVERDE for psych med mgt  - Client to continue finding activities to stay busy and add structure to day, including pursuing interest in puppets (potentially reading books to children at [employer]) and helping friend at art studio when possible.         [facility] C-Ssrs Self-Report-Since Last Contact    Question 7/1/2025 12:55 PM CDT - Filed by Patient   Have you wished you were dead or wished you could go to sleep and not wake up? No   Have you actually had any thoughts of killing yourself? No   Have you done anything, started to do anything, or prepared to do anything to end your life? No       Mental Status Exam:  General Appearance:  unremarkable, age appropriate   Speech: normal tone, normal rate, normal pitch, normal volume      Level of Cooperation: cooperative, slightly reluctant      Thought Processes: normal and logical   Mood: steady      Thought Content: normal, no suicidality, no homicidality, delusions, or paranoia   Affect: congruent and appropriate   Orientation: Oriented x3   Memory: recent >  intact   Attention Span & Concentration: intact   Fund of General Knowledge: intact and appropriate to age and level of education   Abstract Reasoning: interpretation of similarities was abstract   Judgment & Insight: fair, intact     Language intact     Verbal deficits: None    Patient's response to intervention:  The patient's response to intervention is accepting, reluctant.    Progress toward goals and other mental status changes:  The patient's progress toward goals is fair , some progress.    Diagnosis:     ICD-10-CM ICD-9-CM   1. JOSE ANGEL (generalized anxiety disorder)  F41.1 300.02   2. Recurrent depression  F33.9 296.30   3. Attention deficit hyperactivity disorder (ADHD),  combined type  F90.2 314.01           Portions of this note were generated by [employer] for dictation purposes only.

## 2025-07-07 ENCOUNTER — OFFICE VISIT (OUTPATIENT)
Dept: PSYCHIATRY | Facility: CLINIC | Age: 45
End: 2025-07-07
Payer: COMMERCIAL

## 2025-07-07 ENCOUNTER — OFFICE VISIT (OUTPATIENT)
Dept: PULMONOLOGY | Facility: CLINIC | Age: 45
End: 2025-07-07
Payer: COMMERCIAL

## 2025-07-07 VITALS
HEART RATE: 89 BPM | BODY MASS INDEX: 46.41 KG/M2 | WEIGHT: 278.56 LBS | HEIGHT: 65 IN | DIASTOLIC BLOOD PRESSURE: 82 MMHG | SYSTOLIC BLOOD PRESSURE: 129 MMHG

## 2025-07-07 VITALS
DIASTOLIC BLOOD PRESSURE: 82 MMHG | HEART RATE: 94 BPM | SYSTOLIC BLOOD PRESSURE: 129 MMHG | OXYGEN SATURATION: 95 % | HEIGHT: 65 IN | WEIGHT: 280 LBS | BODY MASS INDEX: 46.65 KG/M2

## 2025-07-07 DIAGNOSIS — F51.01 PRIMARY INSOMNIA: ICD-10-CM

## 2025-07-07 DIAGNOSIS — G47.33 OSA (OBSTRUCTIVE SLEEP APNEA): Primary | ICD-10-CM

## 2025-07-07 DIAGNOSIS — F41.1 GAD (GENERALIZED ANXIETY DISORDER): ICD-10-CM

## 2025-07-07 DIAGNOSIS — F33.41 RECURRENT MAJOR DEPRESSIVE DISORDER, IN PARTIAL REMISSION: Primary | ICD-10-CM

## 2025-07-07 DIAGNOSIS — F90.2 ATTENTION DEFICIT HYPERACTIVITY DISORDER (ADHD), COMBINED TYPE: ICD-10-CM

## 2025-07-07 DIAGNOSIS — G47.00 INSOMNIA, UNSPECIFIED TYPE: ICD-10-CM

## 2025-07-07 DIAGNOSIS — F42.9 OBSESSIVE-COMPULSIVE DISORDER, UNSPECIFIED TYPE: ICD-10-CM

## 2025-07-07 DIAGNOSIS — E66.01 OBESITY, CLASS III, BMI 40-49.9 (MORBID OBESITY): ICD-10-CM

## 2025-07-07 PROCEDURE — 3044F HG A1C LEVEL LT 7.0%: CPT | Mod: CPTII,S$GLB,, | Performed by: PHYSICIAN ASSISTANT

## 2025-07-07 PROCEDURE — 1160F RVW MEDS BY RX/DR IN RCRD: CPT | Mod: CPTII,S$GLB,, | Performed by: PHYSICIAN ASSISTANT

## 2025-07-07 PROCEDURE — 99214 OFFICE O/P EST MOD 30 MIN: CPT | Mod: S$GLB,,, | Performed by: PHYSICIAN ASSISTANT

## 2025-07-07 PROCEDURE — G2211 COMPLEX E/M VISIT ADD ON: HCPCS | Mod: S$GLB,,, | Performed by: PHYSICIAN ASSISTANT

## 2025-07-07 PROCEDURE — 1159F MED LIST DOCD IN RCRD: CPT | Mod: CPTII,S$GLB,, | Performed by: NURSE PRACTITIONER

## 2025-07-07 PROCEDURE — 3008F BODY MASS INDEX DOCD: CPT | Mod: CPTII,S$GLB,, | Performed by: NURSE PRACTITIONER

## 2025-07-07 PROCEDURE — 3008F BODY MASS INDEX DOCD: CPT | Mod: CPTII,S$GLB,, | Performed by: PHYSICIAN ASSISTANT

## 2025-07-07 PROCEDURE — 3079F DIAST BP 80-89 MM HG: CPT | Mod: CPTII,S$GLB,, | Performed by: NURSE PRACTITIONER

## 2025-07-07 PROCEDURE — 99213 OFFICE O/P EST LOW 20 MIN: CPT | Mod: S$GLB,,, | Performed by: NURSE PRACTITIONER

## 2025-07-07 PROCEDURE — 1159F MED LIST DOCD IN RCRD: CPT | Mod: CPTII,S$GLB,, | Performed by: PHYSICIAN ASSISTANT

## 2025-07-07 PROCEDURE — 3079F DIAST BP 80-89 MM HG: CPT | Mod: CPTII,S$GLB,, | Performed by: PHYSICIAN ASSISTANT

## 2025-07-07 PROCEDURE — 3044F HG A1C LEVEL LT 7.0%: CPT | Mod: CPTII,S$GLB,, | Performed by: NURSE PRACTITIONER

## 2025-07-07 PROCEDURE — 3074F SYST BP LT 130 MM HG: CPT | Mod: CPTII,S$GLB,, | Performed by: PHYSICIAN ASSISTANT

## 2025-07-07 PROCEDURE — 99999 PR PBB SHADOW E&M-EST. PATIENT-LVL V: CPT | Mod: PBBFAC,,, | Performed by: NURSE PRACTITIONER

## 2025-07-07 PROCEDURE — 3074F SYST BP LT 130 MM HG: CPT | Mod: CPTII,S$GLB,, | Performed by: NURSE PRACTITIONER

## 2025-07-07 PROCEDURE — 99999 PR PBB SHADOW E&M-EST. PATIENT-LVL V: CPT | Mod: PBBFAC,,, | Performed by: PHYSICIAN ASSISTANT

## 2025-07-07 RX ORDER — QUETIAPINE FUMARATE 50 MG/1
50 TABLET, FILM COATED ORAL NIGHTLY
Qty: 30 TABLET | Refills: 1 | Status: SHIPPED | OUTPATIENT
Start: 2025-07-07 | End: 2025-09-05

## 2025-07-07 RX ORDER — AZITHROMYCIN 250 MG/1
TABLET, FILM COATED ORAL
COMMUNITY
Start: 2025-05-12

## 2025-07-07 NOTE — PATIENT INSTRUCTIONS
BIPAP compliance report reviewed - shows great compliance!    Continue BIPAP nightly.    Take Seroquel as prescribed.    Continue current prescription medication regiment. Keep follow up appointment as scheduled. Please call the office if you have any questions or concerns.

## 2025-07-07 NOTE — PROGRESS NOTES
7/7/2025    Mariana Mora  In office visit     Chief Complaint   Patient presents with    Sleep Apnea       HPI:  07/07/2025:   Patient underwent titration study in April 2025 revealing the need for BiPAP therapy.  Patient has since received new BiPAP machine in which she is currently using nightly.  Patient is still suffering with insomnia so BiPAP compliance may be affected.  Patient was seen per psychiatrist today prior to this appointment in which Seroquel was ordered.  Patient states benefit from BiPAP machine is not yet noticed.  Currently using a fullface mask.  States secondary to her severe insomnia she is experiencing significant daytime fatigue.  BIPAP compliance report reviewed from dates 06/08/2025 - 07/07/2025  Usage > = 4 hours 93%  BIPAP 16/6  PS 4  AHI 0.8          04/04/2025:  Patient presents to the clinic today with reports of significant daytime fatigue, lack of energy despite the use of CPAP nightly.  Patient also reports frequent nocturnal arousals each night in which she is unsure if this is secondary to sleep apnea versus chronic neck pain.  Patient did undergo titration study in 2019 revealing the need for CPAP therapy.  Patient currently using a full face mask with no reported issue.  Patient does have a diagnosis of insomnia in the past.  Patient currently taking prazosin nightly.  Patient also states that current CPAP machine is making a loud noise and putting out what seems to her as less pressure as before.  CPAP compliance report reviewed from dates 03/05/2025 - 04/03/2025  Usage > = 4 hours 96.7%  APAP 14-20  90% CPAP 15.7  AHI 0.6  Patient Instructions   We will attempt to pull compliance report.  Recommend CPAP titration study at this time to ensure there is no need for BiPAP machine.  If titration study reveals that you are on the correct machine and correct pressures, will place referral for sleep medicine versus returned to your PCP for further evaluation of  persistent fatigue.  Continue current prescription medication regiment. Keep follow up appointment as scheduled. Please call the office if you have any questions or concerns.       09/08/2023:  Using CPAP nightly - states never did get machine registered for recall, is using nightly with filter. No issues reported with CPAP use. Using Full face mask with benefit.    has had a bad year so far - death of uncle.  Cough has overall subsided however every few days will cough up a small amount of mucous - light green in color. States will notice it happens more often if allergies are flaring.  Still on Xolair per Dr. Dai.  Denies frequent steroid or abx use.   Patient Instructions   Continue CPAP nightly  Will ask [facility] when you are eligible for a new CPAP machine.  CPAP supply order sent.  Chest Xray ordered to be taken as needed   Continue current prescription medication regiment. Keep follow up appointment as scheduled. Please call the office if you have any questions or concerns.       9/20/2022: Hx: SUNIL, asthma a child  Diagnosed with SUNIL years ago - was following per Dr. Borges. Underwent PSG in 2019 revealing AHI of 21.6 followed by titration study November 18, 2019 revealing need or CPAP with pressure set at 12-20 cm h20. Patient has CPAP machine, however it is under recall. Hasn't yet registered for recall but plans to.   Currently using CPAP nightly with in line filter - no issues reported. Using Full Face mask without issue.  Endorses daytime fatigue which could be secondary to medication. Takes medication for insomnia, reports nocturnal arousals. Denies morning headaches. Depression is treated.   On Xolair per Dr. Dai, Allergist - for chronic hives.  Cough: Onset two months ago, persistent - worse in the morning, productive with green, flat, hard mucous. Mucous is dime sized.  Hx septum repair - does endorse chronic allergies, PND. On Zyrtec daily.   Follows with Dr. Lockwood ([facility]) for psoriatic arthritis - currently on Tremfya with great reported benefit. Was on MTX in the past.   Denies frequent steroid or abx use.   Patient Instructions   [facility] for CPAP recall. Call [phone]  Compliance report reviewed from 8/21/22-9/19/22  Average AHI 0.5  Average 90% CPAP - 15.1 cm H20  Average usage 8 h 23 minutes  Until then, can continue CPAP Nightly with use of filter.  Chest xray now.  If cough persists for longer than six months, can consider CT Chest.  Continue current medication regiment. Keep follow up appointment as scheduled. Please call the office if you have any questions or concerns.         Social Hx: Lives with  - four dogs in the home. Disabled, former middle school . No Asbestosis exposure, Smoking Hx: Never smoker  Family Hx: No Lung Cancer, No COPD, Father with Asthma  Medical Hx: No previous pneumonia ; No previous shoulder/chest surgery      The chief compliant problem varies with instability at times.  PFSH:  Past Medical History:   Diagnosis Date    ADHD, predominantly inattentive type     Anxiety     Arthritis     Autism     Autism spectrum disorder     Depression     Hypertension     Hypothyroidism     Migraine headache     OCD (obsessive compulsive disorder)     Psoriasis     TMJ (temporomandibular joint syndrome)          Past Surgical History:   Procedure Laterality Date    ADENOIDECTOMY      CHOLECYSTECTOMY  6-3-2021    COLONOSCOPY      at the age of 7 for rectal bleeding    COLONOSCOPY N/A 8/10/2023    Procedure: COLONOSCOPY;  Surgeon: Jeanne Whitt MD;  Location: [facility];  Service: Endoscopy;  Laterality: N/A;    ESOPHAGEAL MANOMETRY WITH MEASUREMENT OF IMPEDANCE N/A 02/10/2020    Procedure: MANOMETRY, ESOPHAGUS, WITH IMPEDANCE MEASUREMENT;  Surgeon: Fitz Murray MD;  Location: [address]);  Service: Endoscopy;  Laterality: N/A;  2/3 - pt confirmed    ESOPHAGOGASTRODUODENOSCOPY N/A 12/17/2019    Procedure: EGD  (ESOPHAGOGASTRODUODENOSCOPY);  Surgeon: David Stahl MD;  Location: [facility] ENDO;  Service: Endoscopy;  Laterality: N/A;    ESOPHAGOGASTRODUODENOSCOPY N/A 8/31/2023    Procedure: EGD (ESOPHAGOGASTRODUODENOSCOPY);  Surgeon: Jeanne Whitt MD;  Location: [facility] ENDO;  Service: Endoscopy;  Laterality: N/A;    INNER EAR SURGERY Right     LAPAROSCOPIC CHOLECYSTECTOMY N/A 06/03/2021    Procedure: CHOLECYSTECTOMY-LAPAROSCOPIC;  Surgeon: Farrukh Lorenzo MD;  Location: [facility] OR;  Service: General;  Laterality: N/A;    NASAL SEPTUM SURGERY      TYMPANOPLASTY      bilateral    TYMPANOSTOMY TUBE PLACEMENT       Social History     Tobacco Use    Smoking status: Never    Smokeless tobacco: Never   Substance Use Topics    Alcohol use: No    Drug use: Never     Family History   Problem Relation Name Age of Onset    Arthritis Father Billy Pair     Asthma Father Billy Pair     Heart disease Father Billy Pair     Hyperlipidemia Father Billy Pair     Colon cancer Paternal Uncle Roland Pair         dx in 50s    Cancer Paternal Uncle Roland Pair     Colon cancer Paternal Grandmother Sherron Britton     Diabetes Paternal Grandmother Sherron Britton     Heart failure Paternal Grandmother Sherron Britton     Breast cancer Paternal Grandmother Sherron Britton     Arthritis Paternal Grandmother Sherron Britton     Cancer Paternal Grandmother Sherron Britton     Heart disease Paternal Grandmother Sherron Britton     Diabetes Paternal Grandfather TF     Heart failure Paternal Grandfather TF     Cancer Paternal Grandfather TF     Heart disease Paternal Grandfather TF     Stroke Paternal Grandfather TF     Hemochromatosis Other          Father's side    Ovarian cancer Neg Hx      Colon polyps Neg Hx      Esophageal cancer Neg Hx      Stomach cancer Neg Hx      Ulcerative colitis Neg Hx      Crohn's disease Neg Hx       Review of patient's allergies indicates:   Allergen Reactions    Penicillins     Prednisone Other (See Comments) and Rash     PSORIASIS    Other Reaction(s): Burns     "Norco [hydrocodone-acetaminophen] Itching     I have reviewed past medical, family, and social history. I have reviewed previous nurse notes.    Performance Status:The patient's activity level is functions out of house.      Review of Systems:  a review of eleven systems covering constitutional, Eye, HEENT, Psych, Respiratory, Cardiac, GI, , Musculoskeletal, Endocrine, Dermatologic was negative except for pertinent findings as listed ABOVE and below: pertinent positive as above, rest is good       Exam:Comprehensive exam done. /82 (BP Location: Right arm, Patient Position: Sitting)   Pulse 94   Ht 5' 5" (1.651 m)   Wt 127 kg (279 lb 15.8 oz)   SpO2 95% Comment: on room air at rest  BMI 46.59 kg/m²   Exam included Vitals as listed  Constitutional: She is oriented to person, place, and time. She appears well-developed. No distress.   Nose: Nose normal.   Mouth/Throat: Uvula is midline, oropharynx is clear and moist and mucous membranes are normal. No dental caries. No oropharyngeal exudate, posterior oropharyngeal edema, posterior oropharyngeal erythema or tonsillar abscesses.    Eyes: Pupils are equal, round, and reactive to light.   Neck: No JVD present. No thyromegaly present.   Cardiovascular: Normal rate, regular rhythm and normal heart sounds. Exam reveals no gallop and no friction rub.   No murmur heard.  Pulmonary/Chest: Effort normal and breath sounds normal. No accessory muscle usage or stridor. No apnea and no tachypnea. No respiratory distress, decreased breath sounds, wheezes, rhonchi, rales, or tenderness. Clear breath sounds throughout, on room air, in no acute distress.   Abdominal: Soft. She exhibits no mass. There is no tenderness. No hepatosplenomegaly, hernias and normoactive bowel sounds  Musculoskeletal: Normal range of motion. exhibits no edema.   Neurological:  alert and oriented to person, place, and time. not disoriented.   Skin: Skin is warm and dry. Capillary refill takes less " 2 sec. No cyanosis or erythema. No pallor. Nails show no clubbing.   Psychiatric: normal mood and affect. behavior is normal. Judgment and thought content normal.       Radiographs (ct chest and cxr) reviewed: view by direct vision     CXR  9/23/2022 - Normal  Chest Xray 11/3/2011 - Normal      Patient's labs were reviewed including CBC and CMP  Lab Results   Component Value Date    WBC 8.7 05/22/2025    RBC 5.11 05/22/2025    HGB 14.7 05/22/2025    HCT 44.1 05/22/2025    MCV 86.3 05/22/2025    MCH 28.8 05/22/2025    MCHC 33.3 05/22/2025    RDW 41.5 05/22/2025     (H) 05/22/2025    MPV 9 (L) 05/22/2025    GRAN 5.7 09/12/2024    GRAN 62.1 09/12/2024    LYMPH 2.4 09/12/2024    LYMPH 26.1 09/12/2024    MONO 0.7 09/12/2024    MONO 7.4 09/12/2024    EOS 0.3 09/12/2024    BASO 0.08 09/12/2024    EOSINOPHIL 3.0 09/12/2024    BASOPHIL 0.9 09/12/2024   CMP  Sodium   Date Value Ref Range Status   02/14/2025 139 136 - 145 mmol/L Final     Potassium   Date Value Ref Range Status   02/14/2025 3.8 3.5 - 5.1 mmol/L Final     Chloride   Date Value Ref Range Status   02/14/2025 108 95 - 110 mmol/L Final     CO2   Date Value Ref Range Status   02/14/2025 21 (L) 23 - 29 mmol/L Final     Glucose   Date Value Ref Range Status   02/14/2025 108 70 - 110 mg/dL Final     BUN   Date Value Ref Range Status   02/14/2025 14 6 - 20 mg/dL Final     Creatinine   Date Value Ref Range Status   02/14/2025 0.7 0.5 - 1.4 mg/dL Final     Calcium   Date Value Ref Range Status   02/14/2025 9.4 8.7 - 10.5 mg/dL Final     Total Protein   Date Value Ref Range Status   02/14/2025 6.9 6.0 - 8.4 g/dL Final     Albumin   Date Value Ref Range Status   02/14/2025 3.7 3.5 - 5.2 g/dL Final     Albumin Level   Date Value Ref Range Status   05/22/2025 4.6 3.2 - 4.8 g/dL Final     Total Bilirubin   Date Value Ref Range Status   02/14/2025 0.4 0.1 - 1.0 mg/dL Final     Comment:     For infants and newborns, interpretation of results should be based  on  gestational age, weight and in agreement with clinical  observations.    Premature Infant recommended reference ranges:  Up to 24 hours.............<8.0 mg/dL  Up to 48 hours............<12.0 mg/dL  3-5 days..................<15.0 mg/dL  6-29 days.................<15.0 mg/dL       Alkaline Phosphatase   Date Value Ref Range Status   02/14/2025 89 40 - 150 U/L Final     Alk Phos   Date Value Ref Range Status   05/22/2025 104 46 - 116 IU/L Final     AST   Date Value Ref Range Status   02/14/2025 17 10 - 40 U/L Final     Aspartate Aminotransferase   Date Value Ref Range Status   05/22/2025 23 <34 IU/L Final     ALT   Date Value Ref Range Status   02/14/2025 27 10 - 44 U/L Final     Alanine Aminotransferase   Date Value Ref Range Status   05/22/2025 44 10 - 49 IU/L Final     Anion Gap   Date Value Ref Range Status   02/14/2025 10 8 - 16 mmol/L Final     eGFR   Date Value Ref Range Status   02/14/2025 >60.0 >60 mL/min/1.73 m^2 Final         PFT was not done  Pulmonary Functions Testing Results:        Plan:  Clinical impression is apparently straight forward and impression with management as below.    Mariana was seen today for sleep apnea.    Diagnoses and all orders for this visit:    SUNIL (obstructive sleep apnea)    Obesity, Class III, BMI 40-49.9 (morbid obesity)    Primary insomnia          Body mass index is 46.59 kg/m².     Morbid obesity complicates all aspects of disease management from diagnostic modalities to treatment. Weight loss encouraged and health benefits explained to patient. Nutritional counseling and physical activity encouraged.       Follow up in about 1 year (around 7/7/2026), or if symptoms worsen or fail to improve.    Discussed with patient above for education the following:      Patient Instructions   BIPAP compliance report reviewed - shows great compliance!    Continue BIPAP nightly.    Take Seroquel as prescribed.    Continue current prescription medication regiment. Keep follow up  appointment as scheduled. Please call the office if you have any questions or concerns.
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
## 2025-07-07 NOTE — PATIENT INSTRUCTIONS
Stop Vraylar.    Trial Seroquel 25-50 mg nightly for mood/anxiety/sleep.    Please go to emergency department if feeling as though you are a harm to yourself or others or if you are in crisis. Please call the clinic to report any worsening of symptoms or problems associated with medication.

## 2025-07-11 ENCOUNTER — OFFICE VISIT (OUTPATIENT)
Dept: BEHAVIORAL HEALTH | Facility: CLINIC | Age: 45
End: 2025-07-11
Payer: COMMERCIAL

## 2025-07-11 DIAGNOSIS — G47.00 INSOMNIA, UNSPECIFIED TYPE: ICD-10-CM

## 2025-07-11 PROCEDURE — 99999 PR PBB SHADOW E&M-EST. PATIENT-LVL I: CPT | Mod: PBBFAC,,,

## 2025-07-11 NOTE — PROGRESS NOTES
Primary Care Behavioral Health Integration: Initial  Date:  7/11/2025  Referral Source:  Emeli Morgan MD  Length of Appointment: 40minutes spent face to face    Chief Complaint/Reason for Encounter:  sleep    History of Present Illness: Mariana Mora, a 44 y.o. female referred by Emeli Morgan MD.  Patient was seen, examined and chart was reviewed. Met with patient.     Past Medical History:   Diagnosis Date    ADHD, predominantly inattentive type     Anxiety     Arthritis     Autism     Autism spectrum disorder     Depression     Hypertension     Hypothyroidism     Migraine headache     OCD (obsessive compulsive disorder)     Psoriasis     TMJ (temporomandibular joint syndrome)        Current Medications[1]      Risk assessment:  Patient reports no suicidal ideation  Patient reports no homicidal ideation  Patient reports no self-injurious behavior  Patient reports no violent behavior    Patient advised to call [phone] or present the the nearest ED if they experience suicidal or homicidal ideation, plan or intent.      Psychiatric History:  Diagnosis: JOSE ANGEL, DEP, ADHD   Current Psychiatric Medication: Yes -  BUSPAR, prazosin, quetiapine, zoloft   Medication Trial History:  Medication Trials: Yes ambien, lunesta, trazodone   Outpatient Treatment: Yes - currently seeing Yaima Garcia LCSW for individual therapy. Sees Kiara Nath PA-C for medication management     Current and Past Substance Use:  Alcohol: Patient denies alcohol use.    Drugs: Denied.   Nicotine: denied   Caffeine:  Patient denies caffeine consumption.     Mental Status Exam  General Appearance:  appears stated age, casually dressed   Speech: normal tone, normal rate, normal pitch, normal volume      Level of Cooperation: cooperative      Thought Processes: linear, logical, goal-directed   Mood: depressed, sad      Thought Content: {relevant and appropriate   Affect: congruent and appropriate   Orientation: Oriented x4    Memory/Attention/Concentration: No gross cognitive deficits made evident during conversation   Judgment & Insight: fair   Language  intact             7/7/2025     1:08 PM 5/23/2025    10:42 AM 4/23/2025    10:59 AM   GAD7   1. Feeling nervous, anxious, or on edge? 2 2 3   2. Not being able to stop or control worrying? 3 2 3   3. Worrying too much about different things? 3 3 3   4. Trouble relaxing? 3 3 2   5. Being so restless that it is hard to sit still? 2 1 2   6. Becoming easily annoyed or irritable? 2 1 1   7. Feeling afraid as if something awful might happen? 2 3 3   8. If you checked off any problems, how difficult have these problems made it for you to do your work, take care of things at home, or get along with other people? 2 2 2   JOSE ANGEL-7 Score 17  15  17        Patient-reported           7/7/2025   PHQ-9 Depression Patient Health Questionnaire   Over the last two weeks how often have you been bothered by little interest or pleasure in doing things 2   Over the last two weeks how often have you been bothered by feeling down, depressed or hopeless 2   Over the last two weeks how often have you been bothered by trouble falling or staying asleep, or sleeping too much 3   Over the last two weeks how often have you been bothered by feeling tired or having little energy 3   Over the last two weeks how often have you been bothered by a poor appetite or overeating 2   Over the last two weeks how often have you been bothered by feeling bad about yourself - or that you are a failure or have let yourself or your family down 1   Over the last two weeks how often have you been bothered by trouble concentrating on things, such as reading the newspaper or watching television 3   Over the last two weeks how often have you been bothered by moving or speaking so slowly that other people could have noticed. 2   Over the last two weeks how often have you been bothered by thoughts that you would be better off dead, or of  "hurting yourself 0   PHQ-9 Score 18        Patient-reported         1. Presenting Problem:  What is most distressing/disturbing about current sleep?  Inability to sleep. Nonrestful sleep  Difficulty initiating sleep:  Yes, describe: falling asleep around 130am,but can be as late as 6am  Difficulty maintaining sleep:  Yes, describe: usually awakens after 1-2hrs with an inability to return  Early morning awakening:  Yes, describe: rare  Difficulties waking at intended time:  Yes, describe: will sleep through alarms.  Additional comments:  N/a  2. Sleep Habits (Typical Week):  Beginning of sleep period:  Time to bed: 1130p  Time of lights out: 1130p  Average time to fall asleep: 3hrs  What do you do when you cannot sleep?  Get up,sit in living room, watch tv with blue light glasses, read  Pre-bedtime activities:  Dinner around 5pm, unwind and relax watching tv and playing with dogs.   Pre-sleep arousal:  Rumination: (--)  Worry: (--)  Physical tension: (--)  Fears: (--)  What happens when you cannot get to sleep (thoughts/behaviors)?  Reports random, intrusive thoughts  3. Middle of the Night:  Number of awakenings after sleep onset: 1-2    Total time awake after sleep onset: 3-5hrs    What happens when you awake in the middle of the night (thoughts/behaviors)? Random, intrusive thoughts    4. End of Night:  Final wake time: varies    Time out of bed: varies    Early morning awakenings:  No  How much earlier? N/a    Number of days per week: n/a    Average total sleep time: 5hrs    5. Naps:  Do you take naps?  Yes, describe: 1 hr/daily      6. Daytime Effects:  Energy/fatigue:  Yes, describe: "tired all the time"  Concentration/functioning:  Yes, describe: confusion, memory gaps, poor concentration  Mood: irritable, sad/depressed    7. History:  When did the problems start? Has been off and on for years,    Identifiable precipitating factors: chronic illness    Family history of sleep disorders:   No    Use of sleep " medication:  {Yes, describe: prazosin for nightmares, recently prescribed seroquel but has not initiated yet      Suspected sleep apnea:  Yes, describe: diagnosed with SUNIL  Comments:uses bipap ~5hrs per night    Nightmares:  Yes, describe: distressing nightmares, violent in nature  Comments: prazosin does relieve nightmares    Substance use:  Caffeine: Yes, describe: occasional use, coke  Alcohol: No  Nicotine: No  Recreational drugs: No  Unhealthy sleep practices:  Nocturnal eating: No  Timing of exercise: No  Unusual aspects of sleep environment:No  Comments: n/a    Medical or psychiatric comorbidities:  Yes, describe: depression, anxiety, adhd, hypothyroidism autoimmune disorder with ruleout of M.S.       Impression: Initial appointment focused on gathering history, identifying treatment goals and developing a treatment plan. Patient presents with longstanding history of sleep disturbances, with current episode of insomnia persisting for the past3-4 months. Multiple ongoing medical symptoms and conditions are being evaluated, which may be contributing to sleep disruption. Initiation of CBT-I will proceed with the understanding that treatment goals and interventions may need to be adjusted as the patient's medical workup progresses.     Diagnosis:  1. Insomnia, unspecified type  Ambulatory referral/consult to Behavioral Health          Treatment Goals and Plan:   Established consistent sleep-wake schedule  Improve sleep efficiency  Reduce sleep interfering behaviors and promote long term self-management    Future treatment will utilize CBT.      Return to Clinic: 1 week    Mila Thakkar PsyD           [1]   Current Outpatient Medications:     atorvastatin (LIPITOR) 10 MG tablet, Take 1 tablet (10 mg total) by mouth once daily., Disp: 90 tablet, Rfl: 3    azithromycin (Z-MILLICENT) 250 MG tablet, TAKE 2 TABLETS BY MOUTH FOR 1 DAY THEN TAKE 1 TABLET BY MOUTH EVERY DAY FOR 4 DAYS (Patient not taking: Reported on  7/7/2025), Disp: , Rfl:     busPIRone (BUSPAR) 10 MG tablet, Take 1 tablet (10 mg total) by mouth 2 (two) times daily., Disp: 60 tablet, Rfl: 1    cetirizine (ZYRTEC) 10 MG tablet, Take 10 mg by mouth once daily., Disp: , Rfl:     drospirenone, contraceptive, (SLYND) 4 mg (28) Tab, Take 1 tablet (4 mg total) by mouth once daily., Disp: 28 tablet, Rfl: 11    ergocalciferol (ERGOCALCIFEROL) 50,000 unit Cap, Take 1 capsule (50,000 Units total) by mouth every 7 days., Disp: 12 capsule, Rfl: 0    hydroCHLOROthiazide (MICROZIDE) 12.5 mg capsule, Take 1 capsule (12.5 mg total) by mouth once daily., Disp: 90 capsule, Rfl: 3    ixekizumab (TALTZ AUTOINJECTOR) 80 mg/mL AtIn, Inject 80 mg into the skin every 14 (fourteen) days., Disp: , Rfl:     ketoconazole (NIZORAL) 2 % shampoo, Apply topically twice a week., Disp: 120 mL, Rfl: 3    leflunomide (ARAVA) 20 MG Tab, Take one tablet (20 mg total) by mouth in the morning., Disp: 90 tablet, Rfl: 0    methocarbamoL (ROBAXIN) 500 MG Tab, Take 1 tablet (500 mg total) by mouth 3 (three) times daily as needed (muscle spasm)., Disp: 40 tablet, Rfl: 0    montelukast (SINGULAIR) 10 mg tablet, Take 10 mg by mouth once daily., Disp: , Rfl:     nystatin (MYCOSTATIN) cream, Apply topically 2 (two) times daily as needed (Rash)., Disp: 30 g, Rfl: 0    NYSTOP powder, APPLY TO THE AFFECTED AREA TWICE DAILY, Disp: 30 g, Rfl: 1    omalizumab (XOLAIR) 300 mg/2 mL Syrg, 300 mg., Disp: , Rfl:     pantoprazole (PROTONIX) 40 MG tablet, Take one po daily, Disp: 90 tablet, Rfl: 3    prazosin (MINIPRESS) 5 MG capsule, Take 2 capsules (10 mg total) by mouth nightly., Disp: 180 capsule, Rfl: 1    prochlorperazine (COMPAZINE) 10 MG tablet, Take 1 tablet (10 mg total) by mouth every 6 (six) hours as needed (for headaches in conjunction with Maxalt (Imitrex))., Disp: 15 tablet, Rfl: 6    QUEtiapine (SEROQUEL) 50 MG tablet, Take 1 tablet (50 mg total) by mouth every evening., Disp: 30 tablet, Rfl: 1     rimegepant (NURTEC) 75 mg odt, One dissolving tablet on the tongue daily as needed for migraine. No more than once per day., Disp: 8 tablet, Rfl: 11    rizatriptan (MAXALT-MLT) 10 MG disintegrating tablet, Take 1 tablet (10 mg total) by mouth as needed for Migraine (No more than 2 tablets in 24 hours). (Patient not taking: Reported on 6/18/2025), Disp: 27 tablet, Rfl: 3    sertraline (ZOLOFT) 100 MG tablet, Take 2 tablets (200 mg total) by mouth once daily., Disp: 60 tablet, Rfl: 1    SYNTHROID 112 mcg tablet, Take 1 tablet (112 mcg total) by mouth before breakfast., Disp: 90 tablet, Rfl: 3    tirzepatide, weight loss, (ZEPBOUND) 7.5 mg/0.5 mL PnIj, Inject seven and one half mg into the skin every 7 days, Disp: 2 mL, Rfl: 3    viloxazine 200 mg Cp24, Take 1 capsule (200 mg total) by mouth once daily., Disp: 30 capsule, Rfl: 1    Current Facility-Administered Medications:     onabotulinumtoxina injection 200 Units, 200 Units, Intramuscular, Q90 Days, , 200 Units at 10/02/24 1105    onabotulinumtoxina injection 200 Units, 200 Units, Intramuscular, Q90 Days, , 200 Units at 12/27/24 1327    onabotulinumtoxina injection 200 Units, 200 Units, Intramuscular, Q90 Days, , 200 Units at 04/09/25 1316    onabotulinumtoxina injection 200 Units, 200 Units, Intramuscular, Q90 Days, , 200 Units at 06/26/25 0846    Facility-Administered Medications Ordered in Other Visits:     diphenhydrAMINE injection 12.5 mg, 12.5 mg, Intravenous, PRN, Steve Shoemaker MD    lactated ringers infusion, , Intravenous, Continuous, Steve Shoemaker MD, Last Rate: 10 mL/hr at 06/03/21 1131, New Bag at 06/03/21 1131    lactated ringers infusion, 125 mL/hr, Intravenous, Continuous, Steve Shoemaker MD    LIDOcaine (PF) 10 mg/ml (1%) injection 10 mg, 1 mL, Intradermal, Once, Steve Shoemaker MD    morphine injection 2 mg, 2 mg, Intravenous, Q5 Min PRN, Steve Shoemaker MD, 2 mg at 06/03/21 8502

## 2025-07-17 NOTE — Clinical Note
Caller: Behzad Chi    Relationship: Self    Best call back number: [phone]     What is the medical concern/diagnosis: VISION ISSUES    What specialty or service is being requested: OPTOMETRIST    What is the provider, practice or medical service name: AS CHOSEN BY PRIMARY CARE PROVIDER   Please schedule SUNIL appt with Lizeth Hawkins. She has known dx of SUNIL and uses CPAP compliantly but would like a new provider to manage it.

## 2025-07-21 DIAGNOSIS — E03.9 HYPOTHYROIDISM, UNSPECIFIED TYPE: ICD-10-CM

## 2025-07-21 DIAGNOSIS — E55.9 VITAMIN D DEFICIENCY: ICD-10-CM

## 2025-07-21 DIAGNOSIS — M54.2 NECK PAIN: ICD-10-CM

## 2025-07-21 RX ORDER — LEVOTHYROXINE SODIUM 112 UG/1
112 TABLET ORAL
Qty: 90 TABLET | Refills: 1 | Status: SHIPPED | OUTPATIENT
Start: 2025-07-21

## 2025-07-21 RX ORDER — METHOCARBAMOL 500 MG/1
500 TABLET, FILM COATED ORAL 3 TIMES DAILY PRN
Qty: 40 TABLET | Refills: 0 | Status: SHIPPED | OUTPATIENT
Start: 2025-07-21

## 2025-07-21 RX ORDER — ERGOCALCIFEROL 1.25 MG/1
50000 CAPSULE ORAL
Qty: 12 CAPSULE | Refills: 0 | Status: SHIPPED | OUTPATIENT
Start: 2025-07-21

## 2025-07-21 NOTE — TELEPHONE ENCOUNTER
No care due was identified.  [facility] Embedded Care Due Messages. Reference number: [id number].   7/21/2025 2:38:32 PM CDT

## 2025-07-21 NOTE — TELEPHONE ENCOUNTER
Refill Authorization Note     Refill Decision Note   Mariana Murphys  is requesting a refill authorization.  Brief Assessment and Rationale for Refill:  Approve     Medication Therapy Plan:       Medication Reconciliation Completed: No   Comments:     No Care Gaps recommended.     Note composed:4:14 PM 07/21/2025

## 2025-07-21 NOTE — TELEPHONE ENCOUNTER
No care due was identified.  [facility] Embedded Care Due Messages. Reference number: [id number].   7/21/2025 2:39:35 PM CDT

## 2025-07-21 NOTE — TELEPHONE ENCOUNTER
Refill Routing Note   Medication(s) are not appropriate for processing by [facility] for the following reason(s):        Outside of protocol    [facility] action(s):  Route             Appointments  past 12m or future 3m with PCP    Date Provider   Last Visit   6/18/2025 Emeli Morgan MD   Next Visit   7/21/2025 Emeli Morgan MD   ED visits in past 90 days: 0        Note composed:4:14 PM 07/21/2025

## 2025-07-23 ENCOUNTER — PATIENT MESSAGE (OUTPATIENT)
Dept: BEHAVIORAL HEALTH | Facility: CLINIC | Age: 45
End: 2025-07-23
Payer: COMMERCIAL

## 2025-07-31 ENCOUNTER — PATIENT MESSAGE (OUTPATIENT)
Dept: FAMILY MEDICINE | Facility: CLINIC | Age: 45
End: 2025-07-31
Payer: COMMERCIAL

## 2025-08-01 ENCOUNTER — OFFICE VISIT (OUTPATIENT)
Dept: BEHAVIORAL HEALTH | Facility: CLINIC | Age: 45
End: 2025-08-01
Payer: COMMERCIAL

## 2025-08-01 DIAGNOSIS — G47.01 INSOMNIA DUE TO MEDICAL CONDITION: Primary | ICD-10-CM

## 2025-08-01 NOTE — PROGRESS NOTES
Individual Psychotherapy Note    Date: 8/1/2025   Site:  [facility]      Therapeutic Intervention: Met with patient.  Outpatient - Behavior modifying psychotherapy 45 min - CPT code 34866      Patient was last seen by me on 7/11/2025    Problem list  Patient Active Problem List   Diagnosis    Migraine with aura, with intractable migraine, so stated, without mention of status migrainosus    Intermittent confusion    Vitamin B12 deficiency    EMU    Vitamin B2 deficiency    Vitamin B6 deficiency    Anxiety    Gastroesophageal reflux disease    Psoriatic arthritis    Dysphagia    Gastroesophageal reflux disease with esophagitis    Abdominal pain    Preop examination    Hiatal hernia    Obesity, Class III, BMI 40-49.9 (morbid obesity)    GERD (gastroesophageal reflux disease)    Diarrhea    History of esophageal stricture    Nausea    Vitamin D deficiency    Primary insomnia    Functional diarrhea    Symptomatic cholelithiasis    Facial flushing    Dyslipidemia (high LDL; low HDL), baseline LDL-C 181    Cotton wool spots, OS    NAFLD (nonalcoholic fatty liver disease)    Essential thrombocytosis    Cardiovascular event risk, ASCVD 10-years risk 2.4%, 2021    RASHID (dyspnea on exertion), onset 2016    SUNIL on CPAP, 100% use    Tachycardia, with standing    Kyphosis (acquired) (postural)    Impaired mobility and activities of daily living    Acute cystitis with hematuria    Hematuria    Neck pain       Chief complaint/reason for encounter: sleep       Current Medications  Current Outpatient Medications   Medication    atorvastatin (LIPITOR) 10 MG tablet    azithromycin (Z-MILLICENT) 250 MG tablet    busPIRone (BUSPAR) 10 MG tablet    cetirizine (ZYRTEC) 10 MG tablet    drospirenone, contraceptive, (SLYND) 4 mg (28) Tab    ergocalciferol (VITAMIN D2) 50,000 unit Cap    hydroCHLOROthiazide (MICROZIDE) 12.5 mg capsule    ixekizumab (TALTZ AUTOINJECTOR) 80 mg/mL AtIn    ketoconazole (NIZORAL) 2 % shampoo    leflunomide (ARAVA) 20 MG  Tab    levETIRAcetam (KEPPRA) 500 MG Tab    methocarbamoL (ROBAXIN) 500 MG Tab    montelukast (SINGULAIR) 10 mg tablet    nystatin (MYCOSTATIN) cream    NYSTOP powder    omalizumab (XOLAIR) 300 mg/2 mL Syrg    pantoprazole (PROTONIX) 40 MG tablet    prazosin (MINIPRESS) 5 MG capsule    prochlorperazine (COMPAZINE) 10 MG tablet    QUEtiapine (SEROQUEL) 50 MG tablet    rimegepant (NURTEC) 75 mg odt    rizatriptan (MAXALT-MLT) 10 MG disintegrating tablet    sertraline (ZOLOFT) 100 MG tablet    SYNTHROID 112 mcg tablet    tirzepatide, weight loss, (ZEPBOUND) 7.5 mg/0.5 mL PnIj    viloxazine 200 mg Cp24     Current Facility-Administered Medications   Medication Frequency    onabotulinumtoxina injection 200 Units Q90 Days    onabotulinumtoxina injection 200 Units Q90 Days    onabotulinumtoxina injection 200 Units Q90 Days    onabotulinumtoxina injection 200 Units Q90 Days     Facility-Administered Medications Ordered in Other Visits   Medication Frequency    diphenhydrAMINE injection 12.5 mg PRN    lactated ringers infusion Continuous    lactated ringers infusion Continuous    LIDOcaine (PF) 10 mg/ml (1%) injection 10 mg Once    morphine injection 2 mg Q5 Min PRN       Objective:  Mariana Mora arrived promptly for the session. Ms. Mora was independently ambulatory at the time of session. The patient was fully cooperative throughout the session.  Appearance: age appropriate, casually  dressed, adequately  groomed  Behavior/Cooperation: friendly and cooperative  Speech: quiet  Mood: sad, down  Affect: flat  Thought Process: goal-directed, logical  Thought Content: normal,  No delusions or paranoia; did not appear to be responding to internal stimuli during the session  Orientation: grossly intact  Memory: grossly intact  Attention Span/Concentration: Attends to session with some distraction due to feeling ill; reports no difficulty  Fund of Knowledge: average  Estimate of Intelligence: average from verbal  skills and history  Cognition: grossly intact  Insight: patient has awareness of illness; good insight into own behavior and behavior of others  Judgment: the patient's behavior is adequate to circumstances    Interval history and content of current session: Patient explains she was recently diagnosed with temporal lobe epilepsy. She was started on Keppra a little over a week ago. Since starting medication, she has been extremely tired and fatigued. She notes although she is tired, it continues to take her up to 5 hours to fall asleep. As a result, she is sleeping in later. Patient did complete her sleep log for the past two weeks however given the new medication, data may not be accurate. Completed session 1 of CBT-I which includes psychoeducation on sleep and cognitive restructuring. Reviewed sleep hygiene tips and provided another sleep log to complete the next two weeks.     Risk parameters:   Patient reports no suicidal ideation  Patient reports no homicidal ideation  Patient reports no self-injurious behavior  Patient reports no violent behavior   Patient reports mild suicidal ideation without intent or plan   Safety needs:  None at this time      Verbal deficits: None     Patient's response to intervention:The patient's response to intervention is accepting.     Progress toward goals and other mental status changes:  The patient's progress toward goals is limited.      Progress to date:No Progress - Continue Objectives      Goals from last visit: Met       Treatment Plan:implement sleep hygiene strategies and complete sleep log  Target symptoms: Sleep  Why chosen therapy is appropriate versus another modality: relevant to diagnosis, patient responds to this modality, evidence based practice  Outcome monitoring methods: self-report, checklist/rating scale  Therapeutic intervention type: behavior modifying psychotherapy, supportive psychotherapy  Prognosis: Fair      Behavioral goals:    Sleep: compete sleep  log and implement sleep hygiene strategies    Return to clinic: as scheduled     Length of Service (minutes direct face-to-face contact): 45    Diagnosis:     ICD-10-CM ICD-9-CM   1. Insomnia due to medical condition  G47.01 327.01              Mila Thakkar PsyD

## 2025-08-05 ENCOUNTER — PATIENT MESSAGE (OUTPATIENT)
Dept: PSYCHIATRY | Facility: CLINIC | Age: 45
End: 2025-08-05
Payer: COMMERCIAL

## 2025-08-15 DIAGNOSIS — F42.9 OBSESSIVE-COMPULSIVE DISORDER, UNSPECIFIED TYPE: ICD-10-CM

## 2025-08-15 RX ORDER — SERTRALINE HYDROCHLORIDE 100 MG/1
200 TABLET, FILM COATED ORAL DAILY
Qty: 60 TABLET | Refills: 1 | Status: SHIPPED | OUTPATIENT
Start: 2025-08-15

## 2025-08-18 ENCOUNTER — OFFICE VISIT (OUTPATIENT)
Dept: PSYCHIATRY | Facility: CLINIC | Age: 45
End: 2025-08-18
Payer: COMMERCIAL

## 2025-08-18 ENCOUNTER — PATIENT MESSAGE (OUTPATIENT)
Dept: PSYCHIATRY | Facility: CLINIC | Age: 45
End: 2025-08-18
Payer: COMMERCIAL

## 2025-08-18 VITALS
DIASTOLIC BLOOD PRESSURE: 73 MMHG | SYSTOLIC BLOOD PRESSURE: 107 MMHG | BODY MASS INDEX: 45.36 KG/M2 | HEART RATE: 89 BPM | HEIGHT: 65 IN | WEIGHT: 272.25 LBS

## 2025-08-18 DIAGNOSIS — F42.9 OBSESSIVE-COMPULSIVE DISORDER, UNSPECIFIED TYPE: Primary | ICD-10-CM

## 2025-08-18 DIAGNOSIS — F41.1 GAD (GENERALIZED ANXIETY DISORDER): ICD-10-CM

## 2025-08-18 DIAGNOSIS — F33.41 RECURRENT MAJOR DEPRESSIVE DISORDER, IN PARTIAL REMISSION: ICD-10-CM

## 2025-08-18 DIAGNOSIS — G47.33 OSA (OBSTRUCTIVE SLEEP APNEA): ICD-10-CM

## 2025-08-18 DIAGNOSIS — F90.2 ATTENTION DEFICIT HYPERACTIVITY DISORDER (ADHD), COMBINED TYPE: ICD-10-CM

## 2025-08-18 PROCEDURE — 1159F MED LIST DOCD IN RCRD: CPT | Mod: CPTII,S$GLB,, | Performed by: PHYSICIAN ASSISTANT

## 2025-08-18 PROCEDURE — G2211 COMPLEX E/M VISIT ADD ON: HCPCS | Mod: S$GLB,,, | Performed by: PHYSICIAN ASSISTANT

## 2025-08-18 PROCEDURE — 3044F HG A1C LEVEL LT 7.0%: CPT | Mod: CPTII,S$GLB,, | Performed by: PHYSICIAN ASSISTANT

## 2025-08-18 PROCEDURE — 99999 PR PBB SHADOW E&M-EST. PATIENT-LVL V: CPT | Mod: PBBFAC,,, | Performed by: PHYSICIAN ASSISTANT

## 2025-08-18 PROCEDURE — 1160F RVW MEDS BY RX/DR IN RCRD: CPT | Mod: CPTII,S$GLB,, | Performed by: PHYSICIAN ASSISTANT

## 2025-08-18 PROCEDURE — 99214 OFFICE O/P EST MOD 30 MIN: CPT | Mod: S$GLB,,, | Performed by: PHYSICIAN ASSISTANT

## 2025-08-18 PROCEDURE — 3008F BODY MASS INDEX DOCD: CPT | Mod: CPTII,S$GLB,, | Performed by: PHYSICIAN ASSISTANT

## 2025-08-18 PROCEDURE — 3074F SYST BP LT 130 MM HG: CPT | Mod: CPTII,S$GLB,, | Performed by: PHYSICIAN ASSISTANT

## 2025-08-18 PROCEDURE — 3078F DIAST BP <80 MM HG: CPT | Mod: CPTII,S$GLB,, | Performed by: PHYSICIAN ASSISTANT

## 2025-08-18 RX ORDER — BUSPIRONE HYDROCHLORIDE 10 MG/1
10 TABLET ORAL 2 TIMES DAILY
Qty: 60 TABLET | Refills: 1 | Status: SHIPPED | OUTPATIENT
Start: 2025-08-18

## 2025-08-25 ENCOUNTER — OFFICE VISIT (OUTPATIENT)
Dept: FAMILY MEDICINE | Facility: CLINIC | Age: 45
End: 2025-08-25
Payer: COMMERCIAL

## 2025-08-25 VITALS
HEIGHT: 65 IN | RESPIRATION RATE: 18 BRPM | BODY MASS INDEX: 44.15 KG/M2 | WEIGHT: 265 LBS | DIASTOLIC BLOOD PRESSURE: 84 MMHG | HEART RATE: 59 BPM | SYSTOLIC BLOOD PRESSURE: 122 MMHG | OXYGEN SATURATION: 97 %

## 2025-08-25 DIAGNOSIS — B37.2 CANDIDAL INTERTRIGO: Primary | ICD-10-CM

## 2025-08-25 PROCEDURE — 3008F BODY MASS INDEX DOCD: CPT | Mod: CPTII,S$GLB,, | Performed by: FAMILY MEDICINE

## 2025-08-25 PROCEDURE — 99214 OFFICE O/P EST MOD 30 MIN: CPT | Mod: S$GLB,,, | Performed by: FAMILY MEDICINE

## 2025-08-25 PROCEDURE — 3044F HG A1C LEVEL LT 7.0%: CPT | Mod: CPTII,S$GLB,, | Performed by: FAMILY MEDICINE

## 2025-08-25 PROCEDURE — 3079F DIAST BP 80-89 MM HG: CPT | Mod: CPTII,S$GLB,, | Performed by: FAMILY MEDICINE

## 2025-08-25 PROCEDURE — 99999 PR PBB SHADOW E&M-EST. PATIENT-LVL III: CPT | Mod: PBBFAC,,, | Performed by: FAMILY MEDICINE

## 2025-08-25 PROCEDURE — 3074F SYST BP LT 130 MM HG: CPT | Mod: CPTII,S$GLB,, | Performed by: FAMILY MEDICINE

## 2025-08-25 RX ORDER — CLOTRIMAZOLE AND BETAMETHASONE DIPROPIONATE 10; .64 MG/G; MG/G
CREAM TOPICAL NIGHTLY PRN
Qty: 45 G | Refills: 3 | Status: SHIPPED | OUTPATIENT
Start: 2025-08-25

## 2025-08-25 RX ORDER — FLUCONAZOLE 150 MG/1
150 TABLET ORAL
Qty: 3 TABLET | Refills: 0 | Status: SHIPPED | OUTPATIENT
Start: 2025-08-25 | End: 2025-09-01

## 2025-08-25 RX ORDER — DOXYCYCLINE 100 MG/1
100 CAPSULE ORAL 2 TIMES DAILY
Qty: 20 CAPSULE | Refills: 0 | Status: SHIPPED | OUTPATIENT
Start: 2025-08-25 | End: 2025-09-04

## 2025-08-25 RX ORDER — MICONAZOLE NITRATE 2 G/100G
POWDER TOPICAL 2 TIMES DAILY
Qty: 85 G | Refills: 6 | Status: SHIPPED | OUTPATIENT
Start: 2025-08-25 | End: 2025-09-04

## (undated) DEVICE — CANISTER SUCTION 3000CC

## (undated) DEVICE — SUT CTD VICRYL 3-0 CR/SH

## (undated) DEVICE — UNDERGLOVES BIOGEL PI SIZE 7.5

## (undated) DEVICE — GLOVE SURG ULTRA TOUCH 7.5

## (undated) DEVICE — GLOVE SURGEONS ULTRA TOUCH 6.5

## (undated) DEVICE — APPLICATOR FLOSEAL ENDO 35CM

## (undated) DEVICE — GOWN B1 X-LG X-LONG

## (undated) DEVICE — APPLIER CLIP ENDO LIGAMAX 5MM

## (undated) DEVICE — TROCAR KII BLLN 12MM 10CM

## (undated) DEVICE — FILTER LAPAROSCOPIC SMOKE EVAC

## (undated) DEVICE — SEE MEDLINE ITEM 156964

## (undated) DEVICE — GLOVE SURG ULTRA TOUCH 7

## (undated) DEVICE — SYR 20ML BLUE LUER LOCK

## (undated) DEVICE — STRAP OR TABLE 5IN X 72IN

## (undated) DEVICE — TUBING PNEUMO

## (undated) DEVICE — MATRIX HEMOSTATIC FLOSEAL 5ML

## (undated) DEVICE — UNDERGLOVE BIOGEL PI SZ 6.5 LF

## (undated) DEVICE — SOL CLEARIFY VISUALIZATION LAP

## (undated) DEVICE — TROCAR ENDO Z THREAD KII 5X100

## (undated) DEVICE — CHLORAPREP W TINT 26ML APPL

## (undated) DEVICE — SUT 0 VICRYL / UR6 (J603)

## (undated) DEVICE — ELECTRODE LAP WIRE J-HOOK 36CM

## (undated) DEVICE — APPLICATOR SURGICEL ENDOSCOPIC

## (undated) DEVICE — HEMOSTAT SURGICEL PWD 3G

## (undated) DEVICE — HEMOSTAT SURGICEL 4X8IN

## (undated) DEVICE — IRRIGATOR SUCTION W/TIP

## (undated) DEVICE — BLADE EZ CLEAN 2.5IN MODIFIED

## (undated) DEVICE — CANNULA LAP SEAL Z THRD 5X100

## (undated) DEVICE — ADHESIVE DERMABOND ADVANCED

## (undated) DEVICE — SLEEVE SCD EXPRESS CALF LARGE

## (undated) DEVICE — SUT CTD VICRYL 4-0 BR PS-2

## (undated) DEVICE — SCISSOR TIP ENDOCUT DISPOSABLE

## (undated) DEVICE — DECANTER VIAL ASEPTIC TRANSFER

## (undated) DEVICE — DRAPE ABDOMINAL TIBURON 14X11

## (undated) DEVICE — ELECTRODE REM PLYHSV RETURN 9

## (undated) DEVICE — SOL IRR NACL .9% 3000ML

## (undated) DEVICE — BAG TISS RETRV MONARCH 10MM